# Patient Record
Sex: MALE | Race: WHITE | Employment: OTHER | URBAN - NONMETROPOLITAN AREA
[De-identification: names, ages, dates, MRNs, and addresses within clinical notes are randomized per-mention and may not be internally consistent; named-entity substitution may affect disease eponyms.]

---

## 2017-01-11 ENCOUNTER — HOSPITAL ENCOUNTER (INPATIENT)
Facility: HOSPITAL | Age: 82
LOS: 4 days | Discharge: NURSING, REHAB OR LONG-TERM CARE | DRG: 638 | End: 2017-01-16
Attending: INTERNAL MEDICINE | Admitting: NURSE PRACTITIONER
Payer: MEDICARE

## 2017-01-11 DIAGNOSIS — E78.00 PURE HYPERCHOLESTEROLEMIA: ICD-10-CM

## 2017-01-11 DIAGNOSIS — I10 HTN (HYPERTENSION): ICD-10-CM

## 2017-01-11 DIAGNOSIS — Z79.4 TYPE 2 DIABETES MELLITUS WITHOUT COMPLICATION, WITH LONG-TERM CURRENT USE OF INSULIN (H): ICD-10-CM

## 2017-01-11 DIAGNOSIS — I10 ESSENTIAL HYPERTENSION: ICD-10-CM

## 2017-01-11 DIAGNOSIS — E16.2 HYPOGLYCEMIA: ICD-10-CM

## 2017-01-11 DIAGNOSIS — J44.9 COPD (CHRONIC OBSTRUCTIVE PULMONARY DISEASE) (H): ICD-10-CM

## 2017-01-11 DIAGNOSIS — N40.0 HYPERTROPHY OF PROSTATE WITHOUT URINARY OBSTRUCTION: ICD-10-CM

## 2017-01-11 DIAGNOSIS — M86.171 OSTEOMYELITIS OF FOOT, RIGHT, ACUTE (H): Primary | ICD-10-CM

## 2017-01-11 DIAGNOSIS — E11.9 TYPE 2 DIABETES MELLITUS WITHOUT COMPLICATION, WITH LONG-TERM CURRENT USE OF INSULIN (H): ICD-10-CM

## 2017-01-11 DIAGNOSIS — Z79.4 TYPE 2 DIABETES MELLITUS WITH HYPOGLYCEMIA WITHOUT COMA, WITH LONG-TERM CURRENT USE OF INSULIN (H): ICD-10-CM

## 2017-01-11 DIAGNOSIS — E11.649 TYPE 2 DIABETES MELLITUS WITH HYPOGLYCEMIA WITHOUT COMA, WITH LONG-TERM CURRENT USE OF INSULIN (H): ICD-10-CM

## 2017-01-11 DIAGNOSIS — I48.21 PERMANENT ATRIAL FIBRILLATION (H): ICD-10-CM

## 2017-01-11 DIAGNOSIS — I48.91 A-FIB (H): ICD-10-CM

## 2017-01-11 DIAGNOSIS — M19.90 OSTEOARTHRITIS, UNSPECIFIED OSTEOARTHRITIS TYPE, UNSPECIFIED SITE: ICD-10-CM

## 2017-01-11 DIAGNOSIS — K21.9 GASTROESOPHAGEAL REFLUX DISEASE WITHOUT ESOPHAGITIS: ICD-10-CM

## 2017-01-11 DIAGNOSIS — I25.10 CORONARY ARTERY DISEASE WITHOUT ANGINA PECTORIS, UNSPECIFIED VESSEL OR LESION TYPE, UNSPECIFIED WHETHER NATIVE OR TRANSPLANTED HEART: ICD-10-CM

## 2017-01-11 DIAGNOSIS — J41.0 SIMPLE CHRONIC BRONCHITIS (H): ICD-10-CM

## 2017-01-11 DIAGNOSIS — I10 BENIGN ESSENTIAL HYPERTENSION: ICD-10-CM

## 2017-01-11 DIAGNOSIS — K21.9 ESOPHAGEAL REFLUX: ICD-10-CM

## 2017-01-11 DIAGNOSIS — S20.221A BACK CONTUSION, RIGHT, INITIAL ENCOUNTER: ICD-10-CM

## 2017-01-11 PROBLEM — C61: Chronic | Status: ACTIVE | Noted: 2017-01-11

## 2017-01-11 PROBLEM — T38.3X5A HYPOGLYCEMIA DUE TO INSULIN: Status: ACTIVE | Noted: 2017-01-11

## 2017-01-11 PROBLEM — E16.0 HYPOGLYCEMIA DUE TO INSULIN: Status: ACTIVE | Noted: 2017-01-11

## 2017-01-11 LAB
ALBUMIN SERPL-MCNC: 2.4 G/DL (ref 3.4–5)
ALBUMIN UR-MCNC: 30 MG/DL
ALP SERPL-CCNC: 86 U/L (ref 40–150)
ALT SERPL W P-5'-P-CCNC: 15 U/L (ref 0–70)
ANION GAP SERPL CALCULATED.3IONS-SCNC: 9 MMOL/L (ref 3–14)
APPEARANCE UR: CLEAR
AST SERPL W P-5'-P-CCNC: 10 U/L (ref 0–45)
BASOPHILS # BLD AUTO: 0 10E9/L (ref 0–0.2)
BASOPHILS NFR BLD AUTO: 0.2 %
BILIRUB SERPL-MCNC: 0.5 MG/DL (ref 0.2–1.3)
BILIRUB UR QL STRIP: NEGATIVE
BUN SERPL-MCNC: 28 MG/DL (ref 7–30)
CALCIUM SERPL-MCNC: 8.6 MG/DL (ref 8.5–10.1)
CHLORIDE SERPL-SCNC: 102 MMOL/L (ref 94–109)
CK SERPL-CCNC: 56 U/L (ref 30–300)
CO2 SERPL-SCNC: 28 MMOL/L (ref 20–32)
COLOR UR AUTO: YELLOW
CREAT SERPL-MCNC: 1.56 MG/DL (ref 0.66–1.25)
DIFFERENTIAL METHOD BLD: ABNORMAL
EOSINOPHIL # BLD AUTO: 0 10E9/L (ref 0–0.7)
EOSINOPHIL NFR BLD AUTO: 0.2 %
ERYTHROCYTE [DISTWIDTH] IN BLOOD BY AUTOMATED COUNT: 13.2 % (ref 10–15)
GFR SERPL CREATININE-BSD FRML MDRD: 43 ML/MIN/1.7M2
GLUCOSE BLDC GLUCOMTR-MCNC: 125 MG/DL (ref 70–99)
GLUCOSE BLDC GLUCOMTR-MCNC: 155 MG/DL (ref 70–99)
GLUCOSE BLDC GLUCOMTR-MCNC: 204 MG/DL (ref 70–99)
GLUCOSE BLDC GLUCOMTR-MCNC: 212 MG/DL (ref 70–99)
GLUCOSE BLDC GLUCOMTR-MCNC: 246 MG/DL (ref 70–99)
GLUCOSE SERPL-MCNC: 161 MG/DL (ref 70–99)
GLUCOSE UR STRIP-MCNC: 70 MG/DL
HCT VFR BLD AUTO: 36.5 % (ref 40–53)
HGB BLD-MCNC: 12.4 G/DL (ref 13.3–17.7)
HGB UR QL STRIP: ABNORMAL
HYALINE CASTS #/AREA URNS LPF: 2 /LPF (ref 0–2)
IMM GRANULOCYTES # BLD: 0.1 10E9/L (ref 0–0.4)
IMM GRANULOCYTES NFR BLD: 0.5 %
INR PPP: 1.2 (ref 0.8–1.2)
KETONES UR STRIP-MCNC: NEGATIVE MG/DL
LACTATE SERPL-SCNC: 1.6 MMOL/L (ref 0.4–2)
LEUKOCYTE ESTERASE UR QL STRIP: NEGATIVE
LYMPHOCYTES # BLD AUTO: 1.1 10E9/L (ref 0.8–5.3)
LYMPHOCYTES NFR BLD AUTO: 7.7 %
MCH RBC QN AUTO: 29.9 PG (ref 26.5–33)
MCHC RBC AUTO-ENTMCNC: 34 G/DL (ref 31.5–36.5)
MCV RBC AUTO: 88 FL (ref 78–100)
MONOCYTES # BLD AUTO: 0.9 10E9/L (ref 0–1.3)
MONOCYTES NFR BLD AUTO: 6.3 %
MUCOUS THREADS #/AREA URNS LPF: PRESENT /LPF
NEUTROPHILS # BLD AUTO: 12.1 10E9/L (ref 1.6–8.3)
NEUTROPHILS NFR BLD AUTO: 85.1 %
NITRATE UR QL: NEGATIVE
NRBC # BLD AUTO: 0 10*3/UL
NRBC BLD AUTO-RTO: 0 /100
PH UR STRIP: 5.5 PH (ref 4.7–8)
PLATELET # BLD AUTO: 384 10E9/L (ref 150–450)
POTASSIUM SERPL-SCNC: 4.4 MMOL/L (ref 3.4–5.3)
PROT SERPL-MCNC: 6.9 G/DL (ref 6.8–8.8)
RBC # BLD AUTO: 4.15 10E12/L (ref 4.4–5.9)
RBC #/AREA URNS AUTO: 2 /HPF (ref 0–2)
SODIUM SERPL-SCNC: 139 MMOL/L (ref 133–144)
SP GR UR STRIP: 1.02 (ref 1–1.03)
URN SPEC COLLECT METH UR: ABNORMAL
UROBILINOGEN UR STRIP-MCNC: 2 MG/DL (ref 0–2)
WBC # BLD AUTO: 14.2 10E9/L (ref 4–11)
WBC #/AREA URNS AUTO: 3 /HPF (ref 0–2)

## 2017-01-11 PROCEDURE — 99223 1ST HOSP IP/OBS HIGH 75: CPT | Mod: AI | Performed by: NURSE PRACTITIONER

## 2017-01-11 PROCEDURE — 81001 URINALYSIS AUTO W/SCOPE: CPT | Performed by: INTERNAL MEDICINE

## 2017-01-11 PROCEDURE — 85025 COMPLETE CBC W/AUTO DIFF WBC: CPT | Performed by: INTERNAL MEDICINE

## 2017-01-11 PROCEDURE — 73630 X-RAY EXAM OF FOOT: CPT | Mod: TC,RT

## 2017-01-11 PROCEDURE — 96360 HYDRATION IV INFUSION INIT: CPT

## 2017-01-11 PROCEDURE — 85610 PROTHROMBIN TIME: CPT | Performed by: INTERNAL MEDICINE

## 2017-01-11 PROCEDURE — 40000786 ZZHCL STATISTIC ACTIVE MRSA SURVEILLANCE CULTURE: Performed by: NURSE PRACTITIONER

## 2017-01-11 PROCEDURE — 93005 ELECTROCARDIOGRAM TRACING: CPT

## 2017-01-11 PROCEDURE — 99285 EMERGENCY DEPT VISIT HI MDM: CPT | Mod: 25

## 2017-01-11 PROCEDURE — A9270 NON-COVERED ITEM OR SERVICE: HCPCS | Mod: GY | Performed by: NURSE PRACTITIONER

## 2017-01-11 PROCEDURE — 71020 ZZHC CHEST TWO VIEWS, FRONT/LAT: CPT | Mod: TC

## 2017-01-11 PROCEDURE — 93010 ELECTROCARDIOGRAM REPORT: CPT | Performed by: INTERNAL MEDICINE

## 2017-01-11 PROCEDURE — 99283 EMERGENCY DEPT VISIT LOW MDM: CPT | Performed by: INTERNAL MEDICINE

## 2017-01-11 PROCEDURE — 82550 ASSAY OF CK (CPK): CPT | Performed by: INTERNAL MEDICINE

## 2017-01-11 PROCEDURE — G0378 HOSPITAL OBSERVATION PER HR: HCPCS

## 2017-01-11 PROCEDURE — 40000275 ZZH STATISTIC RCP TIME EA 10 MIN

## 2017-01-11 PROCEDURE — 25000132 ZZH RX MED GY IP 250 OP 250 PS 637: Mod: GY | Performed by: NURSE PRACTITIONER

## 2017-01-11 PROCEDURE — 36415 COLL VENOUS BLD VENIPUNCTURE: CPT | Performed by: INTERNAL MEDICINE

## 2017-01-11 PROCEDURE — 00000146 ZZHCL STATISTIC GLUCOSE BY METER IP

## 2017-01-11 PROCEDURE — 96361 HYDRATE IV INFUSION ADD-ON: CPT

## 2017-01-11 PROCEDURE — 70450 CT HEAD/BRAIN W/O DYE: CPT | Mod: TC

## 2017-01-11 PROCEDURE — 83605 ASSAY OF LACTIC ACID: CPT | Performed by: INTERNAL MEDICINE

## 2017-01-11 PROCEDURE — 25000125 ZZHC RX 250: Performed by: INTERNAL MEDICINE

## 2017-01-11 PROCEDURE — 80053 COMPREHEN METABOLIC PANEL: CPT | Performed by: INTERNAL MEDICINE

## 2017-01-11 RX ORDER — METOPROLOL SUCCINATE 50 MG/1
50 TABLET, EXTENDED RELEASE ORAL DAILY
Status: DISCONTINUED | OUTPATIENT
Start: 2017-01-11 | End: 2017-01-14

## 2017-01-11 RX ORDER — CANDESARTAN 16 MG/1
16 TABLET ORAL DAILY
Status: DISCONTINUED | OUTPATIENT
Start: 2017-01-11 | End: 2017-01-13

## 2017-01-11 RX ORDER — DEXTROSE MONOHYDRATE 50 MG/ML
INJECTION, SOLUTION INTRAVENOUS CONTINUOUS
Status: DISCONTINUED | OUTPATIENT
Start: 2017-01-11 | End: 2017-01-12

## 2017-01-11 RX ORDER — ACETAMINOPHEN 325 MG/1
650 TABLET ORAL EVERY 4 HOURS PRN
Status: DISCONTINUED | OUTPATIENT
Start: 2017-01-11 | End: 2017-01-16 | Stop reason: HOSPADM

## 2017-01-11 RX ORDER — NICOTINE POLACRILEX 4 MG
15-30 LOZENGE BUCCAL
Status: DISCONTINUED | OUTPATIENT
Start: 2017-01-11 | End: 2017-01-16 | Stop reason: HOSPADM

## 2017-01-11 RX ORDER — DIGOXIN 125 MCG
125 TABLET ORAL DAILY
Status: DISCONTINUED | OUTPATIENT
Start: 2017-01-11 | End: 2017-01-16 | Stop reason: HOSPADM

## 2017-01-11 RX ORDER — ASPIRIN 325 MG
325 TABLET ORAL DAILY
Status: DISCONTINUED | OUTPATIENT
Start: 2017-01-11 | End: 2017-01-16 | Stop reason: HOSPADM

## 2017-01-11 RX ORDER — WARFARIN SODIUM 4 MG/1
8 TABLET ORAL
Status: COMPLETED | OUTPATIENT
Start: 2017-01-11 | End: 2017-01-11

## 2017-01-11 RX ORDER — DEXTROSE MONOHYDRATE 25 G/50ML
25-50 INJECTION, SOLUTION INTRAVENOUS
Status: DISCONTINUED | OUTPATIENT
Start: 2017-01-11 | End: 2017-01-16 | Stop reason: HOSPADM

## 2017-01-11 RX ORDER — NALOXONE HYDROCHLORIDE 0.4 MG/ML
.1-.4 INJECTION, SOLUTION INTRAMUSCULAR; INTRAVENOUS; SUBCUTANEOUS
Status: DISCONTINUED | OUTPATIENT
Start: 2017-01-11 | End: 2017-01-16 | Stop reason: HOSPADM

## 2017-01-11 RX ORDER — HYDROCODONE BITARTRATE AND ACETAMINOPHEN 5; 325 MG/1; MG/1
1 TABLET ORAL EVERY 6 HOURS PRN
Status: DISCONTINUED | OUTPATIENT
Start: 2017-01-11 | End: 2017-01-16 | Stop reason: HOSPADM

## 2017-01-11 RX ADMIN — METOPROLOL SUCCINATE 50 MG: 50 TABLET, EXTENDED RELEASE ORAL at 18:30

## 2017-01-11 RX ADMIN — INSULIN GLARGINE 80 UNITS: 300 INJECTION, SOLUTION SUBCUTANEOUS at 22:42

## 2017-01-11 RX ADMIN — CANDESARTAN CILEXETIL 16 MG: 16 TABLET ORAL at 12:29

## 2017-01-11 RX ADMIN — WARFARIN SODIUM 8 MG: 4 TABLET ORAL at 18:30

## 2017-01-11 RX ADMIN — DEXTROSE MONOHYDRATE: 50 INJECTION, SOLUTION INTRAVENOUS at 06:15

## 2017-01-11 RX ADMIN — INSULIN ASPART 2 UNITS: 100 INJECTION, SOLUTION INTRAVENOUS; SUBCUTANEOUS at 12:21

## 2017-01-11 RX ADMIN — INSULIN ASPART 2 UNITS: 100 INJECTION, SOLUTION INTRAVENOUS; SUBCUTANEOUS at 18:32

## 2017-01-11 RX ADMIN — DIGOXIN 125 MCG: 125 TABLET ORAL at 12:29

## 2017-01-11 RX ADMIN — CANDESARTAN CILEXETIL 16 MG: 16 TABLET ORAL at 12:21

## 2017-01-11 RX ADMIN — RANITIDINE HYDROCHLORIDE 150 MG: 150 TABLET, FILM COATED ORAL at 22:39

## 2017-01-11 RX ADMIN — DEXTROSE MONOHYDRATE: 50 INJECTION, SOLUTION INTRAVENOUS at 17:28

## 2017-01-11 ASSESSMENT — ACTIVITIES OF DAILY LIVING (ADL)
TRANSFERRING: 0-->INDEPENDENT
CHANGE_IN_FUNCTIONAL_STATUS_SINCE_ONSET_OF_CURRENT_ILLNESS/INJURY: NO
RETIRED_EATING: 0-->INDEPENDENT
NUMBER_OF_TIMES_PATIENT_HAS_FALLEN_WITHIN_LAST_SIX_MONTHS: 2
TOILETING: 0-->INDEPENDENT
BATHING: 0-->INDEPENDENT
SWALLOWING: 0-->SWALLOWS FOODS/LIQUIDS WITHOUT DIFFICULTY
EATING: 0-->INDEPENDENT
DRESS: 0-->INDEPENDENT
CURRENT_FUNCTIONAL_LEVEL_COMMENT: INDEPENDENT
DRESS: 0-->INDEPENDENT
RETIRED_COMMUNICATION: 0-->UNDERSTANDS/COMMUNICATES WITHOUT DIFFICULTY
FALL_HISTORY_WITHIN_LAST_SIX_MONTHS: YES
COMMUNICATION: 0-->UNDERSTANDS/COMMUNICATES WITHOUT DIFFICULTY
SWALLOWING: 0-->SWALLOWS FOODS/LIQUIDS WITHOUT DIFFICULTY
COGNITION: 0 - NO COGNITION ISSUES REPORTED
AMBULATION: 1-->ASSISTIVE EQUIPMENT
TRANSFERRING: 0-->INDEPENDENT
TOILETING: 0-->INDEPENDENT
WHICH_OF_THE_ABOVE_FUNCTIONAL_RISKS_HAD_A_RECENT_ONSET_OR_CHANGE?: TRANSFERRING
BATHING: 0-->INDEPENDENT
AMBULATION: 1-->ASSISTIVE EQUIPMENT

## 2017-01-11 NOTE — IP AVS SNAPSHOT
` ` Patient Information     Patient Name Sex     Adiel Rosa Jr ([id number]) Male 1935       Room Bed    3106 3106-1      Patient Demographics     Address Phone    [address]  [place] [address] [phone] (Home)  none (Mobile)      Patient Ethnicity & Race     Ethnic Group Patient Race    American White      Emergency Contact(s)     Name Relation Home Work Mobile    Chitra Rosa Spouse [phone]      No Secondary Contact Other none        Documents on File        Status Date Received Description       Documents for the Patient    Consent for Services - Hospital/Clinic-ESign Received 13     [place] Privacy Notice-ESign Received 13     Patient ID Received 13     Consent for EHR Access-Received-ESign Received 13     Consent for EHR Access-Decline-ESign       Insurance Card Received 13     Patient ID Received 13     HIM CHAYO Authorization - File Only  10/28/13     Consent for Services - Hospital/Clinic-ESign Received 14     HIM CHAYO Authorization  06/10/14 [facility]    HIM CHAYO Authorization  10/02/14 [facility]    HIM CHAYO Authorization  14 [facility]    Consent for Services - Hospital/Clinic Received 04/06/15 Er Visit    Consent for Services - Hospital/Clinic-ESign Received 08/27/15     Business/Insurance/Care Coordination/Health Form - Patient   INSULIN-TREATED DIABETES MELLITUS REPORT, [place] DEPT PUBLIC SAF[place], 2016    Business/Insurance/Care Coordination/Health Form - Patient   'S LICENSE CANCELED, [place] [employer]PT [place], 2016    Consent for Services/Privacy Notice - Hospital/Clinic-[facility]sign Received 16     Privacy Notice - [place] Received 16     Consent for Services/Privacy Notice - Hospital/Clinic          Documents for the Encounter    Preliminary Result  17 CT HEAD, PRELIMINARY RADIOLOGY REPORT, [facility], 2017    CMS IM for Patient Signature Received 17     CMS IM for Patient  Signature -  Received 01/13/17     CMS IM for Patient Signature Received 01/16/17     Assessment/Questionnaire  01/16/17 MRI HISTORY QUESTIONNAIRE AND CONTRAST NOTICE      Admission Information     Attending Provider Admitting Provider Admission Type Admission Date/Time    Alejandro Fung MD Stenstrom, Scott, MD Emergency 01/11/17  0552    Discharge Date Hospital Service Auth/Cert Status Service Area     General Medicine Incomplete [facility]    Unit Room/Bed Admission Status    [facility] 3106/3106-1 Admission (Confirmed)            Admission     Complaint    Hypoglycemia due to insulin, Osteomyelitis of foot, right, acute (H)      Hospital Account     Name Acct ID Class Status Primary Coverage    Adiel Rosa Jr. [id number] Inpatient Open MEDICARE - MEDICARE            Guarantor Account (for Hospital Account #[id number])     Name Relation to Pt Service Area Active? Acct Type    Adiel Rosa Jr. Self [facility] Yes Personal/Family    Address Phone          [address] [place] [address] [phone](H)              Coverage Information (for Hospital Account #[id number])     1. MEDICARE/MEDICARE     F/O Payor/Plan Precert #    MEDICARE/MEDICARE     Subscriber Subscriber #    Adiel Rosa Jr. [id number]    Address Phone    [employer]  [address]  [facility] [place] [address] [phone]          2. // FOR LIFE     F/O Payor/Plan Precert #    // FOR LIFE     Subscriber Subscriber #    Adiel Rosa Jr. [id number]    Address Phone     [employer]  [address]  [place] [address]

## 2017-01-11 NOTE — ED NOTES
"Pt called EMS d/t a fall, which pt states that he hit his head. Upon arrival EMS reports BG was 34. Pt given some oral glucose and BG increased to 50. 1/2 AMP of D50 given and BG increased to 126. Pt reports that he ran out of his long acting insulin on Monday, and has been taking \"some insulin that starts with an R in a green pen.\" Pt reports that he has been taking 10 units of this insulin along with his novolog. Pt denies pain, dizziness, diaphoresis, n/v/d, and is alert and oriented. No bruising noted.   "

## 2017-01-11 NOTE — IP AVS SNAPSHOT
"` `     [facility]: [phone]                 INTERAGENCY TRANSFER FORM - NOTES (H&P, Discharge Summary, Consults, Procedures, Therapies)   2017                    Hospital Admission Date: 2017  JOYCE ROSA JR   : 1935  Sex: Male        Patient PCP Information     Provider PCP Type    R Hao Grijalva MD General         History & Physicals      H&P by Shirley Chan NP at 2017 11:38 AM     Author:  Shirley Chan NP Service:  Hospitalist Author Type:  Nurse Practitioner    Filed:  2017  5:13 PM Note Time:  2017 11:38 AM Status:  Signed    :  Shirley Chan NP (Nurse Practitioner)           [facility]    History and Physical  Hospitalist       Date of Admission:  2017  Date of Service (when I saw the patient): 2017    Assessment and Plan  Joyce Rosa Jr is a 81 year old male who presents with fall at home.     Active Problems:      Hypoglycemia due to insulin: Ran out of [facility] so called diabetes center and they told him to use his Levemir pen instead at regular dose. He did but states he has no food in the house so has not really eaten much in the last 24 hours. He got up from chair and sort of lost his balance and fell to his knees then forward hitting his head on the carpeted floor. By the time he his his head he was already kneeling. His wife called EMS. EMS found him seated on the floor awake and alert. Blood sugars was in the 30's. He wa given further glucose in the ED and blood sugars have come up nicely. He denies taking any insulin today.       Diabetic foot ulcer: Large 4x4 calloused ulcer noted to right plantar foot at 2nd/3rd metatarsal. There is putrid drainage present. There is a corresponding wound on dorsal side of foot with same drainage. Patient states wound present \"about a month\". Lopez not recall specific injury but does state he is \"out working in the yard a lot, could have stepped on something\". " "Has been treating at home with epsom salt soaks and gold bond lotion. Has been draining for some time. Xray pending to evaluate for osteomyelitis, I would be surprised if he DIDN'T have osteomyelitis. May need MRI for definitive diagnosis and to learn extent of disease.       Uncontrolled diabetes with vascular complications: History peripheral neuropathy, poor compliance and poor control of diabetes. Checks blood sugars unreliable,takes medications unreliably and refused dietary education. He reports he has been receiving his Trujeo from diabetes center and when he takes it he feels his sugars are in good control.       Poor compliance to medical treatment: Fails to show to multiple appointments including diabetic center for follow and to get insulin pens. Has not followed with Coumadin clinic and INR subtherapeutic today. States his car is frozen and won't start. Consult for  for community services, transportation services, etc. In the past has refused services.       Chronic A-fib: Rate well controlled.       Chronic anticoagulation 2nd to above: INR 1.2, pharmacy to dose while here.       COPD,chronic: Takes Advair \"occasionally\". As this is not in our formulary will not order while here.      Fall at home ,presumably due to severe hypoglycemia: Did hit his head but he has no identifiable injury, no laceration, bruising or swelling. INR subtherapeutic, CT scan head negative for acute hemorrhage.           DVT Prophylaxis: Low Risk/Ambulatory with no VTE prophylaxis indicated  Code Status: Full Code    Disposition: Expected discharge in 1-2 days once stable.    Shirley Chan, CNP    Primary Care Physician  SASKIA Grijalva    Chief Complaint  Fall with low blood sugar    History is obtained from the patient    History of Present Illness  Adiel Rosa Jr is a 81 year old male who presented to the ED with hypoglycemia and fall at home. He reports that he ran out of his Trujeo and so took his old " "Levimir instead at his old dose. However, he has \"no food in the house\" so has basically not eaten over the last 24 hours or so. No food this morning, has not taken any insulin this morning either. When EMS arrived to his house they found him seated on the floor alert and conversant. He states he lost his balance and fell to his knees then fell forward and struck his forehead on the floor. He denies any loss of consciousness. EMS checked glucose an it was in the 30's. On arrival to ED glucose >100 and remained so for several hours in the ED. CT scan negative for acute hemorrhage. No neurological changes or complaints. He is on coumadin therapy so he is admitted fo observation overnight.      Past Medical History   I have reviewed this patient's medical history and updated it with pertinent information if needed.   Past Medical History   Diagnosis Date     Diabetes mellitus without mention of complication 11/29/1999     Cough 9/11/2007     Hypertension, benign 1/1/2011     Hypercholesterolemia 1/1/2011     Acute myocardial infarction of other specified sites, episode of care unspecified 1/1/1999     Non Q wave  treated with Retavase     Esophageal reflux 1/1/2011     BPH 1/1/2011     Erectile Dysfunction 1/1/2011     COPD 1/1/2011     Hypercalcemia 1/1/2011     Benign hypertensive heart disease with congestive 1/1/2011     Chronic renal disease, stage III 9/22/2011       Past Surgical History  I have reviewed this patient's surgical history and updated it with pertinent information if needed.  Past Surgical History   Procedure Laterality Date     Nephrolithiasis       lithotripsy     Bladder stone removal       Appendectomy       Tonsillectomy       Stent x 1         Prior to Admission Medications  Prior to Admission Medications   Prescriptions Last Dose Informant Patient Reported? Taking?   ADVAIR DISKUS 250-50 MCG/DOSE diskus inhaler Unknown at Unknown time  No No   Sig: USE 1 INHALATION TWO TIMES A DAY IN THE " MORNING AND IN THE EVENING APPROXIMATELY 12 HOURS APART   B-D U/F 31G X 8 MM insulin pen needle 1/11/2017 at Unknown time  No Yes   Sig: USE 5 TO 6 PEN NEEDLES DAILY OR AS DIRECTED   Cranberry 500 MG CAPS 1/10/2017 at Unknown time  Yes Yes   Sig: Take 1 capsule by mouth   Doxylamine-DM 6.25-15 MG/15ML LIQD Unknown at Unknown time  Yes No   Sig: Taking as pkg directions at night   HYDROcodone-acetaminophen (NORCO) 5-325 MG per tablet 1/10/2017 at Unknown time  No Yes   Sig: Take 1 tablet by mouth every 6 hours as needed for moderate to severe pain   Multiple Vitamins-Minerals (CENTRUM SILVER PO) 1/10/2017 at Unknown time  Yes Yes   Sig: Take 1 tablet by mouth   NOVOLOG FLEXPEN 100 UNIT/ML soln 1/10/2017 at Unknown time  No Yes   Sig: INJECT 20 TO 30 UNITS FOUR TIMES A DAY WITH MEALS AND AT BEDTIME   ONE TOUCH ULTRA test strip 1/10/2017 at Unknown time  No Yes   Sig: USE UP TO 5 STRIPS DAILY TO TEST BLOOD GLUCOSE   acetaminophen (TYLENOL ARTHRITIS PAIN) 650 MG CR tablet Unknown at Unknown time  Yes No   Sig: Take 650 mg by mouth every 8 hours as needed   aspirin 325 MG tablet 1/10/2017 at Unknown time  Yes Yes   Sig: Take 1 tablet by mouth daily   candesartan (ATACAND) 16 MG tablet 1/10/2017 at Unknown time  No Yes   Sig: TAKE 1 TABLET DAILY   cholecalciferol (VITAMIN D3) 1000 UNIT tablet 1/10/2017 at Unknown time  Yes Yes   Sig: Take 1,000 Units by mouth daily   digoxin (LANOXIN) 125 MCG tablet 1/10/2017 at Unknown time  No Yes   Sig: TAKE 1 TABLET DAILY   insulin glargine U-300 (TOUJEO) 300 UNIT/ML PEN Past Week at Unknown time  No Yes   Sig: Inject 80 Units Subcutaneous At Bedtime   magnesium 250 MG tablet 1/10/2017 at Unknown time  Yes Yes   Sig: Take 1 tablet by mouth daily   metoprolol (TOPROL-XL) 50 MG 24 hr tablet 1/10/2017 at Unknown time  No Yes   Sig: TAKE 1 TABLET DAILY   ranitidine (ZANTAC) 150 MG tablet 1/10/2017 at Unknown time  No Yes   Sig: TAKE 1 TABLET TWICE A DAY   simvastatin (ZOCOR) 80 MG tablet  1/10/2017 at Unknown time  No Yes   Sig: TAKE 1 TABLET DAILY IN THE EVENING   warfarin (COUMADIN) 2 MG tablet 1/10/2017 at Unknown time  No Yes   Sig: TAKE 4 TABLETS ON MONDAY, WEDNESDAY, AND FRIDAY AND 3 TABLETS ALL OTHER DAYS      Facility-Administered Medications: None     Allergies  No Known Allergies    Social History  I have reviewed this patient's social history and updated it with pertinent information if needed. Adiel Rosa   reports that he has quit smoking. His smoking use included Cigarettes. He has never used smokeless tobacco.    Family History  I have reviewed this patient's family history and updated it with pertinent information if needed.   Family History   Problem Relation Age of Onset     C.A.D. Father      C.A.D.       Family hx     Heart Failure Mother      Congestive     DIABETES       Family hx     HEART DISEASE Brother      MI, cause of death     HEART DISEASE Father      MI, cause of death     Other - See Comments Brother      PVD     HEART DISEASE Mother      MI, cause of death       Review of Systems  Review Of Systems  Skin: negative, negative for, rash, itching, bruising, lumps or bumps, wounds  Eyes: negative, visual blurring  Ears/Nose/Throat: negative, tinnitus, vertigo  Respiratory: No shortness of breath, dyspnea on exertion, cough, or hemoptysis  Cardiovascular: negative for, palpitations, tachycardia, irregular heart beat, chest pain, lower extremity edema and syncope or near-syncope  Gastrointestinal: negative for, nausea, vomiting and abdominal pain  Genitourinary: negative for, dysuria, urgency and hematuria  Musculoskeletal: negative for, neck pain and joint pain  Neurologic: negative for, headaches, syncope, speech problems, incoordination and tremor  Hematologic/Lymphatic/Immunologic: positive for negative and weight loss, negative for, bleeding disorder, chills, fever, night sweats and swollen nodes  Endocrine: positive for negative, diabetes and polyuria, negative  for, hot flashes and night sweats    Physical Exam  Temp: 97.8  F (36.6  C) Temp src: Oral BP: 114/55 mmHg Pulse: 97 Heart Rate: 78 Resp: 18 SpO2: 98 % O2 Device: None (Room air)    Vital Signs with Ranges  Temp:  [97  F (36.1  C)-97.8  F (36.6  C)] 97.8  F (36.6  C)  Pulse:  [97] 97  Heart Rate:  [] 78  Resp:  [16-18] 18  BP: (100-121)/(54-88) 114/55 mmHg  SpO2:  [95 %-98 %] 98 %  185 lbs 13.56 oz    EXAM:  Constitutional: no distress, pale and unkempt appearing.   Cardiovascular: negative findings: no lift, heave, or thrill, no murmurs, clicks, or gallops, positive findings: irregular rhythm  Respiratory: Percussion normal. Good diaphragmatic excursion. Lungs clear  Head: Normocephalic. No masses, lesions, tenderness or abnormalities, negative findings: Nares normal. Septum midline. Mucosa normal. No drainage or sinus tenderness., negative findings: no wounds,swelling or bruising.  Neck: Neck supple. No adenopathy. Thyroid symmetric, normal size,, Carotids without bruits., negative findings: cervical spinal crepitus or pain on palpation over bony prominences.  Abdomen: Abdomen soft, non-tender. BS normal. No masses, organomegaly  : Deferred  NEURO: negative findings: speech normal, mental status intact, cranial nerves 2-12 intact, muscle strength normal, reflexes normal and symmetric  SKIN: no suspicious lesions or rashes  JOINT/EXTREMITIES: Large 4cm x4cm thick calloused area noted to plantar foot at 2nd/3rd metatarsal head. Central opening is 1cm x1cm and somewhat elliptical in shape. Very malodorous. Thin yellow to serosanguinous drainage present and increases with palpation of the forefoot. Patient states he does have some pain with ambulation but no pain to palpation. Beneath the calloused area the tissue is mildly boggy. There are occasional air bubbles expressed as well as drainage. This wound appears to extend all the way to the dorsal surface as he has a 1cm x1cm open wound with same drainage  present. The surrounding tissue is not erythremic or warm to touch.       Data  Data reviewed today:  I personally reviewed no images or EKG's today. Foot xray pending. Head CT scan reviewed, no acute hemorrhage.     Recent Labs  Lab 01/11/17  0630   WBC 14.2*   HGB 12.4*   MCV 88      INR 1.20      POTASSIUM 4.4   CHLORIDE 102   CO2 28   BUN 28   CR 1.56*   ANIONGAP 9   AZRA 8.6   *   ALBUMIN 2.4*   PROTTOTAL 6.9   BILITOTAL 0.5   ALKPHOS 86   ALT 15   AST 10       Recent Results (from the past 24 hour(s))   CT Head w/o Contrast    Narrative    HEAD CT    FINDINGS:  There is an old right occipital infarct seen.  The  ventricular system and cortical sulci appear normal.  Cranial vault is  intact.  The paranasal sinuses are clear.    IMPRESSION:  OLD RIGHT OCCIPITAL INFARCT.  Exam Date: Jan 11, 2017 07:30:45 AM  Author: KIET ROMANO  This report is final and signed                       Discharge Summaries      Discharge Summaries by Alejandro Retana MD at 1/16/2017  2:35 PM     Author:  Alejandro Retana MD Service:  Hospitalist Author Type:  Physician    Filed:  1/16/2017  4:05 PM Note Time:  1/16/2017  2:35 PM Status:  Signed    :  Alejandro Retana MD (Physician)           [facility]    Discharge Summary  Hospitalist    Date of Admission:  1/11/2017  Date of Discharge:  1/16/2017  2:55 PM  Discharging Provider: Alejandro Retana  Date of Service (when I saw the patient): 01/16/2017    Discharge Diagnoses  Right foot osteomyelitis.  Uncontrolled diabetes mellitus with hypoglycemia on presentation to hospital.  Peripheral vascular disease.  Permanent atrial fibrillation, rate controlled.  Stable coronary artery disease.  Chronic kidney disease    History of Present Illness    Adiel Rosa Jr is a 81 year old man who was admitted on 1/11/2017 after fall. He indicated at that time he had difficulties with his home insulin supply and recently had been using an old  insulin.  He also had difficulties with enough food at home.  EMS was activated.  The patient was found to have a glucose of approximately 30. During initial evaluation, a right foot ulcer was noted felt to be a diabetic foot ulcer.  Further evaluation including magnetic resonance imaging was suggestive of osteomyelitis.  He was evaluated by Dr. Zamora, of orthopedic surgery.  Amputation was recommended.  Consistently, however, the patient has adamantly declined this.    Hospital Course  Adiel Rosa Jr was admitted on 1/11/2017.  The following problems were addressed during his hospitalization:      DM / hypoglycemia  Although he has not had marked hypoglycemia since early in his hospitalization.  Glucose levels have been relatively low wall insulin has been adjusted to less than his reported chronic dosing.  Suspect in large part his hypoglycemia has been on the basis of poor oral intake with a contribution from his active infectious process.  Over the past 24 hours, however, nighttime glucose was somewhat more elevated.  On the day of discharge, resumed a low dose of night time.  Glargine insulin continuing a lower than usual dose of daytime glargine insulin.  Have also continued a low sliding scale mealtime regular insulin dose.  Depending on his course in the next days to week.  Alteration in his insulin dosing will no doubt be required. It is unfortunately unlikely that efforts at take glucose control will be fruitful for this patient, at least as long as he has an active infection.     Right Diabetic foot ulcer / abscess with osteomyelitis:     Evaluated by Dr. Zamora. ABIs suggested that below the knee amputation would be successful.  Unfortunately, the patient has adamantly declined  Any consideration of surgery of any kind.  I, Dr. Lang, and Dr. Zamora have discussed with the patient that any treatment short of amputation is unlikely to have meaningful benefit. E remains, however, quite  opposed to any consideration of a surgical approach either now or in the future.  He describes a history of his brother which appears somewhat similar and on this basis indicates he is quite skeptical that a single surgery for him would be adequate.  He is able to volunteer that  Declining surgery may lead to his more rapid death.  He is able to acknowledge, although he is somewhat skeptical, that antibiotic and local wound treatment is unlikely to be successful.  However, he is focused on returning to his wife, who lives in an assisted living center in [place].  Although benefits are not marked.  Will continue oral antibiotics for 2 weeks with wound care.  They have requested follow-up with Dr. David Peck his primary physician.  Would be hopeful that at that point, the patient might be amenable to considering other therapy..  However, if this is not the case it is unlikely that prolonged antibiotics would be of meaningful benefit.    Permanent atrial fibrillation  Have resumed warfarin as any operation is quite unlikely. Rate control has been adequate with metoprolol as well as digoxin.  These are continued.  His prior dose of metoprolol was decreased from 50-25 mg  During this hospitalization.    Chronic kidney disease  Renal function appears at his baseline.    CAD / s/p stent 1999  As above, continuing metoprolol at a reduced dose.  Aspirin.  Echocardiogram and been done last in 2014 which showed mild reduction in left ventricular systolic function with normal LV size and left atrial enlargement.     Alejandro Retana    Significant Results and Procedures  Osteomyelitis suggested on magnetic resonance imaging    Pending Results  These results will be followed up by Dr. Cordero   Unresulted Labs Ordered in the Past 30 Days of this Admission     Date and Time Order Name Status Description    1/14/2017 0000 25 Hydroxyvitamin D2 and D3 In process     1/12/2017 0755 Wound Culture Aerobic Bacterial  Preliminary           Code Status  Full Code       Primary Care Physician  GAURAV Grijalva    Vital signs:  Temp: 99.2  F (37.3  C) Temp src: Tympanic BP: (!) 112/44 mmHg   Heart Rate: 78 Resp: 16 SpO2: 95 % O2 Device: None (Room air)   Height: 182.9 cm (6') Weight: 84.3 kg (185 lb 13.6 oz)  Estimated body mass index is 25.2 kg/(m^2) as calculated from the following:    Height as of this encounter: 1.829 m (6').    Weight as of this encounter: 84.3 kg (185 lb 13.6 oz).        Awake, alert, somewhat irritable man lying in bed on medical wards.  Speech is clear.  Oriented ×3.  Minimal distractibility.  HEENT: Pupils equal, conjugate. No icterus or nystagmus. Oral mucosa moist. No facial asymmetry.   Neck: Supple, jugular veins not elevated. Trachea midline   Chest: No chest wall movement asymmetry. Aeration preserved to bases. Accessory muscles not in use. Expiratory time not increased. No tidal wheezes. No rhonchi. No discrete crackles.   Cardiac: PMI not displaced. S1, S2 unremarkable. No S3, S4. P2 not accentuated. No murmurs. Carotid upstroke preserved. Carotid amplitude preserved.   Abdomen: Soft. No palpation or percussion tenderness. No distention. Normoactive bowel sounds. Liver and spleen not increased in size. No bruits, masses, or pulsations.   Extremities: Right foot with ulcers both on dorsum and plantar surface atbase of second toe.  mildly boggy area at base of 2nd toe. No palpation tenderness. unable to palpate pulses bilaterally. Ulcers subsequently dressed without significant drainage      Discharge Disposition  Discharged to assisted-living  Condition at discharge: Stable    Consultations This Hospital Stay  PHARMACY TO DOSE WARFARIN  NUTRITION SERVICES ADULT IP CONSULT  SOCIAL WORK IP CONSULT  WOUND OSTOMY CONTINENCE NURSE  IP CONSULT  PHYSICAL THERAPY ADULT IP CONSULT  PHARMACY TO DOSE WARFARIN    Time Spent on This Encounter  I, Alejandro Retana, personally saw the patient today and spent greater  than 30 minutes discharging this patient.    Discharge Orders    INR     Home care nursing referral     INR CLINIC REFERRAL     MD face to face encounter   Documentation of Face to Face and Certification for Home Health Services    I certify that patient: Adiel Rosa Jr is under my care and that I, or a nurse practitioner or physician's assistant working with me, had a face-to-face encounter that meets the physician face-to-face encounter requirements with this patient on: 1/16/2017.    This encounter with the patient was in whole, or in part, for the following medical condition, which is the primary reason for home health care: acute osteomyletis, uncontrolled diabetes mellitus.    I certify that, based on my findings, the following services are medically necessary home health services: Nursing.    My clinical findings support the need for the above services because: Nurse is needed: To provide assessment and oversight required in the home to assure adherence to the medical plan due to: complex wound care and monitoring of uncontrolled diabetes mellitus.    Further, I certify that my clinical findings support that this patient is homebound (i.e. absences from home require considerable and taxing effort and are for medical reasons or [employer] services or infrequently or of short duration when for other reasons) because: Leaving home is medically contraindicated for the following reason(s): Dyspnea on exertion that makes it so they cannot leave their home for needed services without clinical deterioration and extremity wound/infection limiting mobility    Based on the above findings. I certify that this patient is confined to the home and needs intermittent skilled nursing care, physical therapy and/or speech therapy.  The patient is under my care, and I have initiated the establishment of the plan of care.  This patient will be followed by a physician who will periodically review the plan of  care.  Physician/Provider to provide follow up care: GAURAV Grijalva    Attending hospital physician (the Medicare certified JORDIN provider): Alejandro Retana  Physician Signature: See electronic signature associated with these discharge orders.  Date: 1/16/2017     Activity   Your activity upon discharge: activity as tolerated using walker     Reason for your hospital stay   Adiel Rosa Jr is a 81 year old man who presented to the ED with hypoglycemia and fall at home. He reports difficulty with his insulin supply and enough food at home. In hospital, evaluation of a right foot ulcer demonstrated evidence of osteomyelitis. He adamantly declinced amputation which was recommended; He agreed to continued antibiotic treatment and dressing changes, with discussion indicating a low probability of these being effective. Insulin regimen was decreased from his previous because of relatively low glucose levels; further adjustment likely will be needed.     Diet   Follow this diet upon discharge: Orders Placed This Encounter  Moderate Consistent CHO Diet       Discharge Medications  Current Discharge Medication List      START taking these medications    Details   candesartan (ATACAND) 16 MG tablet Take 1 tablet (16 mg) by mouth daily  Qty: 90 tablet, Refills: 0    Associated Diagnoses: Essential hypertension      furosemide (LASIX) 40 MG tablet Take 1 tablet (40 mg) by mouth daily  Qty: 90 tablet, Refills: 1    Associated Diagnoses: Benign essential hypertension      benzocaine-menthol (CEPACOL) 15-3.6 MG lozenge Place 1 lozenge inside cheek every hour as needed for sore throat  Qty: 30 lozenge, Refills: 3    Associated Diagnoses: Simple chronic bronchitis (H)      amoxicillin-clavulanate (AUGMENTIN) 875-125 MG per tablet Take 1 tablet by mouth every 12 hours  Qty: 28 tablet, Refills: 0    Associated Diagnoses: Osteomyelitis of foot, right, acute (H)         CONTINUE these medications which have CHANGED    Details    HYDROcodone-acetaminophen (NORCO) 5-325 MG per tablet Take 1 tablet by mouth every 6 hours as needed for moderate to severe pain  Qty: 30 tablet, Refills: 0    Associated Diagnoses: Back contusion, right, initial encounter      Cranberry 500 MG CAPS Take 1 capsule (500 mg) by mouth daily  Qty: 90 capsule, Refills: 3    Associated Diagnoses: Hypertrophy of prostate without urinary obstruction      acetaminophen (TYLENOL) 650 MG CR tablet Take 1 tablet (650 mg) by mouth every 8 hours as needed  Qty: 60 tablet    Associated Diagnoses: Osteoarthritis, unspecified osteoarthritis type, unspecified site      aspirin 325 MG tablet Take 1 tablet (325 mg) by mouth daily  Qty: 120 tablet, Refills: 3    Associated Diagnoses: Coronary artery disease without angina pectoris, unspecified vessel or lesion type, unspecified whether native or transplanted heart      fluticasone-salmeterol (ADVAIR DISKUS) 250-50 MCG/DOSE diskus inhaler USE 1 INHALATION TWO TIMES A DAY IN THE MORNING AND IN THE EVENING APPROXIMATELY 12 HOURS APART  Qty: 3 Inhaler, Refills: 1    Associated Diagnoses: Simple chronic bronchitis (H)      warfarin (COUMADIN) 2 MG tablet TAKE 4 TABLETS ON MONDAY, WEDNESDAY, AND FRIDAY AND 3 TABLETS ALL OTHER DAYS  Qty: 312 tablet, Refills: 3    Associated Diagnoses: Permanent atrial fibrillation (H)      !! insulin glargine U-300 (TOUJEO) 300 UNIT/ML injection Inject 10 Units Subcutaneous At Bedtime  Qty: 12 mL, Refills: 3    Associated Diagnoses: Type 2 diabetes mellitus without complication, with long-term current use of insulin (H)      !! insulin glargine U-300 (TOUJEO) 300 UNIT/ML injection Inject 35 Units Subcutaneous every morning  Qty: 20 mL, Refills: 3    Associated Diagnoses: Type 2 diabetes mellitus without complication, with long-term current use of insulin (H)      insulin aspart (NOVOLOG FLEXPEN) 100 UNIT/ML injection Inject 1-7 Units Subcutaneous 3 times daily (with meals) If glucose less than or equal to  150 mg/dL do not give insulin  If glucose 151-200 give 2 Units subcutaneously  If glucose 201-250 give 3 Units subcutaneously  If glucose 251-300 give 4 Units subcutaneously  If glucose 301-350 give 5 Units subcutaneously  If glucose over 350 give 7 Units subcutaneously and call physician  Qty: 100 mL, Refills: 3    Associated Diagnoses: Type 2 diabetes mellitus without complication, with long-term current use of insulin (H)      simvastatin (ZOCOR) 80 MG tablet TAKE 1 TABLET DAILY IN THE EVENING  Qty: 90 tablet, Refills: 3    Associated Diagnoses: Pure hypercholesterolemia      metoprolol (TOPROL XL) 50 MG 24 hr tablet Take 0.5 tablets (25 mg) by mouth daily  Qty: 90 tablet, Refills: 0    Associated Diagnoses: Essential hypertension      digoxin (LANOXIN) 125 MCG tablet Take 1 tablet (125 mcg) by mouth daily  Qty: 90 tablet, Refills: 0    Associated Diagnoses: Permanent atrial fibrillation (H)      Doxylamine-DM 6.25-15 MG/15ML LIQD Taking as pkg directions at night  Qty: 236 mL, Refills: 3    Associated Diagnoses: Osteomyelitis of foot, right, acute (H); Simple chronic bronchitis (H)      magnesium 250 MG tablet Take 1 tablet by mouth daily  Qty: 30 tablet, Refills: 3    Associated Diagnoses: Permanent atrial fibrillation (H)      Multiple Vitamins-Minerals (CENTRUM SILVER) per tablet Take 1 tablet by mouth daily  Qty: 90 tablet, Refills: 3    Associated Diagnoses: Simple chronic bronchitis (H)      ranitidine (ZANTAC) 150 MG tablet Take 1 tablet (150 mg) by mouth 2 times daily  Qty: 180 tablet, Refills: 0    Associated Diagnoses: Gastroesophageal reflux disease without esophagitis      cholecalciferol (VITAMIN D3) 1000 UNIT tablet Take 1 tablet (1,000 Units) by mouth daily  Qty: 90 tablet, Refills: 3    Associated Diagnoses: Simple chronic bronchitis (H)       !! - Potential duplicate medications found. Please discuss with provider.      CONTINUE these medications which have NOT CHANGED    Details   B-D U/F 31G X  8 MM insulin pen needle USE 5 TO 6 PEN NEEDLES DAILY OR AS DIRECTED  Qty: 6 each, Refills: 3    Associated Diagnoses: Diabetes mellitus, type 2 (H)      ONE TOUCH ULTRA test strip USE UP TO 5 STRIPS DAILY TO TEST BLOOD GLUCOSE  Qty: 300 each, Refills: 1    Associated Diagnoses: Diabetes mellitus (H)           Allergies  No Known Allergies  Data  Most Recent 3 CBC's:  Recent Labs   Lab Test  01/15/17   0613  01/14/17   0611  01/13/17   0548   WBC  8.2  10.7  14.1*   HGB  11.4*  11.8*  12.9*   MCV  88  87  86   PLT  293  326  385      Most Recent 3 BMP's:  Recent Labs   Lab Test  01/15/17   0613  01/14/17   0611  01/13/17   0548   NA  140  139  140   POTASSIUM  4.4  4.3  3.8   CHLORIDE  104  104  104   CO2  29  28  28   BUN  21  18  18   CR  1.31*  1.24  1.25   ANIONGAP  7  7  8   AZRA  8.2*  8.2*  8.7   GLC  129*  75  35*     Most Recent 2 LFT's:  Recent Labs   Lab Test  01/15/17   0613  01/14/17   0611   AST  9  11   ALT  11  11   ALKPHOS  71  74   BILITOTAL  0.4  0.4     Most Recent INR's and Anticoagulation Dosing History:        Anticoagulation Dose History     Recent Dosing and Labs Latest Ref Rn 11/22/2016 1/11/2017 1/12/2017 1/13/2017 1/14/2017 1/15/2017 1/16/2017    Warfarin 3 mg - - - 6 mg - - - -    Warfarin 4 mg - - 8 mg - - - - -    INR 0.80 - 1.20 - 1.20 1.17 1.34(H) 1.46(H) 1.40(H) 1.20    INR 0.86 - 1.14 2.3(A) - - - - - -        Most Recent 3 Troponin's:  Recent Labs   Lab Test  04/06/15   0435   TROPI  <0.015  The 99th percentile for upper reference range is 0.045 ug/L.  Troponin values in   the range of 0.045 - 0.120 ug/L may be associated with risks of adverse   clinical events.   Effective 7/30/2014, the reference range for this assay has changed to reflect   new instrumentation/methodology.       Most Recent Cholesterol Panel:  Recent Labs   Lab Test  04/23/15   0922   CHOL  142   LDL  56   HDL  36*   TRIG  248*     Most Recent 6 Bacteria Isolates From Any Culture (See EPIC Reports for Culture  Details):  Recent Labs   Lab Test  01/12/17   0755  01/11/17   1117   CULT  Moderate growth Proteus mirabilis  Heavy growth Staphylococcus pseudintermedius  Heavy growth Pasteurella canis Susceptibility testing in progress  Light growth Alcaligenes species  Isolated in the broth only:   Enterococcus faecalis  *  No MRSA isolated     Most Recent TSH, T4 and A1c Labs:  Recent Labs   Lab Test  01/15/17   0613   A1C  8.4*     Results for orders placed or performed during the hospital encounter of 01/11/17   XR Chest 2 Views    Narrative    PA AND LATERAL CHEST    FINDINGS:  The heart size is borderline.  The pulmonary vascularity is  normal and there is no evidence for pleural effusion.  No lung  consolidation is seen.  A coronary artery stent is in place.    IMPRESSION:  COMPARISON IS MADE TO THE PRIOR EXAMINATION COMPLETED  APRIL 6, 2015.  THE FINDINGS THAT WERE COMPATIBLE WITH CONGESTIVE  HEART FAILURE PREVIOUSLY HAVE REGRESSED, WITH NO NEW ABNORMALITIES  IDENTIFIED NOW.  Exam Date: Jan 11, 2017 09:26:05 AM  Author: ASH MANE  This report is final and signed     CT Head w/o Contrast    Narrative    HEAD CT    FINDINGS:  There is an old right occipital infarct seen.  The  ventricular system and cortical sulci appear normal.  Cranial vault is  intact.  The paranasal sinuses are clear.    IMPRESSION:  OLD RIGHT OCCIPITAL INFARCT.  Exam Date: Jan 11, 2017 07:30:45 AM  Author: KIET ROMANO  This report is final and signed     XR Foot Right G/E 3 Views    Narrative    RIGHT FOOT  HISTORY:  This is a 81-year-old male with history of diabetic foot,  evaluate for osteomyelitis.    FINDINGS:  There is a non-acute, healing oblique fracture seen in the  proximal aspect of the first metatarsal.  Soft tissue lucencies are  seen about the second metatarsophalangeal head with bony destruction,  concerning for soft tissue abscess and osteomyelitis with bony  destruction.  A non-acute appearing impacted fracture is also seen  of  the third metatarsal head.  Lucency is seen along the medial aspect of  the proximal phalanx of the third digit, adjacent to the markedly  abnormal appearing second metatarsophalangeal joint, suggesting bony  destruction.    Joint space narrowing and subchondral cystic degenerative changes are  seen at the first metatarsophalangeal joint.    IMPRESSION:  1.  SOFT TISSUE LUCENCIES SEEN ABOUT THE SECOND METATARSOPHALANGEAL  JOINT WITH BONY DESTRUCTION, CONCERNING FOR SOFT TISSUE ABSCESS AND  DESTRUCTION.  ANOTHER LUCENCY IS SEEN ALONG THE MEDIAL ASPECT OF THE  SECOND METATARSAL AT APPROXIMATELY THE MID METATARSAL LEVEL,  CONCERNING FOR A TINY GAS BUBBLE IN THIS LOCATION.    2.  THE MEDIAL ASPECT OF THE THIRD DIGIT PROXIMAL PHALANX ALSO  DEMONSTRATES A FOCAL LUCENCY, CONCERNING FOR BONY DESTRUCTION IN THIS  LOCATION.    3.  NON-ACUTE APPEARING, IMPACTED FRACTURE INVOLVING THE THIRD  METATARSAL HEAD ALSO APPEARS TO BE ASSOCIATED WITH BONY DESTRUCTION  AND MAY ALSO BE INVOLVED WITH THE ABSCESS AND OSTEOMYELITIS ASSOCIATED  WITH THE SECOND DIGIT.  Continued on page 2...        IMPRESSION:  (continued)  4.  NON-ACUTE APPEARING, HEALING FRACTURE INVOLVING THE PROXIMAL  ASPECT OF THE FIRST METATARSAL.    5.  PROMINENT DEGENERATIVE CHANGES OF THE FIRST METATARSOPHALANGEAL  JOINT.  Exam Date: Jan 11, 2017 05:52:48 PM  Author: PAUL AVITIA  This report is final and signed      CATIA Doppler No Exercise    Narrative    ANKLE-BRACHIAL INDICES    REPORT:  The ankle-brachial index on the right measures 1.00, on the  left 1.04.  Absolute ankle pressure measures 119 on the right and 130  on the left.    IMPRESSION:  ADEQUATE BLOOD FLOW FOR WOUND HEALING.  Exam Date: Jan 12, 2017 12:28:00 AM  Author: KIET ROMANO  This report is final and signed     MR Foot Right w/o & w Contrast    Narrative    RIGHT FOOT MRI    HISTORY:  Foot ulceration, osteomyelitis.  COMPARISON:  Today's study is compared to conventional  radiographs  which are dated January 11, 2017.    TECHNIQUE:  Multiplanar MR images acquired before and after the  administration of 7.5 mL intravenous Gadavist.    FINDINGS:  Abnormal marrow signal throughout the entire second  metatarsal with relative sparing of the base of the metatarsal.  The  second metatarsal is of low T1 and low T2 signal and enhances after  the administration of gadolinium.  It has a patchy lytic appearance on  the conventional radiograph and findings are classic for  osteomyelitis.  There are several tiny areas of signal void within the  shaft of the metatarsal, which could be due to gas.  Destructive  changes of the head of the second metatarsal.  Superior subluxation of  the right second toe at the MTP joint.  The base and proximal shaft of  the second metatarsal demonstrate abnormal T1 and T2 signal with  enhancement in addition to tiny areas of signal void, suspicious for  osteomyelitis ate the base of the proximal phalanx of the second toe.  A large amount of fluid which is peripherally enhancing surrounds the  second MTP joint and extends to the ulceration along the plantar  aspect of the foot.  This is consistent with an abscess.  There is  also a small amount of fluid surrounding the flexor tendons of the  second toe, beginning at the distal shaft of the metatarsal consistent  with mild flexor tenosynovitis which is likely infected as well.    There is an impacted fracture of the head and neck of the third  metatarsal and associated signal abnormality in the shaft, neck and  head of the third metatarsal, and in the base and proximal shaft of  the proximal phalanx.  The abscess that surrounds the second MTP joint  extends to partially surround the third MTP joint as well.  Constellation of findings is very suspicious for extension of  osteomyelitis to involve the neck and head of the third metatarsal as  well, in addition to the base of the proximal phalanx of the  third  toe.  Continued on page 2...      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  Healed or healing  fracture base and proximal shaft of the first metatarsal, but no  findings to suggest osteomyelitis in the first metatarsal.    Subcutaneous edema about the forefoot.  Multiple hammertoe  deformities.    Images are degraded by motion.    IMPRESSION:  1.  FINDINGS SUSPICIOUS FOR OSTEOMYELITIS INVOLVING MOST OF THE SECOND  METATARSAL, HEAD AND NECK OF THE THIRD METATARSAL, PROXIMAL BASE AND  SHAFT SECOND PROXIMAL PHALANX, AND BASE OF THE THIRD PROXIMAL PHALANX.      2.  ULCERATION ON THE PLANTAR BASE OF THE MTP JOINT EXTENDING TO AN  ABSCESS SURROUNDING THE SECOND MTP JOINT AND PARTIALLY SURROUNDING THE  THIRD MTP JOINT.    3.  FRACTURES OF THE NECK OF THE THIRD METATARSAL, AND BASE AND  PROXIMAL SHAFT OF THE FIRST METATARSAL.    4.  SEVERE DEGENERATIVE ARTHRITIS FIRST MTP JOINT.    5.  FINDINGS OF OSTEOMYELITIS WERE DISCUSSED WITH DR. ILYA REYES  AT 1015 HOURS ON JANUARY 13, 2017.                SIGNATURE PAGE ONLY  Exam Date: Jan 13, 2017 09:18:13 AM  Author: SHILOH COMER  This report is final and signed                       Consult Notes      Consults by Chauncey Zamora MD at 1/14/2017 11:35 AM     Author:  Chauncey Zamora MD Service:  Orthopedics Author Type:  Physician    Filed:  1/14/2017 11:36 AM Note Time:  1/14/2017 11:35 AM Status:  Signed    :  Chauncey Zamora MD (Physician)           Orthopedic Inpatient Consultation  Physician requesting consult: Dr. Reyes  Reason for consultation: Diabetic osteomyelitis right foot    Subjective: This 81-year-old diabetic gentleman was admitted to the hospital with hypoglycemia and a diabetic ulcer on the plantar aspect of the right foot.  X-rays and MRI reveal osteomyelitis involving nearly the entirety of the 2nd metatarsal, the head and neck of the 3rd metatarsal, and the proximal phalanges of the 2nd and 3rd rays.  In addition there  are fractures of the bases of the 2nd and 3rd proximal phalanges, and an abscess around the entire 2nd ray.  Orthopedics was consulted for surgical intervention.    Objective: Elderly frail-appearing male in no obvious distress.  The right foot shows a an ulcer on the plantar aspect of the 2nd metatarsal head.  No obvious purulence is emerging from the ulcer.  There is a small ulcer on the dorsal aspect of the foot between the 2nd and 3rd metatarsal heads from which robin purulence is noted.  The 2nd toe is swollen and erythematous.    X-rays: His plain films and MRI were reviewed.  In addition ABIs were performed which were normal and the right ankle.    Impression: Diabetic osteomyelitis with abscess involving most of the 2nd and 3rd rays of the right foot    Recommendation: I recommend a below the knee amputation.  The results of the CATIA suggest that he has healing potential at that level.  A lesser procedure would not resolve the infection.  A simple draining of the abscess will not resolve the osteomyelitis.  It is extremely unlikely that IV antibiotics will eradicate the osteomyelitis.  Ray resection would require resection of the 2nd and 3rd rays which is mutilating and would leave the foot nonfunctional.  A transmetatarsal amputation would not remove the entirety of the 2nd ray which has osteomyelitis to its base, according to the MRI.    I discussed the procedure (BKA) with the patient this morning and offered to do the procedure Monday morning.  The patient refused the amputation.  I warned the patient that without surgery the infection will spread and he could eventually die of sepsis.  He still refused.  Unless he consents, I have nothing surgical to offer him.    When Dr. Rebollar contacted me about the MRI results, Dr. Rebollar felt that the patient was amenable to a BKA.  For that reason I believe Dr. Rebollar made the patient NPO after midnight Ubaldo night. The NPO order may to be removed if the  patient continues to refuse amputation.    Please reconsult me if the patient changes his mind.       Consults by Maria Dolores Crawford, RN at 1/11/2017  4:15 PM     Author:  Maria Dolores Crawford, RN Service:  [facility] Nurse Author Type:  Registered Nurse    Filed:  1/12/2017 10:14 AM Note Time:  1/11/2017  4:15 PM Status:  Signed    :  Maria Dolores Crawford, RN (Registered Nurse)       Consult Orders:    1. Wound Ostomy Continence Nurse IP Consult [[id number]] ordered by Shirley Chan NP at 01/11/17 2805                Received phone call from JOSIAH Stoddard re: wound consult by TREVA Chan.  Upon arrival to patient room TREVA Chan present - she stated that the patient has a significant wound that will need a surgical consult and stated that we do not need to consult on this patient at this time.  Shirley will be ordering the surgical consult as well as some diagnostic tests.  Wound Care will be available in the future should our services be needed.                Progress Notes - Physician (Notes from 01/13/17 through 01/16/17)      Progress Notes by Mildred Amato RD at 1/16/2017  2:03 PM     Author:  Mildred Amato RD Service:  (none) Author Type:  Registered Dietitian    Filed:  1/16/2017  2:09 PM Note Time:  1/16/2017  2:03 PM Status:  Signed    :  Mildred Amato RD (Registered Dietitian)           Followup from 1/13/17.  Patient is on moderate consistent CHO diet.  Patient continues to refuse surgery.  Glucose continues to be checked, and given insulin based on levels.  Lantus adjustments have been work in progress.  Continue to encourage PO intake and give nutritional supplement.  Please ensure that patient is scheduled with Piedad Cruz for Diabetic Education upon discharge.   RD will continue to remain available.    Mildred Amato RD       Progress Notes by Sunitha Gifford PT at 1/16/2017 10:19 AM     Author:  Sunitha Gifford, PT Service:  (none) Author Type:  Physical Therapist     Filed:  1/16/2017 10:19 AM Note Time:  1/16/2017 10:19 AM Status:  Signed    :  Sunitha Gifford, PT (Physical Therapist)            01/16/17 1002   Quick Adds   Type of Visit Initial PT Evaluation   Living Environment   Lives With spouse   Living Arrangements house   Home Accessibility stairs to enter home;stairs within home   Number of Stairs to Enter Home 5   Number of Stairs Within Home 12   Stair Railings at Home outside, present at both sides;inside, present at both sides   Transportation Available car   Living Environment Comment Pt's wife was recently moved to assisted living in [place]   SelfCare   Usual Activity Tolerance moderate   Current Activity Tolerance moderate   Regular Exercise no   Equipment Currently Used at Home walker, rolling  (at times uses walker)   Functional Level Prior   Ambulation 1-->assistive equipment   Transferring 0-->independent   Toileting 0-->independent   Bathing 0-->independent   Dressing 0-->independent   Eating 0-->independent   Communication 0-->understands/communicates without difficulty   Swallowing 0-->swallows foods/liquids without difficulty   Cognition 0 - no cognition issues reported   Fall history within last six months yes   Number of times patient has fallen within last six months 1   Which of the above functional risks had a recent onset or change? none   Prior Functional Level Comment Pt reports he has been independent at home   General Information   Onset of Illness/Injury or Date of Surgery - Date 01/11/17   Referring Physician Dr. Lang   Patient/Family Goals Statement pt planning to go to assisted living with wife   Pertinent History of Current Problem (include personal factors and/or comorbidities that impact the POC) Pt admitted with hypoglycemia and also found to have diabetic foot ulcer with osteomyelitis.  Pt with h/o DM with poor control with A1C of 8.4, pt has history of noncompliance with medical treatments and recommendations and both pt  and wife have had vulnerable adult filed in past.  Surgery has been recommended for the osteo however pt refusing at this time and wanting to treat strictly with antibiotics.   Weight-Bearing Status - LLE full weight-bearing   Weight-Bearing Status - RLE full weight-bearing   General Observations Pt resting in bed, agreeable to PT eval   Cognitive Status Examination   Orientation orientation to person, place and time   Level of Consciousness alert   Follows Commands and Answers Questions 100% of the time   Personal Safety and Judgment intact   Memory intact   Pain Assessment   Patient Currently in Pain No   Integumentary/Edema   Integumentary/Edema Comments R foot wrapped   Posture    Posture Forward head position   Range of Motion (ROM)   ROM Comment Bilateral LE AROM WFL throughout   Strength   Strength Comments Bilateral hips 4+/5, bilateral knees 4+/5, bilateral ankles 4/5   Bed Mobility   Bed Mobility Comments sup>sit mod I   Transfer Skills   Transfer Comments sit<>stand CGA   Gait   Gait Comments Ambulated 10' into bathroom with FWW CGA with 1 minor LOB requiring assist to correct   Balance   Balance Comments fair+ with support from walker   Sensory Examination   Sensory Perception Comments per MD limited to no blood flow to R LE causing no pain sensation at this time   General Therapy Interventions   Planned Therapy Interventions gait training;risk factor education   Clinical Impression   Criteria for Skilled Therapeutic Intervention yes, treatment indicated   PT Diagnosis gait disturbance   Influenced by the following impairments decreased strength, decreased balance, decreased sensation   Functional limitations due to impairments decreased safety with gait and transfers   Clinical Presentation Evolving/Changing   Clinical Presentation Rationale presence of osteo with poor DM control and limited circulation to R foot which could lead to progression and worsening infection   Clinical Decision Making  "(Complexity) Moderate complexity   Therapy Frequency` 1 time/week   Predicted Duration of Therapy Intervention (days/wks) eval + 1 treatment   Anticipated Equipment Needs at Discharge other (see comments)  (4WW if wife using the one that they had at home)   Anticipated Discharge Disposition Other (see comments)  (Assisted living with currenlty no further skilled PT needs)   Risk & Benefits of therapy have been explained Yes   Patient, Family & other staff in agreement with plan of care Yes   Clinical Impression Comments Pt presents with osteo of R foot due to diabetic ulcer that pt is currently refusing to have surgery to treat.      [facility] AM-PAC TM \"6 Clicks\"   2016, Trustees of [facility], under license to [employer].  All rights reserved.   6 Clicks Short Forms Basic Mobility Inpatient Short Form   [facility] AM-PAC  \"6 Clicks\" V.2 Basic Mobility Inpatient Short Form   1. Turning from your back to your side while in a flat bed without using bedrails? 4 - None   2. Moving from lying on your back to sitting on the side of a flat bed without using bedrails? 4 - None   3. Moving to and from a bed to a chair (including a wheelchair)? 4 - None   4. Standing up from a chair using your arms (e.g., wheelchair, or bedside chair)? 3 - A Little   5. To walk in hospital room? 3 - A Little   6. Climbing 3-5 steps with a railing? 3 - A Little   Basic Mobility Raw Score (Score out of 24.Lower scores equate to lower levels of function) 21   Total Evaluation Time   Total Evaluation Time (Minutes) 16          Progress Notes by Britt Lang MD at 1/15/2017  3:36 PM     Author:  Britt Lang MD Service:  Hospitalist Author Type:  Physician    Filed:  1/15/2017  3:54 PM Note Time:  1/15/2017  3:36 PM Status:  Signed    :  Britt Lang MD (Physician)           [facility]    Hospitalist Progress Note    Date of Service (when I saw the patient): 01/15/2017    Assessment and Plan  Adiel VILLATORO" Moe Reis is a 81 year old male who was admitted on 1/11/2017 with fall and was found to have FS in 30s. He continues to have low FS adrien middle of night and higher reading pre-dinner. I will stop nighttime lantus to avoid hypoglycemia and give it in the morning.  Has right foot abscess and osteo. Seen by Dr Zamora and he refused surgery to him.   He adamantly refuses surgery and wants to go the [facility] where his wife was just admitted to be with her.  Discussed with SW.       DM / hypoglycemia  Suspect due to poor PO intake.  Again was hypo last night - FS was 58 mg. I will stop night time lantus and give it in the morning. He states at home he eats all night so I think dietary changes here have contributed to hypoglycemia.  His lantus adjustments will be work in progress.  A1c is 8.4.      Right Diabetic foot ulcer / abscess with osteomyelitis:    Foot Cx growth: staph pseudintermedius and proteus mirabilis.  Cont IV unasyn. Surgery on Monday.  CATIA were performed and ok at 1 on both legs.    Afib:   Rate controled.  Holding warfarin for now in anticipation of surgery.    CKD  Cr at baseline    CAD / s/p stent 1999   stable at this time.  Cont metoprolol.   Last Echo done in 2014 showed Normal LV size with mild reduction of EF.  Severe LAE,  No major valve issues.        Protein energy malnutrition  Encourage PO intake and give nutritional supplement.    Social Issues:    Has a long history of noncompliance and  vulnerable adult issues with his wife also.  SS has been involved and they are resistant to any help. Currently his wife is in assisted living  She fel at home and unable to care for herself.  Their 3 dogs are now in a shelter  So he has agreed to stay here and proceed with surgery and care.    DVT Prophylaxis: start Heparin SQ since INR is subtherapeutic.    Code Status: Full Code    Disposition: Expected discharge in 1-2 days           Britt Rickey    Interval History  Has no complaints today.    admant not  to have surgery done and wants to go to AL facility. He states that his brother lost his life after getting this type of surgery.  I explained that with his active infection he will become septic and die.  Also explained that AL facility may not accept due to active infection.    He stated 'get the fucking hell out of here'.       -Data reviewed today: I reviewed all new labs and imaging results over the last 24 hours. I personally reviewed no images or EKG's today.    Physical Exam  Temp: 98  F (36.7  C) Temp src: Tympanic BP: 96/56 mmHg   Heart Rate: 83 Resp: 16 SpO2: 97 % O2 Device: None (Room air)    Filed Vitals:    01/11/17 1004   Weight: 84.3 kg (185 lb 13.6 oz)     Vital Signs with Ranges  Temp:  [97  F (36.1  C)-100.2  F (37.9  C)] 98  F (36.7  C)  Heart Rate:  [83-93] 83  Resp:  [16-18] 16  BP: ()/(56-82) 96/56 mmHg  SpO2:  [95 %-97 %] 97 %  I/O last 3 completed shifts:  In: 1168 [P.O.:720; I.V.:448]  Out: 1675 [Urine:1675]    Peripheral IV 01/14/17 Left Hand (Active)   Site Assessment WDL 1/15/2017  8:40 AM   Line Status Infusing 1/15/2017  8:40 AM   Phlebitis Scale 0-->no symptoms 1/15/2017  8:40 AM   Infiltration Scale 0 1/15/2017  8:40 AM   Extravasation? No 1/14/2017  4:00 PM   Number of days:1       Wound 01/12/17 Right Foot Other (comment) deep wound to middle top of right foot and bottom of middle right foot. (Active)   Site Assessment Pink;Red;Moist;White 1/15/2017  2:00 PM   Miguelina-wound Assessment Erythema 1/15/2017  2:00 PM   Drainage Amount Moderate 1/15/2017  2:00 PM   Drainage Color/Charcteristics Yellow;Tan;Creamy 1/15/2017  2:00 PM   Wound Care/Cleansing Soap and water 1/15/2017  2:00 PM   Dressing Xeroform;Dry gauze 1/15/2017  2:00 PM   Dressing Status New dressing 1/15/2017  2:00 PM   Number of days:3     Line/device assessment(s) completed for medical necessity    Constitutional: NAD  Alert  Respiratory: CTA b/l  Cardiovascular: S1S2 RRR  GI: soft and nontender  Skin/Integumen:  "dressing over right foot is c/d/i  Other:      Medications    IV infusion builder WITH additives 75 mL/hr at 01/15/17 0939     Warfarin Therapy Reminder         [START ON 2017] insulin glargine U-300  35 Units Subcutaneous QAM     metoprolol  25 mg Oral Daily     heparin  5,000 Units Subcutaneous Q8H     sodium chloride (PF)  3 mL Intracatheter Q8H     ampicillin-sulbactam (UNASYN) IV  3 g Intravenous Q6H     aspirin  325 mg Oral Daily     digoxin  125 mcg Oral Daily     insulin aspart  1-7 Units Subcutaneous TID AC     insulin aspart  1-5 Units Subcutaneous At Bedtime     ranitidine  150 mg Oral At Bedtime       Data    Recent Labs  Lab 01/15/17  0613 17  0611 17  0548   WBC 8.2 10.7 14.1*   HGB 11.4* 11.8* 12.9*   MCV 88 87 86    326 385   INR 1.40* 1.46* 1.34*    139 140   POTASSIUM 4.4 4.3 3.8   CHLORIDE 104 104 104   CO2 29 28 28   BUN 21 18 18   CR 1.31* 1.24 1.25   ANIONGAP 7 7 8   AZRA 8.2* 8.2* 8.7   * 75 35*   ALBUMIN 2.1* 2.1*  --    PROTTOTAL 6.2* 6.5*  --    BILITOTAL 0.4 0.4  --    ALKPHOS 71 74  --    ALT 11 11  --    AST 9 11  --        No results found for this or any previous visit (from the past 24 hour(s)).         Progress Notes by Eliana Morales CM at 1/15/2017 12:31 PM     Author:  Eliana Morales CM Service:  (none) Author Type:      Filed:  1/15/2017 12:59 PM Note Time:  1/15/2017 12:31 PM Status:  Signed    :  Eliana Morales CM ()           In talking with Adiel about his plan of care he says he does not want the surgery tomorrow. He said he just wants to spend his remaining time together with his wife. We discussed the opportunity to potentially extend that time with surgery, he refused saying \"I came into this world with two legs and I'm going out with two legs\". He said his brother refused this surgery as well, when asked about the outcome of that he replied \"he \". He is aware he may die from the " infections and he feels this is better than having his leg amputated as planned. He initially requested help getting to [facility] this afternoon, he is agreeable to waiting until tomorrow to allow me to work with [facility] on placement there. Call placed to Wendy ([phone]).        Progress Notes by Eliana Morales CM at 1/14/2017  3:07 PM     Author:  Eliana Morales CM Service:  (none) Author Type:      Filed:  1/14/2017  3:12 PM Note Time:  1/14/2017  3:07 PM Status:  Signed    :  Eliana Morales CM ()           Adiel was up in his chair this afternoon. He signed the paperwork requested by [facility] and was provided with a copy of it. He was also assisted in calling over there to talk to Chitra.        Progress Notes by Britt Lang MD at 1/14/2017 10:49 AM     Author:  Britt Lang MD Service:  Hospitalist Author Type:  Physician    Filed:  1/14/2017 11:19 AM Note Time:  1/14/2017 10:49 AM Status:  Signed    :  Britt Lang MD (Physician)           [facility]    Hospitalist Progress Note    Date of Service (when I saw the patient): 01/14/2017    Assessment and Plan  Adiel Rosa Jr is a 81 year old male who was admitted on 1/11/2017 with fall and was found to have FS in 30s. He continues to have low FS adrien middle of night and higher reading pre-dinner. lantus dose being adjusted.  Also has right foot abscess / osteomyelitis. Awaiting foot surgery on Monday.      DM / hypoglycemia  Suspect due to poor PO intake.  Again was hypo last night - FS was 40 mg. I will cut back on lantus more today. i think he will benefit from twice daily lantu to avoid night time hypoglycemia and predinner hyperglycemia.  A1c in 9/2014 was 7.3. Will recheck A1c in am.      Right Diabetic foot ulcer / abscess with osteomyelitis:    Foot Cx growth: staph pseudintermedius and proteus mirabilis.  Cont IV unasyn. Surgery on Monday.  CATIA were  performed and ok at 1 on both legs.    Afib:   Rate controled.  Holding warfarin for now in anticipation of surgery.    CKD  Cr at baseline    CAD   stable at this time.  He has not received in 3 days prior to sunday and his BP continues to be in low normal range. I have hence cut back on metoprolol dose to 25 mg qd as he needs BB due to CAD.  Per records had and anterior MI back in  1999 had reperfusion with Retavase then PCI to his proximal LAD. And had PTCA to D1 Also.    Last Echo done in 2014 showed Normal LV size with mild reduction of EF.  Severe LAE,  No major valve issues.        Protein energy malnutrition  Encourage PO intake and give nutritional supplement.    Social Issues:    Has a long history of noncompliance and  vulnerable adult issues with his wife also.  SS has been involved and they are resistant to any help. Currently his wife is in assisted living  She fel at home and unable to care for herself.  Their 3 dogs are now in a shelter  So he has agreed to stay here and proceed with surgery and care.    DVT Prophylaxis: start Heparin SQ since INR is subtherapeutic.    Code Status: Full Code    Disposition: Expected discharge in 3-5 days once surgery of foot is taken care of.          Britt Lang    Interval History  Has no complaints today.    -Data reviewed today: I reviewed all new labs and imaging results over the last 24 hours. I personally reviewed no images or EKG's today.    Physical Exam  Temp: 98.8  F (37.1  C) Temp src: Tympanic BP: 110/53 mmHg   Heart Rate: 70 Resp: 20 SpO2: 95 % O2 Device: None (Room air)    Filed Vitals:    01/11/17 1004   Weight: 84.3 kg (185 lb 13.6 oz)     Vital Signs with Ranges  Temp:  [98  F (36.7  C)-98.8  F (37.1  C)] 98.8  F (37.1  C)  Heart Rate:  [70-80] 70  Resp:  [20] 20  BP: (104-110)/(42-70) 110/53 mmHg  SpO2:  [93 %-95 %] 95 %  I/O last 3 completed shifts:  In: 1407 [P.O.:1000; I.V.:407]  Out: 875 [Urine:875]    Peripheral IV 01/12/17 Left Upper forearm  (Active)   Site Assessment WDL 1/14/2017  7:57 AM   Line Status Saline locked 1/14/2017  7:57 AM   Phlebitis Scale 0-->no symptoms 1/14/2017  7:57 AM   Infiltration Scale 0 1/14/2017  7:57 AM   Extravasation? No 1/13/2017  5:00 PM   Number of days:2       Wound 01/12/17 Right Foot Other (comment) deep wound to middle top of right foot and bottom of middle right foot. (Active)   Site Assessment UTV 1/14/2017  7:53 AM   Miguelina-wound Assessment UT 1/14/2017  7:53 AM   Drainage Amount UTV 1/14/2017  7:53 AM   Drainage Color/Charcteristics Serous;Other (Comment) 1/13/2017  2:00 PM   Wound Care/Cleansing Soap and water 1/13/2017  2:00 PM   Dressing Xeroform;Dry gauze 1/13/2017  2:00 PM   Dressing Status New dressing 1/13/2017  2:00 PM   Number of days:2     Line/device assessment(s) completed for medical necessity    Constitutional: NAD  Alert  Respiratory: CTA b/l  Cardiovascular: S1S2 RRR  GI: soft and nontender  Skin/Integumen: dressing over right foot is c/d/i  Other:      Medications    IV infusion builder WITH additives 75 mL/hr at 01/13/17 1714     Warfarin Therapy Reminder         [START ON 1/15/2017] metoprolol  25 mg Oral Daily     sodium chloride (PF)  3 mL Intracatheter Q8H     ampicillin-sulbactam (UNASYN) IV  3 g Intravenous Q6H     insulin glargine U-300  50 Units Subcutaneous At Bedtime     aspirin  325 mg Oral Daily     digoxin  125 mcg Oral Daily     insulin aspart  1-7 Units Subcutaneous TID AC     insulin aspart  1-5 Units Subcutaneous At Bedtime     ranitidine  150 mg Oral At Bedtime       Data    Recent Labs  Lab 01/14/17  0611 01/13/17  0548 01/12/17  0510 01/11/17  0630   WBC 10.7 14.1* 10.4 14.2*   HGB 11.8* 12.9* 11.7* 12.4*   MCV 87 86 87 88    385 357 384   INR 1.46* 1.34* 1.17 1.20    140 136 139   POTASSIUM 4.3 3.8 4.2 4.4   CHLORIDE 104 104 101 102   CO2 28 28 28 28   BUN 18 18 19 28   CR 1.24 1.25 1.04 1.56*   ANIONGAP 7 8 7 9   AZRA 8.2* 8.7 8.4* 8.6   GLC 75 35* 143* 161*    ALBUMIN 2.1*  --   --  2.4*   PROTTOTAL 6.5*  --   --  6.9   BILITOTAL 0.4  --   --  0.5   ALKPHOS 74  --   --  86   ALT 11  --   --  15   AST 11  --   --  10       No results found for this or any previous visit (from the past 24 hour(s)).         Progress Notes by Juan Rebollar MD at 1/13/2017  4:42 PM     Author:  Juan Rebollar MD Service:  Internal Medicine Author Type:  Physician    Filed:  1/13/2017  5:03 PM Note Time:  1/13/2017  4:42 PM Status:  Signed    :  Juan Rebollar MD (Physician)           [facility]    Hospitalist Progress Note    Date of Service (when I saw the patient): 01/13/2017    Assessment and Plan  Adiel Rosa Jr is a 81 year old male who was admitted on 1/11/2017.       1.  Diabetes mellitus:  Patient presented with significant hypoglycemia on admit.  Has been doing better since admit did have some lower sugar this am though  Is on too much Trujeo here.  Have now cut back on the dose to 50 units.  Will have continued with sliding scale coverage also.    2.  Right  Diabetic foot ulcer with osteomyelitis:  Patient has neuropathy on exam and has this ulceration on plantar surface about 1 cm diameter with what appears to be bone on bottom on probing and has on dorsum of foot  Has open ulcer again about 1 cm with some draining   No real surrounding erythema  minimally tender   Some bogginess to the area.  MRI does confirm that has osteomyelitis of 2nd and 3rd metatarsals with abscess present.  Not febrile not systemically il at this time.  Will  Have him on Unasyn.  Will absolutely need surgical intervention. Have been fighting with patient regarding having surgery and staying in hospital he has now agreed.  His wife is safely in assisted living and his dogs are being take care of in shelter.  I will contact Dr Zamora regarding issue of surgery and hope can have this done on Monday  patient is aware that will likely end up with amputation .  CATIA were  performed and ok at 1 on both legs.      3.  Afib:  Rate controled is on warfarin  His INR is sub therapeutic. Have now stopped the warfarin in anticipation of surgery will put on some heparin for DVT prophylaxis.    5.  Chronic kidney disease:  Luckily his creatinine  is quite good today at 1.24.    6.  CAD: Per records had and anterior MI back in  1999 had reperfusion with Retavase then PCI to his proximal LAD. And had PTCA to D1 Also.  Last Echo done in 2014 showed Normal LV size with mild reduction of EF.  Severe LAE,  No major valve issues.  Currently stable at this time.    7.  Social Issues:  Has a long history of noncompliance and  vulnerable adult issues with his wife also.  SS has been involved and they are resistant to any help. Currently his wife is in assisted living  She fel at home and unable to care for herself.  Their 3 dogs are now in a shelter  So he has agreed to stay here and proceed with surgery and care.    DVT Prophylaxis: Heparin SQ  Code Status: Full Code    Disposition: Expected discharge in 3-5 days once surgery o foot is taken care of.    Juan Rebollar    Interval History    Long drawn out day getting his social issues settled as above   Has no foot pain a lot of drainage from his wound  No sob chest pain or nausea had some hypoglycemia this am have cut back on his trujeo now.    -Data reviewed today: I reviewed all new labs and imaging results over the last 24 hours. I personally reviewed the MRI image(s) showing osteomyelitis and ascessof his right foot.    Physical Exam  Temp: 97.8  F (36.6  C) Temp src: Tympanic BP: (!) 104/42 mmHg   Heart Rate: 93 Resp: 20 SpO2: 94 % O2 Device: None (Room air)    Filed Vitals:    01/11/17 1004   Weight: 84.3 kg (185 lb 13.6 oz)     Vital Signs with Ranges  Temp:  [97  F (36.1  C)-100.7  F (38.2  C)] 97.8  F (36.6  C)  Heart Rate:  [74-93] 93  Resp:  [18-28] 20  BP: ()/(42-70) 104/42 mmHg  SpO2:  [93 %-96 %] 94 %  I/O last 3 completed  shifts:  In: 783 [P.O.:780; I.V.:3]  Out: 125 [Urine:125]    Peripheral IV 01/12/17 Left Upper forearm (Active)   Site Assessment WDL 1/13/2017 10:15 AM   Line Status Saline locked 1/13/2017 10:15 AM   Phlebitis Scale 0-->no symptoms 1/13/2017 10:15 AM   Infiltration Scale 0 1/13/2017 10:15 AM   Number of days:1       Wound 01/12/17 Right Foot Other (comment) deep wound to middle top of right foot and bottom of middle right foot. (Active)   Site Assessment Moist;Pink;Pale;Drainage 1/13/2017  2:00 PM   Miguelina-wound Assessment Erythema;Moist 1/13/2017  2:00 PM   Drainage Amount Moderate 1/13/2017  2:00 PM   Drainage Color/Charcteristics Serous;Other (Comment) 1/13/2017  2:00 PM   Wound Care/Cleansing Soap and water 1/13/2017  2:00 PM   Dressing Xeroform;Dry gauze 1/13/2017  2:00 PM   Dressing Status New dressing 1/13/2017  2:00 PM   Number of days:1     No line/device    Constitutional: Alert and oriented x3. No distress    HEENT: NC/AT, PERRL, EOMI, mouth moist, neck supple, no LN.     Cardiovascular: RRR. no Murmur, no  rubs, or gallops.  JVD flat.  Bruits no.  Pulses decreased    Respiratory: Clear to auscultation bilaterally    Abdomen: Soft, NTND, no organomegaly. Bowel sounds present    Extremities: Warm/dry.   Right foot abut same purulent drainage from top and bottom of foot  no erythema   bogginess noted     Neuro:   Non focal, cranial nerves intact, Moves all extremities.    Medications    IV infusion builder WITH additives       Warfarin Therapy Reminder         sodium chloride (PF)  3 mL Intracatheter Q8H     ampicillin-sulbactam (UNASYN) IV  3 g Intravenous Q6H     insulin glargine U-300  50 Units Subcutaneous At Bedtime     aspirin  325 mg Oral Daily     digoxin  125 mcg Oral Daily     metoprolol  50 mg Oral Daily     insulin aspart  1-7 Units Subcutaneous TID AC     insulin aspart  1-5 Units Subcutaneous At Bedtime     ranitidine  150 mg Oral At Bedtime       Data    Recent Labs  Lab 01/13/17  4546  01/12/17  0510 01/11/17  0630   WBC 14.1* 10.4 14.2*   HGB 12.9* 11.7* 12.4*   MCV 86 87 88    357 384   INR 1.34* 1.17 1.20    136 139   POTASSIUM 3.8 4.2 4.4   CHLORIDE 104 101 102   CO2 28 28 28   BUN 18 19 28   CR 1.25 1.04 1.56*   ANIONGAP 8 7 9   AZRA 8.7 8.4* 8.6   GLC 35* 143* 161*   ALBUMIN  --   --  2.4*   PROTTOTAL  --   --  6.9   BILITOTAL  --   --  0.5   ALKPHOS  --   --  86   ALT  --   --  15   AST  --   --  10     LACTIC ACID   Date Value Ref Range Status   01/13/2017 1.0 0.4 - 2.0 mmol/L Final   01/11/2017 1.6 0.4 - 2.0 mmol/L Final       Recent Results (from the past 24 hour(s))   MR Foot Right w/o & w Contrast    Narrative    RIGHT FOOT MRI    HISTORY:  Foot ulceration, osteomyelitis.  COMPARISON:  Today's study is compared to conventional radiographs  which are dated January 11, 2017.    TECHNIQUE:  Multiplanar MR images acquired before and after the  administration of 7.5 mL intravenous Gadavist.    FINDINGS:  Abnormal marrow signal throughout the entire second  metatarsal with relative sparing of the base of the metatarsal.  The  second metatarsal is of low T1 and low T2 signal and enhances after  the administration of gadolinium.  It has a patchy lytic appearance on  the conventional radiograph and findings are classic for  osteomyelitis.  There are several tiny areas of signal void within the  shaft of the metatarsal, which could be due to gas.  Destructive  changes of the head of the second metatarsal.  Superior subluxation of  the right second toe at the MTP joint.  The base and proximal shaft of  the second metatarsal demonstrate abnormal T1 and T2 signal with  enhancement in addition to tiny areas of signal void, suspicious for  osteomyelitis ate the base of the proximal phalanx of the second toe.  A large amount of fluid which is peripherally enhancing surrounds the  second MTP joint and extends to the ulceration along the plantar  aspect of the foot.  This is consistent with  an abscess.  There is  also a small amount of fluid surrounding the flexor tendons of the  second toe, beginning at the distal shaft of the metatarsal consistent  with mild flexor tenosynovitis which is likely infected as well.    There is an impacted fracture of the head and neck of the third  metatarsal and associated signal abnormality in the shaft, neck and  head of the third metatarsal, and in the base and proximal shaft of  the proximal phalanx.  The abscess that surrounds the second MTP joint  extends to partially surround the third MTP joint as well.  Constellation of findings is very suspicious for extension of  osteomyelitis to involve the neck and head of the third metatarsal as  well, in addition to the base of the proximal phalanx of the third  toe.  Continued on page 2...      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  Healed or healing  fracture base and proximal shaft of the first metatarsal, but no  findings to suggest osteomyelitis in the first metatarsal.    Subcutaneous edema about the forefoot.  Multiple hammertoe  deformities.    Images are degraded by motion.    IMPRESSION:  1.  FINDINGS SUSPICIOUS FOR OSTEOMYELITIS INVOLVING MOST OF THE SECOND  METATARSAL, HEAD AND NECK OF THE THIRD METATARSAL, PROXIMAL BASE AND  SHAFT SECOND PROXIMAL PHALANX, AND BASE OF THE THIRD PROXIMAL PHALANX.      2.  ULCERATION ON THE PLANTAR BASE OF THE MTP JOINT EXTENDING TO AN  ABSCESS SURROUNDING THE SECOND MTP JOINT AND PARTIALLY SURROUNDING THE  THIRD MTP JOINT.    3.  FRACTURES OF THE NECK OF THE THIRD METATARSAL, AND BASE AND  PROXIMAL SHAFT OF THE FIRST METATARSAL.    4.  SEVERE DEGENERATIVE ARTHRITIS FIRST MTP JOINT.    5.  FINDINGS OF OSTEOMYELITIS WERE DISCUSSED WITH DR. ILYA REYES  AT 1015 HOURS ON JANUARY 13, 2017.                SIGNATURE PAGE ONLY  Exam Date: Jan 13, 2017 09:18:13 AM  Author: SHILOH COMER  This report is final and signed                      Progress Notes by Carmen  "ROOSEVELT Monroy at 1/13/2017  4:29 PM     Author:  Eliana Morales CM Service:  (none) Author Type:      Filed:  1/13/2017  4:45 PM Note Time:  1/13/2017  4:29 PM Status:  Signed    :  Eliana Morales CM ()           Spoke with Adiel off/on today about a variety of topics including to update him on his wifes status, to discuss his plans for treatment of his foot wound and post op needs, and help with appropriate care of his dogs.     When asked what his expectations were for treatment, he relayed that he would be staying here and having surgery next week. And was aware that he will need treatment such as dressings and therapy afterward, in a facility.     At this time we discussed that Chitra is at [facility] ([phone]) in a room that will be able to accommodate him when appropriate as well. He had no memory of having been shown pictures of the facility earlier this day by another staff member.     He was updated that his dogs will be going to [facility] ([phone]). He provided me with the names, breeds, ages, and overall health of the dogs. His goal is to get his dogs back, though in discussing his care needs he also understood that he may not get them back, he was relieved that this was a no kill shelter. At various points he became agitated and threatened that he'd get a gun and go after anyone who's hurt his dogs.     He also became very agitated discussing his son and daughter in law. He feels they will be trying to steal all his money and that they have already approached him for bank account information. He threatens to have him \"thrown back in group home, and his little wife too!\" if they steal his money.        Progress Notes by Mildred Amato RD at 1/13/2017  2:44 PM     Author:  Mildred Amato RD Service:  (none) Author Type:  Registered Dietitian    Filed:  1/13/2017  2:48 PM Note Time:  1/13/2017  2:44 PM Status:  Signed    :  " Mildred Amato RD (Registered Dietitian)           Follow-up from 11/11:  Patient would benefit from getting back to his see outpatient Diabetic Education provider, Piedad Cruz, who will be able to work with Adiel with his diabetic medication management.  As I stated previously, patient failed to show to his follow-up appointment in October of 2016, which has unfortunately resulted in uncontrolled diabetes.    Writer has spoke to Piedad Cruz, CRUZITO, CDE regarding this patient, and she would like to schedule this asap.    RD will continue to follow and remain available.    Mildred Amato RD               Procedure Notes     No notes of this type exist for this encounter.         Progress Notes - Therapies (Notes from 01/13/17 through 01/16/17)      Progress Notes by Sunitha Gifford PT at 1/16/2017 10:19 AM     Author:  Sunitha Gifford PT Service:  (none) Author Type:  Physical Therapist    Filed:  1/16/2017 10:19 AM Note Time:  1/16/2017 10:19 AM Status:  Signed    :  Sunitha Gifford PT (Physical Therapist)            01/16/17 1002   Quick Adds   Type of Visit Initial PT Evaluation   Living Environment   Lives With spouse   Living Arrangements house   Home Accessibility stairs to enter home;stairs within home   Number of Stairs to Enter Home 5   Number of Stairs Within Home 12   Stair Railings at Home outside, present at both sides;inside, present at both sides   Transportation Available car   Living Environment Comment Pt's wife was recently moved to assisted living in [facility]   Usual Activity Tolerance moderate   Current Activity Tolerance moderate   Regular Exercise no   Equipment Currently Used at Home walker, rolling  (at times uses walker)   Functional Level Prior   Ambulation 1-->assistive equipment   Transferring 0-->independent   Toileting 0-->independent   Bathing 0-->independent   Dressing 0-->independent   Eating 0-->independent   Communication 0-->understands/communicates  without difficulty   Swallowing 0-->swallows foods/liquids without difficulty   Cognition 0 - no cognition issues reported   Fall history within last six months yes   Number of times patient has fallen within last six months 1   Which of the above functional risks had a recent onset or change? none   Prior Functional Level Comment Pt reports he has been independent at home   General Information   Onset of Illness/Injury or Date of Surgery - Date 01/11/17   Referring Physician Dr. Lang   Patient/Family Goals Statement pt planning to go to assisted living with wife   Pertinent History of Current Problem (include personal factors and/or comorbidities that impact the POC) Pt admitted with hypoglycemia and also found to have diabetic foot ulcer with osteomyelitis.  Pt with h/o DM with poor control with A1C of 8.4, pt has history of noncompliance with medical treatments and recommendations and both pt and wife have had vulnerable adult filed in past.  Surgery has been recommended for the osteo however pt refusing at this time and wanting to treat strictly with antibiotics.   Weight-Bearing Status - LLE full weight-bearing   Weight-Bearing Status - RLE full weight-bearing   General Observations Pt resting in bed, agreeable to PT eval   Cognitive Status Examination   Orientation orientation to person, place and time   Level of Consciousness alert   Follows Commands and Answers Questions 100% of the time   Personal Safety and Judgment intact   Memory intact   Pain Assessment   Patient Currently in Pain No   Integumentary/Edema   Integumentary/Edema Comments R foot wrapped   Posture    Posture Forward head position   Range of Motion (ROM)   ROM Comment Bilateral LE AROM WFL throughout   Strength   Strength Comments Bilateral hips 4+/5, bilateral knees 4+/5, bilateral ankles 4/5   Bed Mobility   Bed Mobility Comments sup>sit mod I   Transfer Skills   Transfer Comments sit<>stand CGA   Gait   Gait Comments Ambulated 10' into  "bathroom with FWW CGA with 1 minor LOB requiring assist to correct   Balance   Balance Comments fair+ with support from walker   Sensory Examination   Sensory Perception Comments per MD limited to no blood flow to R LE causing no pain sensation at this time   General Therapy Interventions   Planned Therapy Interventions gait training;risk factor education   Clinical Impression   Criteria for Skilled Therapeutic Intervention yes, treatment indicated   PT Diagnosis gait disturbance   Influenced by the following impairments decreased strength, decreased balance, decreased sensation   Functional limitations due to impairments decreased safety with gait and transfers   Clinical Presentation Evolving/Changing   Clinical Presentation Rationale presence of osteo with poor DM control and limited circulation to R foot which could lead to progression and worsening infection   Clinical Decision Making (Complexity) Moderate complexity   Therapy Frequency` 1 time/week   Predicted Duration of Therapy Intervention (days/wks) eval + 1 treatment   Anticipated Equipment Needs at Discharge other (see comments)  (4WW if wife using the one that they had at home)   Anticipated Discharge Disposition Other (see comments)  (Assisted living with currenlty no further skilled PT needs)   Risk & Benefits of therapy have been explained Yes   Patient, Family & other staff in agreement with plan of care Yes   Clinical Impression Comments Pt presents with osteo of R foot due to diabetic ulcer that pt is currently refusing to have surgery to treat.      [facility] [employer] TM \"6 Clicks\"   2016, Trustees of [facility], under license to [employer].  All rights reserved.   6 Clicks Short Forms Basic Mobility Inpatient Short Form   [facility] [employer]-PAC  \"6 Clicks\" V.2 Basic Mobility Inpatient Short Form   1. Turning from your back to your side while in a flat bed without using bedrails? 4 - None   2. Moving from lying on your back to " sitting on the side of a flat bed without using bedrails? 4 - None   3. Moving to and from a bed to a chair (including a wheelchair)? 4 - None   4. Standing up from a chair using your arms (e.g., wheelchair, or bedside chair)? 3 - A Little   5. To walk in hospital room? 3 - A Little   6. Climbing 3-5 steps with a railing? 3 - A Little   Basic Mobility Raw Score (Score out of 24.Lower scores equate to lower levels of function) 21   Total Evaluation Time   Total Evaluation Time (Minutes) 16

## 2017-01-11 NOTE — ED NOTES
hospitalist staff down to see pt. Requested a diet order be put in for the pt. Order placed and menu provided.

## 2017-01-11 NOTE — IP AVS SNAPSHOT
[facility]    [address] [address]    Phone:  [phone]    Fax:  [phone]                                       After Visit Summary   1/11/2017    Adiel Rsoa Jr    MRN: [id number]           After Visit Summary Signature Page     I have received my discharge instructions, and my questions have been answered. I have discussed any challenges I see with this plan with the nurse or doctor.    ..........................................................................................................................................  Patient/Patient Representative Signature      ..........................................................................................................................................  Patient Representative Print Name and Relationship to Patient    ..................................................               ................................................  Date                                            Time    ..........................................................................................................................................  Reviewed by Signature/Title    ...................................................              ..............................................  Date                                                            Time

## 2017-01-11 NOTE — PROGRESS NOTES
"Met with patient today regarding diabetes care.  Patient came into ER today with blood sugar of 34.  According to chart review, patient ran out of insulin, and gave himself and unknown amount of medication.    Once he was admitted, ate 100% meal.      Patient sees Piedad Cruz for outpatient diabetes education.  Last appt was in September, and patient was a \"No Show\" for October follow-up.  Instructed patient to followup with PCP Dr. Grijalva, and patient failed to do this as well.    Last A1c was 7.4 in April.    Diabetes medication:  1.  Novolog 10 units with meals + correction (>199: additional 4 units)  2.  Toujeo; 67 units every day    In speaking with RN assigned to this patient, he has not been eating well at home due to limited resources.  Lives with wife, and limited cooking at home. May not have proper transportation.  Patient would benefit from additional resources and assistance with medication and health management.    7' 0\"  185 lbs 13.56 oz  Wt Readings from Last 10 Encounters:   01/11/17 84.3 kg (185 lb 13.6 oz)   07/05/16 94.348 kg (208 lb)   06/02/16 92.987 kg (205 lb)   05/16/16 93.35 kg (205 lb 12.8 oz)   04/11/16 93.94 kg (207 lb 1.6 oz)   01/12/16 97.523 kg (215 lb)   12/01/15 99.066 kg (218 lb 6.4 oz)   10/20/15 98.793 kg (217 lb 12.8 oz)   09/23/15 103.012 kg (227 lb 1.6 oz)   09/08/15 102.604 kg (226 lb 3.2 oz)     Patient has lost a significant amount of weight in the past 6 months.  23 lbs in the past 6 months.      Labs reviewed.  Albumin low at 2.4  RD would like to have prealbumin ordered in order to assess nutrition status.    RD will speak to  contact for this patient, and will followup tomorrow (1/12)    Mildred Amato RD    Has a hx of falls, most likely due to low blood sugars.        "

## 2017-01-11 NOTE — ED PROVIDER NOTES
History     Chief Complaint   Patient presents with     Fall     states that he hit his head a couple of times      Hypoglycemia     34 when EMS arrived d/t fall. Pt given 1/5 AMP D50.       The history is provided by the patient.     Adiel Rosa Jr is a 81 year old male who brought by EMS for fall, head trauma, hypoglycemia.     I have reviewed the Medications, Allergies, Past Medical and Surgical History, and Social History in the Epic system.    Review of Systems   Constitutional: Negative for fever, chills and diaphoresis.   HENT: Negative for voice change.    Eyes: Negative for visual disturbance.   Respiratory: Negative for cough, chest tightness, shortness of breath and wheezing.    Cardiovascular: Negative for chest pain, palpitations and leg swelling.   Gastrointestinal: Negative for nausea, vomiting, abdominal pain, blood in stool, abdominal distention and anal bleeding.   Genitourinary: Negative for dysuria, frequency, flank pain and decreased urine volume.   Musculoskeletal: Negative for myalgias, back pain, gait problem and neck pain.   Skin: Negative for color change, pallor and rash.   Neurological: Positive for dizziness and syncope. Negative for weakness, light-headedness, numbness and headaches.   Psychiatric/Behavioral: Negative for suicidal ideas, confusion and sleep disturbance.       Physical Exam   BP: 102/76 mmHg  Pulse: 97  Heart Rate: 98  Temp: 97  F (36.1  C)  Resp: 16  SpO2: 95 %  Physical Exam   Constitutional: He is oriented to person, place, and time. He appears well-developed and well-nourished.   HENT:   Head: Normocephalic and atraumatic.   Mouth/Throat: No oropharyngeal exudate.   Eyes: Conjunctivae are normal. Pupils are equal, round, and reactive to light.   Neck: Normal range of motion. Neck supple. No JVD present. No tracheal deviation present. No thyromegaly present.   Cardiovascular: Normal rate, regular rhythm, normal heart sounds and intact distal pulses.   Exam reveals no gallop and no friction rub.    No murmur heard.  Pulmonary/Chest: Effort normal and breath sounds normal. No stridor. No respiratory distress. He has no wheezes. He has no rales. He exhibits no tenderness.   Abdominal: Soft. Bowel sounds are normal. He exhibits no distension and no mass. There is no tenderness. There is no rebound and no guarding.   Musculoskeletal: Normal range of motion. He exhibits no edema or tenderness.   R 2th, 3th metatarsal calus   Lymphadenopathy:     He has no cervical adenopathy.   Neurological: He is alert and oriented to person, place, and time.   Skin: Skin is warm and dry. No rash noted. No erythema. No pallor.   Psychiatric: His behavior is normal.   Nursing note and vitals reviewed.      ED Course   Procedures               Labs Ordered and Resulted from Time of ED Arrival Up to the Time of Departure from the ED   ROUTINE UA WITH MICROSCOPIC REFLEX TO CULTURE - Abnormal; Notable for the following:     Glucose Urine 70 (*)     Blood Urine Trace (*)     Protein Albumin Urine 30 (*)     WBC Urine 3 (*)     Mucous Urine Present (*)     Hyaline Casts 2 (*)     All other components within normal limits   CBC WITH PLATELETS DIFFERENTIAL - Abnormal; Notable for the following:     WBC 14.2 (*)     RBC Count 4.15 (*)     Hemoglobin 12.4 (*)     Hematocrit 36.5 (*)     Absolute Neutrophil 12.1 (*)     All other components within normal limits   COMPREHENSIVE METABOLIC PANEL - Abnormal; Notable for the following:     Glucose 161 (*)     Creatinine 1.56 (*)     GFR Estimate 43 (*)     GFR Estimate If Black 52 (*)     Albumin 2.4 (*)     All other components within normal limits   GLUCOSE BY METER - Abnormal; Notable for the following:     Glucose 125 (*)     All other components within normal limits   GLUCOSE BY METER - Abnormal; Notable for the following:     Glucose 155 (*)     All other components within normal limits   CK TOTAL   INR   LACTIC ACID   CARDIAC CONTINUOUS  MONITORING   GLUCOSE MONITOR NURSING POCT       Assessments & Plan (with Medical Decision Making)   Hypoglycemia due to CKD + increased in insulin dosage  Diabetic foot  Need admission for observation   Spoke to Dr Miramontes , accepted for admission  I have reviewed the nursing notes.    I have reviewed the findings, diagnosis, plan and need for follow up with the patient.    Discharge Medication List as of 1/16/2017  4:09 PM      START taking these medications    Details   benzocaine-menthol (CEPACOL) 15-3.6 MG lozenge Place 1 lozenge inside cheek every hour as needed for sore throat, Disp-30 lozenge, R-3, Fax      order for DME Equipment being ordered: [facility] BedDisp-1 Device, R-0, Local Print      amoxicillin-clavulanate (AUGMENTIN) 875-125 MG per tablet Take 1 tablet by mouth every 12 hours, Disp-28 tablet, R-0, Local Print             Final diagnoses:   Hypoglycemia       1/11/2017   [facility]      Justin Lovell MD  02/02/17 0620    Justin Lovell MD  02/02/17 2126    Justin Lovell MD  02/02/17 4294

## 2017-01-11 NOTE — PLAN OF CARE
[facility] Inpatient Admission Note:    Patient admitted to 3106/3106-1 at approximately 1004 via cart accompanied by transport tech from emergency room . Report received from Kim RAHMAN RN in SBAR format over  via telephone. Patient transferred to bed via self.. Patient is alert and oriented X 3, denies pain; rates at 0 on 0-10 scale.  Patient oriented to room, unit, hourly rounding, and plan of care. Explained admission packet and patient handbook with patient bill of rights brochure. Will continue to monitor and document as needed.     Inpatient Nursing criteria listed below was met:      Health care directives status obtained and documented: NA      Care Everywhere authorization obtained No      MRSA swab completed for patient 65 years and older: Yes      Patient identifies a surrogate decision maker: Yes If yes, who:Wife Contact Information:see face sheet          Core Measure diagnosis present:No. If yes, state diagnosis: na       If initial lactic acid >2.0, repeat lactic acid drawn within one hour of arrival to unit: NA. If no, state reason: NA      Vaccination assessment and education completed: Yes   Vaccinations received prior to admission: Pneumovax NO Influenza(seasonal)  N/A   Vaccination(s) ordered: influenza vaccine, pneumococcal vaccine      Clergy visit ordered if patient requests: No      Skin issues/needs documented: No      Isolation Patient: no Education given, correct sign in place and documentation row added to PCS:  No      Fall Prevention Yes: Care plan updated, education given and documented, sticker and magnet in place: Yes      Care Plan initiated: Yes      Education Documented (including assessment): Yes      Patient has discharge needs : Yes If yes, please explain:nutrition and diabetic ed

## 2017-01-11 NOTE — PLAN OF CARE
Wound to rt foot top messures 1.2 by 1 ecchymotic, Biegh, drainage. Bottom  1.2 by o.4 with 2.1 probe to the bone .

## 2017-01-11 NOTE — PROGRESS NOTES
Spoke with patient about his Advair and he states that he is unable to have any friends or family bring in his inhaler from home. Pt states that he does not take Advair on a regular basis.

## 2017-01-11 NOTE — PLAN OF CARE
"Problem: Patient Goal: Social Work Focus  Goal: 1. Patient Goal: Social Work Focus  Outcome: No Change  Assessment completed via flowsheet.  Adiel is awake, resting in bed. He is willing to visit pleasantly. His PCP is Dr LAUREN Grijalva. His pharmacy is [facility] [place]. He has dentures and says he has glasses already and so does not need to see either a dentist nor an eye provider. We discussed the benefits of continuing to see these types of care providers, he adamantly refuses. He does not have healthcare directives completed, after discussing what it meant he was receptive of the information to complete one. He is a 26 year [employer] . [facility] and say he will need to use his Medicare for this admission. [facility] was said to have fired the [facility] in [facility], if he would like to re-establish with them he may call the local clinic and schedule an appointment to do so.     He has been performing his own ADL's and household upkeep. He has a hydrolic life chair in the bathtub. He often uses his wifes walker to get around at home. He denies any recent falls other than related to this visit. He is licensed to drive, but says his car is \"froze up\" right now. He relies on friends and daughter in law for getting things like groceries and medications.     He has off/on sleep disruption. He denies any mood concerns. He denies smoking, alcohol consumption, and street drug use. He is Hindu, but does not feel his Yazdanism life is important to him citing various infringements by the [employer] on a community he once lived in.     He has worked with Piedad from Diabetes education in the past and expects a visit from her while here. Though he feels he has enough food in his home, he says sometimes he and his wife are \"too lazy\" to prepare a meal, as is what happened last night. He requests information about Meals on Wheels. He sets up meds for his wife. His wife has been getting PT at " home by [facility] for the past two weeks.

## 2017-01-11 NOTE — IP AVS SNAPSHOT
Adiel Rosa Jr #[id number] (CSN: [id number])  (81 year old M)  (Adm: 17)     [id number]               [facility]: [phone]            Patient Demographics     Patient Name Sex          Age SSN Address Phone    Adiel Rosa Jr Male 1935 (81 year old) [id number]  [place] [address] [phone] (Home)  none (Mobile)      Emergency Contact(s)     Name Relation Home Work Mobile    Chitra Rosa Spouse [phone]      No Secondary Contact Other none        Admission Information     Attending Provider Admitting Provider Admission Type Admission Date/Time    Alejandro Fung MD Stenstrom, Scott, MD Emergency 17  0552    Discharge Date Hospital Service Auth/Cert Status Service Area     General Medicine Incomplete [facility] SERVICES    Unit Room/Bed Admission Status    [facility] 3106/3106-1 Admission (Confirmed)            Admission     Complaint    Hypoglycemia due to insulin, Osteomyelitis of foot, right, acute (H)      Hospital Account     Name Acct ID Class Status Primary Coverage    Adiel Rosa Jr. [id number] Inpatient Open MEDICARE - MEDICARE            Guarantor Account (for Hospital Account #[id number])     Name Relation to Pt Service Area Active? Acct Type    Adiel Rosa Jr. Self RANGE Yes Personal/Family    Address Phone          [address] [place] [address] [phone](H)              Coverage Information (for Hospital Account #[id number])     1. MEDICARE/MEDICARE     F/O Payor/Plan Precert #    MEDICARE/MEDICARE     Subscriber Subscriber #    Adiel Rosa Jr. [id number]    Address Phone    [employer] CLAIMS  [address]  [place] [address] [phone]          2. // FOR LIFE     F/O Payor/Plan Precert #    // FOR LIFE     Subscriber Subscriber #    Adiel Rosa JrJayy [id number]    Address Phone     [employer]  [address]  [place] [address]                                                       INTERAGENCY TRANSFER FORM - PHYSICIAN ORDERS   1/11/2017                       [facility]: [phone]            Attending Provider: Alejandro Fung MD     Allergies:  No Known Allergies    Infection:  None   Service:  GENERAL MEDI    Ht:  1.829 m (6')   Wt:  84.3 kg (185 lb 13.6 oz)   Admission Wt:  84.3 kg (185 lb 13.6 oz)    BMI:  25.2 kg/m 2   BSA:  2.07 m 2            ED Clinical Impression     Diagnosis Description Comment Added By Time Added    Hypoglycemia [E16.2] Hypoglycemia [E16.2]  Justin Lovell MD 1/11/2017  7:43 AM      Hospital Problems as of 1/16/2017              Priority Class Noted POA    Chronic airway obstruction (H)   1/1/2011 Yes    Hypertrophy of prostate without urinary obstruction   1/1/2011 Yes    Chronic renal disease, stage III   9/22/2011 Yes    Chronic atrial fibrillation (H)   4/1/2013 Yes    Long-term (current) use of anticoagulants [Z79.01] Medium  8/2/2016 Yes    * (Principal)Hypoglycemia due to insulin Medium  1/11/2017 Yes    Primary prostate cancer identified by needle biopsy (T1c) (H) Medium  1/11/2017 Yes    Osteomyelitis of foot, right, acute (H) Medium  1/12/2017 Yes      Non-Hospital Problems as of 1/16/2017              Priority Class Noted    Acute myocardial infarction of other specified sites, episode of care unspecified   1/1/1999    Diabetes mellitus, type 2 (H)   11/29/1999    Cough   9/11/2007    Hypercholesterolemia   1/1/2011    Benign hypertensive heart disease with heart failure (H)   1/1/2011    Hypercalcemia   1/1/2011    Erectile Dysfunction   1/1/2011    Esophageal reflux   1/1/2011    Hypertension, benign   1/1/2011    Advanced care planning/counseling discussion Medium  3/16/2012      Code Status History     Date Active Date Inactive Code Status Order ID Comments User Context    This patient has a current code status but no historical code status.      Current  Code Status     Date Active Code Status Order ID Comments User Context       1/11/2017  9:48 AM Full Code [id number]  Shirley Chan NP Inpatient       Summary of Visit     Reason for your hospital stay       Adiel Rosa Jr is a 81 year old man who presented to the ED with hypoglycemia and fall at home. He reports difficulty with his insulin supply and enough food at home. In hospital, evaluation of a right foot ulcer demonstrated evidence of osteomyelitis. He adamantly declinced amputation which was recommended; He agreed to continued antibiotic treatment and dressing changes, with discussion indicating a low probability of these being effective. Insulin regimen was decreased from his previous because of relatively low glucose levels; further adjustment likely will be needed.                Medication Review      START taking        Dose / Directions Comments    amoxicillin-clavulanate 875-125 MG per tablet   Commonly known as:  AUGMENTIN   Indication:  Infection of Bone and Bone Marrow, Skin and Soft Tissue Infection   Used for:  Osteomyelitis of foot, right, acute (H)        Dose:  1 tablet   Take 1 tablet by mouth every 12 hours   Quantity:  28 tablet   Refills:  0        benzocaine-menthol 15-3.6 MG lozenge   Commonly known as:  CEPACOL   Used for:  Simple chronic bronchitis (H)        Dose:  1 lozenge   Place 1 lozenge inside cheek every hour as needed for sore throat   Quantity:  30 lozenge   Refills:  3        candesartan 16 MG tablet   Commonly known as:  ATACAND   Used for:  Essential hypertension        Dose:  16 mg   Take 1 tablet (16 mg) by mouth daily   Quantity:  90 tablet   Refills:  0        metoprolol 50 MG 24 hr tablet   Commonly known as:  TOPROL XL   Used for:  Essential hypertension        Dose:  25 mg   Take 0.5 tablets (25 mg) by mouth daily   Quantity:  90 tablet   Refills:  0        order for DME   Used for:  Osteomyelitis of foot, right, acute (H), Osteoarthritis, unspecified  osteoarthritis type, unspecified site        Equipment being ordered: Hospital Bed   Quantity:  1 Device   Refills:  0          CONTINUE these medications which may have CHANGED, or have new prescriptions. If we are uncertain of the size of tablets/capsules you have at home, strength may be listed as something that might have changed.        Dose / Directions Comments    CENTRUM SILVER per tablet   This may have changed:  when to take this   Used for:  Simple chronic bronchitis (H)        Dose:  1 tablet   Take 1 tablet by mouth daily   Quantity:  90 tablet   Refills:  3        Cranberry 500 MG Caps   This may have changed:  when to take this   Used for:  Hypertrophy of prostate without urinary obstruction        Dose:  1 capsule   Take 1 capsule (500 mg) by mouth daily   Quantity:  90 capsule   Refills:  3        digoxin 125 MCG tablet   Commonly known as:  LANOXIN   This may have changed:  See the new instructions.   Used for:  Permanent atrial fibrillation (H)        Dose:  125 mcg   Take 1 tablet (125 mcg) by mouth daily   Quantity:  90 tablet   Refills:  0        fluticasone-salmeterol 250-50 MCG/DOSE diskus inhaler   Commonly known as:  ADVAIR DISKUS   This may have changed:  See the new instructions.   Used for:  Simple chronic bronchitis (H)        USE 1 INHALATION TWO TIMES A DAY IN THE MORNING AND IN THE EVENING APPROXIMATELY 12 HOURS APART   Quantity:  3 Inhaler   Refills:  1        furosemide 40 MG tablet   Commonly known as:  LASIX   This may have changed:  See the new instructions.   Used for:  Benign essential hypertension        Dose:  40 mg   Take 1 tablet (40 mg) by mouth daily   Quantity:  90 tablet   Refills:  1        insulin aspart 100 UNIT/ML injection   Commonly known as:  NovoLOG FLEXPEN   This may have changed:  See the new instructions.   Used for:  Type 2 diabetes mellitus without complication, with long-term current use of insulin (H)        Dose:  1-7 Units   Inject 1-7 Units  Subcutaneous 3 times daily (with meals) If glucose less than or equal to 150 mg/dL do not give insulin If glucose 151-200 give 2 Units subcutaneously If glucose 201-250 give 3 Units subcutaneously If glucose 251-300 give 4 Units subcutaneously If glucose 301-350 give 5 Units subcutaneously If glucose over 350 give 7 Units subcutaneously and call physician   Quantity:  100 mL   Refills:  3        * insulin glargine U-300 300 UNIT/ML injection   Commonly known as:  TOUJEO   This may have changed:  how much to take   Used for:  Type 2 diabetes mellitus without complication, with long-term current use of insulin (H)        Dose:  10 Units   Inject 10 Units Subcutaneous At Bedtime   Quantity:  12 mL   Refills:  3        * insulin glargine U-300 300 UNIT/ML injection   Commonly known as:  TOUJEO   This may have changed:  You were already taking a medication with the same name, and this prescription was added. Make sure you understand how and when to take each.   Used for:  Type 2 diabetes mellitus without complication, with long-term current use of insulin (H)        Dose:  35 Units   Inject 35 Units Subcutaneous every morning   Quantity:  20 mL   Refills:  3        ranitidine 150 MG tablet   Commonly known as:  ZANTAC   This may have changed:  See the new instructions.   Used for:  Gastroesophageal reflux disease without esophagitis        Dose:  150 mg   Take 1 tablet (150 mg) by mouth 2 times daily   Quantity:  180 tablet   Refills:  0        simvastatin 80 MG tablet   Commonly known as:  ZOCOR   This may have changed:  See the new instructions.   Used for:  Pure hypercholesterolemia        TAKE 1 TABLET DAILY IN THE EVENING   Quantity:  90 tablet   Refills:  3        warfarin 2 MG tablet   Commonly known as:  COUMADIN   This may have changed:  See the new instructions.   Used for:  Permanent atrial fibrillation (H)        TAKE 4 TABLETS ON MONDAY, WEDNESDAY, AND FRIDAY AND 3 TABLETS ALL OTHER DAYS   Quantity:  312  tablet   Refills:  3        * Notice:  This list has 2 medication(s) that are the same as other medications prescribed for you. Read the directions carefully, and ask your doctor or other care provider to review them with you.      CONTINUE these medications which have NOT CHANGED        Dose / Directions Comments    acetaminophen 650 MG CR tablet   Commonly known as:  TYLENOL   Used for:  Osteoarthritis, unspecified osteoarthritis type, unspecified site        Dose:  650 mg   Take 1 tablet (650 mg) by mouth every 8 hours as needed   Quantity:  60 tablet   Refills:  0        aspirin 325 MG tablet   Used for:  Coronary artery disease without angina pectoris, unspecified vessel or lesion type, unspecified whether native or transplanted heart        Dose:  325 mg   Take 1 tablet (325 mg) by mouth daily   Quantity:  120 tablet   Refills:  3        B-D U/F 31G X 8 MM   Used for:  Diabetes mellitus, type 2 (H)   Generic drug:  insulin pen needle        USE 5 TO 6 PEN NEEDLES DAILY OR AS DIRECTED   Quantity:  6 each   Refills:  3        cholecalciferol 1000 UNIT tablet   Commonly known as:  vitamin D   Used for:  Simple chronic bronchitis (H)        Dose:  1000 Units   Take 1 tablet (1,000 Units) by mouth daily   Quantity:  90 tablet   Refills:  3        Doxylamine-DM 6.25-15 MG/15ML Liqd   Used for:  Osteomyelitis of foot, right, acute (H), Simple chronic bronchitis (H)        Taking as pkg directions at night   Quantity:  236 mL   Refills:  3        HYDROcodone-acetaminophen 5-325 MG per tablet   Commonly known as:  NORCO   Used for:  Back contusion, right, initial encounter        Dose:  1 tablet   Take 1 tablet by mouth every 6 hours as needed for moderate to severe pain   Quantity:  30 tablet   Refills:  0        magnesium 250 MG tablet   Used for:  Permanent atrial fibrillation (H)        Dose:  1 tablet   Take 1 tablet by mouth daily   Quantity:  30 tablet   Refills:  3        ONE TOUCH ULTRA test strip   Used for:   Diabetes mellitus (H)   Generic drug:  blood glucose monitoring        USE UP TO 5 STRIPS DAILY TO TEST BLOOD GLUCOSE   Quantity:  300 each   Refills:  1                After Care     Activity       Your activity upon discharge: activity as tolerated using walker       Diet       Follow this diet upon discharge: Orders Placed This Encounter  Moderate Consistent CHO Diet               Further instructions from your care team                    Lab Orders     INR                 Referrals     Home care nursing referral       RN skilled nursing visit. RN to assess vital signs and weight, diabetic managment.  RN to provide complex wound managment.    Your provider has ordered home care nursing services. If you have not been contacted within 2 days of your discharge please call the inpatient department phone number at [phone] .       INR CLINIC REFERRAL       Your provider has referred you to INR Services.    Please be aware that coverage of these services is subject to the terms and limitations of your health insurance plan.  Call member services at your health plan with any benefit or coverage questions.    Indication for Anticoagulation: Atrial Fibrillation  If nonstandard INR is desired, indicate goal range and explanation: 2-3  Expected Duration of Therapy: Lifetime   ANTICOAGULATION CLINIC COLLABORATIVE PRACTICE AGREEMENT    The following represents a collaborative practice agreement among the physicians of the Clinic and staff of the Anticoagulation Clinic Service (ACC)    Physicians shall:  1.  Refer patients requiring anticoagulation to a specialty service staffed by personnel of Pharmacy Services and supervised by Clinic physicians.  2.  Respond to questions and referrals from pharmacy staff regarding delinquent or difficult patients.  3.  Inform the ACC staff when a new patient is to be started on the service in a timely manner including the indication for warfarin therapy and any variation from the  protocol.    Anticoagulation service staff shall:  1.  Conduct an education program for each patient.  2.  Assess each patient for their ability to comply with therapy and their understanding of anticoagulation therapy.  3.  Order all doses and dose changes based on a mutually agreed protocol.  4.  Schedule patients for follow-up laboratory and consultation visits.  5.  Assess each patient's INR and provide appropriate direction.  6.  At each visit, assess each patient for his or her risk of adverse or sub-therapeutic effects including at least the following:  Has  the patient experienced any bleeding since the last INR  Has the patient had any bacterial or viral infections since the last INR  Have any medications been added or changed or stopped since the last INR  Has the patient had any symptoms of thromboemboli since the last INR  Has the patient had any dietary changes or changes in eating habits since the last INR  Has the patient missed any warfarin/coumadin doses since the last INR  Has the patient had a fever since the last INR  Has the patient had unusual diarrhea since the last INR    7.  Prepare new prescriptions and authorize refills for anticoagulants.  8.  Serve as a resource to and answer questions from patients and staff.  9.  Identify delinquent patients and initiate appropriate steps to ensure adequate follow-up and monitoring.  10.  Refer delinquent patients and other problem situations to a physician for direction.  11.  Initiate vitamin K therapy when indicated.              MD face to face encounter       Documentation of Face to Face and Certification for Home Health Services    I certify that patient: Adiel Rosa Jr is under my care and that I, or a nurse practitioner or physician's assistant working with me, had a face-to-face encounter that meets the physician face-to-face encounter requirements with this patient on: 1/16/2017.    This encounter with the patient was in whole, or in  part, for the following medical condition, which is the primary reason for home health care: acute osteomyletis, uncontrolled diabetes mellitus.    I certify that, based on my findings, the following services are medically necessary home health services: Nursing.    My clinical findings support the need for the above services because: Nurse is needed: To provide assessment and oversight required in the home to assure adherence to the medical plan due to: complex wound care and monitoring of uncontrolled diabetes mellitus.    Further, I certify that my clinical findings support that this patient is homebound (i.e. absences from home require considerable and taxing effort and are for medical reasons or [facility] services or infrequently or of short duration when for other reasons) because: Leaving home is medically contraindicated for the following reason(s): Dyspnea on exertion that makes it so they cannot leave their home for needed services without clinical deterioration and extremity wound/infection limiting mobility    Based on the above findings. I certify that this patient is confined to the home and needs intermittent skilled nursing care, physical therapy and/or speech therapy.  The patient is under my care, and I have initiated the establishment of the plan of care.  This patient will be followed by a physician who will periodically review the plan of care.  Physician/Provider to provide follow up care: GAURAV Grijalva    Attending hospital physician (the Medicare certified [place] provider): Alejandro Retana  Physician Signature: See electronic signature associated with these discharge orders.  Date: 1/16/2017                  Statement of Approval     Ordered          01/16/17 1430  I have reviewed and agree with all the recommendations and orders detailed in this document.   EFFECTIVE NOW     Approved and electronically signed by:  Alejandro Retana MD                                                  INTERAGENCY TRANSFER FORM - NURSING   1/11/2017                       [facility]: [phone]            Attending Provider: Alejandro Fung MD     Allergies:  No Known Allergies    Infection:  None   Service:  GENERAL MEDI    Ht:  1.829 m (6')   Wt:  84.3 kg (185 lb 13.6 oz)   Admission Wt:  84.3 kg (185 lb 13.6 oz)    BMI:  25.2 kg/m 2   BSA:  2.07 m 2            Advance Directives        Does patient have a scanned Advance Directive/ACP document in EPIC?           No        Immunizations     Name Date      Influenza (High Dose) 3 valent vaccine 01/12/17     Pneumococcal 23 valent 01/12/17       ASSESSMENT     Discharge Profile Flowsheet     EXPECTED DISCHARGE     GASTROINTESTINAL (ADULT,PEDIATRIC,OB)      Expected Discharge Date  01/12/17 01/11/17 1218   GI WDL  WDL 01/16/17 0036    DISCHARGE NEEDS ASSESSMENT     All Quadrants Bowel Sounds  audible and active in all quadrants 01/16/17 0036    Readmission Within The Last 30 Days  no previous admission in last 30 days 01/11/17 1227   All Quadrants Palpation  soft/nontender 01/11/17 0605    Equipment Currently Used at Home  walker, rolling (at times uses walker) 01/16/17 1007   Last Bowel Movement  01/14/17 01/16/17 0036    Transportation Available  car 01/16/17 1007   Passing flatus  yes 01/16/17 0036    FUNCTIONAL LEVEL CURRENT     COMMUNICATION ASSESSMENT      Ambulation  2-->assistive person 01/15/17 1642   Patient's communication style  spoken language (English or Bilingual) 01/11/17 0554    Transferring  2-->assistive person 01/15/17 1642   SKIN      Toileting  2-->assistive person 01/15/17 1642   Inspection  Partial (identify areas NOT inspected) 01/16/17 0036    Bathing  2-->assistive person 01/15/17 1642   Skin areas NOT inspected  Buttock, left;Buttock, right;Sacrum;Coccyx 01/16/17 0036    Dressing  2-->assistive person 01/15/17 1642   Skin WDL  ex 01/16/17 0036    Eating  0-->independent 01/15/17 1642   Skin Integrity  bruise(s) 01/16/17  "0036    Communication  0-->understands/communicates without difficulty 01/15/17 1642   Additional Documentation  Wound (LDA) 01/16/17 0036    Swallowing  0-->swallows foods/liquids without difficulty 01/15/17 1642   SAFETY      Current Functional Level Comment  independent 01/11/17 1014   Safety WDL  WDL 01/16/17 0036    Change in Functional Status Since Onset of Current Illness/Injury  no 01/11/17 1014                      Assessment WDL (Within Defined Limits) Definitions           Safety WDL     Effective: 09/28/15    Row Information: <b>WDL Definition:</b> Bed in low position, wheels locked; call light in reach; upper side rails up x 2; ID band on<br> <font color=\"gray\"><i>Item=AS safety wdl>>List=AS safety wdl>>Version=F14</i></font>      Skin WDL     Effective: 09/28/15    Row Information: <b>WDL Definition:</b> Warm; dry; intact; elastic; without discoloration; pressure points without redness<br> <font color=\"gray\"><i>Item=AS skin wdl>>List=AS skin wdl>>Version=F14</i></font>      Vitals     Vital Signs Flowsheet     QUICK ADDS     Bedside Glucose (mg/dl )   137 mg/dl 01/16/17 0744    Quick Adds  Fingerstick Glucose 01/15/17 1646   HEIGHT AND WEIGHT      VITAL SIGNS     Height  1.829 m (6') 01/16/17 0857    Temp  99.2  F (37.3  C) 01/16/17 1411   Weight  84.3 kg (185 lb 13.6 oz) 01/11/17 1035    Temp src  Tympanic 01/16/17 1411   BSA (Calculated - sq m)  2.24 01/11/17 1035    Resp  16 01/16/17 1411   BMI (Calculated)  18.56 01/11/17 1035    Pulse  74 01/12/17 0128   POSITIONING      Heart Rate  78 01/16/17 1411   Body Position  independently positioning 01/16/17 1016    Pulse/Heart Rate Source  Monitor 01/16/17 1411   Head of Bed (HOB)  HOB at 20-30 degrees 01/16/17 0036    BP  (!) 112/44 mmHg 01/16/17 1411   DAILY CARE      BP Location  Left arm 01/16/17 1411   Activity Type  up in stretcher chair 01/16/17 1016    OXYGEN THERAPY     Activity Level of Assistance  assistance, 2 people 01/16/17 0937    " SpO2  95 % 01/16/17 1411   Activity Assistive Device  walker;gait belt 01/16/17 0937    O2 Device  None (Room air) 01/16/17 1411   ECG      PAIN/COMFORT     ECG Rhythm  Atrial fibrillation 01/11/17 1038    Patient Currently in Pain  denies 01/16/17 1411   Lead Monitored  -- (per EKG) 01/11/17 1038    Preferred Pain Scale  number (Numeric Rating Pain Scale) 01/15/17 1646   NEERAJ COMA SCALE      Patient's Stated Pain Goal  No pain 01/15/17 1646   Best Eye Response  4-->(E4) spontaneous 01/16/17 0036    0-10 Pain Scale  0 01/15/17 1646   Best Motor Response  6-->(M6) obeys commands 01/16/17 0036    POINT OF CARE TESTING     Best Verbal Response  5-->(V5) oriented 01/16/17 0036    Puncture Site  fingertip 01/16/17 0744   Neeraj Coma Scale Score  15 01/16/17 0036            Patient Lines/Drains/Airways Status    Active LINES/DRAINS/AIRWAYS     Name: Placement date: Placement time: Site: Days: Last dressing change:    Peripheral IV 01/16/17 Right Upper forearm 01/16/17  0400  Upper forearm  less than 1     Wound 01/12/17 Right Foot Other (comment) deep wound to middle top of right foot and bottom of middle right foot. 01/12/17  0815  Foot  4             Patient Lines/Drains/Airways Status    Active PICC/CVC     **None**            Intake/Output Detail Report     Date Intake     Output Net    Shift P.O. I.V. IV Piggyback Total Urine Total       Noc 01/14/17 2300 - 01/15/17 0659 -- -- -- -- 600 600 -600    Day 01/15/17 0700 - 01/15/17 1459 480 -- -- 480 525 525 -45    Erica 01/15/17 1500 - 01/15/17 2259 480 520 -- 1000 1250 1250 -250    Noc 01/15/17 2300 - 01/16/17 0659 -- -- -- -- -- -- 0    Day 01/16/17 0700 - 01/16/17 1459 720 -- -- 720 1000 1000 -280      Last Void/BM       Most Recent Value    Urine Occurrence 1 at 01/15/2017 2019    Stool Occurrence 1 at 01/12/2017 1500      Case Management/Discharge Planning     Case Management/Discharge Planning Flowsheet     REFERRAL INFORMATION     EXPECTED DISCHARGE       Admission Type  observation 01/11/17 1218   Expected Discharge Date  01/12/17 01/11/17 1218    Arrived From  home or self-care 01/11/17 1218   DISCHARGE PLANNING      Referral Source  admission list 01/11/17 1218   Readmission Within The Last 30 Days  no previous admission in last 30 days 01/11/17 1227    Reason For Consult  discharge planning 01/11/17 1218   Transportation Available  car 01/16/17 1007    Record Reviewed  history and physical;medical record;patient profile 01/11/17 1218   FINAL NOTE       Assigned to Case  Lashell Loza RN 01/11/17 1218   Final Note  see care plan note 01/11/17 1227    [facility] Name  [facility] 01/11/17 1218   FINAL RESOURCES      Primary Care MD Name  Dr LAUREN Grijalva 01/11/17 1218   Equipment Currently Used at Home  walker, rolling (at times uses walker) 01/16/17 1007    LIVING ENVIRONMENT     ABUSE RISK SCREEN      Lives With  spouse 01/16/17 1007   QUESTION TO PATIENT:  Has a member of your family or a partner(now or in the past) intimidated, hurt, manipulated, or controlled you in any way?  no 01/11/17 0554    Living Arrangements  house 01/16/17 1007   QUESTION TO PATIENT: Do you feel safe going back to the place where you are living?  yes 01/11/17 0554    Able to Return to Prior Living Arrangements  yes 01/11/17 1218   OBSERVATION: Is there reason to believe there has been maltreatment of a vulnerable adult (ie. Physical/Sexual/Emotional abuse, self neglect, lack of adequate food, shelter, medical care, or financial exploitation)?  no 01/11/17 0554    ASSESSMENT OF FAMILY/SOCIAL SUPPORT     (R) MENTAL HEALTH SUICIDE RISK      Marital Status   01/11/17 1218   Are you depressed or being treated for depression?  No 01/11/17 1027    COPING/STRESS     HOMICIDE RISK      Major Change/Loss/Stressor  none 01/11/17 1008   Homicidal Ideation  no 01/11/17 0554                  [facility]: [phone]            Medication Administration Report for Moe  Adiel Reis as of 01/16/17 1439   Legend:    Given Hold Not Given Due Canceled Entry Other Actions    Time Time (Time) Time  Time-Action       Inactive    Active    Linked        Medications 01/10/17 01/11/17 01/12/17 01/13/17 01/14/17 01/15/17 01/16/17    acetaminophen (TYLENOL) tablet 650 mg  Dose: 650 mg Freq: EVERY 4 HOURS PRN Route: PO  PRN Reason: mild pain  Start: 01/11/17 0948   Admin Instructions: Alternate ibuprofen (if ordered) with acetaminophen.  Maximum acetaminophen dose from all sources = 75 mg/kg/day not to exceed 4 grams/day.        0051 (650 mg)-Given        0513 (650 mg)-Given             amoxicillin-clavulanate (AUGMENTIN) 875-125 MG per tablet 1 tablet  Dose: 1 tablet Freq: EVERY 12 HOURS SCHEDULED Route: PO  Indications of Use: OSTEOMYELITIS,SKIN AND SOFT TISSUE INFECTION  Start: 01/16/17 0830   End: 01/23/17 0759          0946 (1 tablet)-Given       [ ] 2000           aspirin tablet 325 mg  Dose: 325 mg Freq: DAILY Route: PO  Start: 01/11/17 1000     (1044)-Not Given        0921 (325 mg)-Given        0938 (325 mg)-Given        0932 (325 mg)-Given        0943 (325 mg)-Given        0832 (325 mg)-Given           benzocaine-menthol (CEPACOL) 15-3.6 MG lozenge 1 lozenge  Dose: 1 lozenge Freq: EVERY 1 HOUR PRN Route: BU  PRN Reason: sore throat  Start: 01/16/17 0948              digoxin (LANOXIN) tablet 125 mcg  Dose: 125 mcg Freq: DAILY Route: PO  Start: 01/11/17 1000     1229 (125 mcg)-Given [C]        0921 (125 mcg)-Given        0938 (125 mcg)-Given        0932 (125 mcg)-Given        0943 (125 mcg)-Given        0832 (125 mcg)-Given           glucose 40 % gel 15-30 g  Dose: 15-30 g Freq: EVERY 15 MIN PRN Route: PO  PRN Reason: low blood sugar  Start: 01/11/17 0948   Admin Instructions: Give 15 g for BG 51 to 69 mg/dL IF patient is conscious and able to swallow. Give 30 g for BG less than or equal to 50 mg/dL IF patient is conscious and able to swallow. Do NOT give glucose gel via enteral tube.   IF patient has enteral tube: give apple juice 120 mL (4 oz or 15 g of CHO) via enteral tube for BG 51 to 69 mg/dL.  Give apple juice 240 mL (8 oz or 30 g of CHO) via enteral tube for BG less than or equal to 50 mg/dL.        0105 (15 g)-Given       0632 (30 g)-Given [C]               0804 (15 g)-Given                   Or  dextrose 50 % injection 25-50 mL  Dose: 25-50 mL Freq: EVERY 15 MIN PRN Route: IV  PRN Reason: low blood sugar  Start: 01/11/17 0948   Admin Instructions: Use if have IV access, BG less than 70 mg/dL and meet dose criteria below:  Dose if conscious and alert (or disorientated) and NPO = 25 mL  Dose if unconscious / not alert = 50 mL                       0223 (25 mL)-Given               0429 (25 mL)-Given           Or  glucagon injection 1 mg  Dose: 1 mg Freq: EVERY 15 MIN PRN Route: SC  PRN Reason: low blood sugar  PRN Comment: May repeat x 1 only  Start: 01/11/17 0948   Admin Instructions: May give SQ or IM. IF BG less than or equal to 50 mg/dL and no IV access.  ONLY use glucagon IF patient has NO IV access AND is UNABLE to swallow.                                                  heparin sodium PF injection 5,000 Units  Dose: 5,000 Units Freq: EVERY 8 HOURS Route: SC  Start: 01/14/17 1115   Admin Instructions: High concentration HEParin. Not for line flush or cath care.         1146 (5,000 Units)-Given       1957 (5,000 Units)-Given        0422 (5,000 Units)-Given       1140 (5,000 Units)-Given       2027 (5,000 Units)-Given        0455 (5,000 Units)-Given       1135 (5,000 Units)-Given       [ ] 2000           HYDROcodone-acetaminophen (NORCO) 5-325 MG per tablet 1 tablet  Dose: 1 tablet Freq: EVERY 6 HOURS PRN Route: PO  PRN Reason: moderate to severe pain  Start: 01/11/17 0948   Admin Instructions: Maximum acetaminophen dose from all sources= 75 mg/kg/day not to exceed 4 grams               insulin aspart (NovoLOG) inj (RAPID ACTING)  Dose: 1-5 Units Freq: AT BEDTIME Route: SC  Start:  01/11/17 2200   Admin Instructions: MEDIUM INSULIN RESISTANCE DOSING    Do Not give Bedtime Correction Insulin if BG less than  200.   For  - 249 give 1 units.   For  - 299 give 2 units.   For  - 349 give 3 units.   For  -399 give 4 units.   For BG greater than or equal to 400 give 5 units.  Notify provider if glucose greater than or equal to 350 mg/dL after administration of correction dose.  If given at mealtime, must be administered 5 min before meal or immediately after.      2244 (1 Units)-Given        (2117)-Not Given        2118 (1 Units)-Given [C]        2100 (1 Units)-Given [C]        2112 (3 Units)-Given [C]        [ ] 2200           insulin aspart (NovoLOG) inj (RAPID ACTING)  Dose: 1-7 Units Freq: 3 TIMES DAILY BEFORE MEALS Route: SC  Start: 01/11/17 1130   Admin Instructions: Correction Scale - MEDIUM INSULIN RESISTANCE DOSING     Do Not give Correction Insulin if Pre-Meal BG less than 140.   For Pre-Meal  - 189 give 1 unit.   For Pre-Meal  - 239 give 2 units.   For Pre-Meal  - 289 give 3 units.   For Pre-Meal  - 339 give 4 units.   For Pre-Meal - 399 give 5 units.   For Pre-Meal -449 give 6 units  For Pre-Meal BG greater than or equal to 450 give 7 units.   To be given with prandial insulin, and based on pre-meal blood glucose.    Notify provider if glucose greater than or equal to 350 mg/dL after administration of correction dose.  If given at mealtime, must be administered 5 min before meal or immediately after.      1221 (2 Units)-Given       1832 (2 Units)-Given        0709 (1 Units)-Given       (1140)-Not Given [C]       1654 (2 Units)-Given        (0745)-Not Given [C]       1122 (2 Units)-Given [C]       1720 (2 Units)-Given [C]        (0802)-Not Given [C]       (1105)-Not Given [C]       1629 (1 Units)-Given [C]        (0649)-Not Given       1130 (2 Units)-Given [C]       1627 (4 Units)-Given [C]        (0742)-Not Given       1135 (1  Units)-Given       [ ] 1630           insulin glargine U-300 (TOUJEO) injection 35 Units  Dose: 35 Units Freq: EVERY MORNING Route: SC  Start: 01/16/17 0900          0832 (35 Units)-Given           metoprolol (TOPROL-XL) 24 hr tablet 25 mg  Dose: 25 mg Freq: DAILY Route: PO  Start: 01/15/17 0900   Admin Instructions: DO NOT CRUSH. Tablet may be split in half along score line.  Hold if HR less than 60 or MAP less than 65          0943 (25 mg)-Given        0832 (25 mg)-Given           naloxone (NARCAN) injection 0.1-0.4 mg  Dose: 0.1-0.4 mg Freq: EVERY 2 MIN PRN Route: IV  PRN Reason: opioid reversal  Start: 01/11/17 0948   Admin Instructions: For respiratory rate LESS than or EQUAL to 8.  Partial reversal dose:  0.1 mg titrated q 2 minutes for Analgesia Side Effects Monitoring Sedation Level of 3 (frequently drowsy, arousable, drifts to sleep during conversation).Full reversal dose:  0.4 mg bolus for Analgesia Side Effects Monitoring Sedation Level of 4 (somnolent, minimal or no response to stimulation).               ranitidine (ZANTAC) tablet 150 mg  Dose: 150 mg Freq: AT BEDTIME Route: PO  Start: 01/11/17 2200     2239 (150 mg)-Given        2138 (150 mg)-Given        2026 (150 mg)-Given        2003 (150 mg)-Given        2027 (150 mg)-Given        [ ] 2100           sodium chloride (PF) 0.9% PF flush 3 mL  Dose: 3 mL Freq: EVERY 8 HOURS Route: IK  Start: 01/13/17 0100       0051 (3 mL)-Given       0941 (3 mL)-Given       (1722)-Not Given [C]        (0100)-Not Given       0933 (3 mL)-Given       1629 (3 mL)-Given        (0114)-Not Given       (0934)-Not Given       (1649)-Not Given        (0021)-Not Given       (0840)-Not Given       [ ] 1700           Warfarin Therapy Reminder (Check START DATE - warfarin may be starting in the FUTURE)  Freq: CONTINUOUS PRN Route: XX  Start: 01/16/17 0859   Admin Instructions: *Note to reorder warfarin daily*  Pharmacy Warfarin Dosing Service  Patient is on Warfarin Therapy - check  for daily order              Future Medications  Medications 01/10/17 01/11/17 01/12/17 01/13/17 01/14/17 01/15/17 01/16/17       insulin glargine U-300 (TOUJEO) injection 10 Units  Dose: 10 Units Freq: AT BEDTIME Route: SC  Start: 01/16/17 2200          [ ] 2200           warfarin (COUMADIN) tablet 8 mg  Dose: 8 mg Freq: ONCE AT 6PM Route: PO  Start: 01/16/17 1800          [ ] 1800          Completed Medications  Medications 01/10/17 01/11/17 01/12/17 01/13/17 01/14/17 01/15/17 01/16/17         Dose: 25 Units Freq: ONCE Route: SC  Start: 01/15/17 1000   End: 01/15/17 0954         0954 (25 Units)-Given           Discontinued Medications  Medications 01/10/17 01/11/17 01/12/17 01/13/17 01/14/17 01/15/17 01/16/17         Dose: 1 tablet Freq: EVERY 12 HOURS SCHEDULED Route: PO  Indications of Use: SKIN AND SOFT TISSUE INFECTION  Start: 01/16/17 0830   End: 01/16/17 0829          0829-Med Discontinued         Dose: 3 g Freq: EVERY 6 HOURS Route: IV  Indications of Use: OSTEOMYELITIS  Last Dose: 3 g (01/15/17 1132)  Start: 01/13/17 1645   End: 01/16/17 0827       1716 (3 g)-New Bag [C]       2201 (3 g)-New Bag        0509 (3 g)-New Bag       1114 (3 g)-New Bag       1629 (3 g)-New Bag       2156 (3 g)-New Bag        0422 (3 g)-New Bag       1132 (3 g)-New Bag       1630 (3 g)-New Bag       2208 (3 g)-New Bag        0455 (3 g)-New Bag       0827-Med Discontinued         Dose: 16 mg Freq: DAILY Route: PO  Start: 01/11/17 1000   End: 01/13/17 1638     1221 (16 mg)-Given       1229 (16 mg)-Given        0921 (16 mg)-Given        0804-Hold       1638-Med Discontinued            Dose: 400 mg Freq: EVERY 12 HOURS Route: IV  Indications of Use: OSTEOMYELITIS  Last Dose: 400 mg (01/13/17 1212)  Start: 01/12/17 1015   End: 01/13/17 1638      1233 (400 mg)-New Bag        0050 (400 mg)-New Bag       1212 (400 mg)-New Bag       1638-Med Discontinued            Dose: 25 Units Freq: ONCE Route: SC  Start: 01/15/17 1000   End: 01/15/17  0954         0954-Med Discontinued          Dose: 25 Units Freq: 2 TIMES DAILY Route: SC  Start: 01/14/17 2100   End: 01/15/17 0945        2101 (25 Units)-Given        0945-Med Discontinued                Dose: 50 Units Freq: AT BEDTIME Route: SC  Start: 01/13/17 2200   End: 01/14/17 1106       2200 (50 Units)-Given [C]        1106-Med Discontinued           Dose: 80 Units Freq: AT BEDTIME Route: SC  Start: 01/11/17 2200   End: 01/13/17 1640     2242 (80 Units)-Given        2138 (80 Units)-Given        1640-Med Discontinued            Dose: 50 mg Freq: DAILY Route: PO  Start: 01/11/17 1000   End: 01/14/17 1049   Admin Instructions: DO NOT CRUSH. Tablet may be split in half along score line.      1830 (50 mg)-Given        0924-Hold [C]        0804-Hold        0839-Hold       1049-Med Discontinued           Dose: 500 mg Freq: EVERY 8 HOURS Route: IV  Indications of Use: OSTEOMYELITIS  Last Dose: 500 mg (01/13/17 1025)  Start: 01/12/17 1015   End: 01/13/17 1638      1125 (500 mg)-New Bag       1740 (500 mg)-New Bag        0225 (500 mg)-New Bag       0335-Stopped       1025 (500 mg)-New Bag       1638-Med Discontinued            Rate: 75 mL/hr Freq: CONTINUOUS Route: IV  Start: 01/13/17 1645   End: 01/16/17 0900       1714 ( )-New Bag [C]        1955 ( )-New Bag        0939 ( )-New Bag       1500 ( )-Rate/Dose Verify        0221 ( )-New Bag       0900-Med Discontinued         Dose: 6 mg Freq: ONCE AT 6PM Route: PO  Start: 01/13/17 1800   End: 01/13/17 1638       1638-Med Discontinued            Freq: CONTINUOUS PRN Route: XX  Start: 01/11/17 0948   End: 01/16/17 0902   Admin Instructions: *Note to reorder warfarin daily*  Pharmacy Warfarin Dosing Service  Patient is on Warfarin Therapy - check for daily order           0902-Med Discontinued    Medications 01/10/17 01/11/17 01/12/17 01/13/17 01/14/17 01/15/17 01/16/17               INTERAGENCY TRANSFER FORM - NOTES (H&P, Discharge Summary, Consults, Procedures,  "Therapies)   1/11/2017                       [facility]: [phone]               History & Physicals      H&P by Shirley Chan NP at 1/11/2017 11:38 AM     Author:  Shirley Chan NP Service:  Hospitalist Author Type:  Nurse Practitioner    Filed:  1/11/2017  5:13 PM Note Time:  1/11/2017 11:38 AM Status:  Signed    :  Shirley Chan NP (Nurse Practitioner)           [facility]    History and Physical  Hospitalist       Date of Admission:  1/11/2017  Date of Service (when I saw the patient): 01/11/2017    Assessment and Plan  Adiel Rosa Jr is a 81 year old male who presents with fall at home.     Active Problems:      Hypoglycemia due to insulin: Ran out of [facility] so called diabetes center and they told him to use his Levemir pen instead at regular dose. He did but states he has no food in the house so has not really eaten much in the last 24 hours. He got up from chair and sort of lost his balance and fell to his knees then forward hitting his head on the carpeted floor. By the time he his his head he was already kneeling. His wife called EMS. EMS found him seated on the floor awake and alert. Blood sugars was in the 30's. He wa given further glucose in the ED and blood sugars have come up nicely. He denies taking any insulin today.       Diabetic foot ulcer: Large 4x4 calloused ulcer noted to right plantar foot at 2nd/3rd metatarsal. There is putrid drainage present. There is a corresponding wound on dorsal side of foot with same drainage. Patient states wound present \"about a month\". Lopez not recall specific injury but does state he is \"out working in the yard a lot, could have stepped on something\". Has been treating at home with epsom salt soaks and gold bond lotion. Has been draining for some time. Xray pending to evaluate for osteomyelitis, I would be surprised if he DIDN'T have osteomyelitis. May need MRI for definitive diagnosis and to learn extent of " "disease.       Uncontrolled diabetes with vascular complications: History peripheral neuropathy, poor compliance and poor control of diabetes. Checks blood sugars unreliable,takes medications unreliably and refused dietary education. He reports he has been receiving his Trujeo from diabetes center and when he takes it he feels his sugars are in good control.       Poor compliance to medical treatment: Fails to show to multiple appointments including diabetic center for follow and to get insulin pens. Has not followed with Coumadin clinic and INR subtherapeutic today. States his car is frozen and won't start. Consult for  for community services, transportation services, etc. In the past has refused services.       Chronic A-fib: Rate well controlled.       Chronic anticoagulation 2nd to above: INR 1.2, pharmacy to dose while here.       COPD,chronic: Takes Advair \"occasionally\". As this is not in our formulary will not order while here.      Fall at home ,presumably due to severe hypoglycemia: Did hit his head but he has no identifiable injury, no laceration, bruising or swelling. INR subtherapeutic, CT scan head negative for acute hemorrhage.           DVT Prophylaxis: Low Risk/Ambulatory with no VTE prophylaxis indicated  Code Status: Full Code    Disposition: Expected discharge in 1-2 days once stable.    Shirley Chan, CNP    Primary Care Physician  SASKIA Grijalva    Chief Complaint  Fall with low blood sugar    History is obtained from the patient    History of Present Illness  Adiel Rosa Jr is a 81 year old male who presented to the ED with hypoglycemia and fall at home. He reports that he ran out of his Trujeo and so took his old Levimir instead at his old dose. However, he has \"no food in the house\" so has basically not eaten over the last 24 hours or so. No food this morning, has not taken any insulin this morning either. When EMS arrived to his house they found him seated on the floor " alert and conversant. He states he lost his balance and fell to his knees then fell forward and struck his forehead on the floor. He denies any loss of consciousness. EMS checked glucose an it was in the 30's. On arrival to ED glucose >100 and remained so for several hours in the ED. CT scan negative for acute hemorrhage. No neurological changes or complaints. He is on coumadin therapy so he is admitted fo observation overnight.      Past Medical History   I have reviewed this patient's medical history and updated it with pertinent information if needed.   Past Medical History   Diagnosis Date     Diabetes mellitus without mention of complication 11/29/1999     Cough 9/11/2007     Hypertension, benign 1/1/2011     Hypercholesterolemia 1/1/2011     Acute myocardial infarction of other specified sites, episode of care unspecified 1/1/1999     Non Q wave  treated with Retavase     Esophageal reflux 1/1/2011     BPH 1/1/2011     Erectile Dysfunction 1/1/2011     COPD 1/1/2011     Hypercalcemia 1/1/2011     Benign hypertensive heart disease with congestive 1/1/2011     Chronic renal disease, stage III 9/22/2011       Past Surgical History  I have reviewed this patient's surgical history and updated it with pertinent information if needed.  Past Surgical History   Procedure Laterality Date     Nephrolithiasis       lithotripsy     Bladder stone removal       Appendectomy       Tonsillectomy       Stent x 1         Prior to Admission Medications  Prior to Admission Medications   Prescriptions Last Dose Informant Patient Reported? Taking?   ADVAIR DISKUS 250-50 MCG/DOSE diskus inhaler Unknown at Unknown time  No No   Sig: USE 1 INHALATION TWO TIMES A DAY IN THE MORNING AND IN THE EVENING APPROXIMATELY 12 HOURS APART   B-D U/F 31G X 8 MM insulin pen needle 1/11/2017 at Unknown time  No Yes   Sig: USE 5 TO 6 PEN NEEDLES DAILY OR AS DIRECTED   Cranberry 500 MG CAPS 1/10/2017 at Unknown time  Yes Yes   Sig: Take 1 capsule by  mouth   Doxylamine-DM 6.25-15 MG/15ML LIQD Unknown at Unknown time  Yes No   Sig: Taking as pkg directions at night   HYDROcodone-acetaminophen (NORCO) 5-325 MG per tablet 1/10/2017 at Unknown time  No Yes   Sig: Take 1 tablet by mouth every 6 hours as needed for moderate to severe pain   Multiple Vitamins-Minerals (CENTRUM SILVER PO) 1/10/2017 at Unknown time  Yes Yes   Sig: Take 1 tablet by mouth   NOVOLOG FLEXPEN 100 UNIT/ML soln 1/10/2017 at Unknown time  No Yes   Sig: INJECT 20 TO 30 UNITS FOUR TIMES A DAY WITH MEALS AND AT BEDTIME   ONE TOUCH ULTRA test strip 1/10/2017 at Unknown time  No Yes   Sig: USE UP TO 5 STRIPS DAILY TO TEST BLOOD GLUCOSE   acetaminophen (TYLENOL ARTHRITIS PAIN) 650 MG CR tablet Unknown at Unknown time  Yes No   Sig: Take 650 mg by mouth every 8 hours as needed   aspirin 325 MG tablet 1/10/2017 at Unknown time  Yes Yes   Sig: Take 1 tablet by mouth daily   candesartan (ATACAND) 16 MG tablet 1/10/2017 at Unknown time  No Yes   Sig: TAKE 1 TABLET DAILY   cholecalciferol (VITAMIN D3) 1000 UNIT tablet 1/10/2017 at Unknown time  Yes Yes   Sig: Take 1,000 Units by mouth daily   digoxin (LANOXIN) 125 MCG tablet 1/10/2017 at Unknown time  No Yes   Sig: TAKE 1 TABLET DAILY   insulin glargine U-300 (TOUJEO) 300 UNIT/ML PEN Past Week at Unknown time  No Yes   Sig: Inject 80 Units Subcutaneous At Bedtime   magnesium 250 MG tablet 1/10/2017 at Unknown time  Yes Yes   Sig: Take 1 tablet by mouth daily   metoprolol (TOPROL-XL) 50 MG 24 hr tablet 1/10/2017 at Unknown time  No Yes   Sig: TAKE 1 TABLET DAILY   ranitidine (ZANTAC) 150 MG tablet 1/10/2017 at Unknown time  No Yes   Sig: TAKE 1 TABLET TWICE A DAY   simvastatin (ZOCOR) 80 MG tablet 1/10/2017 at Unknown time  No Yes   Sig: TAKE 1 TABLET DAILY IN THE EVENING   warfarin (COUMADIN) 2 MG tablet 1/10/2017 at Unknown time  No Yes   Sig: TAKE 4 TABLETS ON MONDAY, WEDNESDAY, AND FRIDAY AND 3 TABLETS ALL OTHER DAYS      Facility-Administered  Medications: None     Allergies  No Known Allergies    Social History  I have reviewed this patient's social history and updated it with pertinent information if needed. Adiel Rosa Jr  reports that he has quit smoking. His smoking use included Cigarettes. He has never used smokeless tobacco.    Family History  I have reviewed this patient's family history and updated it with pertinent information if needed.   Family History   Problem Relation Age of Onset     C.A.D. Father      C.A.D.       Family hx     Heart Failure Mother      Congestive     DIABETES       Family hx     HEART DISEASE Brother      MI, cause of death     HEART DISEASE Father      MI, cause of death     Other - See Comments Brother      PVD     HEART DISEASE Mother      MI, cause of death       Review of Systems  Review Of Systems  Skin: negative, negative for, rash, itching, bruising, lumps or bumps, wounds  Eyes: negative, visual blurring  Ears/Nose/Throat: negative, tinnitus, vertigo  Respiratory: No shortness of breath, dyspnea on exertion, cough, or hemoptysis  Cardiovascular: negative for, palpitations, tachycardia, irregular heart beat, chest pain, lower extremity edema and syncope or near-syncope  Gastrointestinal: negative for, nausea, vomiting and abdominal pain  Genitourinary: negative for, dysuria, urgency and hematuria  Musculoskeletal: negative for, neck pain and joint pain  Neurologic: negative for, headaches, syncope, speech problems, incoordination and tremor  Hematologic/Lymphatic/Immunologic: positive for negative and weight loss, negative for, bleeding disorder, chills, fever, night sweats and swollen nodes  Endocrine: positive for negative, diabetes and polyuria, negative for, hot flashes and night sweats    Physical Exam  Temp: 97.8  F (36.6  C) Temp src: Oral BP: 114/55 mmHg Pulse: 97 Heart Rate: 78 Resp: 18 SpO2: 98 % O2 Device: None (Room air)    Vital Signs with Ranges  Temp:  [97  F (36.1  C)-97.8  F (36.6  C)] 97.8   F (36.6  C)  Pulse:  [97] 97  Heart Rate:  [] 78  Resp:  [16-18] 18  BP: (100-121)/(54-88) 114/55 mmHg  SpO2:  [95 %-98 %] 98 %  185 lbs 13.56 oz    EXAM:  Constitutional: no distress, pale and unkempt appearing.   Cardiovascular: negative findings: no lift, heave, or thrill, no murmurs, clicks, or gallops, positive findings: irregular rhythm  Respiratory: Percussion normal. Good diaphragmatic excursion. Lungs clear  Head: Normocephalic. No masses, lesions, tenderness or abnormalities, negative findings: Nares normal. Septum midline. Mucosa normal. No drainage or sinus tenderness., negative findings: no wounds,swelling or bruising.  Neck: Neck supple. No adenopathy. Thyroid symmetric, normal size,, Carotids without bruits., negative findings: cervical spinal crepitus or pain on palpation over bony prominences.  Abdomen: Abdomen soft, non-tender. BS normal. No masses, organomegaly  : Deferred  NEURO: negative findings: speech normal, mental status intact, cranial nerves 2-12 intact, muscle strength normal, reflexes normal and symmetric  SKIN: no suspicious lesions or rashes  JOINT/EXTREMITIES: Large 4cm x4cm thick calloused area noted to plantar foot at 2nd/3rd metatarsal head. Central opening is 1cm x1cm and somewhat elliptical in shape. Very malodorous. Thin yellow to serosanguinous drainage present and increases with palpation of the forefoot. Patient states he does have some pain with ambulation but no pain to palpation. Beneath the calloused area the tissue is mildly boggy. There are occasional air bubbles expressed as well as drainage. This wound appears to extend all the way to the dorsal surface as he has a 1cm x1cm open wound with same drainage present. The surrounding tissue is not erythremic or warm to touch.       Data  Data reviewed today:  I personally reviewed no images or EKG's today. Foot xray pending. Head CT scan reviewed, no acute hemorrhage.     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  The  ventricular system and cortical sulci appear normal.  Cranial vault is  intact.  The paranasal sinuses are clear.    IMPRESSION:  OLD RIGHT OCCIPITAL INFARCT.  Exam Date: Jan 11, 2017 07:30:45 AM  Author: KIET ROMANO  This report is final and signed                    Discharge Summaries     No notes of this type exist for this encounter.         Consult Notes      Consults by Chauncey Zamora MD at 1/14/2017 11:35 AM     Author:  Chauncey Zamora MD Service:  Orthopedics Author Type:  Physician    Filed:  1/14/2017 11:36 AM Note Time:  1/14/2017 11:35 AM Status:  Signed    :  Chauncey Zamora MD (Physician)           Orthopedic Inpatient Consultation  Physician requesting consult: Dr. Rebollar  Reason for consultation: Diabetic osteomyelitis right foot    Subjective: This 81-year-old diabetic gentleman was admitted to the hospital with hypoglycemia and a diabetic ulcer on the plantar aspect of the right foot.  X-rays and MRI reveal osteomyelitis involving nearly the entirety of the 2nd metatarsal, the head and neck of the 3rd metatarsal, and the proximal phalanges of the 2nd and 3rd rays.  In addition there are fractures of the bases of the 2nd and 3rd proximal phalanges, and an abscess around the entire 2nd ray.  Orthopedics was consulted for surgical intervention.    Objective: Elderly frail-appearing male in no obvious distress.  The right foot shows a an ulcer on the plantar aspect of the 2nd metatarsal head.  No obvious purulence is emerging from the ulcer.  There is a small ulcer on the dorsal aspect of the  foot between the 2nd and 3rd metatarsal heads from which robin purulence is noted.  The 2nd toe is swollen and erythematous.    X-rays: His plain films and MRI were reviewed.  In addition ABIs were performed which were normal and the right ankle.    Impression: Diabetic osteomyelitis with abscess involving most of the 2nd and 3rd rays of the right foot    Recommendation: I recommend a below the knee amputation.  The results of the CATIA suggest that he has healing potential at that level.  A lesser procedure would not resolve the infection.  A simple draining of the abscess will not resolve the osteomyelitis.  It is extremely unlikely that IV antibiotics will eradicate the osteomyelitis.  Ray resection would require resection of the 2nd and 3rd rays which is mutilating and would leave the foot nonfunctional.  A transmetatarsal amputation would not remove the entirety of the 2nd ray which has osteomyelitis to its base, according to the MRI.    I discussed the procedure (BKA) with the patient this morning and offered to do the procedure Monday morning.  The patient refused the amputation.  I warned the patient that without surgery the infection will spread and he could eventually die of sepsis.  He still refused.  Unless he consents, I have nothing surgical to offer him.    When Dr. Rebollar contacted me about the MRI results, Dr. Rebollar felt that the patient was amenable to a BKA.  For that reason I believe Dr. Rebollar made the patient NPO after midnight Ubaldo night. The NPO order may to be removed if the patient continues to refuse amputation.    Please reconsult me if the patient changes his mind.       Consults by Maria Dolores Crawford RN at 1/11/2017  4:15 PM     Author:  Maria Dolores Crawford RN Service:  [facility] Nurse Author Type:  Registered Nurse    Filed:  1/12/2017 10:14 AM Note Time:  1/11/2017  4:15 PM Status:  Signed    :  Maria Dolores Crawford RN (Registered Nurse)       Consult Orders:     1. Wound Ostomy Continence Nurse IP Consult [[id number]] ordered by Shirley Chan NP at 01/11/17 8363                Received phone call from JOSIAH Stoddard re: wound consult by TREVA Chan.  Upon arrival to patient room TREVA Chan present - she stated that the patient has a significant wound that will need a surgical consult and stated that we do not need to consult on this patient at this time.  Shirley will be ordering the surgical consult as well as some diagnostic tests.  Wound Care will be available in the future should our services be needed.                Progress Notes - Physician (Notes for yesterday and today)      Progress Notes by Britt Lang MD at 1/15/2017  3:36 PM     Author:  Britt Lang MD Service:  Hospitalist Author Type:  Physician    Filed:  1/15/2017  3:54 PM Note Time:  1/15/2017  3:36 PM Status:  Signed    :  Britt Lang MD (Physician)           [facility]    Hospitalist Progress Note    Date of Service (when I saw the patient): 01/15/2017    Assessment and Plan  Adiel Rosa Jr is a 81 year old male who was admitted on 1/11/2017 with fall and was found to have FS in 30s. He continues to have low FS adrien middle of night and higher reading pre-dinner. I will stop nighttime lantus to avoid hypoglycemia and give it in the morning.  Has right foot abscess and osteo. Seen by Dr Zamora and he refused surgery to him.   He adamantly refuses surgery and wants to go the AL where his wife was just admitted to be with her.  Discussed with SW.       DM / hypoglycemia  Suspect due to poor PO intake.  Again was hypo last night - FS was 58 mg. I will stop night time lantus and give it in the morning. He states at home he eats all night so I think dietary changes here have contributed to hypoglycemia.  His lantus adjustments will be work in progress.  A1c is 8.4.      Right Diabetic foot ulcer / abscess with osteomyelitis:    Foot Cx growth: staph pseudintermedius and  proteus mirabilis.  Cont IV unasyn. Surgery on Monday.  CATIA were performed and ok at 1 on both legs.    Afib:   Rate controled.  Holding warfarin for now in anticipation of surgery.    CKD  Cr at baseline    CAD / s/p stent 1999   stable at this time.  Cont metoprolol.   Last Echo done in 2014 showed Normal LV size with mild reduction of EF.  Severe LAE,  No major valve issues.        Protein energy malnutrition  Encourage PO intake and give nutritional supplement.    Social Issues:    Has a long history of noncompliance and  vulnerable adult issues with his wife also.  SS has been involved and they are resistant to any help. Currently his wife is in assisted living  She fel at home and unable to care for herself.  Their 3 dogs are now in a shelter  So he has agreed to stay here and proceed with surgery and care.    DVT Prophylaxis: start Heparin SQ since INR is subtherapeutic.    Code Status: Full Code    Disposition: Expected discharge in 1-2 days           Britt Lang    Interval History  Has no complaints today.    admant not to have surgery done and wants to go to AL facility. He states that his brother lost his life after getting this type of surgery.  I explained that with his active infection he will become septic and die.  Also explained that AL facility may not accept due to active infection.    He stated 'get the fucking hell out of here'.       -Data reviewed today: I reviewed all new labs and imaging results over the last 24 hours. I personally reviewed no images or EKG's today.    Physical Exam  Temp: 98  F (36.7  C) Temp src: Tympanic BP: 96/56 mmHg   Heart Rate: 83 Resp: 16 SpO2: 97 % O2 Device: None (Room air)    Filed Vitals:    01/11/17 1004   Weight: 84.3 kg (185 lb 13.6 oz)     Vital Signs with Ranges  Temp:  [97  F (36.1  C)-100.2  F (37.9  C)] 98  F (36.7  C)  Heart Rate:  [83-93] 83  Resp:  [16-18] 16  BP: ()/(56-82) 96/56 mmHg  SpO2:  [95 %-97 %] 97 %  I/O last 3 completed shifts:  In:  1168 [P.O.:720; I.V.:448]  Out: 1675 [Urine:1675]    Peripheral IV 01/14/17 Left Hand (Active)   Site Assessment WDL 1/15/2017  8:40 AM   Line Status Infusing 1/15/2017  8:40 AM   Phlebitis Scale 0-->no symptoms 1/15/2017  8:40 AM   Infiltration Scale 0 1/15/2017  8:40 AM   Extravasation? No 1/14/2017  4:00 PM   Number of days:1       Wound 01/12/17 Right Foot Other (comment) deep wound to middle top of right foot and bottom of middle right foot. (Active)   Site Assessment Pink;Red;Moist;White 1/15/2017  2:00 PM   Miguelina-wound Assessment Erythema 1/15/2017  2:00 PM   Drainage Amount Moderate 1/15/2017  2:00 PM   Drainage Color/Charcteristics Yellow;Tan;Creamy 1/15/2017  2:00 PM   Wound Care/Cleansing Soap and water 1/15/2017  2:00 PM   Dressing Xeroform;Dry gauze 1/15/2017  2:00 PM   Dressing Status New dressing 1/15/2017  2:00 PM   Number of days:3     Line/device assessment(s) completed for medical necessity    Constitutional: NAD  Alert  Respiratory: CTA b/l  Cardiovascular: S1S2 RRR  GI: soft and nontender  Skin/Integumen: dressing over right foot is c/d/i  Other:      Medications    IV infusion builder WITH additives 75 mL/hr at 01/15/17 0939     Warfarin Therapy Reminder         [START ON 1/16/2017] insulin glargine U-300  35 Units Subcutaneous QAM     metoprolol  25 mg Oral Daily     heparin  5,000 Units Subcutaneous Q8H     sodium chloride (PF)  3 mL Intracatheter Q8H     ampicillin-sulbactam (UNASYN) IV  3 g Intravenous Q6H     aspirin  325 mg Oral Daily     digoxin  125 mcg Oral Daily     insulin aspart  1-7 Units Subcutaneous TID AC     insulin aspart  1-5 Units Subcutaneous At Bedtime     ranitidine  150 mg Oral At Bedtime       Data    Recent Labs  Lab 01/15/17  0613 01/14/17  0611 01/13/17  0548   WBC 8.2 10.7 14.1*   HGB 11.4* 11.8* 12.9*   MCV 88 87 86    326 385   INR 1.40* 1.46* 1.34*    139 140   POTASSIUM 4.4 4.3 3.8   CHLORIDE 104 104 104   CO2 29 28 28   BUN 21 18 18   CR 1.31* 1.24  1.25   ANIONGAP 7 7 8   AZRA 8.2* 8.2* 8.7   * 75 35*   ALBUMIN 2.1* 2.1*  --    PROTTOTAL 6.2* 6.5*  --    BILITOTAL 0.4 0.4  --    ALKPHOS 71 74  --    ALT 11 11  --    AST 9 11  --        No results found for this or any previous visit (from the past 24 hour(s)).               Procedure Notes     No notes of this type exist for this encounter.         Progress Notes - Therapies (Notes from 01/13/17 through 01/16/17)      Progress Notes by Sunitha Gifford PT at 1/16/2017 10:19 AM     Author:  Sunitha Gifford PT Service:  (none) Author Type:  Physical Therapist    Filed:  1/16/2017 10:19 AM Note Time:  1/16/2017 10:19 AM Status:  Signed    :  Sunitha Gifford PT (Physical Therapist)            01/16/17 1002   Quick Adds   Type of Visit Initial PT Evaluation   Living Environment   Lives With spouse   Living Arrangements house   Home Accessibility stairs to enter home;stairs within home   Number of Stairs to Enter Home 5   Number of Stairs Within Home 12   Stair Railings at Home outside, present at both sides;inside, present at both sides   Transportation Available car   Living Environment Comment Pt's [facility]   Usual Activity Tolerance moderate   Current Activity Tolerance moderate   Regular Exercise no   Equipment Currently Used at Home walker, rolling  (at times uses walker)   Functional Level Prior   Ambulation 1-->assistive equipment   Transferring 0-->independent   Toileting 0-->independent   Bathing 0-->independent   Dressing 0-->independent   Eating 0-->independent   Communication 0-->understands/communicates without difficulty   Swallowing 0-->swallows foods/liquids without difficulty   Cognition 0 - no cognition issues reported   Fall history within last six months yes   Number of times patient has fallen within last six months 1   Which of the above functional risks had a recent onset or change? none   Prior Functional Level  Comment Pt reports he has been independent at home   General Information   Onset of Illness/Injury or Date of Surgery - Date 01/11/17   Referring Physician Dr. Lang   Patient/Family Goals Statement pt planning to go to assisted living with wife   Pertinent History of Current Problem (include personal factors and/or comorbidities that impact the POC) Pt admitted with hypoglycemia and also found to have diabetic foot ulcer with osteomyelitis.  Pt with h/o DM with poor control with A1C of 8.4, pt has history of noncompliance with medical treatments and recommendations and both pt and wife have had vulnerable adult filed in past.  Surgery has been recommended for the osteo however pt refusing at this time and wanting to treat strictly with antibiotics.   Weight-Bearing Status - LLE full weight-bearing   Weight-Bearing Status - RLE full weight-bearing   General Observations Pt resting in bed, agreeable to PT eval   Cognitive Status Examination   Orientation orientation to person, place and time   Level of Consciousness alert   Follows Commands and Answers Questions 100% of the time   Personal Safety and Judgment intact   Memory intact   Pain Assessment   Patient Currently in Pain No   Integumentary/Edema   Integumentary/Edema Comments R foot wrapped   Posture    Posture Forward head position   Range of Motion (ROM)   ROM Comment Bilateral LE AROM WFL throughout   Strength   Strength Comments Bilateral hips 4+/5, bilateral knees 4+/5, bilateral ankles 4/5   Bed Mobility   Bed Mobility Comments sup>sit mod I   Transfer Skills   Transfer Comments sit<>stand CGA   Gait   Gait Comments Ambulated 10' into bathroom with FWW CGA with 1 minor LOB requiring assist to correct   Balance   Balance Comments fair+ with support from walker   Sensory Examination   Sensory Perception Comments per MD limited to no blood flow to R LE causing no pain sensation at this time   General Therapy Interventions   Planned Therapy Interventions  "gait training;risk factor education   Clinical Impression   Criteria for Skilled Therapeutic Intervention yes, treatment indicated   PT Diagnosis gait disturbance   Influenced by the following impairments decreased strength, decreased balance, decreased sensation   Functional limitations due to impairments decreased safety with gait and transfers   Clinical Presentation Evolving/Changing   Clinical Presentation Rationale presence of osteo with poor DM control and limited circulation to R foot which could lead to progression and worsening infection   Clinical Decision Making (Complexity) Moderate complexity   Therapy Frequency` 1 time/week   Predicted Duration of Therapy Intervention (days/wks) eval + 1 treatment   Anticipated Equipment Needs at Discharge other (see comments)  (4WW if wife using the one that they had at home)   Anticipated Discharge Disposition Other (see comments)  (Assisted living with currenlty no further skilled PT needs)   Risk & Benefits of therapy have been explained Yes   Patient, Family & other staff in agreement with plan of care Yes   Clinical Impression Comments Pt presents with osteo of R foot due to diabetic ulcer that pt is currently refusing to have surgery to treat.      [facility] [employer] \"6 Clicks\"   2016, Trustees of [facility], under license to [employer].  All rights reserved.   6 Clicks Short Forms Basic Mobility Inpatient Short Form   [facility] [employer]  \"6 Clicks\" V.2 Basic Mobility Inpatient Short Form   1. Turning from your back to your side while in a flat bed without using bedrails? 4 - None   2. Moving from lying on your back to sitting on the side of a flat bed without using bedrails? 4 - None   3. Moving to and from a bed to a chair (including a wheelchair)? 4 - None   4. Standing up from a chair using your arms (e.g., wheelchair, or bedside chair)? 3 - A Little   5. To walk in hospital room? 3 - A Little   6. Climbing 3-5 steps with a railing? " 3 - A Little   Basic Mobility Raw Score (Score out of 24.Lower scores equate to lower levels of function) 21   Total Evaluation Time   Total Evaluation Time (Minutes) 16                                                    INTERAGENCY TRANSFER FORM - LAB / IMAGING / EKG / EMG RESULTS   1/11/2017                       [facility]: [phone]            Unresulted Labs (24h ago through future)    Start       Ordered    01/17/17 0600  INR -  (warfarin (COUMADIN) Pharmacy Consult-INITIAL ORDER)   DAILY,   Routine      01/16/17 0900    01/16/17 0000  INR  Routine      01/16/17 1430    01/12/17 0600  INR -  (warfarin (COUMADIN) Pharmacy Consult-INITIAL ORDER)   DAILY,   Routine      01/11/17 0948         Lab Results - 3 Days (01/16/17 - 01/13/17)      Glucose by meter [[id number]] (Abnormal)  Resulted: 01/16/17 1111, Result status: Final result    Ordering provider: Juan Rebollar MD  01/16/17 1105 Resulting lab: POINT OF CARE TEST, GLUCOSE    Specimen Information    Type Source Collected On     01/16/17 1105          Components       Value Reference Range Flag Lab   Glucose 181 70 - 99 mg/dL H 170            Wound Culture Aerobic Bacterial [[id number]] (Abnormal)  Resulted: 01/16/17 1110, Result status: Preliminary result    Ordering provider: Juan Rebollar MD  01/12/17 0755 Resulting lab: [facility]    Specimen Information    Type Source Collected On   Wound  01/12/17 0755          Components       Value Reference Range Flag Lab   Specimen Description Wound   HI   Special Requests Right Foot   HI   Culture Micro --  A HI   Result:         Moderate growth Proteus mirabilis  Heavy growth Staphylococcus pseudintermedius  Heavy growth Pasteurella canis Susceptibility testing in progress  Light growth Alcaligenes species  Isolated in the broth only:   Enterococcus faecalis     Micro Report Status Pending   HI   Result:     Organism: Moderate growth Proteus mirabilis   HI   Organism: Heavy  growth Staphylococcus pseudintermedius   HI   Organism: Isolated in the broth only:   Enterococcus faecalis   HI   Organism: Light growth Alcaligenes species   HI            Glucose by meter [[id number]] (Abnormal)  Resulted: 01/16/17 0736, Result status: Final result    Ordering provider: Juan Rebollar MD  01/16/17 0732 Resulting lab: POINT OF CARE TEST, GLUCOSE    Specimen Information    Type Source Collected On     01/16/17 0732          Components       Value Reference Range Flag Lab   Glucose 137 70 - 99 mg/dL H 170            INR [[id number]]  Resulted: 01/16/17 0620, Result status: Final result    Ordering provider: Shirley Chan NP  01/16/17 0000 Resulting lab: [facility]    Specimen Information    Type Source Collected On   Blood  01/16/17 0551          Components       Value Reference Range Flag Lab   INR 1.20 0.80 - 1.20   HI            Glucose by meter [[id number]] (Abnormal)  Resulted: 01/16/17 0201, Result status: Final result    Ordering provider: Juan Rebollar MD  01/16/17 0154 Resulting lab: POINT OF CARE TEST, GLUCOSE    Specimen Information    Type Source Collected On     01/16/17 0154          Components       Value Reference Range Flag Lab   Glucose 267 70 - 99 mg/dL H 170            Glucose by meter [[id number]] (Abnormal)  Resulted: 01/15/17 2036, Result status: Final result    Ordering provider: Juan Rebollar MD  01/15/17 2026 Resulting lab: POINT OF CARE TEST, GLUCOSE    Specimen Information    Type Source Collected On     01/15/17 2026          Components       Value Reference Range Flag Lab   Glucose 311 70 - 99 mg/dL H 170            Glucose by meter [[id number]] (Abnormal)  Resulted: 01/15/17 1701, Result status: Final result    Ordering provider: Juan Rebollar MD  01/15/17 1626 Resulting lab: POINT OF CARE TEST, GLUCOSE    Specimen Information    Type Source Collected On     01/15/17 1626          Components       Value Reference Range Flag Lab    Glucose 291 70 - 99 mg/dL H 170            Glucose by meter [[id number]] (Abnormal)  Resulted: 01/15/17 1206, Result status: Final result    Ordering provider: Juan Rebollar MD  01/15/17 1127 Resulting lab: POINT OF CARE TEST, GLUCOSE    Specimen Information    Type Source Collected On     01/15/17 1127          Components       Value Reference Range Flag Lab   Glucose 235 70 - 99 mg/dL H 170            Glucose by meter [[id number]] (Abnormal)  Resulted: 01/15/17 0845, Result status: Final result    Ordering provider: Juan Rebollar MD  01/15/17 0833 Resulting lab: POINT OF CARE TEST, GLUCOSE    Specimen Information    Type Source Collected On     01/15/17 0833          Components       Value Reference Range Flag Lab   Glucose 137 70 - 99 mg/dL H 170            Glucose by meter [[id number]]  Resulted: 01/15/17 0821, Result status: Final result    Ordering provider: Juan Rebollar MD  01/15/17 0155 Resulting lab: POINT OF CARE TEST, GLUCOSE    Specimen Information    Type Source Collected On     01/15/17 0155          Components       Value Reference Range Flag Lab   Glucose 86 70 - 99 mg/dL  170            Estimated Average Glucose [[id number]]  Resulted: 01/15/17 0715, Result status: Final result    Ordering provider: Shirley Chan NP  01/15/17 0613 Resulting lab: [facility]    Specimen Information    Type Source Collected On     01/15/17 0613          Components       Value Reference Range Flag Lab   Estimated Average Glucose 194 mg/dL  HI            Hemoglobin A1c [[id number]] (Abnormal)  Resulted: 01/15/17 0706, Result status: Final result    Ordering provider: Britt Lang MD  01/15/17 0000 Resulting lab: [facility]    Specimen Information    Type Source Collected On   Blood  01/15/17 0613          Components       Value Reference Range Flag Lab   Hemoglobin A1C 8.4 4.3 - 6.0 % H HI            Comprehensive metabolic panel [[id number]] (Abnormal)  Resulted:  01/15/17 0646, Result status: Final result    Ordering provider: Juan Rebollar MD  01/15/17 0000 Resulting lab: [facility]    Specimen Information    Type Source Collected On   Blood  01/15/17 0613          Components       Value Reference Range Flag Lab   Sodium 140 133 - 144 mmol/L  HI   Potassium 4.4 3.4 - 5.3 mmol/L  HI   Chloride 104 94 - 109 mmol/L  HI   Carbon Dioxide 29 20 - 32 mmol/L  HI   Anion Gap 7 3 - 14 mmol/L  HI   Glucose 129 70 - 99 mg/dL H HI   Urea Nitrogen 21 7 - 30 mg/dL  HI   Creatinine 1.31 0.66 - 1.25 mg/dL H HI   GFR Estimate 53 >60 mL/min/1.7m2 L HI   Comment:  Non  GFR Calc   GFR Estimate If Black 64 >60 mL/min/1.7m2  HI   Comment:  African American GFR Calc   Calcium 8.2 8.5 - 10.1 mg/dL L HI   Bilirubin Total 0.4 0.2 - 1.3 mg/dL  HI   Albumin 2.1 3.4 - 5.0 g/dL L HI   Protein Total 6.2 6.8 - 8.8 g/dL L HI   Alkaline Phosphatase 71 40 - 150 U/L  HI   ALT 11 0 - 70 U/L  HI   AST 9 0 - 45 U/L  HI            INR [[id number]] (Abnormal)  Resulted: 01/15/17 0636, Result status: Final result    Ordering provider: Shirley Chan NP  01/15/17 0000 Resulting lab: [facility]    Specimen Information    Type Source Collected On   Blood  01/15/17 0613          Components       Value Reference Range Flag Lab   INR 1.40 0.80 - 1.20  H HI            CBC with platelets differential [[id number]] (Abnormal)  Resulted: 01/15/17 0631, Result status: Final result    Ordering provider: Juan Rebollar MD  01/15/17 0000 Resulting lab: [facility]    Specimen Information    Type Source Collected On   Blood  01/15/17 0613          Components       Value Reference Range Flag Lab   WBC 8.2 4.0 - 11.0 10e9/L  HI   RBC Count 3.89 4.4 - 5.9 10e12/L L HI   Hemoglobin 11.4 13.3 - 17.7 g/dL L HI   Hematocrit 34.2 40.0 - 53.0 % L HI   MCV 88 78 - 100 fl  HI   MCH 29.3 26.5 - 33.0 pg  HI   MCHC 33.3 31.5 - 36.5 g/dL  HI   RDW 13.2 10.0 - 15.0  %  HI   Platelet Count 293 150 - 450 10e9/L  HI   Diff Method Automated Method   HI   % Neutrophils 70.4 %  HI   % Lymphocytes 16.3 %  HI   % Monocytes 8.9 %  HI   % Eosinophils 2.9 %  HI   % Basophils 0.6 %  HI   % Immature Granulocytes 0.9 %  HI   Nucleated RBCs 0 0 /100  HI   Absolute Neutrophil 5.8 1.6 - 8.3 10e9/L  HI   Absolute Lymphocytes 1.3 0.8 - 5.3 10e9/L  HI   Absolute Monocytes 0.7 0.0 - 1.3 10e9/L  HI   Absolute Eosinophils 0.2 0.0 - 0.7 10e9/L  HI   Absolute Basophils 0.1 0.0 - 0.2 10e9/L  HI   Abs Immature Granulocytes 0.1 0 - 0.4 10e9/L  HI   Absolute Nucleated RBC 0.0   HI            Glucose by meter [[id number]] (Abnormal)  Resulted: 01/15/17 0611, Result status: Final result    Ordering provider: Juan Rebollar MD  01/15/17 0421 Resulting lab: POINT OF CARE TEST, GLUCOSE    Specimen Information    Type Source Collected On     01/15/17 0421          Components       Value Reference Range Flag Lab   Glucose 58 70 - 99 mg/dL L 170            Glucose by meter [[id number]]  Resulted: 01/15/17 0611, Result status: Final result    Ordering provider: Juan Rebollar MD  01/15/17 0451 Resulting lab: POINT OF CARE TEST, GLUCOSE    Specimen Information    Type Source Collected On     01/15/17 0451          Components       Value Reference Range Flag Lab   Glucose 84 70 - 99 mg/dL  170            Glucose by meter [[id number]] (Abnormal)  Resulted: 01/14/17 2051, Result status: Final result    Ordering provider: Juan Rebollar MD  01/14/17 2002 Resulting lab: POINT OF CARE TEST, GLUCOSE    Specimen Information    Type Source Collected On     01/14/17 2002          Components       Value Reference Range Flag Lab   Glucose 243 70 - 99 mg/dL H 170            Glucose by meter [[id number]] (Abnormal)  Resulted: 01/14/17 1701, Result status: Final result    Ordering provider: Juan Rebollar MD  01/14/17 1548 Resulting lab: POINT OF CARE TEST, GLUCOSE    Specimen Information    Type Source Collected On      01/14/17 1548          Components       Value Reference Range Flag Lab   Glucose 182 70 - 99 mg/dL H 170            Glucose by meter [[id number]]  Resulted: 01/14/17 1126, Result status: Final result    Ordering provider: Juan Rebollar MD  01/14/17 1105 Resulting lab: POINT OF CARE TEST, GLUCOSE    Specimen Information    Type Source Collected On     01/14/17 1105          Components       Value Reference Range Flag Lab   Glucose 99 70 - 99 mg/dL  170            Glucose by meter [[id number]] (Abnormal)  Resulted: 01/14/17 0836, Result status: Final result    Ordering provider: Juan Rebollar MD  01/14/17 0832 Resulting lab: POINT OF CARE TEST, GLUCOSE    Specimen Information    Type Source Collected On     01/14/17 0832          Components       Value Reference Range Flag Lab   Glucose 113 70 - 99 mg/dL H 170            Glucose by meter [[id number]] (Abnormal)  Resulted: 01/14/17 0811, Result status: Final result    Ordering provider: Juan Rebollar MD  01/14/17 0759 Resulting lab: POINT OF CARE TEST, GLUCOSE    Specimen Information    Type Source Collected On     01/14/17 0759          Components       Value Reference Range Flag Lab   Glucose 63 70 - 99 mg/dL L 170            Comprehensive metabolic panel [[id number]] (Abnormal)  Resulted: 01/14/17 0634, Result status: Final result    Ordering provider: Juan Rebollar MD  01/14/17 0000 Resulting lab: [facility]    Specimen Information    Type Source Collected On   Blood  01/14/17 0611          Components       Value Reference Range Flag Lab   Sodium 139 133 - 144 mmol/L  HI   Potassium 4.3 3.4 - 5.3 mmol/L  HI   Chloride 104 94 - 109 mmol/L  HI   Carbon Dioxide 28 20 - 32 mmol/L  HI   Anion Gap 7 3 - 14 mmol/L  HI   Glucose 75 70 - 99 mg/dL  HI   Urea Nitrogen 18 7 - 30 mg/dL  HI   Creatinine 1.24 0.66 - 1.25 mg/dL  HI   GFR Estimate 56 >60 mL/min/1.7m2 L HI   Comment:  Non  GFR Calc   GFR Estimate If Black 68 >60  mL/min/1.7m2  HI   Comment:  African American GFR Calc   Calcium 8.2 8.5 - 10.1 mg/dL L HI   Bilirubin Total 0.4 0.2 - 1.3 mg/dL  HI   Albumin 2.1 3.4 - 5.0 g/dL L HI   Protein Total 6.5 6.8 - 8.8 g/dL L HI   Alkaline Phosphatase 74 40 - 150 U/L  HI   ALT 11 0 - 70 U/L  HI   AST 11 0 - 45 U/L  HI            INR [[id number]] (Abnormal)  Resulted: 01/14/17 0631, Result status: Final result    Ordering provider: Shirley Chan NP  01/14/17 0000 Resulting lab: [facility]    Specimen Information    Type Source Collected On   Blood  01/14/17 0611          Components       Value Reference Range Flag Lab   INR 1.46 0.80 - 1.20  H HI            CBC with platelets differential [[id number]] (Abnormal)  Resulted: 01/14/17 0619, Result status: Final result    Ordering provider: Juan Rebollar MD  01/14/17 0000 Resulting lab: [facility]    Specimen Information    Type Source Collected On   Blood  01/14/17 0611          Components       Value Reference Range Flag Lab   WBC 10.7 4.0 - 11.0 10e9/L  HI   RBC Count 4.05 4.4 - 5.9 10e12/L L HI   Hemoglobin 11.8 13.3 - 17.7 g/dL L HI   Hematocrit 35.2 40.0 - 53.0 % L HI   MCV 87 78 - 100 fl  HI   MCH 29.1 26.5 - 33.0 pg  HI   MCHC 33.5 31.5 - 36.5 g/dL  HI   RDW 13.2 10.0 - 15.0 %  HI   Platelet Count 326 150 - 450 10e9/L  HI   Diff Method Automated Method   HI   % Neutrophils 75.8 %  HI   % Lymphocytes 14.6 %  HI   % Monocytes 7.3 %  HI   % Eosinophils 1.6 %  HI   % Basophils 0.3 %  HI   % Immature Granulocytes 0.4 %  HI   Nucleated RBCs 0 0 /100  HI   Absolute Neutrophil 8.1 1.6 - 8.3 10e9/L  HI   Absolute Lymphocytes 1.6 0.8 - 5.3 10e9/L  HI   Absolute Monocytes 0.8 0.0 - 1.3 10e9/L  HI   Absolute Eosinophils 0.2 0.0 - 0.7 10e9/L  HI   Absolute Basophils 0.0 0.0 - 0.2 10e9/L  HI   Abs Immature Granulocytes 0.0 0 - 0.4 10e9/L  HI   Absolute Nucleated RBC 0.0   HI            25 Hydroxyvitamin D2 and D3 [[id number]]  Resulted: 01/14/17 0617,  Result status: In process    Ordering provider: Juan Rebollar MD  01/14/17 0000 Resulting lab: [facility]    Specimen Information    Type Source Collected On   Blood  01/14/17 0611            Glucose by meter [[id number]] (Abnormal)  Resulted: 01/14/17 0545, Result status: Final result    Ordering provider: Juan Rebollar MD  01/14/17 0541 Resulting lab: POINT OF CARE TEST, GLUCOSE    Specimen Information    Type Source Collected On     01/14/17 0541          Components       Value Reference Range Flag Lab   Glucose 117 70 - 99 mg/dL H 170            Glucose by meter [[id number]] (Abnormal)  Resulted: 01/14/17 0435, Result status: Final result    Ordering provider: Juan Rebollar MD  01/14/17 0432 Resulting lab: POINT OF CARE TEST, GLUCOSE    Specimen Information    Type Source Collected On     01/14/17 0432          Components       Value Reference Range Flag Lab   Glucose 102 70 - 99 mg/dL H 170            Glucose by meter [[id number]] (Abnormal)  Resulted: 01/14/17 0330, Result status: Final result    Ordering provider: Juan Rebollar MD  01/14/17 0327 Resulting lab: POINT OF CARE TEST, GLUCOSE    Specimen Information    Type Source Collected On     01/14/17 0327          Components       Value Reference Range Flag Lab   Glucose 115 70 - 99 mg/dL H 170            Glucose by meter [[id number]]  Resulted: 01/14/17 0251, Result status: Final result    Ordering provider: Juan Rebollar MD  01/14/17 0246 Resulting lab: POINT OF CARE TEST, GLUCOSE    Specimen Information    Type Source Collected On     01/14/17 0246          Components       Value Reference Range Flag Lab   Glucose 96 70 - 99 mg/dL  170            Glucose by meter [[id number]] (Abnormal)  Resulted: 01/14/17 0221, Result status: Final result    Ordering provider: Juan Rebollar MD  01/14/17 0216 Resulting lab: POINT OF CARE TEST, GLUCOSE    Specimen Information    Type Source Collected On     01/14/17 0216          Components       Value  Reference Range Flag Lab   Glucose 42 70 - 99 mg/dL             Glucose by meter [[id number]] (Abnormal)  Resulted: 01/13/17 2035, Result status: Final result    Ordering provider: Juan Rebollar MD  01/13/17 2028 Resulting lab: POINT OF CARE TEST, GLUCOSE    Specimen Information    Type Source Collected On     01/13/17 2028          Components       Value Reference Range Flag Lab   Glucose 235 70 - 99 mg/dL H 170            Glucose by meter [[id number]] (Abnormal)  Resulted: 01/13/17 1736, Result status: Final result    Ordering provider: Juan Rebollar MD  01/13/17 1719 Resulting lab: POINT OF CARE TEST, GLUCOSE    Specimen Information    Type Source Collected On     01/13/17 1719          Components       Value Reference Range Flag Lab   Glucose 213 70 - 99 mg/dL H 170            Glucose by meter [[id number]] (Abnormal)  Resulted: 01/13/17 1506, Result status: Final result    Ordering provider: Juan Rebollar MD  01/13/17 1456 Resulting lab: POINT OF CARE TEST, GLUCOSE    Specimen Information    Type Source Collected On     01/13/17 1456          Components       Value Reference Range Flag Lab   Glucose 187 70 - 99 mg/dL H 170            Glucose by meter [[id number]] (Abnormal)  Resulted: 01/13/17 1126, Result status: Final result    Ordering provider: Juan Rebollar MD  01/13/17 1117 Resulting lab: POINT OF CARE TEST, GLUCOSE    Specimen Information    Type Source Collected On     01/13/17 1117          Components       Value Reference Range Flag Lab   Glucose 239 70 - 99 mg/dL H 170            Glucose by meter [[id number]] (Abnormal)  Resulted: 01/13/17 0951, Result status: Final result    Ordering provider: Juan Rebollar MD  01/13/17 0937 Resulting lab: POINT OF CARE TEST, GLUCOSE    Specimen Information    Type Source Collected On     01/13/17 0937          Components       Value Reference Range Flag Lab   Glucose 260 70 - 99 mg/dL H 170            Glucose by meter [[id number]] (Abnormal)   Resulted: 01/13/17 0711, Result status: Final result    Ordering provider: Juan Rebollar MD  01/13/17 0640 Resulting lab: POINT OF CARE TEST, GLUCOSE    Specimen Information    Type Source Collected On     01/13/17 0640          Components       Value Reference Range Flag Lab   Glucose 35 70 - 99 mg/dL    Comment:  /RN Notified            Glucose by meter [[id number]] (Abnormal)  Resulted: 01/13/17 0711, Result status: Final result    Ordering provider: Juan Rebollar MD  01/13/17 0652 Resulting lab: POINT OF CARE TEST, GLUCOSE    Specimen Information    Type Source Collected On     01/13/17 0652          Components       Value Reference Range Flag Lab   Glucose 39 70 - 99 mg/dL    Comment:  /RN Notified            Glucose by meter [[id number]] (Abnormal)  Resulted: 01/13/17 0706, Result status: Final result    Ordering provider: Juan Rebollar MD  01/13/17 0702 Resulting lab: POINT OF CARE TEST, GLUCOSE    Specimen Information    Type Source Collected On     01/13/17 0702          Components       Value Reference Range Flag Lab   Glucose 62 70 - 99 mg/dL L 170            CRP inflammation [[id number]] (Abnormal)  Resulted: 01/13/17 0628, Result status: Final result    Ordering provider: Juan Rebollar MD  01/13/17 0000 Resulting lab: [facility]    Specimen Information    Type Source Collected On   Blood  01/13/17 0548          Components       Value Reference Range Flag Lab   CRP Inflammation 72.7 0.0 - 8.0 mg/L H HI            Basic metabolic panel [[id number]] (Abnormal)  Resulted: 01/13/17 0628, Result status: Final result    Ordering provider: Juan Rebollar MD  01/13/17 0000 Resulting lab: [facility]    Specimen Information    Type Source Collected On   Blood  01/13/17 0548          Components       Value Reference Range Flag Lab   Sodium 140 133 - 144 mmol/L  HI   Potassium 3.8 3.4 - 5.3 mmol/L  HI   Chloride 104 94 - 109 mmol/L  HI   Carbon  Dioxide 28 20 - 32 mmol/L  HI   Anion Gap 8 3 - 14 mmol/L  HI   Glucose 35 70 - 99 mg/dL LL HI   Comment:  Critical Value called to and read back by  PEPE SAL BY [facility]     Urea Nitrogen 18 7 - 30 mg/dL  HI   Creatinine 1.25 0.66 - 1.25 mg/dL  HI   GFR Estimate 56 >60 mL/min/1.7m2 L HI   Comment:  Non  GFR Calc   GFR Estimate If Black 67 >60 mL/min/1.7m2  HI   Comment:  African American GFR Calc   Calcium 8.7 8.5 - 10.1 mg/dL  HI            INR [[id number]] (Abnormal)  Resulted: 01/13/17 0612, Result status: Final result    Ordering provider: Shirley Chan NP  01/13/17 0000 Resulting lab: [facility]    Specimen Information    Type Source Collected On   Blood  01/13/17 0548          Components       Value Reference Range Flag Lab   INR 1.34 0.80 - 1.20  H HI            CBC with platelets differential [[id number]] (Abnormal)  Resulted: 01/13/17 0556, Result status: Final result    Ordering provider: Juan Rebollar MD  01/13/17 0000 Resulting lab: [facility]    Specimen Information    Type Source Collected On   Blood  01/13/17 0548          Components       Value Reference Range Flag Lab   WBC 14.1 4.0 - 11.0 10e9/L H HI   RBC Count 4.42 4.4 - 5.9 10e12/L  HI   Hemoglobin 12.9 13.3 - 17.7 g/dL L HI   Hematocrit 38.2 40.0 - 53.0 % L HI   MCV 86 78 - 100 fl  HI   MCH 29.2 26.5 - 33.0 pg  HI   MCHC 33.8 31.5 - 36.5 g/dL  HI   RDW 13.1 10.0 - 15.0 %  HI   Platelet Count 385 150 - 450 10e9/L  HI   Diff Method Automated Method   HI   % Neutrophils 69.8 %  HI   % Lymphocytes 20.1 %  HI   % Monocytes 7.3 %  HI   % Eosinophils 1.9 %  HI   % Basophils 0.3 %  HI   % Immature Granulocytes 0.6 %  HI   Nucleated RBCs 0 0 /100  HI   Absolute Neutrophil 9.8 1.6 - 8.3 10e9/L H HI   Absolute Lymphocytes 2.8 0.8 - 5.3 10e9/L  HI   Absolute Monocytes 1.0 0.0 - 1.3 10e9/L  HI   Absolute Eosinophils 0.3 0.0 - 0.7 10e9/L  HI   Absolute Basophils 0.0 0.0 - 0.2 10e9/L  HI   Abs  Immature Granulocytes 0.1 0 - 0.4 10e9/L  HI   Absolute Nucleated RBC 0.0   HI            Glucose by meter [[id number]] (Abnormal)  Resulted: 01/13/17 0240, Result status: Final result    Ordering provider: Juan Rebollar MD  01/13/17 0222 Resulting lab: POINT OF CARE TEST, GLUCOSE    Specimen Information    Type Source Collected On     01/13/17 0222          Components       Value Reference Range Flag Lab   Glucose 127 70 - 99 mg/dL H 170            Testing Performed By     Lab - Abbreviation Name Director Address Valid Date Range    45 - P[facility] Unknown Unknown 01/28/02 0000 - Present    170 - Unknown POINT OF CARE TEST, GLUCOSE Unknown Unknown 10/31/11 1114 - Present    210 - HI [facility] Unknown [address] [place] [id number] 05/08/15 1057 - Present               Imaging Results - 3 Days (01/13/17 - 01/13/17)      MR Foot Right w/o & w Contrast [[id number]]  Resulted: 01/13/17 1418, Result status: Final result    Ordering provider: Juan Rebollar MD  01/12/17 1448 Resulted by: Milly Marcano MD    Performed: 01/13/17 0815 - 01/13/17 0918 Resulting lab: [facility]    Narrative:           RIGHT FOOT MRI    HISTORY:  Foot ulceration, osteomyelitis.  COMPARISON:  Today's study is compared to conventional radiographs  which are dated January 11, 2017.    TECHNIQUE:  Multiplanar MR images acquired before and after the  administration of 7.5 mL intravenous Gadavist.    FINDINGS:  Abnormal marrow signal throughout the entire second  metatarsal with relative sparing of the base of the metatarsal.  The  second metatarsal is of low T1 and low T2 signal and enhances after  the administration of gadolinium.  It has a patchy lytic appearance on  the conventional radiograph and findings are classic for  osteomyelitis.  There are several tiny areas of signal void within the  shaft of the metatarsal, which could be due to gas.  Destructive  changes of the head of the second metatarsal.  Superior  subluxation of  the right second toe at the MTP joint.  The base and proximal shaft of  the second metatarsal demonstrate abnormal T1 and T2 signal with  enhancement in addition to tiny areas of signal void, suspicious for  osteomyelitis ate the base of the proximal phalanx of the second toe.  A large amount of fluid which is peripherally enhancing surrounds the  second MTP joint and extends to the ulceration along the plantar  aspect of the foot.  This is consistent with an abscess.  There is  also a small amount of fluid surrounding the flexor tendons of the  second toe, beginning at the distal shaft of the metatarsal consistent  with mild flexor tenosynovitis which is likely infected as well.    There is an impacted fracture of the head and neck of the third  metatarsal and associated signal abnormality in the shaft, neck and  head of the third metatarsal, and in the base and proximal shaft of  the proximal phalanx.  The abscess that surrounds the second MTP joint  extends to partially surround the third MTP joint as well.  Constellation of findings is very suspicious for extension of  osteomyelitis to involve the neck and head of the third metatarsal as  well, in addition to the base of the proximal phalanx of the third  toe.  Continued on page 2...      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  Healed or healing  fracture base and proximal shaft of the first metatarsal, but no  findings to suggest osteomyelitis in the first metatarsal.    Subcutaneous edema about the forefoot.  Multiple hammertoe  deformities.    Images are degraded by motion.    IMPRESSION:  1.  FINDINGS SUSPICIOUS FOR OSTEOMYELITIS INVOLVING MOST OF THE SECOND  METATARSAL, HEAD AND NECK OF THE THIRD METATARSAL, PROXIMAL BASE AND  SHAFT SECOND PROXIMAL PHALANX, AND BASE OF THE THIRD PROXIMAL PHALANX.      2.  ULCERATION ON THE PLANTAR BASE OF THE MTP JOINT EXTENDING TO AN  ABSCESS SURROUNDING THE SECOND MTP JOINT AND PARTIALLY  SURROUNDING THE  THIRD MTP JOINT.    3.  FRACTURES OF THE NECK OF THE THIRD METATARSAL, AND BASE AND  PROXIMAL SHAFT OF THE FIRST METATARSAL.    4.  SEVERE DEGENERATIVE ARTHRITIS FIRST MTP JOINT.    5.  FINDINGS OF OSTEOMYELITIS WERE DISCUSSED WITH DR. ILYA REBOLLAR  AT 1015 HOURS ON JANUARY 13, 2017.                SIGNATURE PAGE ONLY  Exam Date: Jan 13, 2017 09:18:13 AM  Author: SHILOH COMER  This report is final and signed      Specimen Information    Type Source Collected On     01/13/17 0845             CATIA Doppler No Exercise [[id number]]  Resulted: 01/13/17 0803, Result status: Final result    Ordering provider: Ilya Rebollar MD  01/12/17 1014 Resulted by: Yakov Milner MD    Performed: 01/12/17 1015 - 01/12/17 1228 Resulting lab: [facility]    Narrative:           ANKLE-BRACHIAL INDICES    REPORT:  The ankle-brachial index on the right measures 1.00, on the  left 1.04.  Absolute ankle pressure measures 119 on the right and 130  on the left.    IMPRESSION:  ADEQUATE BLOOD FLOW FOR WOUND HEALING.  Exam Date: Jan 12, 2017 12:28:00 AM  Author: YAKOV MILNER  This report is final and signed      Specimen Information    Type Source Collected On     01/12/17 1144            Testing Performed By     Lab - Abbreviation Name Director Address Valid Date Range    183 -  [facility] Unknown Unknown 11/29/12 1227 - Present            Encounter-Level Documents:     There are no encounter-level documents.      Order-Level Documents:     There are no order-level documents.

## 2017-01-11 NOTE — H&P
"[facility]    History and Physical  Hospitalist       Date of Admission:  1/11/2017  Date of Service (when I saw the patient): 01/11/2017    Assessment and Plan  Adiel Rosa Jr is a 81 year old male who presents with fall at home.     Active Problems:      Hypoglycemia due to insulin: Ran out of Trujeo so called diabetes center and they told him to use his Levemir pen instead at regular dose. He did but states he has no food in the house so has not really eaten much in the last 24 hours. He got up from chair and sort of lost his balance and fell to his knees then forward hitting his head on the carpeted floor. By the time he his his head he was already kneeling. His wife called EMS. EMS found him seated on the floor awake and alert. Blood sugars was in the 30's. He wa given further glucose in the ED and blood sugars have come up nicely. He denies taking any insulin today.       Diabetic foot ulcer: Large 4x4 calloused ulcer noted to right plantar foot at 2nd/3rd metatarsal. There is putrid drainage present. There is a corresponding wound on dorsal side of foot with same drainage. Patient states wound present \"about a month\". Lopez not recall specific injury but does state he is \"out working in the yard a lot, could have stepped on something\". Has been treating at home with epsom salt soaks and gold bond lotion. Has been draining for some time. Xray pending to evaluate for osteomyelitis, I would be surprised if he DIDN'T have osteomyelitis. May need MRI for definitive diagnosis and to learn extent of disease.       Uncontrolled diabetes with vascular complications: History peripheral neuropathy, poor compliance and poor control of diabetes. Checks blood sugars unreliable,takes medications unreliably and refused dietary education. He reports he has been receiving his Trujeo from diabetes center and when he takes it he feels his sugars are in good control.       Poor compliance to medical treatment: " "Fails to show to multiple appointments including diabetic center for follow and to get insulin pens. Has not followed with Coumadin clinic and INR subtherapeutic today. States his car is frozen and won't start. Consult for  for community services, transportation services, etc. In the past has refused services.       Chronic A-fib: Rate well controlled.       Chronic anticoagulation 2nd to above: INR 1.2, pharmacy to dose while here.       COPD,chronic: Takes Advair \"occasionally\". As this is not in our formulary will not order while here.      Fall at home ,presumably due to severe hypoglycemia: Did hit his head but he has no identifiable injury, no laceration, bruising or swelling. INR subtherapeutic, CT scan head negative for acute hemorrhage.           DVT Prophylaxis: Low Risk/Ambulatory with no VTE prophylaxis indicated  Code Status: Full Code    Disposition: Expected discharge in 1-2 days once stable.    Shirley Chan, CNP    Primary Care Physician  SASKIA Grijalva    Chief Complaint  Fall with low blood sugar    History is obtained from the patient    History of Present Illness  Adiel Rosa Jr is a 81 year old male who presented to the ED with hypoglycemia and fall at home. He reports that he ran out of his Trujeo and so took his old Levimir instead at his old dose. However, he has \"no food in the house\" so has basically not eaten over the last 24 hours or so. No food this morning, has not taken any insulin this morning either. When EMS arrived to his house they found him seated on the floor alert and conversant. He states he lost his balance and fell to his knees then fell forward and struck his forehead on the floor. He denies any loss of consciousness. EMS checked glucose an it was in the 30's. On arrival to ED glucose >100 and remained so for several hours in the ED. CT scan negative for acute hemorrhage. No neurological changes or complaints. He is on coumadin therapy so he is " admitted fo observation overnight.      Past Medical History   I have reviewed this patient's medical history and updated it with pertinent information if needed.   Past Medical History   Diagnosis Date     Diabetes mellitus without mention of complication 11/29/1999     Cough 9/11/2007     Hypertension, benign 1/1/2011     Hypercholesterolemia 1/1/2011     Acute myocardial infarction of other specified sites, episode of care unspecified 1/1/1999     Non Q wave  treated with Retavase     Esophageal reflux 1/1/2011     BPH 1/1/2011     Erectile Dysfunction 1/1/2011     COPD 1/1/2011     Hypercalcemia 1/1/2011     Benign hypertensive heart disease with congestive 1/1/2011     Chronic renal disease, stage III 9/22/2011       Past Surgical History  I have reviewed this patient's surgical history and updated it with pertinent information if needed.  Past Surgical History   Procedure Laterality Date     Nephrolithiasis       lithotripsy     Bladder stone removal       Appendectomy       Tonsillectomy       Stent x 1         Prior to Admission Medications  Prior to Admission Medications   Prescriptions Last Dose Informant Patient Reported? Taking?   ADVAIR DISKUS 250-50 MCG/DOSE diskus inhaler Unknown at Unknown time  No No   Sig: USE 1 INHALATION TWO TIMES A DAY IN THE MORNING AND IN THE EVENING APPROXIMATELY 12 HOURS APART   B-D U/F 31G X 8 MM insulin pen needle 1/11/2017 at Unknown time  No Yes   Sig: USE 5 TO 6 PEN NEEDLES DAILY OR AS DIRECTED   Cranberry 500 MG CAPS 1/10/2017 at Unknown time  Yes Yes   Sig: Take 1 capsule by mouth   Doxylamine-DM 6.25-15 MG/15ML LIQD Unknown at Unknown time  Yes No   Sig: Taking as pkg directions at night   HYDROcodone-acetaminophen (NORCO) 5-325 MG per tablet 1/10/2017 at Unknown time  No Yes   Sig: Take 1 tablet by mouth every 6 hours as needed for moderate to severe pain   Multiple Vitamins-Minerals (CENTRUM SILVER PO) 1/10/2017 at Unknown time  Yes Yes   Sig: Take 1 tablet by  mouth   NOVOLOG FLEXPEN 100 UNIT/ML soln 1/10/2017 at Unknown time  No Yes   Sig: INJECT 20 TO 30 UNITS FOUR TIMES A DAY WITH MEALS AND AT BEDTIME   ONE TOUCH ULTRA test strip 1/10/2017 at Unknown time  No Yes   Sig: USE UP TO 5 STRIPS DAILY TO TEST BLOOD GLUCOSE   acetaminophen (TYLENOL ARTHRITIS PAIN) 650 MG CR tablet Unknown at Unknown time  Yes No   Sig: Take 650 mg by mouth every 8 hours as needed   aspirin 325 MG tablet 1/10/2017 at Unknown time  Yes Yes   Sig: Take 1 tablet by mouth daily   candesartan (ATACAND) 16 MG tablet 1/10/2017 at Unknown time  No Yes   Sig: TAKE 1 TABLET DAILY   cholecalciferol (VITAMIN D3) 1000 UNIT tablet 1/10/2017 at Unknown time  Yes Yes   Sig: Take 1,000 Units by mouth daily   digoxin (LANOXIN) 125 MCG tablet 1/10/2017 at Unknown time  No Yes   Sig: TAKE 1 TABLET DAILY   insulin glargine U-300 (TOUJEO) 300 UNIT/ML PEN Past Week at Unknown time  No Yes   Sig: Inject 80 Units Subcutaneous At Bedtime   magnesium 250 MG tablet 1/10/2017 at Unknown time  Yes Yes   Sig: Take 1 tablet by mouth daily   metoprolol (TOPROL-XL) 50 MG 24 hr tablet 1/10/2017 at Unknown time  No Yes   Sig: TAKE 1 TABLET DAILY   ranitidine (ZANTAC) 150 MG tablet 1/10/2017 at Unknown time  No Yes   Sig: TAKE 1 TABLET TWICE A DAY   simvastatin (ZOCOR) 80 MG tablet 1/10/2017 at Unknown time  No Yes   Sig: TAKE 1 TABLET DAILY IN THE EVENING   warfarin (COUMADIN) 2 MG tablet 1/10/2017 at Unknown time  No Yes   Sig: TAKE 4 TABLETS ON MONDAY, WEDNESDAY, AND FRIDAY AND 3 TABLETS ALL OTHER DAYS      Facility-Administered Medications: None     Allergies  No Known Allergies    Social History  I have reviewed this patient's social history and updated it with pertinent information if needed. Adiel Rosa Jr  reports that he has quit smoking. His smoking use included Cigarettes. He has never used smokeless tobacco.    Family History  I have reviewed this patient's family history and updated it with pertinent information  if needed.   Family History   Problem Relation Age of Onset     C.A.D. Father      C.A.D.       Family hx     Heart Failure Mother      Congestive     DIABETES       Family hx     HEART DISEASE Brother      MI, cause of death     HEART DISEASE Father      MI, cause of death     Other - See Comments Brother      PVD     HEART DISEASE Mother      MI, cause of death       Review of Systems  Review Of Systems  Skin: negative, negative for, rash, itching, bruising, lumps or bumps, wounds  Eyes: negative, visual blurring  Ears/Nose/Throat: negative, tinnitus, vertigo  Respiratory: No shortness of breath, dyspnea on exertion, cough, or hemoptysis  Cardiovascular: negative for, palpitations, tachycardia, irregular heart beat, chest pain, lower extremity edema and syncope or near-syncope  Gastrointestinal: negative for, nausea, vomiting and abdominal pain  Genitourinary: negative for, dysuria, urgency and hematuria  Musculoskeletal: negative for, neck pain and joint pain  Neurologic: negative for, headaches, syncope, speech problems, incoordination and tremor  Hematologic/Lymphatic/Immunologic: positive for negative and weight loss, negative for, bleeding disorder, chills, fever, night sweats and swollen nodes  Endocrine: positive for negative, diabetes and polyuria, negative for, hot flashes and night sweats    Physical Exam  Temp: 97.8  F (36.6  C) Temp src: Oral BP: 114/55 mmHg Pulse: 97 Heart Rate: 78 Resp: 18 SpO2: 98 % O2 Device: None (Room air)    Vital Signs with Ranges  Temp:  [97  F (36.1  C)-97.8  F (36.6  C)] 97.8  F (36.6  C)  Pulse:  [97] 97  Heart Rate:  [] 78  Resp:  [16-18] 18  BP: (100-121)/(54-88) 114/55 mmHg  SpO2:  [95 %-98 %] 98 %  185 lbs 13.56 oz    EXAM:  Constitutional: no distress, pale and unkempt appearing.   Cardiovascular: negative findings: no lift, heave, or thrill, no murmurs, clicks, or gallops, positive findings: irregular rhythm  Respiratory: Percussion normal. Good diaphragmatic  excursion. Lungs clear  Head: Normocephalic. No masses, lesions, tenderness or abnormalities, negative findings: Nares normal. Septum midline. Mucosa normal. No drainage or sinus tenderness., negative findings: no wounds,swelling or bruising.  Neck: Neck supple. No adenopathy. Thyroid symmetric, normal size,, Carotids without bruits., negative findings: cervical spinal crepitus or pain on palpation over bony prominences.  Abdomen: Abdomen soft, non-tender. BS normal. No masses, organomegaly  : Deferred  NEURO: negative findings: speech normal, mental status intact, cranial nerves 2-12 intact, muscle strength normal, reflexes normal and symmetric  SKIN: no suspicious lesions or rashes  JOINT/EXTREMITIES: Large 4cm x4cm thick calloused area noted to plantar foot at 2nd/3rd metatarsal head. Central opening is 1cm x1cm and somewhat elliptical in shape. Very malodorous. Thin yellow to serosanguinous drainage present and increases with palpation of the forefoot. Patient states he does have some pain with ambulation but no pain to palpation. Beneath the calloused area the tissue is mildly boggy. There are occasional air bubbles expressed as well as drainage. This wound appears to extend all the way to the dorsal surface as he has a 1cm x1cm open wound with same drainage present. The surrounding tissue is not erythremic or warm to touch.       Data  Data reviewed today:  I personally reviewed no images or EKG's today. Foot xray pending. Head CT scan reviewed, no acute hemorrhage.     Recent Labs  Lab 01/11/17  0630   WBC 14.2*   HGB 12.4*   MCV 88      INR 1.20      POTASSIUM 4.4   CHLORIDE 102   CO2 28   BUN 28   CR 1.56*   ANIONGAP 9   AZRA 8.6   *   ALBUMIN 2.4*   PROTTOTAL 6.9   BILITOTAL 0.5   ALKPHOS 86   ALT 15   AST 10       Recent Results (from the past 24 hour(s))   CT Head w/o Contrast    Narrative    HEAD CT    FINDINGS:  There is an old right occipital infarct seen.  The  ventricular system  and cortical sulci appear normal.  Cranial vault is  intact.  The paranasal sinuses are clear.    IMPRESSION:  OLD RIGHT OCCIPITAL INFARCT.  Exam Date: Jan 11, 2017 07:30:45 AM  Author: KIET ROMANO  This report is final and signed

## 2017-01-12 DIAGNOSIS — I10 BENIGN ESSENTIAL HYPERTENSION: Primary | ICD-10-CM

## 2017-01-12 DIAGNOSIS — K21.9 ESOPHAGEAL REFLUX: ICD-10-CM

## 2017-01-12 DIAGNOSIS — I10 HTN (HYPERTENSION): ICD-10-CM

## 2017-01-12 PROBLEM — M86.171 OSTEOMYELITIS OF FOOT, RIGHT, ACUTE (H): Status: ACTIVE | Noted: 2017-01-12

## 2017-01-12 LAB
ANION GAP SERPL CALCULATED.3IONS-SCNC: 7 MMOL/L (ref 3–14)
BACTERIA SPEC CULT: NORMAL
BUN SERPL-MCNC: 19 MG/DL (ref 7–30)
CALCIUM SERPL-MCNC: 8.4 MG/DL (ref 8.5–10.1)
CHLORIDE SERPL-SCNC: 101 MMOL/L (ref 94–109)
CO2 SERPL-SCNC: 28 MMOL/L (ref 20–32)
CREAT SERPL-MCNC: 1.04 MG/DL (ref 0.66–1.25)
CRP SERPL-MCNC: 73.4 MG/L (ref 0–8)
ERYTHROCYTE [DISTWIDTH] IN BLOOD BY AUTOMATED COUNT: 12.9 % (ref 10–15)
GFR SERPL CREATININE-BSD FRML MDRD: 69 ML/MIN/1.7M2
GLUCOSE BLDC GLUCOMTR-MCNC: 182 MG/DL (ref 70–99)
GLUCOSE BLDC GLUCOMTR-MCNC: 211 MG/DL (ref 70–99)
GLUCOSE BLDC GLUCOMTR-MCNC: 221 MG/DL (ref 70–99)
GLUCOSE BLDC GLUCOMTR-MCNC: 95 MG/DL (ref 70–99)
GLUCOSE SERPL-MCNC: 143 MG/DL (ref 70–99)
HCT VFR BLD AUTO: 35.1 % (ref 40–53)
HGB BLD-MCNC: 11.7 G/DL (ref 13.3–17.7)
INR PPP: 1.17 (ref 0.8–1.2)
MCH RBC QN AUTO: 28.9 PG (ref 26.5–33)
MCHC RBC AUTO-ENTMCNC: 33.3 G/DL (ref 31.5–36.5)
MCV RBC AUTO: 87 FL (ref 78–100)
MICRO REPORT STATUS: NORMAL
PLATELET # BLD AUTO: 357 10E9/L (ref 150–450)
POTASSIUM SERPL-SCNC: 4.2 MMOL/L (ref 3.4–5.3)
PREALB SERPL IA-MCNC: 12 MG/DL (ref 15–45)
RBC # BLD AUTO: 4.05 10E12/L (ref 4.4–5.9)
SODIUM SERPL-SCNC: 136 MMOL/L (ref 133–144)
SPECIMEN SOURCE: NORMAL
WBC # BLD AUTO: 10.4 10E9/L (ref 4–11)

## 2017-01-12 PROCEDURE — 87077 CULTURE AEROBIC IDENTIFY: CPT | Performed by: INTERNAL MEDICINE

## 2017-01-12 PROCEDURE — A9585 GADOBUTROL INJECTION: HCPCS | Mod: TC

## 2017-01-12 PROCEDURE — 80048 BASIC METABOLIC PNL TOTAL CA: CPT | Performed by: NURSE PRACTITIONER

## 2017-01-12 PROCEDURE — 36415 COLL VENOUS BLD VENIPUNCTURE: CPT | Performed by: NURSE PRACTITIONER

## 2017-01-12 PROCEDURE — 84134 ASSAY OF PREALBUMIN: CPT | Performed by: NURSE PRACTITIONER

## 2017-01-12 PROCEDURE — 90662 IIV NO PRSV INCREASED AG IM: CPT | Performed by: NURSE PRACTITIONER

## 2017-01-12 PROCEDURE — 25000125 ZZHC RX 250: Performed by: INTERNAL MEDICINE

## 2017-01-12 PROCEDURE — 93922 UPR/L XTREMITY ART 2 LEVELS: CPT | Mod: TC

## 2017-01-12 PROCEDURE — A9270 NON-COVERED ITEM OR SERVICE: HCPCS | Mod: GY | Performed by: NURSE PRACTITIONER

## 2017-01-12 PROCEDURE — 25000132 ZZH RX MED GY IP 250 OP 250 PS 637: Mod: GY | Performed by: INTERNAL MEDICINE

## 2017-01-12 PROCEDURE — 85027 COMPLETE CBC AUTOMATED: CPT | Performed by: NURSE PRACTITIONER

## 2017-01-12 PROCEDURE — 86140 C-REACTIVE PROTEIN: CPT | Performed by: NURSE PRACTITIONER

## 2017-01-12 PROCEDURE — G0378 HOSPITAL OBSERVATION PER HR: HCPCS

## 2017-01-12 PROCEDURE — 25000132 ZZH RX MED GY IP 250 OP 250 PS 637: Mod: GY | Performed by: NURSE PRACTITIONER

## 2017-01-12 PROCEDURE — 90732 PPSV23 VACC 2 YRS+ SUBQ/IM: CPT | Performed by: NURSE PRACTITIONER

## 2017-01-12 PROCEDURE — 12000000 ZZH R&B MED SURG/OB

## 2017-01-12 PROCEDURE — 87186 SC STD MICRODIL/AGAR DIL: CPT | Performed by: INTERNAL MEDICINE

## 2017-01-12 PROCEDURE — 00000146 ZZHCL STATISTIC GLUCOSE BY METER IP

## 2017-01-12 PROCEDURE — 25000128 H RX IP 250 OP 636: Performed by: NURSE PRACTITIONER

## 2017-01-12 PROCEDURE — 85610 PROTHROMBIN TIME: CPT | Performed by: NURSE PRACTITIONER

## 2017-01-12 PROCEDURE — 99233 SBSQ HOSP IP/OBS HIGH 50: CPT | Performed by: INTERNAL MEDICINE

## 2017-01-12 PROCEDURE — 87070 CULTURE OTHR SPECIMN AEROBIC: CPT | Performed by: INTERNAL MEDICINE

## 2017-01-12 PROCEDURE — A9270 NON-COVERED ITEM OR SERVICE: HCPCS | Mod: GY | Performed by: INTERNAL MEDICINE

## 2017-01-12 RX ORDER — LORAZEPAM 0.5 MG/1
0.5 TABLET ORAL
Status: COMPLETED | OUTPATIENT
Start: 2017-01-12 | End: 2017-01-13

## 2017-01-12 RX ORDER — GADOBUTROL 604.72 MG/ML
7.5 INJECTION INTRAVENOUS ONCE
Status: DISCONTINUED | OUTPATIENT
Start: 2017-01-12 | End: 2017-01-12 | Stop reason: CLARIF

## 2017-01-12 RX ORDER — WARFARIN SODIUM 3 MG/1
6 TABLET ORAL
Status: COMPLETED | OUTPATIENT
Start: 2017-01-12 | End: 2017-01-12

## 2017-01-12 RX ORDER — CIPROFLOXACIN 2 MG/ML
400 INJECTION, SOLUTION INTRAVENOUS EVERY 12 HOURS
Status: DISCONTINUED | OUTPATIENT
Start: 2017-01-12 | End: 2017-01-13

## 2017-01-12 RX ADMIN — ASPIRIN 325 MG: 325 TABLET ORAL at 09:21

## 2017-01-12 RX ADMIN — DIGOXIN 125 MCG: 125 TABLET ORAL at 09:21

## 2017-01-12 RX ADMIN — METRONIDAZOLE 500 MG: 500 INJECTION, SOLUTION INTRAVENOUS at 17:40

## 2017-01-12 RX ADMIN — INSULIN ASPART 1 UNITS: 100 INJECTION, SOLUTION INTRAVENOUS; SUBCUTANEOUS at 07:09

## 2017-01-12 RX ADMIN — INFLUENZA A VIRUS A/CALIFORNIA/7/2009 X-179A (H1N1) ANTIGEN (FORMALDEHYDE INACTIVATED), INFLUENZA A VIRUS A/HONG KONG/4801/2014 X-263B (H3N2) ANTIGEN (FORMALDEHYDE INACTIVATED), AND INFLUENZA B VIRUS B/BRISBANE/60/2008 ANTIGEN (FORMALDEHYDE INACTIVATED) 0.5 ML: 60; 60; 60 INJECTION, SUSPENSION INTRAMUSCULAR at 14:11

## 2017-01-12 RX ADMIN — PNEUMOCOCCAL VACCINE POLYVALENT 0.5 ML
25; 25; 25; 25; 25; 25; 25; 25; 25; 25; 25; 25; 25; 25; 25; 25; 25; 25; 25; 25; 25; 25; 25 INJECTION, SOLUTION INTRAMUSCULAR; SUBCUTANEOUS at 14:14

## 2017-01-12 RX ADMIN — DEXTROSE MONOHYDRATE: 50 INJECTION, SOLUTION INTRAVENOUS at 03:39

## 2017-01-12 RX ADMIN — WARFARIN SODIUM 6 MG: 3 TABLET ORAL at 17:40

## 2017-01-12 RX ADMIN — CIPROFLOXACIN 400 MG: 2 INJECTION, SOLUTION INTRAVENOUS at 12:33

## 2017-01-12 RX ADMIN — RANITIDINE HYDROCHLORIDE 150 MG: 150 TABLET, FILM COATED ORAL at 21:38

## 2017-01-12 RX ADMIN — CANDESARTAN CILEXETIL 16 MG: 16 TABLET ORAL at 09:21

## 2017-01-12 RX ADMIN — METRONIDAZOLE 500 MG: 500 INJECTION, SOLUTION INTRAVENOUS at 11:25

## 2017-01-12 RX ADMIN — INSULIN GLARGINE 80 UNITS: 300 INJECTION, SOLUTION SUBCUTANEOUS at 21:38

## 2017-01-12 RX ADMIN — INSULIN ASPART 2 UNITS: 100 INJECTION, SOLUTION INTRAVENOUS; SUBCUTANEOUS at 16:54

## 2017-01-12 NOTE — PROGRESS NOTES
Spoke briefly with Adiel. Provided him with information on Meals on Wheels, [employer], and [employer] Meals as well as advance directive information. We discussed [facility], he will [facility] this. We also discussed the proposed plan for treatment of his foot wound/infection in the context of how to best keep him as healthy as possible so he in turn can continue to care for his wife as best as possible. He is agreeable to having homecare nurses come to the home to help with wound care.

## 2017-01-12 NOTE — PROGRESS NOTES
[facility]    Hospitalist Progress Note    Date of Service (when I saw the patient): 01/12/2017    Assessment and Plan  Adiel Rosa Jr is a 81 year old male who was admitted on 1/11/2017.     1.  Hypoglycemia:  This has resolved  Was due to poor intake .   Was on D5W  This has been stopped he is eating.  last A1C was 7.3 %.    2.  DM2:  His compliance has been marginal in past.  Is on trujeo and sliding scale.  Sugars here now as above have been fine.    3.  Right  Diabetic foot ulcer with osteomyelitis:  Patient has neuropathy on exam and has this ulceration on plantar surface about 1 cm diameter with what appears to be bone on bottom on probing and has on dorsum of foot  Has open ulcer again about 1 cm with some draining   No real surrounding erythema  minimally tender   Some bogginess to the area.  Can't really palpate pulses  Leg warm  No extension up higher on foot.  No real fevers here and WBC is elevated and has CRP up in 70's.  Xray to my eye shows osteomyelitis on the proximal head of  proximal tarsal bone of 2nd toe.  Luckily he is not clinically ill.  He states has been going on for last month or so at least.   I did swab of the plantar ulcer beeply.  Is not currently on abx.  I did discuss with him the severity of this problem and he is quite resistant to having anything done.  I ran it by Dr Zamora and he reccommended MRI to evaluate  tarsal bones.  He would do at least a ray amputation probably on Monday.  patient  Is resistant   I tried to get MRI this am but he stopped midway stated multiple issues   Has now agreed to have it done in am with some ativan preprocedure.  States is then going home tomorrow and might have the surgery done next week.  I thoroughly explained his current illness and the risk in not taking care of this now . Told him might get septic and die or could spread and lose his whole leg.  He  States is going home regardless. Will put on some abx now flagyl and  cipro.  Not best coverage but I can put him on this PO at discharge.  Will get at least CATIA today  Last done in 2009 were normal.    4.  Afib:  Rate controled is on warfarin  His INR is sub therapeutic.  Pharmacy is  Handling  Dosing   I will continue with this for now if he decides to have the surgery done then will need this stopped.    5.  Chronic kidney disease:  Luckily his creatinine  is quite good today at 1.o2.    6.  CAD: Per records had and anterior MI back in  1999 had reperfusion with Retavase then PCI to his proximal LAD. And had PTCA to D! Also.  Last Echo done in 2014 showed Normal LV size with mild reduct of EF.  Severe LAE,  No major valve issues.    7.  Socia Issues:  Has a long history of noncompliance and  vulnerable adult issues with his wife also.  SS has been involved and they are resistant to any hl ep.  He wants to go home and care for his wife.  Offered help and refuses at this point  SS to help and see if will accept.  discussed options  at length with patient.  Is extremely stubborn. I will contact PCP Dr LAUREN Grijalva to inform him of current issues.      DVT Prophylaxis: Warfarin  Code Status: Full Code    Disposition: Expected discharge in 1 days once patient decides to leave.    Juan Keturah    Interval History  Is alert  Feels okay no further hypoglycemia.  No sob no chest pain  Ate breakfast fine  Worried about his wife calling her several times this am.  No real pain in foot.  He is adamant abut going home in am  declines surgery  And will think about having it next week.  will try MRI again in am will give him ativan.  I explained  exhaustive his current  problems with the osteomyelitis and possible progression of this  and need for surgery   He will not have done now might have next week.    -Data reviewed today: I reviewed all new labs and imaging results over the last 24 hours. I personally reviewed no images or EKG's today.    Physical Exam  Temp: 99.3  F (37.4  C) Temp src:  Oral BP: 103/56 mmHg Pulse: 74 Heart Rate: 70 Resp: 16 SpO2: 96 % O2 Device: None (Room air)    Filed Vitals:    01/11/17 1004   Weight: 84.3 kg (185 lb 13.6 oz)     Vital Signs with Ranges  Temp:  [97.7  F (36.5  C)-99.3  F (37.4  C)] 99.3  F (37.4  C)  Pulse:  [74-79] 74  Heart Rate:  [70-87] 70  Resp:  [12-16] 16  BP: (103-134)/(47-69) 103/56 mmHg  SpO2:  [96 %] 96 %  I/O last 3 completed shifts:  In: 1992 [P.O.:600; I.V.:1392]  Out: 1010 [Urine:1010]    Peripheral IV 01/11/17 Right Lower forearm (Active)   Site Assessment WDL 1/12/2017  8:19 AM   Line Status Infusing 1/12/2017  8:19 AM   Phlebitis Scale 0-->no symptoms 1/12/2017  8:19 AM   Infiltration Scale 0 1/12/2017  8:19 AM   Extravasation? No 1/11/2017  8:00 AM   Number of days:1     No line/device    Constitutional: Alert and oriented x3. No distress    HEENT: NC/AT, PERRL, EOMI, mouth mist, neck supple, no LN.     Cardiovascular:IRRR. no Murmur, no  rubs, or gallops.  JVD flat.  Bruits no.  Pulses cant find in legs peripherally    Respiratory: Clear to auscultation bilaterally    Abdomen: Soft, NTND, no organomegaly. Bowel sounds present    Extremities:     Right foot with ulcers on dorsum and plantar surface around the base of second toe.  Plantar one is open and probing is hard feels like bone with some brownish drainage. The dorsal one is draining also purulent material both about 1 cm diameter.not much erythema  Boggy area at base of 2nd toe. Not really tender  Warm leg  Can't palpate pulses either foot.      Neuro:   Non focal, cranial nerves intact, Moves all extremities.    Medications    Warfarin Therapy Reminder         gadobutrol  7.5 mL Intravenous Once     sodium chloride (PF)  5 mL Intravenous Once     metroNIDAZOLE  500 mg Intravenous Q8H     ciprofloxacin  400 mg Intravenous Q12H     aspirin  325 mg Oral Daily     candesartan  16 mg Oral Daily     digoxin  125 mcg Oral Daily     insulin glargine U-300  80 Units Subcutaneous At Bedtime      metoprolol  50 mg Oral Daily     insulin aspart  1-7 Units Subcutaneous TID AC     insulin aspart  1-5 Units Subcutaneous At Bedtime     pneumococcal vaccine  0.5 mL Intramuscular Prior to discharge     influenza Vac Split High-Dose  0.5 mL Intramuscular Prior to discharge     ranitidine  150 mg Oral At Bedtime       Data    Recent Labs  Lab 01/12/17  0510 01/11/17  0630   WBC 10.4 14.2*   HGB 11.7* 12.4*   MCV 87 88    384   INR 1.17 1.20    139   POTASSIUM 4.2 4.4   CHLORIDE 101 102   CO2 28 28   BUN 19 28   CR 1.04 1.56*   ANIONGAP 7 9   AZRA 8.4* 8.6   * 161*   ALBUMIN  --  2.4*   PROTTOTAL  --  6.9   BILITOTAL  --  0.5   ALKPHOS  --  86   ALT  --  15   AST  --  10     LACTIC ACID   Date Value Ref Range Status   01/11/2017 1.6 0.4 - 2.0 mmol/L Final       No results found for this or any previous visit (from the past 24 hour(s)).

## 2017-01-12 NOTE — PLAN OF CARE
Problem: Goal Outcome Summary  Goal: Goal Outcome Summary  Outcome: No Change  Vitals stable, 98 % via RA. Patient appetite good. Up to shower, rt foot soaked . Left rt foot to air then 10 cc NS flushes x 2 to wound. Applied foam dressing to top and bottom of foot . Denies pain or discomfort this shift . He ambulated to bathroom with SBA otherwise used urinal.

## 2017-01-12 NOTE — PROGRESS NOTES
Adiel Rosa Jr 81 year old    Returned from MRI  Exam Performed : MRI RIGHT FOOT W/WO- NOT COMPLETED  Pushed to Reading Service?: No  Tolerated Procedure : poorly  May resume previous diet : Yes  Time Returned to Unit : 10:00  Report Given to : Min  I let Min know, via Ascom, that the patient was back in the room and ready to be hooked back up as before. I also let her know the patient had refused the exam, so we were unable to complete it.     1/12/2017 10:03 AM   Joselyn Jameson

## 2017-01-12 NOTE — UTILIZATION REVIEW
"    Admission Status; Secondary Review Determination         Under the authority of the Utilization Management Committee, the utilization review process indicated a secondary review on the above patient.  The review outcome is based on review of the medical records, discussions with staff, and applying clinical experience noted on the date of the review.        (x)      Inpatient Status Appropriate - This patient's medical care is consistent with medical management for inpatient care and reasonable inpatient medical practice.      () Observation Status Appropriate - This patient does not meet hospital inpatient criteria and is placed in observation status. If this patient's primary payer is Medicare and was admitted as an inpatient, Condition Code 44 should be used and patient status changed to \"observation\".   () Admission Status NOT Appropriate - This patient's medical care is not consistent with medical management for Inpatient or Observation Status.          RATIONALE FOR DETERMINATION     \"81 year old male who presented to the ED with hypoglycemia and fall at home. He reports that he ran out of his Trujeo and so took his old Levimir instead at his old dose. However, he has \"no food in the house\" so has basically not eaten over the last 24 hours or so. No food this morning, has not taken any insulin this morning either. When EMS arrived to his house they found him seated on the floor alert and conversant. He states he lost his balance and fell to his knees then fell forward and struck his forehead on the floor. He denies any loss of consciousness. EMS checked glucose an it was in the 30's. \"    Pt was admitted under observation status, however, x ray of his MTP on the right shows destructive lesions and he in undergoing further work-up such as MRI and has been placed on IV Abx to include IV cipro and IV flagyl due to concern for OM and some gas bubble. I spoke to the attending MD Dr Juan Rebollar and he " concurs need for further work-up and IV Abx and likely patient will stay > 2 midnights and inpatient status is appropriate.      The severity of illness, intensity of service provided, expected LOS and risk for adverse outcome make the care complex, high risk and appropriate for hospital admission.        The information on this document is developed by the utilization review team in order for the business office to ensure compliance.  This only denotes the appropriateness of proper admission status and does not reflect the quality of care rendered.         The definitions of Inpatient Status and Observation Status used in making the determination above are those provided in the CMS Coverage Manual, Chapter 1 and Chapter 6, section 70.4.      Sincerely,     CARLOS WADDELL MD    Physician Advisor  Utilization Review/ Case Management  [facility].

## 2017-01-12 NOTE — PLAN OF CARE
Problem: Goal Outcome Summary  Goal: Goal Outcome Summary  Outcome: No Change  Pt VSS, afebrile, BP low this shift 109/57, metoprolol held this morning due to low BP, Dr. Rebollar aware. Pt lungs are clear but dim, bowels active. Pt has two wounds to his right foot, one in the middle on top and one on the bottom in the middle, they appear pink, warm, and have slight clear drainage. Top wound is covered with optifoam, and bottom is open to air. Pt was scheduled for an MRI this morning of his right foot, however pt was unable to complete the whole procedure, and stopped in the beginning. Pt agreed to try MRI again tomorrow, Dr. Rebollar has ordered Ativan to be given prior to MRI. Pt was started on Cipro and Flagyl today for infection in his foot. Pt is very adamant that he needs to go home to his wife and states that she has no one to help her, and no food in the home. Pt has spoken to her several times today on the phone,  are aware and are attempting to work with pt and wife to get some help arranged. Pt is adamant that he does not was surgery for this infection, and he has made several remarks that he needs to get home, and can't have surgery at this time. Pt has been made aware of importance of treatment, and possible consequences if this isn't treated properly by Dr. Rebollar. Pt did have loose, incontient stool this afternoon, pt stated that he had to go poop, and then got up to go, his pants were falling and it appeared he had a stool, I asked the pt if he needed assistance he declined, after pt returned to bed, as I was leaving the room, it was obvious he should have had assistance in the bathroom, he had went all over the toilet and there was a small amount of stool on the floor. Pt was then cleaned up and all linens changes, bathroom was cleaned. Pt BG this AM was 143, he received 1 unit prior to breakfast, and BG at lunch was 95, pt did not receive any coverage and a juice was given,  while he awaited lunch. Pt received his flu and pneumonia vaccines today with prior approval by Dr. Rebollar.     Face to face report given with opportunity to observe patient.    Report given to STONEY Allred RN  1/12/2017  4:35 PM          Problem: Diabetes, Type 1 (Adult)  Goal: Signs and Symptoms of Listed Potential Problems Will be Absent or Manageable (Diabetes, Type 1)  Signs and symptoms of listed potential problems will be absent or manageable by discharge/transition of care (reference Diabetes, Type 1 (Adult) CPG).   Outcome: No Change    01/12/17 0819 01/12/17 1621   Diabetes, Type 1   Problems Assessed (Type 1 Diabetes) --  all   Problems Present (Type 1 Diabetes) none --

## 2017-01-12 NOTE — CONSULTS
Received phone call from JOSIAH Stoddard re: wound consult by TREVA Chan.  Upon arrival to patient room TREVA Chan present - she stated that the patient has a significant wound that will need a surgical consult and stated that we do not need to consult on this patient at this time.  Shirley will be ordering the surgical consult as well as some diagnostic tests.  Wound Care will be available in the future should our services be needed.

## 2017-01-12 NOTE — PROVIDER NOTIFICATION
Provider called to inquire about surgical consult . No surgical consult  At this time. Possible ortho consult tomorrow if  He remains here.

## 2017-01-12 NOTE — PLAN OF CARE
Problem: Goal Outcome Summary  Goal: Goal Outcome Summary  Outcome: No Change  A&O. Pt makes statement of wanting to go home to his wife this shift, but cooperative. VSS, afebrile. Denies pain.  and 143. IVF of D5 infusing. Skin with scattered bruising and left foot has optifoam covered wound on top and bottom of foot. Pt reported to nurse that he does not want surgery to his foot due to a brother passing away from a foot surgery. Now this AM the Pt reports that he may consider surgery if needed when the weather is better and his wife could be of some help. Stand by assist and using the urinal for output. Call light with in reach and used appropriately. No falls or injuries this shift.      Face to face report given with opportunity to observe patient.    Report given to STONEY Copeland   1/12/2017  7:35 AM

## 2017-01-13 LAB
ANION GAP SERPL CALCULATED.3IONS-SCNC: 8 MMOL/L (ref 3–14)
BASOPHILS # BLD AUTO: 0 10E9/L (ref 0–0.2)
BASOPHILS NFR BLD AUTO: 0.3 %
BUN SERPL-MCNC: 18 MG/DL (ref 7–30)
CALCIUM SERPL-MCNC: 8.7 MG/DL (ref 8.5–10.1)
CHLORIDE SERPL-SCNC: 104 MMOL/L (ref 94–109)
CO2 SERPL-SCNC: 28 MMOL/L (ref 20–32)
CREAT SERPL-MCNC: 1.25 MG/DL (ref 0.66–1.25)
CRP SERPL-MCNC: 72.7 MG/L (ref 0–8)
DIFFERENTIAL METHOD BLD: ABNORMAL
EOSINOPHIL # BLD AUTO: 0.3 10E9/L (ref 0–0.7)
EOSINOPHIL NFR BLD AUTO: 1.9 %
ERYTHROCYTE [DISTWIDTH] IN BLOOD BY AUTOMATED COUNT: 13.1 % (ref 10–15)
GFR SERPL CREATININE-BSD FRML MDRD: 56 ML/MIN/1.7M2
GLUCOSE BLDC GLUCOMTR-MCNC: 127 MG/DL (ref 70–99)
GLUCOSE BLDC GLUCOMTR-MCNC: 187 MG/DL (ref 70–99)
GLUCOSE BLDC GLUCOMTR-MCNC: 213 MG/DL (ref 70–99)
GLUCOSE BLDC GLUCOMTR-MCNC: 235 MG/DL (ref 70–99)
GLUCOSE BLDC GLUCOMTR-MCNC: 239 MG/DL (ref 70–99)
GLUCOSE BLDC GLUCOMTR-MCNC: 260 MG/DL (ref 70–99)
GLUCOSE BLDC GLUCOMTR-MCNC: 35 MG/DL (ref 70–99)
GLUCOSE BLDC GLUCOMTR-MCNC: 39 MG/DL (ref 70–99)
GLUCOSE BLDC GLUCOMTR-MCNC: 61 MG/DL (ref 70–99)
GLUCOSE BLDC GLUCOMTR-MCNC: 62 MG/DL (ref 70–99)
GLUCOSE BLDC GLUCOMTR-MCNC: 93 MG/DL (ref 70–99)
GLUCOSE SERPL-MCNC: 35 MG/DL (ref 70–99)
HCT VFR BLD AUTO: 38.2 % (ref 40–53)
HGB BLD-MCNC: 12.9 G/DL (ref 13.3–17.7)
IMM GRANULOCYTES # BLD: 0.1 10E9/L (ref 0–0.4)
IMM GRANULOCYTES NFR BLD: 0.6 %
INR PPP: 1.34 (ref 0.8–1.2)
LACTATE SERPL-SCNC: 1 MMOL/L (ref 0.4–2)
LYMPHOCYTES # BLD AUTO: 2.8 10E9/L (ref 0.8–5.3)
LYMPHOCYTES NFR BLD AUTO: 20.1 %
MCH RBC QN AUTO: 29.2 PG (ref 26.5–33)
MCHC RBC AUTO-ENTMCNC: 33.8 G/DL (ref 31.5–36.5)
MCV RBC AUTO: 86 FL (ref 78–100)
MONOCYTES # BLD AUTO: 1 10E9/L (ref 0–1.3)
MONOCYTES NFR BLD AUTO: 7.3 %
NEUTROPHILS # BLD AUTO: 9.8 10E9/L (ref 1.6–8.3)
NEUTROPHILS NFR BLD AUTO: 69.8 %
NRBC # BLD AUTO: 0 10*3/UL
NRBC BLD AUTO-RTO: 0 /100
PLATELET # BLD AUTO: 385 10E9/L (ref 150–450)
POTASSIUM SERPL-SCNC: 3.8 MMOL/L (ref 3.4–5.3)
RBC # BLD AUTO: 4.42 10E12/L (ref 4.4–5.9)
SODIUM SERPL-SCNC: 140 MMOL/L (ref 133–144)
WBC # BLD AUTO: 14.1 10E9/L (ref 4–11)

## 2017-01-13 PROCEDURE — 25000132 ZZH RX MED GY IP 250 OP 250 PS 637: Mod: GY | Performed by: NURSE PRACTITIONER

## 2017-01-13 PROCEDURE — A9270 NON-COVERED ITEM OR SERVICE: HCPCS | Mod: GY | Performed by: INTERNAL MEDICINE

## 2017-01-13 PROCEDURE — 27210995 ZZH RX 272: Performed by: INTERNAL MEDICINE

## 2017-01-13 PROCEDURE — 73720 MRI LWR EXTREMITY W/O&W/DYE: CPT | Mod: TC,RT

## 2017-01-13 PROCEDURE — 99232 SBSQ HOSP IP/OBS MODERATE 35: CPT | Performed by: INTERNAL MEDICINE

## 2017-01-13 PROCEDURE — 85025 COMPLETE CBC W/AUTO DIFF WBC: CPT | Performed by: NURSE PRACTITIONER

## 2017-01-13 PROCEDURE — 25000132 ZZH RX MED GY IP 250 OP 250 PS 637: Mod: GY | Performed by: INTERNAL MEDICINE

## 2017-01-13 PROCEDURE — 36415 COLL VENOUS BLD VENIPUNCTURE: CPT | Performed by: INTERNAL MEDICINE

## 2017-01-13 PROCEDURE — 83605 ASSAY OF LACTIC ACID: CPT | Performed by: INTERNAL MEDICINE

## 2017-01-13 PROCEDURE — 80048 BASIC METABOLIC PNL TOTAL CA: CPT | Performed by: NURSE PRACTITIONER

## 2017-01-13 PROCEDURE — A9585 GADOBUTROL INJECTION: HCPCS | Mod: TC

## 2017-01-13 PROCEDURE — 25000128 H RX IP 250 OP 636: Performed by: INTERNAL MEDICINE

## 2017-01-13 PROCEDURE — 25000125 ZZHC RX 250: Performed by: INTERNAL MEDICINE

## 2017-01-13 PROCEDURE — 36415 COLL VENOUS BLD VENIPUNCTURE: CPT | Performed by: NURSE PRACTITIONER

## 2017-01-13 PROCEDURE — 85610 PROTHROMBIN TIME: CPT | Performed by: NURSE PRACTITIONER

## 2017-01-13 PROCEDURE — 00000146 ZZHCL STATISTIC GLUCOSE BY METER IP

## 2017-01-13 PROCEDURE — A9270 NON-COVERED ITEM OR SERVICE: HCPCS | Mod: GY | Performed by: NURSE PRACTITIONER

## 2017-01-13 PROCEDURE — 86140 C-REACTIVE PROTEIN: CPT | Performed by: NURSE PRACTITIONER

## 2017-01-13 PROCEDURE — 12000000 ZZH R&B MED SURG/OB

## 2017-01-13 RX ORDER — GADOBUTROL 604.72 MG/ML
7.5 INJECTION INTRAVENOUS ONCE
Status: COMPLETED | OUTPATIENT
Start: 2017-01-13 | End: 2017-01-13

## 2017-01-13 RX ORDER — WARFARIN SODIUM 3 MG/1
6 TABLET ORAL
Status: DISCONTINUED | OUTPATIENT
Start: 2017-01-13 | End: 2017-01-13

## 2017-01-13 RX ADMIN — INSULIN ASPART 2 UNITS: 100 INJECTION, SOLUTION INTRAVENOUS; SUBCUTANEOUS at 11:22

## 2017-01-13 RX ADMIN — CIPROFLOXACIN 400 MG: 2 INJECTION, SOLUTION INTRAVENOUS at 00:50

## 2017-01-13 RX ADMIN — METRONIDAZOLE 500 MG: 500 INJECTION, SOLUTION INTRAVENOUS at 02:25

## 2017-01-13 RX ADMIN — ACETAMINOPHEN 650 MG: 325 TABLET, FILM COATED ORAL at 00:51

## 2017-01-13 RX ADMIN — LORAZEPAM 0.5 MG: 0.5 TABLET ORAL at 07:59

## 2017-01-13 RX ADMIN — GADOBUTROL 7.5 ML: 604.72 INJECTION INTRAVENOUS at 09:01

## 2017-01-13 RX ADMIN — CIPROFLOXACIN 400 MG: 2 INJECTION, SOLUTION INTRAVENOUS at 12:12

## 2017-01-13 RX ADMIN — AMPICILLIN SODIUM AND SULBACTAM SODIUM 3 G: 2; 1 INJECTION, POWDER, FOR SOLUTION INTRAMUSCULAR; INTRAVENOUS at 17:16

## 2017-01-13 RX ADMIN — ASPIRIN 325 MG: 325 TABLET ORAL at 09:38

## 2017-01-13 RX ADMIN — METRONIDAZOLE 500 MG: 500 INJECTION, SOLUTION INTRAVENOUS at 10:25

## 2017-01-13 RX ADMIN — AMPICILLIN SODIUM AND SULBACTAM SODIUM 3 G: 2; 1 INJECTION, POWDER, FOR SOLUTION INTRAMUSCULAR; INTRAVENOUS at 22:01

## 2017-01-13 RX ADMIN — DIGOXIN 125 MCG: 125 TABLET ORAL at 09:38

## 2017-01-13 RX ADMIN — POTASSIUM CHLORIDE: 2 INJECTION, SOLUTION, CONCENTRATE INTRAVENOUS at 17:14

## 2017-01-13 RX ADMIN — Medication 15 G: at 01:05

## 2017-01-13 RX ADMIN — INSULIN ASPART 2 UNITS: 100 INJECTION, SOLUTION INTRAVENOUS; SUBCUTANEOUS at 17:20

## 2017-01-13 RX ADMIN — RANITIDINE HYDROCHLORIDE 150 MG: 150 TABLET, FILM COATED ORAL at 20:26

## 2017-01-13 RX ADMIN — Medication 30 G: at 06:32

## 2017-01-13 NOTE — PLAN OF CARE
BS 61 at 0035. Oral Glucose given and orange juice given. Re-check was 93. One more orange juice was given to pt and tolerated well. Temp of 100.7 and was given Tylenol and went down to 99.8. Pt denies any discomfort and appears to have no s/s of hypoglycemia.  Primary nurse was notified. Lactic was ordered per protocol.

## 2017-01-13 NOTE — PROGRESS NOTES
[facility]    Hospitalist Progress Note    Date of Service (when I saw the patient): 01/13/2017    Assessment and Plan  Adiel Rosa Jr is a 81 year old male who was admitted on 1/11/2017.       1.  Diabetes mellitus:  Patient presented with significant hypoglycemia on admit.  Has been doing better since admit did have some lower sugar this am though  Is on too much Trujeo here.  Have now cut back on the dose to 50 units.  Will have continued with sliding scale coverage also.    2.  Right  Diabetic foot ulcer with osteomyelitis:  Patient has neuropathy on exam and has this ulceration on plantar surface about 1 cm diameter with what appears to be bone on bottom on probing and has on dorsum of foot  Has open ulcer again about 1 cm with some draining   No real surrounding erythema  minimally tender   Some bogginess to the area.  MRI does confirm that has osteomyelitis of 2nd and 3rd metatarsals with abscess present.  Not febrile not systemically il at this time.  Will  Have him on Unasyn.  Will absolutely need surgical intervention. Have been fighting with patient regarding having surgery and staying in hospital he has now agreed.  His wife is safely in assisted living and his dogs are being take care of in shelter.  I will contact Dr Zamora regarding issue of surgery and hope can have this done on Monday  patient is aware that will likely end up with amputation .  CATIA were performed and ok at 1 on both legs.      3.  Afib:  Rate controled is on warfarin  His INR is sub therapeutic. Have now stopped the warfarin in anticipation of surgery will put on some heparin for DVT prophylaxis.    5.  Chronic kidney disease:  Luckily his creatinine  is quite good today at 1.24.    6.  CAD: Per records had and anterior MI back in  1999 had reperfusion with Retavase then PCI to his proximal LAD. And had PTCA to D1 Also.  Last Echo done in 2014 showed Normal LV size with mild reduction of EF.  Severe LAE,  No  major valve issues.  Currently stable at this time.    7.  Social Issues:  Has a long history of noncompliance and  vulnerable adult issues with his wife also.  SS has been involved and they are resistant to any help. Currently his wife is in assisted living  She fel at home and unable to care for herself.  Their 3 dogs are now in a shelter  So he has agreed to stay here and proceed with surgery and care.    DVT Prophylaxis: Heparin SQ  Code Status: Full Code    Disposition: Expected discharge in 3-5 days once surgery o foot is taken care of.    Juan Rebollar    Interval History    Long drawn out day getting his social issues settled as above   Has no foot pain a lot of drainage from his wound  No sob chest pain or nausea had some hypoglycemia this am have cut back on his trujeo now.    -Data reviewed today: I reviewed all new labs and imaging results over the last 24 hours. I personally reviewed the MRI image(s) showing osteomyelitis and ascessof his right foot.    Physical Exam  Temp: 97.8  F (36.6  C) Temp src: Tympanic BP: (!) 104/42 mmHg   Heart Rate: 93 Resp: 20 SpO2: 94 % O2 Device: None (Room air)    Filed Vitals:    01/11/17 1004   Weight: 84.3 kg (185 lb 13.6 oz)     Vital Signs with Ranges  Temp:  [97  F (36.1  C)-100.7  F (38.2  C)] 97.8  F (36.6  C)  Heart Rate:  [74-93] 93  Resp:  [18-28] 20  BP: ()/(42-70) 104/42 mmHg  SpO2:  [93 %-96 %] 94 %  I/O last 3 completed shifts:  In: 783 [P.O.:780; I.V.:3]  Out: 125 [Urine:125]    Peripheral IV 01/12/17 Left Upper forearm (Active)   Site Assessment WDL 1/13/2017 10:15 AM   Line Status Saline locked 1/13/2017 10:15 AM   Phlebitis Scale 0-->no symptoms 1/13/2017 10:15 AM   Infiltration Scale 0 1/13/2017 10:15 AM   Number of days:1       Wound 01/12/17 Right Foot Other (comment) deep wound to middle top of right foot and bottom of middle right foot. (Active)   Site Assessment Moist;Pink;Pale;Drainage 1/13/2017  2:00 PM   Miguelina-wound Assessment  Erythema;Moist 1/13/2017  2:00 PM   Drainage Amount Moderate 1/13/2017  2:00 PM   Drainage Color/Charcteristics Serous;Other (Comment) 1/13/2017  2:00 PM   Wound Care/Cleansing Soap and water 1/13/2017  2:00 PM   Dressing Xeroform;Dry gauze 1/13/2017  2:00 PM   Dressing Status New dressing 1/13/2017  2:00 PM   Number of days:1     No line/device    Constitutional: Alert and oriented x3. No distress    HEENT: NC/AT, PERRL, EOMI, mouth moist, neck supple, no LN.     Cardiovascular: RRR. no Murmur, no  rubs, or gallops.  JVD flat.  Bruits no.  Pulses decreased    Respiratory: Clear to auscultation bilaterally    Abdomen: Soft, NTND, no organomegaly. Bowel sounds present    Extremities: Warm/dry.   Right foot abut same purulent drainage from top and bottom of foot  no erythema   bogginess noted     Neuro:   Non focal, cranial nerves intact, Moves all extremities.    Medications    IV infusion builder WITH additives       Warfarin Therapy Reminder         sodium chloride (PF)  3 mL Intracatheter Q8H     ampicillin-sulbactam (UNASYN) IV  3 g Intravenous Q6H     insulin glargine U-300  50 Units Subcutaneous At Bedtime     aspirin  325 mg Oral Daily     digoxin  125 mcg Oral Daily     metoprolol  50 mg Oral Daily     insulin aspart  1-7 Units Subcutaneous TID AC     insulin aspart  1-5 Units Subcutaneous At Bedtime     ranitidine  150 mg Oral At Bedtime       Data    Recent Labs  Lab 01/13/17  0548 01/12/17  0510 01/11/17  0630   WBC 14.1* 10.4 14.2*   HGB 12.9* 11.7* 12.4*   MCV 86 87 88    357 384   INR 1.34* 1.17 1.20    136 139   POTASSIUM 3.8 4.2 4.4   CHLORIDE 104 101 102   CO2 28 28 28   BUN 18 19 28   CR 1.25 1.04 1.56*   ANIONGAP 8 7 9   AZRA 8.7 8.4* 8.6   GLC 35* 143* 161*   ALBUMIN  --   --  2.4*   PROTTOTAL  --   --  6.9   BILITOTAL  --   --  0.5   ALKPHOS  --   --  86   ALT  --   --  15   AST  --   --  10     LACTIC ACID   Date Value Ref Range Status   01/13/2017 1.0 0.4 - 2.0 mmol/L Final    01/11/2017 1.6 0.4 - 2.0 mmol/L Final       Recent Results (from the past 24 hour(s))   MR Foot Right w/o & w Contrast    Narrative    RIGHT FOOT MRI    HISTORY:  Foot ulceration, osteomyelitis.  COMPARISON:  Today's study is compared to conventional radiographs  which are dated January 11, 2017.    TECHNIQUE:  Multiplanar MR images acquired before and after the  administration of 7.5 mL intravenous Gadavist.    FINDINGS:  Abnormal marrow signal throughout the entire second  metatarsal with relative sparing of the base of the metatarsal.  The  second metatarsal is of low T1 and low T2 signal and enhances after  the administration of gadolinium.  It has a patchy lytic appearance on  the conventional radiograph and findings are classic for  osteomyelitis.  There are several tiny areas of signal void within the  shaft of the metatarsal, which could be due to gas.  Destructive  changes of the head of the second metatarsal.  Superior subluxation of  the right second toe at the MTP joint.  The base and proximal shaft of  the second metatarsal demonstrate abnormal T1 and T2 signal with  enhancement in addition to tiny areas of signal void, suspicious for  osteomyelitis ate the base of the proximal phalanx of the second toe.  A large amount of fluid which is peripherally enhancing surrounds the  second MTP joint and extends to the ulceration along the plantar  aspect of the foot.  This is consistent with an abscess.  There is  also a small amount of fluid surrounding the flexor tendons of the  second toe, beginning at the distal shaft of the metatarsal consistent  with mild flexor tenosynovitis which is likely infected as well.    There is an impacted fracture of the head and neck of the third  metatarsal and associated signal abnormality in the shaft, neck and  head of the third metatarsal, and in the base and proximal shaft of  the proximal phalanx.  The abscess that surrounds the second MTP joint  extends to partially  surround the third MTP joint as well.  Constellation of findings is very suspicious for extension of  osteomyelitis to involve the neck and head of the third metatarsal as  well, in addition to the base of the proximal phalanx of the third  toe.  Continued on page 2...      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  Healed or healing  fracture base and proximal shaft of the first metatarsal, but no  findings to suggest osteomyelitis in the first metatarsal.    Subcutaneous edema about the forefoot.  Multiple hammertoe  deformities.    Images are degraded by motion.    IMPRESSION:  1.  FINDINGS SUSPICIOUS FOR OSTEOMYELITIS INVOLVING MOST OF THE SECOND  METATARSAL, HEAD AND NECK OF THE THIRD METATARSAL, PROXIMAL BASE AND  SHAFT SECOND PROXIMAL PHALANX, AND BASE OF THE THIRD PROXIMAL PHALANX.      2.  ULCERATION ON THE PLANTAR BASE OF THE MTP JOINT EXTENDING TO AN  ABSCESS SURROUNDING THE SECOND MTP JOINT AND PARTIALLY SURROUNDING THE  THIRD MTP JOINT.    3.  FRACTURES OF THE NECK OF THE THIRD METATARSAL, AND BASE AND  PROXIMAL SHAFT OF THE FIRST METATARSAL.    4.  SEVERE DEGENERATIVE ARTHRITIS FIRST MTP JOINT.    5.  FINDINGS OF OSTEOMYELITIS WERE DISCUSSED WITH DR. ILYA REYES  AT 1015 HOURS ON JANUARY 13, 2017.                SIGNATURE PAGE ONLY  Exam Date: Jan 13, 2017 09:18:13 AM  Author: SHILOH COMER  This report is final and signed

## 2017-01-13 NOTE — PLAN OF CARE
BG 35 this AM per Lab, administered glucose oral gel 30g, ham sandwich with perez, and 360mL of cranberry juice. BG now 62, MD aware of BG levels.

## 2017-01-13 NOTE — PLAN OF CARE
"Dr Rebollar and writer in to speak with patient at 10:45 this morning. Dr Rebollar explained in detail the MRI results of his foot, the diagnosis, the recommendations for treatment including antibiotics surgery and continued hospitalization, and the risks of patient's planned AMA discharge. Patient was informed that by insisting to leave against medical advice he risked his infection spreading, worsening,and even possible death. Still insisting on \"going home\". States he needs to get home to his wife \"she doesn't have any money or food\" also concerned re: 3 dogs at home. States has no family or friends to assist. / case management working with patient to attempt to assist with needs. States \"I'll come back Monday\"  "

## 2017-01-13 NOTE — PLAN OF CARE
Face to face report given with opportunity to observe patient.    Report given to Leonila TILLEY RN.    Pretty Bryant RN.  1/12/2017  11:28 PM

## 2017-01-13 NOTE — PROVIDER NOTIFICATION
Notified Dr. Rebollar of BS still at 35 after giving juice and 30 of oral glucose.  Pt A/O and eating a sandwich and drinking more juice.  MD said to monitor right now and continue to re-check.

## 2017-01-13 NOTE — PROVIDER NOTIFICATION
MD notified on VS T 100.7 RR 28 BP 97/46 BG 61. Lactic order. Questioned if any other labs or orders would be necessary. No new orders per MD.

## 2017-01-13 NOTE — PLAN OF CARE
Face to face report given with opportunity to observe patient.    Report given to STONEY Ribera   1/13/2017  7:52 AM

## 2017-01-13 NOTE — PLAN OF CARE
Problem: Goal Outcome Summary  Goal: Goal Outcome Summary  Outcome: No Change  At shift start OOB in room stating he was going home-adamant attitude-declined to have assessment done on feet/suma-rect areas ( states they looked around today in/at these areas)- alerted of pt's wanting to leave AMA along with charge RN (both in to see pt & calm him down & now agreeable to stay overnight), shift start accucheck= 211 & covered with SS, ordered & ate a bkfst-type supper food with tolerance, remains calm after supper & watching TV. LR=587 accucheck, 21:30 found pt roaming in room without calling for assistance & noted urine all over the floor-cleaned with new clothing/slippers provided for the pt. Alarms on for pt safety.

## 2017-01-13 NOTE — PLAN OF CARE
DATE:  1/13/2017   TIME OF RECEIPT FROM LAB:  0630  LAB TEST:  Blood glucose  LAB VALUE:  35  RESULTS GIVEN WITH READ-BACK TO (PROVIDER):  Juan Rebollar MD  TIME LAB VALUE REPORTED TO PROVIDER:   0662

## 2017-01-13 NOTE — PLAN OF CARE
Problem: Goal Outcome Summary  Goal: Goal Outcome Summary  Outcome: No Change  Pt A&O x3. Pt denies any pain throughout shift. Accu checks 260, 239 and 187; see MAR for insulin coverage. BP 91/47 and 104/42; BP medications held today per MD. Pt right foot soaked and washed with baby soap today then redressed with xeroform and gauze. Wound is draining moderate amount of white, creamy drainage. Pt planning to leave this morning AMA, but has calmed down about it since. MD aware of pt wishes about leaving. Pt tolerating regular diet. ABX continued. Alarms on and pt transfers with standby assist. Call light within reach throughout shift.     Face to face report given with opportunity to observe patient.  Report given to STONEY Olsen.    Ana Webb  1/13/2017, 4:02 PM

## 2017-01-13 NOTE — PROGRESS NOTES
"Spoke with Adiel off/on today about a variety of topics including to update him on his wifes status, to discuss his plans for treatment of his foot wound and post op needs, and help with appropriate care of his dogs.     When asked what his expectations were for treatment, he relayed that he would be staying here and having surgery next week. And was aware that he will need treatment such as dressings and therapy afterward, in a facility.     At this time we discussed that Chitra is at [facility] ([phone]) in a room that will be able to accommodate him when appropriate as well. He had no memory of having been shown pictures of the facility earlier this day by another staff member.     He was updated that his dogs will be going to [facility] ([phone]). He provided me with the names, breeds, ages, and overall health of the dogs. His goal is to get his dogs back, though in discussing his care needs he also understood that he may not get them back, he was relieved that this was a no kill shelter. At various points he became agitated and threatened that he'd get a gun and go after anyone who's hurt his dogs.     He also became very agitated discussing his son and daughter in law. He feels they will be trying to steal all his money and that they have already approached him for bank account information. He threatens to have him \"thrown back in nursing home, and his little wife too!\" if they steal his money.   "

## 2017-01-13 NOTE — PLAN OF CARE
Problem: Goal Outcome Summary  Goal: Goal Outcome Summary  Outcome: No Change  /66 mmHg  Pulse 74  Temp(Src) 99.8  F (37.7  C) (Tympanic)  Resp 28  Ht 2.134 m (7')  Wt 84.3 kg (185 lb 13.6 oz)  BMI 18.51 kg/m2  SpO2 96%  A&O. Calm and cooperative this shift. BG 61 on initial assessment, glucose gel given with BG 93 on recheck and juice given with next recheck of 127. Lactic acid ordered with VS meeting peremeters, which was 1.0. Skin with scattered bruises, wound to lower right foot covered with optifoam. IV locked. IV cipro and flagyl administered this shift. Pt up to BR multiple times this shift without using call light setting off alarm, Hospital gown and pants changed x2 from urinating on himself. No falls or injuries with continued encouragement and education on using the call light for assistance to prevent falls.  MRI plans to re-attempt a scan today at 0815 after the Pt has had breakfast and prn ativan to help him relax. Pt is aware and agrees to this time.

## 2017-01-13 NOTE — PROGRESS NOTES
Follow-up from 11/11:  Patient would benefit from getting back to his see outpatient Diabetic Education provider, Piedad Cruz, who will be able to work with Adiel with his diabetic medication management.  As I stated previously, patient failed to show to his follow-up appointment in October of 2016, which has unfortunately resulted in uncontrolled diabetes.    Writer has spoke to Piedad Cruz NP, CDE regarding this patient, and she would like to schedule this asap.    RD will continue to follow and remain available.    Mildred Amato RD

## 2017-01-14 LAB
ALBUMIN SERPL-MCNC: 2.1 G/DL (ref 3.4–5)
ALP SERPL-CCNC: 74 U/L (ref 40–150)
ALT SERPL W P-5'-P-CCNC: 11 U/L (ref 0–70)
ANION GAP SERPL CALCULATED.3IONS-SCNC: 7 MMOL/L (ref 3–14)
AST SERPL W P-5'-P-CCNC: 11 U/L (ref 0–45)
BASOPHILS # BLD AUTO: 0 10E9/L (ref 0–0.2)
BASOPHILS NFR BLD AUTO: 0.3 %
BILIRUB SERPL-MCNC: 0.4 MG/DL (ref 0.2–1.3)
BUN SERPL-MCNC: 18 MG/DL (ref 7–30)
CALCIUM SERPL-MCNC: 8.2 MG/DL (ref 8.5–10.1)
CHLORIDE SERPL-SCNC: 104 MMOL/L (ref 94–109)
CO2 SERPL-SCNC: 28 MMOL/L (ref 20–32)
CREAT SERPL-MCNC: 1.24 MG/DL (ref 0.66–1.25)
DIFFERENTIAL METHOD BLD: ABNORMAL
EOSINOPHIL # BLD AUTO: 0.2 10E9/L (ref 0–0.7)
EOSINOPHIL NFR BLD AUTO: 1.6 %
ERYTHROCYTE [DISTWIDTH] IN BLOOD BY AUTOMATED COUNT: 13.2 % (ref 10–15)
GFR SERPL CREATININE-BSD FRML MDRD: 56 ML/MIN/1.7M2
GLUCOSE BLDC GLUCOMTR-MCNC: 102 MG/DL (ref 70–99)
GLUCOSE BLDC GLUCOMTR-MCNC: 113 MG/DL (ref 70–99)
GLUCOSE BLDC GLUCOMTR-MCNC: 115 MG/DL (ref 70–99)
GLUCOSE BLDC GLUCOMTR-MCNC: 117 MG/DL (ref 70–99)
GLUCOSE BLDC GLUCOMTR-MCNC: 182 MG/DL (ref 70–99)
GLUCOSE BLDC GLUCOMTR-MCNC: 243 MG/DL (ref 70–99)
GLUCOSE BLDC GLUCOMTR-MCNC: 42 MG/DL (ref 70–99)
GLUCOSE BLDC GLUCOMTR-MCNC: 63 MG/DL (ref 70–99)
GLUCOSE BLDC GLUCOMTR-MCNC: 96 MG/DL (ref 70–99)
GLUCOSE BLDC GLUCOMTR-MCNC: 99 MG/DL (ref 70–99)
GLUCOSE SERPL-MCNC: 75 MG/DL (ref 70–99)
HCT VFR BLD AUTO: 35.2 % (ref 40–53)
HGB BLD-MCNC: 11.8 G/DL (ref 13.3–17.7)
IMM GRANULOCYTES # BLD: 0 10E9/L (ref 0–0.4)
IMM GRANULOCYTES NFR BLD: 0.4 %
INR PPP: 1.46 (ref 0.8–1.2)
LYMPHOCYTES # BLD AUTO: 1.6 10E9/L (ref 0.8–5.3)
LYMPHOCYTES NFR BLD AUTO: 14.6 %
MCH RBC QN AUTO: 29.1 PG (ref 26.5–33)
MCHC RBC AUTO-ENTMCNC: 33.5 G/DL (ref 31.5–36.5)
MCV RBC AUTO: 87 FL (ref 78–100)
MONOCYTES # BLD AUTO: 0.8 10E9/L (ref 0–1.3)
MONOCYTES NFR BLD AUTO: 7.3 %
NEUTROPHILS # BLD AUTO: 8.1 10E9/L (ref 1.6–8.3)
NEUTROPHILS NFR BLD AUTO: 75.8 %
NRBC # BLD AUTO: 0 10*3/UL
NRBC BLD AUTO-RTO: 0 /100
PLATELET # BLD AUTO: 326 10E9/L (ref 150–450)
POTASSIUM SERPL-SCNC: 4.3 MMOL/L (ref 3.4–5.3)
PROT SERPL-MCNC: 6.5 G/DL (ref 6.8–8.8)
RBC # BLD AUTO: 4.05 10E12/L (ref 4.4–5.9)
SODIUM SERPL-SCNC: 139 MMOL/L (ref 133–144)
WBC # BLD AUTO: 10.7 10E9/L (ref 4–11)

## 2017-01-14 PROCEDURE — 25000132 ZZH RX MED GY IP 250 OP 250 PS 637: Mod: GY | Performed by: NURSE PRACTITIONER

## 2017-01-14 PROCEDURE — 85025 COMPLETE CBC W/AUTO DIFF WBC: CPT | Performed by: NURSE PRACTITIONER

## 2017-01-14 PROCEDURE — 82306 VITAMIN D 25 HYDROXY: CPT | Performed by: NURSE PRACTITIONER

## 2017-01-14 PROCEDURE — 25000125 ZZHC RX 250: Performed by: INTERNAL MEDICINE

## 2017-01-14 PROCEDURE — 00000146 ZZHCL STATISTIC GLUCOSE BY METER IP

## 2017-01-14 PROCEDURE — 85610 PROTHROMBIN TIME: CPT | Performed by: NURSE PRACTITIONER

## 2017-01-14 PROCEDURE — 25000128 H RX IP 250 OP 636: Performed by: INTERNAL MEDICINE

## 2017-01-14 PROCEDURE — 80053 COMPREHEN METABOLIC PANEL: CPT | Performed by: NURSE PRACTITIONER

## 2017-01-14 PROCEDURE — 12000000 ZZH R&B MED SURG/OB

## 2017-01-14 PROCEDURE — 36415 COLL VENOUS BLD VENIPUNCTURE: CPT | Performed by: NURSE PRACTITIONER

## 2017-01-14 PROCEDURE — 99221 1ST HOSP IP/OBS SF/LOW 40: CPT | Performed by: ORTHOPAEDIC SURGERY

## 2017-01-14 PROCEDURE — 25800025 ZZH RX 258: Performed by: NURSE PRACTITIONER

## 2017-01-14 PROCEDURE — 27210995 ZZH RX 272: Performed by: INTERNAL MEDICINE

## 2017-01-14 PROCEDURE — A9270 NON-COVERED ITEM OR SERVICE: HCPCS | Mod: GY | Performed by: NURSE PRACTITIONER

## 2017-01-14 PROCEDURE — 99232 SBSQ HOSP IP/OBS MODERATE 35: CPT | Performed by: INTERNAL MEDICINE

## 2017-01-14 RX ORDER — METOPROLOL SUCCINATE 25 MG/1
25 TABLET, EXTENDED RELEASE ORAL DAILY
Status: DISCONTINUED | OUTPATIENT
Start: 2017-01-15 | End: 2017-01-16 | Stop reason: HOSPADM

## 2017-01-14 RX ORDER — HEPARIN SODIUM 5000 [USP'U]/.5ML
5000 INJECTION, SOLUTION INTRAVENOUS; SUBCUTANEOUS EVERY 8 HOURS
Status: DISCONTINUED | OUTPATIENT
Start: 2017-01-14 | End: 2017-01-16 | Stop reason: HOSPADM

## 2017-01-14 RX ADMIN — AMPICILLIN SODIUM AND SULBACTAM SODIUM 3 G: 2; 1 INJECTION, POWDER, FOR SOLUTION INTRAMUSCULAR; INTRAVENOUS at 11:14

## 2017-01-14 RX ADMIN — Medication 15 G: at 08:04

## 2017-01-14 RX ADMIN — RANITIDINE HYDROCHLORIDE 150 MG: 150 TABLET, FILM COATED ORAL at 20:03

## 2017-01-14 RX ADMIN — AMPICILLIN SODIUM AND SULBACTAM SODIUM 3 G: 2; 1 INJECTION, POWDER, FOR SOLUTION INTRAMUSCULAR; INTRAVENOUS at 05:09

## 2017-01-14 RX ADMIN — POTASSIUM CHLORIDE: 2 INJECTION, SOLUTION, CONCENTRATE INTRAVENOUS at 19:55

## 2017-01-14 RX ADMIN — HEPARIN SODIUM 5000 UNITS: 10000 INJECTION, SOLUTION INTRAVENOUS; SUBCUTANEOUS at 19:57

## 2017-01-14 RX ADMIN — DEXTROSE MONOHYDRATE 25 ML: 25 INJECTION, SOLUTION INTRAVENOUS at 02:23

## 2017-01-14 RX ADMIN — AMPICILLIN SODIUM AND SULBACTAM SODIUM 3 G: 2; 1 INJECTION, POWDER, FOR SOLUTION INTRAMUSCULAR; INTRAVENOUS at 16:29

## 2017-01-14 RX ADMIN — HEPARIN SODIUM 5000 UNITS: 10000 INJECTION, SOLUTION INTRAVENOUS; SUBCUTANEOUS at 11:46

## 2017-01-14 RX ADMIN — INSULIN ASPART 1 UNITS: 100 INJECTION, SOLUTION INTRAVENOUS; SUBCUTANEOUS at 16:29

## 2017-01-14 RX ADMIN — ACETAMINOPHEN 650 MG: 325 TABLET, FILM COATED ORAL at 05:13

## 2017-01-14 RX ADMIN — DIGOXIN 125 MCG: 125 TABLET ORAL at 09:32

## 2017-01-14 RX ADMIN — ASPIRIN 325 MG: 325 TABLET ORAL at 09:32

## 2017-01-14 RX ADMIN — AMPICILLIN SODIUM AND SULBACTAM SODIUM 3 G: 2; 1 INJECTION, POWDER, FOR SOLUTION INTRAMUSCULAR; INTRAVENOUS at 21:56

## 2017-01-14 NOTE — PROGRESS NOTES
Adiel was up in his chair this afternoon. He signed the paperwork requested by [facility] and was provided with a copy of it. He was also assisted in calling over there to talk to Chitra.

## 2017-01-14 NOTE — PLAN OF CARE
"Problem: Goal Outcome Summary  Goal: Goal Outcome Summary  Outcome: No Change  Pt A&O x3 but can be forgetful at times. Pt denies any pain. /53 and metoprolol held this AM per MD. BS 63 at shift change, PO glucose 15g given and breakfast ordered. Rechecked at 113 and then Accu check 99 this afternoon. Right foot soaked in warm water with baby soap, wound has small amount of creamy serous drainage noted on dressing. Wound redressed with xeroform and dry gauze. Pt removed IV this afternoon, new IV place in left hand; pt tolerated well. Pt tolerating regular diet and transfers with assist of one. Pt very fearful to have surgery and states \"he will die during surgery.\" Alarms on and call light within reach throughout shift.       Face to face report given with opportunity to observe patient.  Report given to Pretty SAMANO RN.    nAa Webb  1/14/2017, 3:24 PM        "

## 2017-01-14 NOTE — PLAN OF CARE
Problem: Goal Outcome Summary  Goal: Goal Outcome Summary  Outcome: No Change  Assessment and vitals as charted. Pt alert and oriented Will continue to monitor.

## 2017-01-14 NOTE — PLAN OF CARE
Dr. Maddox paged for blood sugar. Orders received to recheck blood sugar every hour for the next 3 hours. If falls below 80 page MD. Will continue to monitor.

## 2017-01-14 NOTE — PROVIDER NOTIFICATION
Updated MD on /53 and Accu check 63 this AM. MD ordered to hold Metoprolol this AM and she will make changes to other orders.

## 2017-01-14 NOTE — PROGRESS NOTES
[facility]    Hospitalist Progress Note    Date of Service (when I saw the patient): 01/14/2017    Assessment and Plan  Adiel Rosa Jr is a 81 year old male who was admitted on 1/11/2017 with fall and was found to have FS in 30s. He continues to have low FS adrien middle of night and higher reading pre-dinner. lantus dose being adjusted.  Also has right foot abscess / osteomyelitis. Awaiting foot surgery on Monday.      DM / hypoglycemia  Suspect due to poor PO intake.  Again was hypo last night - FS was 40 mg. I will cut back on lantus more today. i think he will benefit from twice daily lantu to avoid night time hypoglycemia and predinner hyperglycemia.  A1c in 9/2014 was 7.3. Will recheck A1c in am.      Right Diabetic foot ulcer / abscess with osteomyelitis:    Foot Cx growth: staph pseudintermedius and proteus mirabilis.  Cont IV unasyn. Surgery on Monday.  CATIA were performed and ok at 1 on both legs.    Afib:   Rate controled.  Holding warfarin for now in anticipation of surgery.    CKD  Cr at baseline    CAD   stable at this time.  He has not received in 3 days prior to sunday and his BP continues to be in low normal range. I have hence cut back on metoprolol dose to 25 mg qd as he needs BB due to CAD.  Per records had and anterior MI back in  1999 had reperfusion with Retavase then PCI to his proximal LAD. And had PTCA to D1 Also.    Last Echo done in 2014 showed Normal LV size with mild reduction of EF.  Severe LAE,  No major valve issues.        Protein energy malnutrition  Encourage PO intake and give nutritional supplement.    Social Issues:    Has a long history of noncompliance and  vulnerable adult issues with his wife also.  SS has been involved and they are resistant to any help. Currently his wife is in assisted living  She fel at home and unable to care for herself.  Their 3 dogs are now in a shelter  So he has agreed to stay here and proceed with surgery and care.    DVT  Prophylaxis: start Heparin SQ since INR is subtherapeutic.    Code Status: Full Code    Disposition: Expected discharge in 3-5 days once surgery of foot is taken care of.          Britt Lang    Interval History  Has no complaints today.    -Data reviewed today: I reviewed all new labs and imaging results over the last 24 hours. I personally reviewed no images or EKG's today.    Physical Exam  Temp: 98.8  F (37.1  C) Temp src: Tympanic BP: 110/53 mmHg   Heart Rate: 70 Resp: 20 SpO2: 95 % O2 Device: None (Room air)    Filed Vitals:    01/11/17 1004   Weight: 84.3 kg (185 lb 13.6 oz)     Vital Signs with Ranges  Temp:  [98  F (36.7  C)-98.8  F (37.1  C)] 98.8  F (37.1  C)  Heart Rate:  [70-80] 70  Resp:  [20] 20  BP: (104-110)/(42-70) 110/53 mmHg  SpO2:  [93 %-95 %] 95 %  I/O last 3 completed shifts:  In: 1407 [P.O.:1000; I.V.:407]  Out: 875 [Urine:875]    Peripheral IV 01/12/17 Left Upper forearm (Active)   Site Assessment WDL 1/14/2017  7:57 AM   Line Status Saline locked 1/14/2017  7:57 AM   Phlebitis Scale 0-->no symptoms 1/14/2017  7:57 AM   Infiltration Scale 0 1/14/2017  7:57 AM   Extravasation? No 1/13/2017  5:00 PM   Number of days:2       Wound 01/12/17 Right Foot Other (comment) deep wound to middle top of right foot and bottom of middle right foot. (Active)   Site Assessment UTV 1/14/2017  7:53 AM   Miguelina-wound Assessment UTV 1/14/2017  7:53 AM   Drainage Amount UTV 1/14/2017  7:53 AM   Drainage Color/Charcteristics Serous;Other (Comment) 1/13/2017  2:00 PM   Wound Care/Cleansing Soap and water 1/13/2017  2:00 PM   Dressing Xeroform;Dry gauze 1/13/2017  2:00 PM   Dressing Status New dressing 1/13/2017  2:00 PM   Number of days:2     Line/device assessment(s) completed for medical necessity    Constitutional: NAD  Alert  Respiratory: CTA b/l  Cardiovascular: S1S2 RRR  GI: soft and nontender  Skin/Integumen: dressing over right foot is c/d/i  Other:      Medications    IV infusion builder WITH additives 75  mL/hr at 01/13/17 1714     Warfarin Therapy Reminder         [START ON 1/15/2017] metoprolol  25 mg Oral Daily     sodium chloride (PF)  3 mL Intracatheter Q8H     ampicillin-sulbactam (UNASYN) IV  3 g Intravenous Q6H     insulin glargine U-300  50 Units Subcutaneous At Bedtime     aspirin  325 mg Oral Daily     digoxin  125 mcg Oral Daily     insulin aspart  1-7 Units Subcutaneous TID AC     insulin aspart  1-5 Units Subcutaneous At Bedtime     ranitidine  150 mg Oral At Bedtime       Data    Recent Labs  Lab 01/14/17  0611 01/13/17  0548 01/12/17  0510 01/11/17  0630   WBC 10.7 14.1* 10.4 14.2*   HGB 11.8* 12.9* 11.7* 12.4*   MCV 87 86 87 88    385 357 384   INR 1.46* 1.34* 1.17 1.20    140 136 139   POTASSIUM 4.3 3.8 4.2 4.4   CHLORIDE 104 104 101 102   CO2 28 28 28 28   BUN 18 18 19 28   CR 1.24 1.25 1.04 1.56*   ANIONGAP 7 8 7 9   AZRA 8.2* 8.7 8.4* 8.6   GLC 75 35* 143* 161*   ALBUMIN 2.1*  --   --  2.4*   PROTTOTAL 6.5*  --   --  6.9   BILITOTAL 0.4  --   --  0.5   ALKPHOS 74  --   --  86   ALT 11  --   --  15   AST 11  --   --  10       No results found for this or any previous visit (from the past 24 hour(s)).

## 2017-01-14 NOTE — PLAN OF CARE
Face to face report given with opportunity to observe patient.    Report given to Yari Hair   1/14/2017  7:24 AM

## 2017-01-14 NOTE — PLAN OF CARE
Problem: Goal Outcome Summary  Goal: Goal Outcome Summary  Outcome: No Change  SS in to see pt & discuss trmt/discharge plans (pt calm at present & receptive to POC), VSS-afebrile, alert but mentation distant at times, denies pain, dressings intact from previous shift, ABX's switched per MD POC with coumadin stopped due to ? Impending surgery & awaiting start of suggested heparin to admin, content to watchTV (jeopardy), all alarms on with urinal on bed for pt to reach, ordered juice/cookies inbetween meal due to his low bs on nights & having MD decrease HS scheduled ins dose.    Problem: Skin Integrity Impairment, Risk/Actual (Adult)  Goal: Skin Integrity/Wound Healing  Patient will not have a decrease in skin integrity during hospital stay.   Outcome: No Change    01/13/17 1818   Skin Integrity Impairment, Risk/Actual (Adult)   Skin Integrity/Wound Healing unable to achieve outcome  (awaiting ? surgery on RTfot/toes.)

## 2017-01-14 NOTE — PLAN OF CARE
Face to face report given with opportunity to observe patient.    Report given to Osmani LUA.    Pretty Bryant RN.  1/14/2017  12:01 AM

## 2017-01-14 NOTE — PLAN OF CARE
Problem: Goal Outcome Summary  Goal: Goal Outcome Summary  Outcome: No Change  A/O  But forgets to call for assistance then attempts bed exits to void-all alarms on for pt safety, denies pain, VSS-afebrile, continues with scheduled IVF's & ABX's ordered, ate all supper ordered + cranberry juice/coffee. RT foot dressing dry & intact. 2000-son visited with pt thru supper then left @ 1930. Pt called wife to report him being discharged to be with her @ her assisted living situation.    Problem: Diabetes, Type 1 (Adult)  Goal: Signs and Symptoms of Listed Potential Problems Will be Absent or Manageable (Diabetes, Type 1)  Signs and symptoms of listed potential problems will be absent or manageable by discharge/transition of care (reference Diabetes, Type 1 (Adult) CPG).   Outcome: No Change    01/14/17 1745   Diabetes, Type 1   Problems Assessed (Type 1 Diabetes) hypoglycemia   Problems Present (Type 1 Diabetes) (continuing to monitor & adjust ins.)

## 2017-01-14 NOTE — CONSULTS
Orthopedic Inpatient Consultation  Physician requesting consult: Dr. Rebollar  Reason for consultation: Diabetic osteomyelitis right foot    Subjective: This 81-year-old diabetic gentleman was admitted to the hospital with hypoglycemia and a diabetic ulcer on the plantar aspect of the right foot.  X-rays and MRI reveal osteomyelitis involving nearly the entirety of the 2nd metatarsal, the head and neck of the 3rd metatarsal, and the proximal phalanges of the 2nd and 3rd rays.  In addition there are fractures of the bases of the 2nd and 3rd proximal phalanges, and an abscess around the entire 2nd ray.  Orthopedics was consulted for surgical intervention.    Objective: Elderly frail-appearing male in no obvious distress.  The right foot shows a an ulcer on the plantar aspect of the 2nd metatarsal head.  No obvious purulence is emerging from the ulcer.  There is a small ulcer on the dorsal aspect of the foot between the 2nd and 3rd metatarsal heads from which robin purulence is noted.  The 2nd toe is swollen and erythematous.    X-rays: His plain films and MRI were reviewed.  In addition ABIs were performed which were normal and the right ankle.    Impression: Diabetic osteomyelitis with abscess involving most of the 2nd and 3rd rays of the right foot    Recommendation: I recommend a below the knee amputation.  The results of the CATIA suggest that he has healing potential at that level.  A lesser procedure would not resolve the infection.  A simple draining of the abscess will not resolve the osteomyelitis.  It is extremely unlikely that IV antibiotics will eradicate the osteomyelitis.  Ray resection would require resection of the 2nd and 3rd rays which is mutilating and would leave the foot nonfunctional.  A transmetatarsal amputation would not remove the entirety of the 2nd ray which has osteomyelitis to its base, according to the MRI.    I discussed the procedure ([id number]) with the patient this morning and offered to do  the procedure Monday morning.  The patient refused the amputation.  I warned the patient that without surgery the infection will spread and he could eventually die of sepsis.  He still refused.  Unless he consents, I have nothing surgical to offer him.    When Dr. Rebollar contacted me about the MRI results, Dr. Rebollar felt that the patient was amenable to a BKA.  For that reason I believe Dr. Rebollar made the patient NPO after midnight Ubaldo night. The NPO order may to be removed if the patient continues to refuse amputation.    Please reconsult me if the patient changes his mind.

## 2017-01-15 LAB
ALBUMIN SERPL-MCNC: 2.1 G/DL (ref 3.4–5)
ALP SERPL-CCNC: 71 U/L (ref 40–150)
ALT SERPL W P-5'-P-CCNC: 11 U/L (ref 0–70)
ANION GAP SERPL CALCULATED.3IONS-SCNC: 7 MMOL/L (ref 3–14)
AST SERPL W P-5'-P-CCNC: 9 U/L (ref 0–45)
BASOPHILS # BLD AUTO: 0.1 10E9/L (ref 0–0.2)
BASOPHILS NFR BLD AUTO: 0.6 %
BILIRUB SERPL-MCNC: 0.4 MG/DL (ref 0.2–1.3)
BUN SERPL-MCNC: 21 MG/DL (ref 7–30)
CALCIUM SERPL-MCNC: 8.2 MG/DL (ref 8.5–10.1)
CHLORIDE SERPL-SCNC: 104 MMOL/L (ref 94–109)
CO2 SERPL-SCNC: 29 MMOL/L (ref 20–32)
CREAT SERPL-MCNC: 1.31 MG/DL (ref 0.66–1.25)
DIFFERENTIAL METHOD BLD: ABNORMAL
EOSINOPHIL # BLD AUTO: 0.2 10E9/L (ref 0–0.7)
EOSINOPHIL NFR BLD AUTO: 2.9 %
ERYTHROCYTE [DISTWIDTH] IN BLOOD BY AUTOMATED COUNT: 13.2 % (ref 10–15)
EST. AVERAGE GLUCOSE BLD GHB EST-MCNC: 194 MG/DL
GFR SERPL CREATININE-BSD FRML MDRD: 53 ML/MIN/1.7M2
GLUCOSE BLDC GLUCOMTR-MCNC: 137 MG/DL (ref 70–99)
GLUCOSE BLDC GLUCOMTR-MCNC: 235 MG/DL (ref 70–99)
GLUCOSE BLDC GLUCOMTR-MCNC: 291 MG/DL (ref 70–99)
GLUCOSE BLDC GLUCOMTR-MCNC: 311 MG/DL (ref 70–99)
GLUCOSE BLDC GLUCOMTR-MCNC: 58 MG/DL (ref 70–99)
GLUCOSE BLDC GLUCOMTR-MCNC: 84 MG/DL (ref 70–99)
GLUCOSE BLDC GLUCOMTR-MCNC: 86 MG/DL (ref 70–99)
GLUCOSE SERPL-MCNC: 129 MG/DL (ref 70–99)
HBA1C MFR BLD: 8.4 % (ref 4.3–6)
HCT VFR BLD AUTO: 34.2 % (ref 40–53)
HGB BLD-MCNC: 11.4 G/DL (ref 13.3–17.7)
IMM GRANULOCYTES # BLD: 0.1 10E9/L (ref 0–0.4)
IMM GRANULOCYTES NFR BLD: 0.9 %
INR PPP: 1.4 (ref 0.8–1.2)
LYMPHOCYTES # BLD AUTO: 1.3 10E9/L (ref 0.8–5.3)
LYMPHOCYTES NFR BLD AUTO: 16.3 %
MCH RBC QN AUTO: 29.3 PG (ref 26.5–33)
MCHC RBC AUTO-ENTMCNC: 33.3 G/DL (ref 31.5–36.5)
MCV RBC AUTO: 88 FL (ref 78–100)
MONOCYTES # BLD AUTO: 0.7 10E9/L (ref 0–1.3)
MONOCYTES NFR BLD AUTO: 8.9 %
NEUTROPHILS # BLD AUTO: 5.8 10E9/L (ref 1.6–8.3)
NEUTROPHILS NFR BLD AUTO: 70.4 %
NRBC # BLD AUTO: 0 10*3/UL
NRBC BLD AUTO-RTO: 0 /100
PLATELET # BLD AUTO: 293 10E9/L (ref 150–450)
POTASSIUM SERPL-SCNC: 4.4 MMOL/L (ref 3.4–5.3)
PROT SERPL-MCNC: 6.2 G/DL (ref 6.8–8.8)
RBC # BLD AUTO: 3.89 10E12/L (ref 4.4–5.9)
SODIUM SERPL-SCNC: 140 MMOL/L (ref 133–144)
WBC # BLD AUTO: 8.2 10E9/L (ref 4–11)

## 2017-01-15 PROCEDURE — 25800025 ZZH RX 258: Performed by: NURSE PRACTITIONER

## 2017-01-15 PROCEDURE — 99232 SBSQ HOSP IP/OBS MODERATE 35: CPT | Performed by: INTERNAL MEDICINE

## 2017-01-15 PROCEDURE — 83036 HEMOGLOBIN GLYCOSYLATED A1C: CPT | Performed by: NURSE PRACTITIONER

## 2017-01-15 PROCEDURE — 25000125 ZZHC RX 250: Performed by: INTERNAL MEDICINE

## 2017-01-15 PROCEDURE — 25000128 H RX IP 250 OP 636: Performed by: INTERNAL MEDICINE

## 2017-01-15 PROCEDURE — 80053 COMPREHEN METABOLIC PANEL: CPT | Performed by: NURSE PRACTITIONER

## 2017-01-15 PROCEDURE — 85025 COMPLETE CBC W/AUTO DIFF WBC: CPT | Performed by: NURSE PRACTITIONER

## 2017-01-15 PROCEDURE — 36415 COLL VENOUS BLD VENIPUNCTURE: CPT | Performed by: NURSE PRACTITIONER

## 2017-01-15 PROCEDURE — 27210995 ZZH RX 272: Performed by: INTERNAL MEDICINE

## 2017-01-15 PROCEDURE — 25000132 ZZH RX MED GY IP 250 OP 250 PS 637: Mod: GY | Performed by: INTERNAL MEDICINE

## 2017-01-15 PROCEDURE — 25000132 ZZH RX MED GY IP 250 OP 250 PS 637: Mod: GY | Performed by: NURSE PRACTITIONER

## 2017-01-15 PROCEDURE — 12000000 ZZH R&B MED SURG/OB

## 2017-01-15 PROCEDURE — A9270 NON-COVERED ITEM OR SERVICE: HCPCS | Mod: GY | Performed by: INTERNAL MEDICINE

## 2017-01-15 PROCEDURE — A9270 NON-COVERED ITEM OR SERVICE: HCPCS | Mod: GY | Performed by: NURSE PRACTITIONER

## 2017-01-15 PROCEDURE — 85610 PROTHROMBIN TIME: CPT | Performed by: NURSE PRACTITIONER

## 2017-01-15 PROCEDURE — 40000788 ZZHCL STATISTIC ESTIMATED AVERAGE GLUCOSE: Performed by: NURSE PRACTITIONER

## 2017-01-15 PROCEDURE — 00000146 ZZHCL STATISTIC GLUCOSE BY METER IP

## 2017-01-15 RX ADMIN — HEPARIN SODIUM 5000 UNITS: 10000 INJECTION, SOLUTION INTRAVENOUS; SUBCUTANEOUS at 11:40

## 2017-01-15 RX ADMIN — INSULIN ASPART 4 UNITS: 100 INJECTION, SOLUTION INTRAVENOUS; SUBCUTANEOUS at 16:27

## 2017-01-15 RX ADMIN — HEPARIN SODIUM 5000 UNITS: 10000 INJECTION, SOLUTION INTRAVENOUS; SUBCUTANEOUS at 04:22

## 2017-01-15 RX ADMIN — METOPROLOL SUCCINATE 25 MG: 25 TABLET, EXTENDED RELEASE ORAL at 09:43

## 2017-01-15 RX ADMIN — POTASSIUM CHLORIDE: 2 INJECTION, SOLUTION, CONCENTRATE INTRAVENOUS at 09:39

## 2017-01-15 RX ADMIN — AMPICILLIN SODIUM AND SULBACTAM SODIUM 3 G: 2; 1 INJECTION, POWDER, FOR SOLUTION INTRAMUSCULAR; INTRAVENOUS at 04:22

## 2017-01-15 RX ADMIN — ASPIRIN 325 MG: 325 TABLET ORAL at 09:43

## 2017-01-15 RX ADMIN — INSULIN ASPART 2 UNITS: 100 INJECTION, SOLUTION INTRAVENOUS; SUBCUTANEOUS at 11:30

## 2017-01-15 RX ADMIN — AMPICILLIN SODIUM AND SULBACTAM SODIUM 3 G: 2; 1 INJECTION, POWDER, FOR SOLUTION INTRAMUSCULAR; INTRAVENOUS at 11:32

## 2017-01-15 RX ADMIN — DIGOXIN 125 MCG: 125 TABLET ORAL at 09:43

## 2017-01-15 RX ADMIN — RANITIDINE HYDROCHLORIDE 150 MG: 150 TABLET, FILM COATED ORAL at 20:27

## 2017-01-15 RX ADMIN — AMPICILLIN SODIUM AND SULBACTAM SODIUM 3 G: 2; 1 INJECTION, POWDER, FOR SOLUTION INTRAMUSCULAR; INTRAVENOUS at 16:30

## 2017-01-15 RX ADMIN — INSULIN GLARGINE 25 UNITS: 300 INJECTION, SOLUTION SUBCUTANEOUS at 09:54

## 2017-01-15 RX ADMIN — DEXTROSE MONOHYDRATE 25 ML: 25 INJECTION, SOLUTION INTRAVENOUS at 04:29

## 2017-01-15 RX ADMIN — AMPICILLIN SODIUM AND SULBACTAM SODIUM 3 G: 2; 1 INJECTION, POWDER, FOR SOLUTION INTRAMUSCULAR; INTRAVENOUS at 22:08

## 2017-01-15 RX ADMIN — HEPARIN SODIUM 5000 UNITS: 10000 INJECTION, SOLUTION INTRAVENOUS; SUBCUTANEOUS at 20:27

## 2017-01-15 NOTE — PROVIDER NOTIFICATION
Updated MD on /57 and pt has not received BP meds for couple days now. MD ordered to give BP medications.

## 2017-01-15 NOTE — PLAN OF CARE
Face to face report given with opportunity to observe patient.    Report given to Victor M TILLEY RN.    Pretty Bryant RN.  1/14/2017  11:22 PM

## 2017-01-15 NOTE — PLAN OF CARE
Problem: Goal Outcome Summary  Goal: Goal Outcome Summary  Outcome: No Change  Pt A&O x3 but forgetful at times. Temp this .2 but last checked at 98. /74 and 96/56, pt did receive 25 mg of metoprolol this AM. Wound on right foot soaked and redressed with xeroform and gauze today. Wound has moderate amount of yellow, creamy, tan drainage. Accu checks 137 and 235, see MAR for insulin coverage. Pt did receive 25 units of Toujeo insulin this AM along with regular insulin for mealtimes. Pt denies pain and states he has barely any pain during dressing change. IVF and IV ABX continued. Alarms on and call light within reach.       Face to face report given with opportunity to observe patient.  Report given to Pretty SAMANO RN.    Ana Webb  1/15/2017, 3:26 PM

## 2017-01-15 NOTE — PLAN OF CARE
Problem: Goal Outcome Summary  Goal: Goal Outcome Summary  Outcome: No Change  Pt alert and has intermittent confusion. Pt BG checked q2h, results as charted. meds as charted, assessment as charted. Pt making needs known. Pt incontinent of bladder, total bed change, and gown change.     Problem: Diabetes, Type 1 (Adult)  Goal: Signs and Symptoms of Listed Potential Problems Will be Absent or Manageable (Diabetes, Type 1)  Signs and symptoms of listed potential problems will be absent or manageable by discharge/transition of care (reference Diabetes, Type 1 (Adult) CPG).   Outcome: No Change    01/15/17 0745   Diabetes, Type 1   Problems Assessed (Type 1 Diabetes) all   Problems Present (Type 1 Diabetes) none         Problem: Skin Integrity Impairment, Risk/Actual (Adult)  Goal: Skin Integrity/Wound Healing  Patient will not have a decrease in skin integrity during hospital stay.   Outcome: No Change    01/15/17 0745   Skin Integrity Impairment, Risk/Actual (Adult)   Skin Integrity/Wound Healing making progress toward outcome           Face to face report given with opportunity to observe patient.    Report given to Yari Lyons   1/15/2017  7:52 AM

## 2017-01-15 NOTE — PROGRESS NOTES
"In talking with Adiel about his plan of care he says he does not want the surgery tomorrow. He said he just wants to spend his remaining time together with his wife. We discussed the opportunity to potentially extend that time with surgery, he refused saying \"I came into this world with two legs and I'm going out with two legs\". He said his brother refused this surgery as well, when asked about the outcome of that he replied \"he \". He is aware he may die from the infections and he feels this is better than having his leg amputated as planned. He initially requested help getting to [facility] this afternoon, he is agreeable to waiting until tomorrow to allow me to work with [facility] on placement there. Call placed to Wendy ([phone]).   "

## 2017-01-16 ENCOUNTER — APPOINTMENT (OUTPATIENT)
Dept: PHYSICAL THERAPY | Facility: HOSPITAL | Age: 82
DRG: 638 | End: 2017-01-16
Attending: INTERNAL MEDICINE
Payer: MEDICARE

## 2017-01-16 VITALS
SYSTOLIC BLOOD PRESSURE: 112 MMHG | OXYGEN SATURATION: 95 % | RESPIRATION RATE: 16 BRPM | HEART RATE: 74 BPM | BODY MASS INDEX: 25.17 KG/M2 | TEMPERATURE: 99.2 F | HEIGHT: 72 IN | WEIGHT: 185.85 LBS | DIASTOLIC BLOOD PRESSURE: 44 MMHG

## 2017-01-16 LAB
GLUCOSE BLDC GLUCOMTR-MCNC: 137 MG/DL (ref 70–99)
GLUCOSE BLDC GLUCOMTR-MCNC: 181 MG/DL (ref 70–99)
GLUCOSE BLDC GLUCOMTR-MCNC: 267 MG/DL (ref 70–99)
INR PPP: 1.2 (ref 0.8–1.2)

## 2017-01-16 PROCEDURE — 40000193 ZZH STATISTIC PT WARD VISIT: Performed by: PHYSICAL THERAPIST

## 2017-01-16 PROCEDURE — 25000132 ZZH RX MED GY IP 250 OP 250 PS 637: Mod: GY | Performed by: INTERNAL MEDICINE

## 2017-01-16 PROCEDURE — A9270 NON-COVERED ITEM OR SERVICE: HCPCS | Mod: GY | Performed by: NURSE PRACTITIONER

## 2017-01-16 PROCEDURE — 99239 HOSP IP/OBS DSCHRG MGMT >30: CPT | Performed by: INTERNAL MEDICINE

## 2017-01-16 PROCEDURE — 25000128 H RX IP 250 OP 636: Performed by: INTERNAL MEDICINE

## 2017-01-16 PROCEDURE — 85610 PROTHROMBIN TIME: CPT | Performed by: NURSE PRACTITIONER

## 2017-01-16 PROCEDURE — 25000125 ZZHC RX 250: Performed by: INTERNAL MEDICINE

## 2017-01-16 PROCEDURE — 36415 COLL VENOUS BLD VENIPUNCTURE: CPT | Performed by: NURSE PRACTITIONER

## 2017-01-16 PROCEDURE — 25000132 ZZH RX MED GY IP 250 OP 250 PS 637: Mod: GY | Performed by: NURSE PRACTITIONER

## 2017-01-16 PROCEDURE — 97530 THERAPEUTIC ACTIVITIES: CPT | Mod: GP | Performed by: PHYSICAL THERAPIST

## 2017-01-16 PROCEDURE — 00000146 ZZHCL STATISTIC GLUCOSE BY METER IP

## 2017-01-16 PROCEDURE — 27210995 ZZH RX 272: Performed by: INTERNAL MEDICINE

## 2017-01-16 PROCEDURE — 97162 PT EVAL MOD COMPLEX 30 MIN: CPT | Mod: GP | Performed by: PHYSICAL THERAPIST

## 2017-01-16 PROCEDURE — A9270 NON-COVERED ITEM OR SERVICE: HCPCS | Mod: GY | Performed by: INTERNAL MEDICINE

## 2017-01-16 RX ORDER — FUROSEMIDE 40 MG
40 TABLET ORAL DAILY
Qty: 90 TABLET | Refills: 1 | Status: SHIPPED
Start: 2017-01-16 | End: 2017-08-25

## 2017-01-16 RX ORDER — PYRIDOXINE HCL (VITAMIN B6) 100 MG
1 TABLET ORAL DAILY
Qty: 90 CAPSULE | Refills: 3 | Status: SHIPPED
Start: 2017-01-16 | End: 2017-12-19

## 2017-01-16 RX ORDER — SENNOSIDES 8.6 MG
650 CAPSULE ORAL EVERY 8 HOURS PRN
Qty: 60 TABLET | COMMUNITY
Start: 2017-01-16 | End: 2018-08-31

## 2017-01-16 RX ORDER — SIMVASTATIN 80 MG
TABLET ORAL
Qty: 90 TABLET | Refills: 3 | Status: SHIPPED
Start: 2017-01-16 | End: 2017-03-13

## 2017-01-16 RX ORDER — FUROSEMIDE 40 MG
TABLET ORAL
Qty: 90 TABLET | Refills: 1 | Status: SHIPPED | OUTPATIENT
Start: 2017-01-16 | End: 2017-01-16

## 2017-01-16 RX ORDER — MULTIVITAMIN WITH IRON
1 TABLET ORAL DAILY
Qty: 30 TABLET | Refills: 3 | Status: SHIPPED
Start: 2017-01-16 | End: 2017-03-14

## 2017-01-16 RX ORDER — METOPROLOL SUCCINATE 50 MG
TABLET, EXTENDED RELEASE 24 HR ORAL
Qty: 90 TABLET | Refills: 0 | Status: SHIPPED | OUTPATIENT
Start: 2017-01-16 | End: 2017-01-16

## 2017-01-16 RX ORDER — WARFARIN SODIUM 4 MG/1
8 TABLET ORAL
Status: DISCONTINUED | OUTPATIENT
Start: 2017-01-16 | End: 2017-01-16 | Stop reason: HOSPADM

## 2017-01-16 RX ORDER — WARFARIN SODIUM 2 MG/1
TABLET ORAL
Qty: 312 TABLET | Refills: 3 | Status: SHIPPED
Start: 2017-01-16 | End: 2017-03-13

## 2017-01-16 RX ORDER — DIGOXIN 125 MCG
125 TABLET ORAL DAILY
Qty: 90 TABLET | Refills: 0 | Status: SHIPPED
Start: 2017-01-16 | End: 2017-03-13

## 2017-01-16 RX ORDER — CANDESARTAN 16 MG/1
16 TABLET ORAL DAILY
Qty: 90 TABLET | Refills: 0 | Status: SHIPPED
Start: 2017-01-16 | End: 2017-03-14

## 2017-01-16 RX ORDER — HYDROCODONE BITARTRATE AND ACETAMINOPHEN 5; 325 MG/1; MG/1
1 TABLET ORAL EVERY 6 HOURS PRN
Qty: 30 TABLET | Refills: 0 | Status: SHIPPED | OUTPATIENT
Start: 2017-01-16 | End: 2017-02-13

## 2017-01-16 RX ORDER — ASPIRIN 325 MG
325 TABLET ORAL DAILY
Qty: 120 TABLET | Refills: 3 | Status: SHIPPED
Start: 2017-01-16 | End: 2018-02-12

## 2017-01-16 RX ORDER — METOPROLOL SUCCINATE 50 MG/1
25 TABLET, EXTENDED RELEASE ORAL DAILY
Qty: 90 TABLET | Refills: 0 | Status: SHIPPED
Start: 2017-01-16 | End: 2017-06-15

## 2017-01-16 RX ORDER — MULTIVIT WITH MINERALS/LUTEIN
1 TABLET ORAL DAILY
Qty: 90 TABLET | Refills: 3 | Status: SHIPPED
Start: 2017-01-16 | End: 2017-01-18

## 2017-01-16 RX ORDER — CANDESARTAN CILEXETIL 16 MG/1
TABLET ORAL
Qty: 90 TABLET | Refills: 0 | Status: SHIPPED | OUTPATIENT
Start: 2017-01-16 | End: 2017-01-16

## 2017-01-16 RX ADMIN — METOPROLOL SUCCINATE 25 MG: 25 TABLET, EXTENDED RELEASE ORAL at 08:32

## 2017-01-16 RX ADMIN — ASPIRIN 325 MG: 325 TABLET ORAL at 08:32

## 2017-01-16 RX ADMIN — INSULIN ASPART 1 UNITS: 100 INJECTION, SOLUTION INTRAVENOUS; SUBCUTANEOUS at 11:35

## 2017-01-16 RX ADMIN — POTASSIUM CHLORIDE: 2 INJECTION, SOLUTION, CONCENTRATE INTRAVENOUS at 02:21

## 2017-01-16 RX ADMIN — AMOXICILLIN AND CLAVULANATE POTASSIUM 1 TABLET: 875; 125 TABLET, FILM COATED ORAL at 09:46

## 2017-01-16 RX ADMIN — HEPARIN SODIUM 5000 UNITS: 10000 INJECTION, SOLUTION INTRAVENOUS; SUBCUTANEOUS at 04:55

## 2017-01-16 RX ADMIN — AMPICILLIN SODIUM AND SULBACTAM SODIUM 3 G: 2; 1 INJECTION, POWDER, FOR SOLUTION INTRAMUSCULAR; INTRAVENOUS at 04:55

## 2017-01-16 RX ADMIN — HEPARIN SODIUM 5000 UNITS: 10000 INJECTION, SOLUTION INTRAVENOUS; SUBCUTANEOUS at 11:35

## 2017-01-16 RX ADMIN — DIGOXIN 125 MCG: 125 TABLET ORAL at 08:32

## 2017-01-16 NOTE — TELEPHONE ENCOUNTER
Last office visit 01/12/16.  Lasix last filled 04/18/16 #90 with 2 refills.  Medication was discontinued 01/11/17 with admission to hospital. Please review and sign if appropriate.

## 2017-01-16 NOTE — PLAN OF CARE
Problem: Goal Outcome Summary  Goal: Goal Outcome Summary  Outcome: No Change  Physical Therapy Discharge Summary    Reason for therapy discharge:    All goals and outcomes met, no further needs identified.    Progress towards therapy goal(s). See goals on Care Plan in [facility] electronic health record for goal details.  Goals met    Therapy recommendation(s):    No further therapy is recommended.

## 2017-01-16 NOTE — PLAN OF CARE
Problem: Discharge Planning  Goal: Discharge Planning (Adult, OB, Behavioral, Peds)  Outcome: Adequate for Discharge Date Met:  01/16/17 01/16/17 1531   Discharge Planning   Patient/family verbalizes understanding of discharge plan recommendations? Yes   Medical Team notified of plan? yes   Patient discharged at 1500 PM via wheel chair accompanied by other:[facility] team member and staff. Prescriptions sent with patient to fill . All belongings sent with patient.     Discharge instructions reviewed with Wendy LUA. Listed belongings gathered and returned to patient.       Patient discharged to [facility].   Report called to [facility] RN    Core Measures and Vaccines  Core Measures applicable during stay: No.   Pneumonia and Influenza given prior to discharge, if indicated: No    Surgical Patient   Surgical Procedures during stay: No  Did patient receive discharge instruction on wound care and recognition of infection symptoms? N/A    MISC  Follow up appointment made:  No  [facility] aquired medications returned to patient: Yes  Patient reports pain was well managed at discharge: Yes    Pt upon discharge was alert and oriented, dressing change done prior to discharge. Pt assessment and vitals as charted. Pt transferring stand by assist in room. Pt belonging with pt on discharge. Pt expressed no interest in surgical interventions. Pt blood glucose as charted. Taking in and putting out adequate amounts, using call light appropriately. No falls this shift. No injuries this shift.        Problem: Diabetes, Type 1 (Adult)  Goal: Signs and Symptoms of Listed Potential Problems Will be Absent or Manageable (Diabetes, Type 1)  Signs and symptoms of listed potential problems will be absent or manageable by discharge/transition of care (reference Diabetes, Type 1 (Adult) CPG).   Outcome: Adequate for Discharge Date Met:  01/16/17 01/16/17 1531   Diabetes, Type 1   Problems Assessed (Type 1  Diabetes) all   Problems Present (Type 1 Diabetes) none         Problem: Skin Integrity Impairment, Risk/Actual (Adult)  Goal: Skin Integrity/Wound Healing  Patient will not have a decrease in skin integrity during hospital stay.   Outcome: Adequate for Discharge Date Met:  01/16/17 01/16/17 1531   Skin Integrity Impairment, Risk/Actual (Adult)   Skin Integrity/Wound Healing making progress toward outcome

## 2017-01-16 NOTE — PLAN OF CARE
"Problem: Goal Outcome Summary  Goal: Goal Outcome Summary  Outcome: No Change  Sitting up in  at shift start watching tv-denies pain, commenting on how he told hospitalist today that he is not having his \"leg cut off\" tomorrow, skin warm & dry-has the same scratches/scars, darkened areas on knees as upon admit, RT foot wrapped in bandage & wound/toes not visualized (slipper covers dressing), VSS-afebrile, continues with IVF's & ABX's as ordered, good appetite & ate all he ordered, continuing to monitor BS's due to ongoing low values on 11-7 shift.    Problem: Diabetes, Type 1 (Adult)  Goal: Signs and Symptoms of Listed Potential Problems Will be Absent or Manageable (Diabetes, Type 1)  Signs and symptoms of listed potential problems will be absent or manageable by discharge/transition of care (reference Diabetes, Type 1 (Adult) CPG).   Outcome: No Change    01/15/17 8193   Diabetes, Type 1   Problems Assessed (Type 1 Diabetes) hypoglycemia             "

## 2017-01-16 NOTE — PROGRESS NOTES
01/16/17 1002   Quick Adds   Type of Visit Initial PT Evaluation   Living Environment   Lives With spouse   Living Arrangements house   Home Accessibility stairs to enter home;stairs within home   Number of Stairs to Enter Home 5   Number of Stairs Within Home 12   Stair Railings at Home outside, present at both sides;inside, present at both sides   Transportation Available car   Living Environment Comment Pt's wife was recently moved to assisted living in [facility]   Usual Activity Tolerance moderate   Current Activity Tolerance moderate   Regular Exercise no   Equipment Currently Used at Home walker, rolling  (at times uses walker)   Functional Level Prior   Ambulation 1-->assistive equipment   Transferring 0-->independent   Toileting 0-->independent   Bathing 0-->independent   Dressing 0-->independent   Eating 0-->independent   Communication 0-->understands/communicates without difficulty   Swallowing 0-->swallows foods/liquids without difficulty   Cognition 0 - no cognition issues reported   Fall history within last six months yes   Number of times patient has fallen within last six months 1   Which of the above functional risks had a recent onset or change? none   Prior Functional Level Comment Pt reports he has been independent at home   General Information   Onset of Illness/Injury or Date of Surgery - Date 01/11/17   Referring Physician Dr. Lang   Patient/Family Goals Statement pt planning to go to assisted living with wife   Pertinent History of Current Problem (include personal factors and/or comorbidities that impact the POC) Pt admitted with hypoglycemia and also found to have diabetic foot ulcer with osteomyelitis.  Pt with h/o DM with poor control with A1C of 8.4, pt has history of noncompliance with medical treatments and recommendations and both pt and wife have had vulnerable adult filed in past.  Surgery has been recommended for the osteo however pt refusing at this time and wanting  to treat strictly with antibiotics.   Weight-Bearing Status - LLE full weight-bearing   Weight-Bearing Status - RLE full weight-bearing   General Observations Pt resting in bed, agreeable to PT eval   Cognitive Status Examination   Orientation orientation to person, place and time   Level of Consciousness alert   Follows Commands and Answers Questions 100% of the time   Personal Safety and Judgment intact   Memory intact   Pain Assessment   Patient Currently in Pain No   Integumentary/Edema   Integumentary/Edema Comments R foot wrapped   Posture    Posture Forward head position   Range of Motion (ROM)   ROM Comment Bilateral LE AROM WFL throughout   Strength   Strength Comments Bilateral hips 4+/5, bilateral knees 4+/5, bilateral ankles 4/5   Bed Mobility   Bed Mobility Comments sup>sit mod I   Transfer Skills   Transfer Comments sit<>stand CGA   Gait   Gait Comments Ambulated 10' into bathroom with FWW CGA with 1 minor LOB requiring assist to correct   Balance   Balance Comments fair+ with support from walker   Sensory Examination   Sensory Perception Comments per MD limited to no blood flow to R LE causing no pain sensation at this time   General Therapy Interventions   Planned Therapy Interventions gait training;risk factor education   Clinical Impression   Criteria for Skilled Therapeutic Intervention yes, treatment indicated   PT Diagnosis gait disturbance   Influenced by the following impairments decreased strength, decreased balance, decreased sensation   Functional limitations due to impairments decreased safety with gait and transfers   Clinical Presentation Evolving/Changing   Clinical Presentation Rationale presence of osteo with poor DM control and limited circulation to R foot which could lead to progression and worsening infection   Clinical Decision Making (Complexity) Moderate complexity   Therapy Frequency` 1 time/week   Predicted Duration of Therapy Intervention (days/wks) eval + 1 treatment  "  Anticipated Equipment Needs at Discharge other (see comments)  (4WW if wife using the one that they had at home)   Anticipated Discharge Disposition Other (see comments)  (Assisted living with currenlty no further skilled PT needs)   Risk & Benefits of therapy have been explained Yes   Patient, Family & other staff in agreement with plan of care Yes   Clinical Impression Comments Pt presents with osteo of R foot due to diabetic ulcer that pt is currently refusing to have surgery to treat.      [facility] Glass[employer]-PAC TM \"6 Clicks\"   2016, Trustees of [facility], under license to [employer].  All rights reserved.   6 Clicks Short Forms Basic Mobility Inpatient Short Form   [facility] AM-PAC  \"6 Clicks\" V.2 Basic Mobility Inpatient Short Form   1. Turning from your back to your side while in a flat bed without using bedrails? 4 - None   2. Moving from lying on your back to sitting on the side of a flat bed without using bedrails? 4 - None   3. Moving to and from a bed to a chair (including a wheelchair)? 4 - None   4. Standing up from a chair using your arms (e.g., wheelchair, or bedside chair)? 3 - A Little   5. To walk in hospital room? 3 - A Little   6. Climbing 3-5 steps with a railing? 3 - A Little   Basic Mobility Raw Score (Score out of 24.Lower scores equate to lower levels of function) 21   Total Evaluation Time   Total Evaluation Time (Minutes) 16     "

## 2017-01-16 NOTE — DISCHARGE SUMMARY
[facility]    Discharge Summary  Hospitalist    Date of Admission:  1/11/2017  Date of Discharge:  1/16/2017  2:55 PM  Discharging Provider: Alejandro Retana  Date of Service (when I saw the patient): 01/16/2017    Discharge Diagnoses  Right foot osteomyelitis.  Uncontrolled diabetes mellitus with hypoglycemia on presentation to hospital.  Peripheral vascular disease.  Permanent atrial fibrillation, rate controlled.  Stable coronary artery disease.  Chronic kidney disease    History of Present Illness    Adiel Rosa Jr is a 81 year old man who was admitted on 1/11/2017 after fall. He indicated at that time he had difficulties with his home insulin supply and recently had been using an old insulin.  He also had difficulties with enough food at home.  EMS was activated.  The patient was found to have a glucose of approximately 30. During initial evaluation, a right foot ulcer was noted felt to be a diabetic foot ulcer.  Further evaluation including magnetic resonance imaging was suggestive of osteomyelitis.  He was evaluated by Dr. Zamora, of orthopedic surgery.  Amputation was recommended.  Consistently, however, the patient has adamantly declined this.    Hospital Course  Adiel Rosa Jr was admitted on 1/11/2017.  The following problems were addressed during his hospitalization:      DM / hypoglycemia  Although he has not had marked hypoglycemia since early in his hospitalization.  Glucose levels have been relatively low wall insulin has been adjusted to less than his reported chronic dosing.  Suspect in large part his hypoglycemia has been on the basis of poor oral intake with a contribution from his active infectious process.  Over the past 24 hours, however, nighttime glucose was somewhat more elevated.  On the day of discharge, resumed a low dose of night time.  Glargine insulin continuing a lower than usual dose of daytime glargine insulin.  Have also continued a low sliding scale  mealtime regular insulin dose.  Depending on his course in the next days to week.  Alteration in his insulin dosing will no doubt be required. It is unfortunately unlikely that efforts at take glucose control will be fruitful for this patient, at least as long as he has an active infection.     Right Diabetic foot ulcer / abscess with osteomyelitis:     Evaluated by Dr. Zamora. ABIs suggested that below the knee amputation would be successful.  Unfortunately, the patient has adamantly declined  Any consideration of surgery of any kind.  I, Dr. Lang, and Dr. Zamora have discussed with the patient that any treatment short of amputation is unlikely to have meaningful benefit. E remains, however, quite opposed to any consideration of a surgical approach either now or in the future.  He describes a history of his brother which appears somewhat similar and on this basis indicates he is quite skeptical that a single surgery for him would be adequate.  He is able to volunteer that  Declining surgery may lead to his more rapid death.  He is able to acknowledge, although he is somewhat skeptical, that antibiotic and local wound treatment is unlikely to be successful.  However, he is focused on returning to his wife, who lives in an assisted living center in [place].  Although benefits are not marked.  Will continue oral antibiotics for 2 weeks with wound care.  They have requested follow-up with Dr. David Peck his primary physician.  Would be hopeful that at that point, the patient might be amenable to considering other therapy..  However, if this is not the case it is unlikely that prolonged antibiotics would be of meaningful benefit.    Permanent atrial fibrillation  Have resumed warfarin as any operation is quite unlikely. Rate control has been adequate with metoprolol as well as digoxin.  These are continued.  His prior dose of metoprolol was decreased from 50-25 mg  During this  hospitalization.    Chronic kidney disease  Renal function appears at his baseline.    CAD / s/p stent 1999  As above, continuing metoprolol at a reduced dose.  Aspirin.  Echocardiogram and been done last in 2014 which showed mild reduction in left ventricular systolic function with normal LV size and left atrial enlargement.     Alejandro Retana    Significant Results and Procedures  Osteomyelitis suggested on magnetic resonance imaging    Pending Results  These results will be followed up by Dr. Cordero   Unresulted Labs Ordered in the Past 30 Days of this Admission     Date and Time Order Name Status Description    1/14/2017 0000 25 Hydroxyvitamin D2 and D3 In process     1/12/2017 0755 Wound Culture Aerobic Bacterial Preliminary           Code Status  Full Code       Primary Care Physician  GAURAV Grijalva    Vital signs:  Temp: 99.2  F (37.3  C) Temp src: Tympanic BP: (!) 112/44 mmHg   Heart Rate: 78 Resp: 16 SpO2: 95 % O2 Device: None (Room air)   Height: 182.9 cm (6') Weight: 84.3 kg (185 lb 13.6 oz)  Estimated body mass index is 25.2 kg/(m^2) as calculated from the following:    Height as of this encounter: 1.829 m (6').    Weight as of this encounter: 84.3 kg (185 lb 13.6 oz).        Awake, alert, somewhat irritable man lying in bed on medical wards.  Speech is clear.  Oriented ×3.  Minimal distractibility.  HEENT: Pupils equal, conjugate. No icterus or nystagmus. Oral mucosa moist. No facial asymmetry.   Neck: Supple, jugular veins not elevated. Trachea midline   Chest: No chest wall movement asymmetry. Aeration preserved to bases. Accessory muscles not in use. Expiratory time not increased. No tidal wheezes. No rhonchi. No discrete crackles.   Cardiac: PMI not displaced. S1, S2 unremarkable. No S3, S4. P2 not accentuated. No murmurs. Carotid upstroke preserved. Carotid amplitude preserved.   Abdomen: Soft. No palpation or percussion tenderness. No distention. Normoactive bowel sounds. Liver and spleen  not increased in size. No bruits, masses, or pulsations.   Extremities: Right foot with ulcers both on dorsum and plantar surface atbase of second toe.  mildly boggy area at base of 2nd toe. No palpation tenderness. unable to palpate pulses bilaterally. Ulcers subsequently dressed without significant drainage      Discharge Disposition  Discharged to assisted-living  Condition at discharge: Stable    Consultations This Hospital Stay  PHARMACY TO DOSE WARFARIN  NUTRITION SERVICES ADULT IP CONSULT  SOCIAL WORK IP CONSULT  WOUND OSTOMY CONTINENCE NURSE  IP CONSULT  PHYSICAL THERAPY ADULT IP CONSULT  PHARMACY TO DOSE WARFARIN    Time Spent on This Encounter  I, Alejandro Retana, personally saw the patient today and spent greater than 30 minutes discharging this patient.    Discharge Orders    INR     Home care nursing referral     INR CLINIC REFERRAL     MD face to face encounter   Documentation of Face to Face and Certification for Home Health Services    I certify that patient: Adiel Rosa Jr is under my care and that I, or a nurse practitioner or physician's assistant working with me, had a face-to-face encounter that meets the physician face-to-face encounter requirements with this patient on: 1/16/2017.    This encounter with the patient was in whole, or in part, for the following medical condition, which is the primary reason for home health care: acute osteomyletis, uncontrolled diabetes mellitus.    I certify that, based on my findings, the following services are medically necessary home health services: Nursing.    My clinical findings support the need for the above services because: Nurse is needed: To provide assessment and oversight required in the home to assure adherence to the medical plan due to: complex wound care and monitoring of uncontrolled diabetes mellitus.    Further, I certify that my clinical findings support that this patient is homebound (i.e. absences from home require considerable  and taxing effort and are for medical reasons or [employer] services or infrequently or of short duration when for other reasons) because: Leaving home is medically contraindicated for the following reason(s): Dyspnea on exertion that makes it so they cannot leave their home for needed services without clinical deterioration and extremity wound/infection limiting mobility    Based on the above findings. I certify that this patient is confined to the home and needs intermittent skilled nursing care, physical therapy and/or speech therapy.  The patient is under my care, and I have initiated the establishment of the plan of care.  This patient will be followed by a physician who will periodically review the plan of care.  Physician/Provider to provide follow up care: GAURAV Grijalva    Attending hospital physician (the Medicare certified [place] provider): Alejandro Retana  Physician Signature: See electronic signature associated with these discharge orders.  Date: 1/16/2017     Activity   Your activity upon discharge: activity as tolerated using walker     Reason for your hospital stay   Adiel Rosa Jr is a 81 year old man who presented to the ED with hypoglycemia and fall at home. He reports difficulty with his insulin supply and enough food at home. In hospital, evaluation of a right foot ulcer demonstrated evidence of osteomyelitis. He adamantly declinced amputation which was recommended; He agreed to continued antibiotic treatment and dressing changes, with discussion indicating a low probability of these being effective. Insulin regimen was decreased from his previous because of relatively low glucose levels; further adjustment likely will be needed.     Diet   Follow this diet upon discharge: Orders Placed This Encounter  Moderate Consistent CHO Diet       Discharge Medications  Current Discharge Medication List      START taking these medications    Details   candesartan (ATACAND) 16 MG tablet Take 1 tablet  (16 mg) by mouth daily  Qty: 90 tablet, Refills: 0    Associated Diagnoses: Essential hypertension      furosemide (LASIX) 40 MG tablet Take 1 tablet (40 mg) by mouth daily  Qty: 90 tablet, Refills: 1    Associated Diagnoses: Benign essential hypertension      benzocaine-menthol (CEPACOL) 15-3.6 MG lozenge Place 1 lozenge inside cheek every hour as needed for sore throat  Qty: 30 lozenge, Refills: 3    Associated Diagnoses: Simple chronic bronchitis (H)      amoxicillin-clavulanate (AUGMENTIN) 875-125 MG per tablet Take 1 tablet by mouth every 12 hours  Qty: 28 tablet, Refills: 0    Associated Diagnoses: Osteomyelitis of foot, right, acute (H)         CONTINUE these medications which have CHANGED    Details   HYDROcodone-acetaminophen (NORCO) 5-325 MG per tablet Take 1 tablet by mouth every 6 hours as needed for moderate to severe pain  Qty: 30 tablet, Refills: 0    Associated Diagnoses: Back contusion, right, initial encounter      Cranberry 500 MG CAPS Take 1 capsule (500 mg) by mouth daily  Qty: 90 capsule, Refills: 3    Associated Diagnoses: Hypertrophy of prostate without urinary obstruction      acetaminophen (TYLENOL) 650 MG CR tablet Take 1 tablet (650 mg) by mouth every 8 hours as needed  Qty: 60 tablet    Associated Diagnoses: Osteoarthritis, unspecified osteoarthritis type, unspecified site      aspirin 325 MG tablet Take 1 tablet (325 mg) by mouth daily  Qty: 120 tablet, Refills: 3    Associated Diagnoses: Coronary artery disease without angina pectoris, unspecified vessel or lesion type, unspecified whether native or transplanted heart      fluticasone-salmeterol (ADVAIR DISKUS) 250-50 MCG/DOSE diskus inhaler USE 1 INHALATION TWO TIMES A DAY IN THE MORNING AND IN THE EVENING APPROXIMATELY 12 HOURS APART  Qty: 3 Inhaler, Refills: 1    Associated Diagnoses: Simple chronic bronchitis (H)      warfarin (COUMADIN) 2 MG tablet TAKE 4 TABLETS ON MONDAY, WEDNESDAY, AND FRIDAY AND 3 TABLETS ALL OTHER DAYS  Qty:  312 tablet, Refills: 3    Associated Diagnoses: Permanent atrial fibrillation (H)      !! insulin glargine U-300 (TOUJEO) 300 UNIT/ML injection Inject 10 Units Subcutaneous At Bedtime  Qty: 12 mL, Refills: 3    Associated Diagnoses: Type 2 diabetes mellitus without complication, with long-term current use of insulin (H)      !! insulin glargine U-300 (TOUJEO) 300 UNIT/ML injection Inject 35 Units Subcutaneous every morning  Qty: 20 mL, Refills: 3    Associated Diagnoses: Type 2 diabetes mellitus without complication, with long-term current use of insulin (H)      insulin aspart (NOVOLOG FLEXPEN) 100 UNIT/ML injection Inject 1-7 Units Subcutaneous 3 times daily (with meals) If glucose less than or equal to 150 mg/dL do not give insulin  If glucose 151-200 give 2 Units subcutaneously  If glucose 201-250 give 3 Units subcutaneously  If glucose 251-300 give 4 Units subcutaneously  If glucose 301-350 give 5 Units subcutaneously  If glucose over 350 give 7 Units subcutaneously and call physician  Qty: 100 mL, Refills: 3    Associated Diagnoses: Type 2 diabetes mellitus without complication, with long-term current use of insulin (H)      simvastatin (ZOCOR) 80 MG tablet TAKE 1 TABLET DAILY IN THE EVENING  Qty: 90 tablet, Refills: 3    Associated Diagnoses: Pure hypercholesterolemia      metoprolol (TOPROL XL) 50 MG 24 hr tablet Take 0.5 tablets (25 mg) by mouth daily  Qty: 90 tablet, Refills: 0    Associated Diagnoses: Essential hypertension      digoxin (LANOXIN) 125 MCG tablet Take 1 tablet (125 mcg) by mouth daily  Qty: 90 tablet, Refills: 0    Associated Diagnoses: Permanent atrial fibrillation (H)      Doxylamine-DM 6.25-15 MG/15ML LIQD Taking as pkg directions at night  Qty: 236 mL, Refills: 3    Associated Diagnoses: Osteomyelitis of foot, right, acute (H); Simple chronic bronchitis (H)      magnesium 250 MG tablet Take 1 tablet by mouth daily  Qty: 30 tablet, Refills: 3    Associated Diagnoses: Permanent atrial  fibrillation (H)      Multiple Vitamins-Minerals (CENTRUM SILVER) per tablet Take 1 tablet by mouth daily  Qty: 90 tablet, Refills: 3    Associated Diagnoses: Simple chronic bronchitis (H)      ranitidine (ZANTAC) 150 MG tablet Take 1 tablet (150 mg) by mouth 2 times daily  Qty: 180 tablet, Refills: 0    Associated Diagnoses: Gastroesophageal reflux disease without esophagitis      cholecalciferol (VITAMIN D3) 1000 UNIT tablet Take 1 tablet (1,000 Units) by mouth daily  Qty: 90 tablet, Refills: 3    Associated Diagnoses: Simple chronic bronchitis (H)       !! - Potential duplicate medications found. Please discuss with provider.      CONTINUE these medications which have NOT CHANGED    Details   B-D U/F 31G X 8 MM insulin pen needle USE 5 TO 6 PEN NEEDLES DAILY OR AS DIRECTED  Qty: 6 each, Refills: 3    Associated Diagnoses: Diabetes mellitus, type 2 (H)      ONE TOUCH ULTRA test strip USE UP TO 5 STRIPS DAILY TO TEST BLOOD GLUCOSE  Qty: 300 each, Refills: 1    Associated Diagnoses: Diabetes mellitus (H)           Allergies  No Known Allergies  Data  Most Recent 3 CBC's:  Recent Labs   Lab Test  01/15/17   0613  01/14/17   0611  01/13/17   0548   WBC  8.2  10.7  14.1*   HGB  11.4*  11.8*  12.9*   MCV  88  87  86   PLT  293  326  385      Most Recent 3 BMP's:  Recent Labs   Lab Test  01/15/17   0613  01/14/17   0611  01/13/17   0548   NA  140  139  140   POTASSIUM  4.4  4.3  3.8   CHLORIDE  104  104  104   CO2  29  28  28   BUN  21  18  18   CR  1.31*  1.24  1.25   ANIONGAP  7  7  8   AZRA  8.2*  8.2*  8.7   GLC  129*  75  35*     Most Recent 2 LFT's:  Recent Labs   Lab Test  01/15/17   0613  01/14/17   0611   AST  9  11   ALT  11  11   ALKPHOS  71  74   BILITOTAL  0.4  0.4     Most Recent INR's and Anticoagulation Dosing History:        Anticoagulation Dose History     Recent Dosing and Labs Latest Ref Rng 11/22/2016 1/11/2017 1/12/2017 1/13/2017 1/14/2017 1/15/2017 1/16/2017    Warfarin 3 mg - - - 6 mg - - - -     Warfarin 4 mg - - 8 mg - - - - -    INR 0.80 - 1.20 - 1.20 1.17 1.34(H) 1.46(H) 1.40(H) 1.20    INR 0.86 - 1.14 2.3(A) - - - - - -        Most Recent 3 Troponin's:  Recent Labs   Lab Test  04/06/15   0435   TROPI  <0.015  The 99th percentile for upper reference range is 0.045 ug/L.  Troponin values in   the range of 0.045 - 0.120 ug/L may be associated with risks of adverse   clinical events.   Effective 7/30/2014, the reference range for this assay has changed to reflect   new instrumentation/methodology.       Most Recent Cholesterol Panel:  Recent Labs   Lab Test  04/23/15   0922   CHOL  142   LDL  56   HDL  36*   TRIG  248*     Most Recent 6 Bacteria Isolates From Any Culture (See EPIC Reports for Culture Details):  Recent Labs   Lab Test  01/12/17   0755  01/11/17   1117   CULT  Moderate growth Proteus mirabilis  Heavy growth Staphylococcus pseudintermedius  Heavy growth Pasteurella canis Susceptibility testing in progress  Light growth Alcaligenes species  Isolated in the broth only:   Enterococcus faecalis  *  No MRSA isolated     Most Recent TSH, T4 and A1c Labs:  Recent Labs   Lab Test  01/15/17   0613   A1C  8.4*     Results for orders placed or performed during the hospital encounter of 01/11/17   XR Chest 2 Views    Narrative    PA AND LATERAL CHEST    FINDINGS:  The heart size is borderline.  The pulmonary vascularity is  normal and there is no evidence for pleural effusion.  No lung  consolidation is seen.  A coronary artery stent is in place.    IMPRESSION:  COMPARISON IS MADE TO THE PRIOR EXAMINATION COMPLETED  APRIL 6, 2015.  THE FINDINGS THAT WERE COMPATIBLE WITH CONGESTIVE  HEART FAILURE PREVIOUSLY HAVE REGRESSED, WITH NO NEW ABNORMALITIES  IDENTIFIED NOW.  Exam Date: Jan 11, 2017 09:26:05 AM  Author: ASH MANE  This report is final and signed     CT Head w/o Contrast    Narrative    HEAD CT    FINDINGS:  There is an old right occipital infarct seen.  The  ventricular system and cortical  sulci appear normal.  Cranial vault is  intact.  The paranasal sinuses are clear.    IMPRESSION:  OLD RIGHT OCCIPITAL INFARCT.  Exam Date: Jan 11, 2017 07:30:45 AM  Author: KIET ROMANO  This report is final and signed     XR Foot Right G/E 3 Views    Narrative    RIGHT FOOT  HISTORY:  This is a 81-year-old male with history of diabetic foot,  evaluate for osteomyelitis.    FINDINGS:  There is a non-acute, healing oblique fracture seen in the  proximal aspect of the first metatarsal.  Soft tissue lucencies are  seen about the second metatarsophalangeal head with bony destruction,  concerning for soft tissue abscess and osteomyelitis with bony  destruction.  A non-acute appearing impacted fracture is also seen of  the third metatarsal head.  Lucency is seen along the medial aspect of  the proximal phalanx of the third digit, adjacent to the markedly  abnormal appearing second metatarsophalangeal joint, suggesting bony  destruction.    Joint space narrowing and subchondral cystic degenerative changes are  seen at the first metatarsophalangeal joint.    IMPRESSION:  1.  SOFT TISSUE LUCENCIES SEEN ABOUT THE SECOND METATARSOPHALANGEAL  JOINT WITH BONY DESTRUCTION, CONCERNING FOR SOFT TISSUE ABSCESS AND  DESTRUCTION.  ANOTHER LUCENCY IS SEEN ALONG THE MEDIAL ASPECT OF THE  SECOND METATARSAL AT APPROXIMATELY THE MID METATARSAL LEVEL,  CONCERNING FOR A TINY GAS BUBBLE IN THIS LOCATION.    2.  THE MEDIAL ASPECT OF THE THIRD DIGIT PROXIMAL PHALANX ALSO  DEMONSTRATES A FOCAL LUCENCY, CONCERNING FOR BONY DESTRUCTION IN THIS  LOCATION.    3.  NON-ACUTE APPEARING, IMPACTED FRACTURE INVOLVING THE THIRD  METATARSAL HEAD ALSO APPEARS TO BE ASSOCIATED WITH BONY DESTRUCTION  AND MAY ALSO BE INVOLVED WITH THE ABSCESS AND OSTEOMYELITIS ASSOCIATED  WITH THE SECOND DIGIT.  Continued on page 2...        IMPRESSION:  (continued)  4.  NON-ACUTE APPEARING, HEALING FRACTURE INVOLVING THE PROXIMAL  ASPECT OF THE FIRST METATARSAL.    5.   PROMINENT DEGENERATIVE CHANGES OF THE FIRST METATARSOPHALANGEAL  JOINT.  Exam Date: Jan 11, 2017 05:52:48 PM  Author: PAUL AVITIA  This report is final and signed     US CATIA Doppler No Exercise    Narrative    ANKLE-BRACHIAL INDICES    REPORT:  The ankle-brachial index on the right measures 1.00, on the  left 1.04.  Absolute ankle pressure measures 119 on the right and 130  on the left.    IMPRESSION:  ADEQUATE BLOOD FLOW FOR WOUND HEALING.  Exam Date: Jan 12, 2017 12:28:00 AM  Author: KIET ROMANO  This report is final and signed     MR Foot Right w/o & w Contrast    Narrative    RIGHT FOOT MRI    HISTORY:  Foot ulceration, osteomyelitis.  COMPARISON:  Today's study is compared to conventional radiographs  which are dated January 11, 2017.    TECHNIQUE:  Multiplanar MR images acquired before and after the  administration of 7.5 mL intravenous Gadavist.    FINDINGS:  Abnormal marrow signal throughout the entire second  metatarsal with relative sparing of the base of the metatarsal.  The  second metatarsal is of low T1 and low T2 signal and enhances after  the administration of gadolinium.  It has a patchy lytic appearance on  the conventional radiograph and findings are classic for  osteomyelitis.  There are several tiny areas of signal void within the  shaft of the metatarsal, which could be due to gas.  Destructive  changes of the head of the second metatarsal.  Superior subluxation of  the right second toe at the MTP joint.  The base and proximal shaft of  the second metatarsal demonstrate abnormal T1 and T2 signal with  enhancement in addition to tiny areas of signal void, suspicious for  osteomyelitis ate the base of the proximal phalanx of the second toe.  A large amount of fluid which is peripherally enhancing surrounds the  second MTP joint and extends to the ulceration along the plantar  aspect of the foot.  This is consistent with an abscess.  There is  also a small amount of fluid surrounding the  flexor tendons of the  second toe, beginning at the distal shaft of the metatarsal consistent  with mild flexor tenosynovitis which is likely infected as well.    There is an impacted fracture of the head and neck of the third  metatarsal and associated signal abnormality in the shaft, neck and  head of the third metatarsal, and in the base and proximal shaft of  the proximal phalanx.  The abscess that surrounds the second MTP joint  extends to partially surround the third MTP joint as well.  Constellation of findings is very suspicious for extension of  osteomyelitis to involve the neck and head of the third metatarsal as  well, in addition to the base of the proximal phalanx of the third  toe.  Continued on page 2...      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  Healed or healing  fracture base and proximal shaft of the first metatarsal, but no  findings to suggest osteomyelitis in the first metatarsal.    Subcutaneous edema about the forefoot.  Multiple hammertoe  deformities.    Images are degraded by motion.    IMPRESSION:  1.  FINDINGS SUSPICIOUS FOR OSTEOMYELITIS INVOLVING MOST OF THE SECOND  METATARSAL, HEAD AND NECK OF THE THIRD METATARSAL, PROXIMAL BASE AND  SHAFT SECOND PROXIMAL PHALANX, AND BASE OF THE THIRD PROXIMAL PHALANX.      2.  ULCERATION ON THE PLANTAR BASE OF THE MTP JOINT EXTENDING TO AN  ABSCESS SURROUNDING THE SECOND MTP JOINT AND PARTIALLY SURROUNDING THE  THIRD MTP JOINT.    3.  FRACTURES OF THE NECK OF THE THIRD METATARSAL, AND BASE AND  PROXIMAL SHAFT OF THE FIRST METATARSAL.    4.  SEVERE DEGENERATIVE ARTHRITIS FIRST MTP JOINT.    5.  FINDINGS OF OSTEOMYELITIS WERE DISCUSSED WITH DR. ILYA REYES  AT 1015 HOURS ON JANUARY 13, 2017.                SIGNATURE PAGE ONLY  Exam Date: Jan 13, 2017 09:18:13 AM  Author: SHILOH COMER  This report is final and signed

## 2017-01-16 NOTE — PROGRESS NOTES
Followup from 1/13/17.  Patient is on moderate consistent CHO diet.  Patient continues to refuse surgery.  Glucose continues to be checked, and given insulin based on levels.  Lantus adjustments have been work in progress.  Continue to encourage PO intake and give nutritional supplement.  Please ensure that patient is scheduled with Piedad Cruz for Diabetic Education upon discharge.   RD will continue to remain available.    Mildred Aamto RD

## 2017-01-16 NOTE — PLAN OF CARE
Problem: Goal Outcome Summary  Goal: Goal Outcome Summary  Outcome: No Change  GAIT/AMBULATION: 200' with FWW SBA  EQUIPMENT NEEDS AT DISCHARGE: 4WW if wife is currenlty using the one he has used in past  D/C RECOMMENDATIONS: assisted living with no further skilled PT needs at this time  TREATMENT AT D/C: should use 4WW at all times for mobility  COMMENTS: Pt presents with osteo of R foot due to diabetic ulcer that pt is currently refusing to have surgery to treat.

## 2017-01-16 NOTE — PLAN OF CARE
Face to face report given with opportunity to observe patient.    Report given to Victor M TILLEY RN.    Pretty Bryant RN.  1/15/2017  11:06 PM

## 2017-01-17 ENCOUNTER — TELEPHONE (OUTPATIENT)
Dept: FAMILY MEDICINE | Facility: OTHER | Age: 82
End: 2017-01-17

## 2017-01-17 DIAGNOSIS — Z79.4 TYPE 2 DIABETES MELLITUS WITHOUT COMPLICATION, WITH LONG-TERM CURRENT USE OF INSULIN (H): Primary | ICD-10-CM

## 2017-01-17 DIAGNOSIS — E11.9 TYPE 2 DIABETES MELLITUS WITHOUT COMPLICATION, WITH LONG-TERM CURRENT USE OF INSULIN (H): Primary | ICD-10-CM

## 2017-01-17 LAB
DEPRECATED CALCIDIOL+CALCIFEROL SERPL-MC: NORMAL UG/L (ref 20–75)
VITAMIN D2 SERPL-MCNC: <5 UG/L
VITAMIN D3 SERPL-MCNC: 34 UG/L

## 2017-01-17 RX ORDER — SYRINGE-NEEDLE,INSULIN,0.5 ML 27GX1/2"
SYRINGE, EMPTY DISPOSABLE MISCELLANEOUS
Qty: 300 EACH | Refills: 1 | Status: SHIPPED
Start: 2017-01-17 | End: 2017-06-09

## 2017-01-17 NOTE — TELEPHONE ENCOUNTER
Patient will be coming in on Friday for a hospital follow up. Home care has been set up by Dr. Crouch, but they would like another referral done by the Primary. Please place order on Friday and state a face to face was done in encounter note  BUSHRA MUÑIZ

## 2017-01-17 NOTE — TELEPHONE ENCOUNTER
Spoke with assisted living. They are requesting vials of insulin/syringes due to patient sometimes being unable to use the pen himself. Xero Form dressings for wound on foot. Also needs new one touch blood sugar kit.   Will speak with refill.    BUSHRA MUÑIZ

## 2017-01-17 NOTE — TELEPHONE ENCOUNTER
11:54 AM    Reason for Call: Phone Call    Description: call back : new order for dressings are needed    Was an appointment offered for this call? No    Preferred method for responding to this message: Telephone Call    If we cannot reach you directly, may we leave a detailed response at the number you provided? Yes    Can this message wait until your PCP/provider returns, if available today? Not applicable, provider is in today    Annabel Denny

## 2017-01-17 NOTE — TELEPHONE ENCOUNTER
Last office visit 01/12/16.  Request for insulin, diabetic supplies, and dressing from assisted living. Per assisted living, insulin pens need to be change to vials due to nursing administering insulin for patient. No Toujeo vials.  Per diabetic education change Toujeo to Levemir. Please review and sign if appropriate.

## 2017-01-17 NOTE — TELEPHONE ENCOUNTER
1:15 PM    Reason for Call: Phone Call    Description: Michelle with [facility] is making sure that Dr. Grijalva  Is ok with them doing some cares from the order given by Miko Rodarte at [facility] please call Michelle to advise    Was an appointment offered for this call? No    Preferred method for responding to this message: [phone]    If we cannot reach you directly, may we leave a detailed response at the number you provided?  Yes      Jessa Gifford

## 2017-01-18 ENCOUNTER — TELEPHONE (OUTPATIENT)
Dept: FAMILY MEDICINE | Facility: OTHER | Age: 82
End: 2017-01-18

## 2017-01-18 ENCOUNTER — ANTICOAGULATION THERAPY VISIT (OUTPATIENT)
Dept: ANTICOAGULATION | Facility: OTHER | Age: 82
End: 2017-01-18

## 2017-01-18 ENCOUNTER — TRANSFERRED RECORDS (OUTPATIENT)
Dept: HEALTH INFORMATION MANAGEMENT | Facility: HOSPITAL | Age: 82
End: 2017-01-18

## 2017-01-18 DIAGNOSIS — M86.171 OSTEOMYELITIS OF FOOT, RIGHT, ACUTE (H): Primary | ICD-10-CM

## 2017-01-18 DIAGNOSIS — Z79.01 LONG-TERM (CURRENT) USE OF ANTICOAGULANTS: Primary | ICD-10-CM

## 2017-01-18 DIAGNOSIS — I48.20 CHRONIC ATRIAL FIBRILLATION (H): ICD-10-CM

## 2017-01-18 LAB
BACTERIA SPEC CULT: ABNORMAL
GLUCOSE BLDC GLUCOMTR-MCNC: 111 MG/DL (ref 70–99)
INR PPP: 1.2
Lab: ABNORMAL
MICRO REPORT STATUS: ABNORMAL
MICROORGANISM SPEC CULT: ABNORMAL
SPECIMEN SOURCE: ABNORMAL

## 2017-01-18 NOTE — TELEPHONE ENCOUNTER
9:07 AM    Reason for Call: Phone Call    Description: Requesting orders/prescription be sent to [facility] in [place] [phone] where patient is admitted,this is for soaking in warm water w/baby shampoo. Then Xero form dressing and wrap with gauze. Once facility has orders/prescription, they will be able to give the same wound care as the patient received here when admitted to the hospital.    Was an appointment offered for this call? No    Preferred method for responding to this message: Telephone Call    If we cannot reach you directly, may we leave a detailed response at the number you provided? Yes    Can this message wait until your PCP/provider returns, if unavailable today? No, would like orders today     Quiana Su

## 2017-01-18 NOTE — PROGRESS NOTES
ANTICOAGULATION FOLLOW-UP CLINIC VISIT    Patient Name:  Adiel Rosa Jr  Date:  1/18/2017  Contact Type:  faxed INR result from [facility]. New warfarin dosing and INR recheck date fax[facility] to [facility] and to [facility] where patient is residing    SUBJECTIVE:     Patient Findings     Positives Hospitalization, Antibiotic use or infection    Comments Discharged from hospital on 1/17/17. He is on antibiotic for foot infection. He is now is assisted living facility. Unknown recent doses of warfarin            OBJECTIVE    INR   Date Value Ref Range Status   01/18/2017 1.2  Final       ASSESSMENT / PLAN  INR assessment SUB    Recheck INR In: 2 DAYS    INR Location [facility]      Anticoagulation Summary as of 1/18/2017     INR goal 2.0-3.0   Selected INR 1.2! (1/18/2017)   Maintenance plan 8 mg (2 mg x 4) on Mon, Wed, Fri; 6 mg (2 mg x 3) all other days   Full instructions 1/18: 10 mg; 1/19: 10 mg; Otherwise 8 mg on Mon, Wed, Fri; 6 mg all other days   Weekly total 48 mg   Plan last modified Jessa Gibson RN (8/2/2016)   Next INR check 1/20/2017   Priority INR   Target end date Indefinite    Indications   Chronic atrial fibrillation (H) [I48.2]  Long-term (current) use of anticoagulants [Z79.01] [Z79.01]         Anticoagulation Episode Summary     INR check location     Preferred lab     Send INR reminders to  ANTICOAG POOL    Comments [facility] assisted living, Fax   [facility] fax: [phone], , [phone]      Anticoagulation Care Providers     Provider Role Specialty Phone number    GAURAV Grijalva MD [facility] [phone]            See the Encounter Report to view Anticoagulation Flowsheet and Dosing Calendar (Go to Encounters tab in chart review, and find the Anticoagulation Therapy Visit)        Jessa Storey, RN

## 2017-01-18 NOTE — TELEPHONE ENCOUNTER
Home care will be doing dressing changes for Adiel but has no direction on how to change and clean wound. Spoke with Maria Dolores at wound care, she didn't do the wound care but was able to pull up the flow sheet from the hospital for me to give me some directions. Notified [facility] staff that they will be using a xero form with dry gauze and krilix. Also clean wound with mild soap and irrigate with saline. Patient will be seeing Dr. Hao Grijalva on Friday. Also a snapshot has been sent to home care.   BUSHRA MUÑIZ

## 2017-01-19 ENCOUNTER — MEDICAL CORRESPONDENCE (OUTPATIENT)
Dept: HEALTH INFORMATION MANAGEMENT | Facility: HOSPITAL | Age: 82
End: 2017-01-19

## 2017-01-20 ENCOUNTER — TELEPHONE (OUTPATIENT)
Dept: FAMILY MEDICINE | Facility: OTHER | Age: 82
End: 2017-01-20

## 2017-01-20 ENCOUNTER — OFFICE VISIT (OUTPATIENT)
Dept: FAMILY MEDICINE | Facility: OTHER | Age: 82
End: 2017-01-20
Attending: FAMILY MEDICINE
Payer: MEDICARE

## 2017-01-20 ENCOUNTER — OFFICE VISIT (OUTPATIENT)
Dept: WOUND CARE | Facility: OTHER | Age: 82
End: 2017-01-20
Attending: FAMILY MEDICINE
Payer: MEDICARE

## 2017-01-20 ENCOUNTER — MEDICAL CORRESPONDENCE (OUTPATIENT)
Dept: HEALTH INFORMATION MANAGEMENT | Facility: HOSPITAL | Age: 82
End: 2017-01-20

## 2017-01-20 ENCOUNTER — ANTICOAGULATION THERAPY VISIT (OUTPATIENT)
Dept: ANTICOAGULATION | Facility: OTHER | Age: 82
End: 2017-01-20
Attending: FAMILY MEDICINE
Payer: MEDICARE

## 2017-01-20 VITALS
SYSTOLIC BLOOD PRESSURE: 122 MMHG | HEIGHT: 72 IN | OXYGEN SATURATION: 99 % | TEMPERATURE: 96.1 F | WEIGHT: 188 LBS | DIASTOLIC BLOOD PRESSURE: 62 MMHG | HEART RATE: 52 BPM | BODY MASS INDEX: 25.47 KG/M2

## 2017-01-20 VITALS — TEMPERATURE: 98.1 F | HEART RATE: 52 BPM | SYSTOLIC BLOOD PRESSURE: 122 MMHG | DIASTOLIC BLOOD PRESSURE: 62 MMHG

## 2017-01-20 DIAGNOSIS — N18.30 CHRONIC KIDNEY DISEASE, STAGE III (MODERATE) (H): ICD-10-CM

## 2017-01-20 DIAGNOSIS — Z79.01 LONG-TERM (CURRENT) USE OF ANTICOAGULANTS: ICD-10-CM

## 2017-01-20 DIAGNOSIS — L97.519 DIABETIC ULCER OF RIGHT FOOT ASSOCIATED WITH TYPE 1 DIABETES MELLITUS (H): ICD-10-CM

## 2017-01-20 DIAGNOSIS — E11.22 TYPE 2 DIABETES MELLITUS WITH STAGE 3 CHRONIC KIDNEY DISEASE, WITH LONG-TERM CURRENT USE OF INSULIN (H): ICD-10-CM

## 2017-01-20 DIAGNOSIS — M86.171 OSTEOMYELITIS OF FOOT, RIGHT, ACUTE (H): Primary | ICD-10-CM

## 2017-01-20 DIAGNOSIS — Z79.4 TYPE 2 DIABETES MELLITUS WITH STAGE 3 CHRONIC KIDNEY DISEASE, WITH LONG-TERM CURRENT USE OF INSULIN (H): ICD-10-CM

## 2017-01-20 DIAGNOSIS — I11.0 BENIGN HYPERTENSIVE HEART DISEASE WITH HEART FAILURE (H): ICD-10-CM

## 2017-01-20 DIAGNOSIS — I48.20 CHRONIC ATRIAL FIBRILLATION (H): ICD-10-CM

## 2017-01-20 DIAGNOSIS — N18.30 TYPE 2 DIABETES MELLITUS WITH STAGE 3 CHRONIC KIDNEY DISEASE, WITH LONG-TERM CURRENT USE OF INSULIN (H): ICD-10-CM

## 2017-01-20 DIAGNOSIS — E10.621 DIABETIC ULCER OF RIGHT FOOT ASSOCIATED WITH TYPE 1 DIABETES MELLITUS (H): ICD-10-CM

## 2017-01-20 DIAGNOSIS — G47.09 OTHER INSOMNIA: ICD-10-CM

## 2017-01-20 DIAGNOSIS — R19.7 DIARRHEA, UNSPECIFIED TYPE: Primary | ICD-10-CM

## 2017-01-20 LAB — INR POINT OF CARE: 1.8 (ref 0.86–1.14)

## 2017-01-20 PROCEDURE — 99214 OFFICE O/P EST MOD 30 MIN: CPT | Performed by: NURSE PRACTITIONER

## 2017-01-20 PROCEDURE — 99214 OFFICE O/P EST MOD 30 MIN: CPT | Performed by: FAMILY MEDICINE

## 2017-01-20 PROCEDURE — 85610 PROTHROMBIN TIME: CPT | Mod: QW

## 2017-01-20 PROCEDURE — 99213 OFFICE O/P EST LOW 20 MIN: CPT

## 2017-01-20 RX ORDER — SACCHAROMYCES BOULARDII 250 MG
250 CAPSULE ORAL 2 TIMES DAILY
Qty: 28 CAPSULE | Refills: 0 | Status: SHIPPED
Start: 2017-01-20 | End: 2017-02-03

## 2017-01-20 ASSESSMENT — ANXIETY QUESTIONNAIRES
3. WORRYING TOO MUCH ABOUT DIFFERENT THINGS: NOT AT ALL
7. FEELING AFRAID AS IF SOMETHING AWFUL MIGHT HAPPEN: SEVERAL DAYS
1. FEELING NERVOUS, ANXIOUS, OR ON EDGE: NOT AT ALL
GAD7 TOTAL SCORE: 1
2. NOT BEING ABLE TO STOP OR CONTROL WORRYING: NOT AT ALL
5. BEING SO RESTLESS THAT IT IS HARD TO SIT STILL: NOT AT ALL
6. BECOMING EASILY ANNOYED OR IRRITABLE: NOT AT ALL
IF YOU CHECKED OFF ANY PROBLEMS ON THIS QUESTIONNAIRE, HOW DIFFICULT HAVE THESE PROBLEMS MADE IT FOR YOU TO DO YOUR WORK, TAKE CARE OF THINGS AT HOME, OR GET ALONG WITH OTHER PEOPLE: NOT DIFFICULT AT ALL

## 2017-01-20 ASSESSMENT — PAIN SCALES - GENERAL: PAINLEVEL: NO PAIN (0)

## 2017-01-20 ASSESSMENT — PATIENT HEALTH QUESTIONNAIRE - PHQ9: 5. POOR APPETITE OR OVEREATING: NOT AT ALL

## 2017-01-20 NOTE — PROGRESS NOTES
[facility]    Adiel Rosa Jr, 81 year old, male presents with   Chief Complaint   Patient presents with     Hospital F/U     Dx osteomylitis, admitted 01/11/2017 discharged 01/16/2017. He has chronic atrial fibrillation will be seen by wound care and Coumadin clinic.  Discussion with patient in the hospital about amputation and he refused      *_* Health Care Directive *_*     declined        PAST MEDICAL HISTORY:  Past Medical History   Diagnosis Date     Diabetes mellitus without mention of complication 11/29/1999     Cough 9/11/2007     Hypertension, benign 1/1/2011     Hypercholesterolemia 1/1/2011     Acute myocardial infarction of other specified sites, episode of care unspecified 1/1/1999     Non Q wave  treated with Retavase     Esophageal reflux 1/1/2011     BPH 1/1/2011     Erectile Dysfunction 1/1/2011     COPD 1/1/2011     Hypercalcemia 1/1/2011     Benign hypertensive heart disease with congestive 1/1/2011     Chronic renal disease, stage III 9/22/2011       PAST SURGICAL HISTORY:  Past Surgical History   Procedure Laterality Date     Nephrolithiasis       lithotripsy     Bladder stone removal       Appendectomy       Tonsillectomy       Stent x 1         MEDICATIONS:  Prior to Admission medications    Medication Sig Start Date End Date Taking? Authorizing Provider   Multiple Vitamins-Minerals (CENTRUM SILVER ADULT 50+ PO)    Yes Reported, Patient   order for DME Equipment being ordered:  Warm water and baby shampoo soaks daily to affected area. Cover with Xeroform dressing and gauze until healed    Fax order to [facility]  [phone] 1/18/17  Yes Christie Quijano PA   insulin aspart (NOVOLOG VIAL) 100 UNITS/ML injection Inject 1-7 Units Subcutaneous 3 times daily (with meals) If glucose less than or equal to 150 mg/dL do not give insulin   If glucose 151-200 give 2 Units subcutaneously   If glucose 201-250 give 3 Units subcutaneously   If glucose 251-300 give 4 Units  "subcutaneously   If glucose 301-350 give 5 Units subcutaneously   If glucose over 350 give 7 Units subcutaneously and call physician 1/17/17  Yes GAURAV Grijalva MD   blood glucose monitoring (NO BRAND SPECIFIED) meter device kit Use to test blood sugar 5 times daily or as directed. 1/17/17  Yes GAURAV Grijalva MD   insulin syringe-needle U-100 (ADVOCATE INSULIN SYRINGE) 31G X 5/16\" 1 ML Use 4 syringes daily or as directed. 1/17/17  Yes GAURAV Grijalva MD   order for DME Equipment being ordered: Xero form dressing  Change dressing on foot wound daily 1/17/17  Yes GAURAV Grijalva MD   insulin detemir (LEVEMIR VIAL) 100 UNIT/ML injection Inject 35 units in the morning and 10 units at bedtime subcutaneously. 1/17/17  Yes GAURAV Grijalva MD   HYDROcodone-acetaminophen (NORCO) 5-325 MG per tablet Take 1 tablet by mouth every 6 hours as needed for moderate to severe pain 1/16/17  Yes Alejandro Fung MD   Cranberry 500 MG CAPS Take 1 capsule (500 mg) by mouth daily 1/16/17  Yes Alejandro Fung MD   acetaminophen (TYLENOL) 650 MG CR tablet Take 1 tablet (650 mg) by mouth every 8 hours as needed 1/16/17  Yes Alejandro Fung MD   aspirin 325 MG tablet Take 1 tablet (325 mg) by mouth daily 1/16/17  Yes Alejandro Fung MD   fluticasone-salmeterol (ADVAIR DISKUS) 250-50 MCG/DOSE diskus inhaler USE 1 INHALATION TWO TIMES A DAY IN THE MORNING AND IN THE EVENING APPROXIMATELY 12 HOURS APART 1/16/17  Yes Alejandro Fung MD   warfarin (COUMADIN) 2 MG tablet TAKE 4 TABLETS ON MONDAY, WEDNESDAY, AND FRIDAY AND 3 TABLETS ALL OTHER DAYS 1/16/17  Yes Alejandro Fung MD   simvastatin (ZOCOR) 80 MG tablet TAKE 1 TABLET DAILY IN THE EVENING 1/16/17  Yes Alejandro Fung MD   candesartan (ATACAND) 16 MG tablet Take 1 tablet (16 mg) by mouth daily 1/16/17  Yes Alejandro Fung MD   metoprolol (TOPROL XL) 50 MG 24 hr tablet Take 0.5 tablets (25 mg) by mouth daily 1/16/17  Yes Alejandro Fung MD   digoxin " (LANOXIN) 125 MCG tablet Take 1 tablet (125 mcg) by mouth daily 1/16/17  Yes Alejandro Fung MD   Doxylamine-DM 6.25-15 MG/15ML LIQD Taking as pkg directions at night 1/16/17  Yes Alejandro Fung MD   furosemide (LASIX) 40 MG tablet Take 1 tablet (40 mg) by mouth daily 1/16/17  Yes Alejandro Fung MD   magnesium 250 MG tablet Take 1 tablet by mouth daily 1/16/17  Yes Alejandro Fung MD   benzocaine-menthol (CEPACOL) 15-3.6 MG lozenge Place 1 lozenge inside cheek every hour as needed for sore throat 1/16/17  Yes Alejandro Fung MD   amoxicillin-clavulanate (AUGMENTIN) 875-125 MG per tablet Take 1 tablet by mouth every 12 hours 1/16/17  Yes Alejandro Fung MD   ranitidine (ZANTAC) 150 MG tablet Take 1 tablet (150 mg) by mouth 2 times daily 1/16/17  Yes Alejandro Fung MD   order for DME Equipment being ordered: Hospital Bed 1/16/17  Yes Alejandro Fung MD   B-D U/F 31G X 8 MM insulin pen needle USE 5 TO 6 PEN NEEDLES DAILY OR AS DIRECTED 4/19/16  Yes GAURAV Grijalva MD   ONE TOUCH ULTRA test strip USE UP TO 5 STRIPS DAILY TO TEST BLOOD GLUCOSE 8/24/15  Yes GAURAV Grijalva MD       ALLERGIES:   No Known Allergies    ROS:  Constitutional, neuro, ENT, endocrine, pulmonary, cardiac, gastrointestinal, genitourinary, musculoskeletal, integument and psychiatric systems are negative, except as otherwise noted.      EXAM:  /62 mmHg  Pulse 52  Temp(Src) 96.1  F (35.6  C) (Tympanic)  Ht 6' (1.829 m)  Wt 188 lb (85.276 kg)  BMI 25.49 kg/m2  SpO2 99% Body mass index is 25.49 kg/(m^2).   GENERAL APPEARANCE: healthy, alert, no distress and cooperative  EYES: Eyes grossly normal to inspection, PERRL and conjunctivae and sclerae normal  NECK: no adenopathy, no asymmetry, masses, or scars and thyroid normal to palpation  RESP: lungs clear to auscultation - no rales, rhonchi or wheezes  CV: irregularly irregular without S3-S4  MS: right foot has a small open sore on the dorsal aspect but on  the plantar aspect is a 0.5 cm diameter opening but it extends about a centimeter either way underneath the skin by probing with a Q-tip  NEURO: Normal strength and tone, mentation intact and speech normal  PSYCH: mentation appears normal and affect normal, mentally he is of sound mind.  No delusional thoughts thought content is in place.  Lab/ X-ray  Results for orders placed or performed in visit on 01/20/17 (from the past 24 hour(s))   INR point of care   Result Value Ref Range    INR Protime 1.8 (A) 0.86 - 1.14     Exam Date Exam Time Exam Date Exam Time Accession # Results       1/13/17  8:45 AM 1/13/17  9:18 AM [id number]        PACS Images      Show images for MR Foot Right w/o & w Contrast       Study Result            RIGHT FOOT MRI     HISTORY:  Foot ulceration, osteomyelitis.  COMPARISON:  Today's study is compared to conventional radiographs  which are dated January 11, 2017.     TECHNIQUE:  Multiplanar MR images acquired before and after the  administration of 7.5 mL intravenous Gadavist.     FINDINGS:  Abnormal marrow signal throughout the entire second  metatarsal with relative sparing of the base of the metatarsal.  The  second metatarsal is of low T1 and low T2 signal and enhances after  the administration of gadolinium.  It has a patchy lytic appearance on  the conventional radiograph and findings are classic for  osteomyelitis.  There are several tiny areas of signal void within the  shaft of the metatarsal, which could be due to gas.  Destructive  changes of the head of the second metatarsal.  Superior subluxation of  the right second toe at the MTP joint.  The base and proximal shaft of  the second metatarsal demonstrate abnormal T1 and T2 signal with  enhancement in addition to tiny areas of signal void, suspicious for  osteomyelitis ate the base of the proximal phalanx of the second toe.  A large amount of fluid which is peripherally enhancing surrounds the  second MTP joint and  extends to the ulceration along the plantar  aspect of the foot.  This is consistent with an abscess.  There is  also a small amount of fluid surrounding the flexor tendons of the  second toe, beginning at the distal shaft of the metatarsal consistent  with mild flexor tenosynovitis which is likely infected as well.     There is an impacted fracture of the head and neck of the third  metatarsal and associated signal abnormality in the shaft, neck and  head of the third metatarsal, and in the base and proximal shaft of  the proximal phalanx.  The abscess that surrounds the second MTP joint  extends to partially surround the third MTP joint as well.  Constellation of findings is very suspicious for extension of  osteomyelitis to involve the neck and head of the third metatarsal as  well, in addition to the base of the proximal phalanx of the third  toe.  Continued on page 2...        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  Healed or healing  fracture base and proximal shaft of the first metatarsal, but no  findings to suggest osteomyelitis in the first metatarsal.     Subcutaneous edema about the forefoot.  Multiple hammertoe  deformities.     Images are degraded by motion.     IMPRESSION:  1.  FINDINGS SUSPICIOUS FOR OSTEOMYELITIS INVOLVING MOST OF THE SECOND  METATARSAL, HEAD AND NECK OF THE THIRD METATARSAL, PROXIMAL BASE AND  SHAFT SECOND PROXIMAL PHALANX, AND BASE OF THE THIRD PROXIMAL PHALANX.        2.  ULCERATION ON THE PLANTAR BASE OF THE MTP JOINT EXTENDING TO AN  ABSCESS SURROUNDING THE SECOND MTP JOINT AND PARTIALLY SURROUNDING THE  THIRD MTP JOINT.     3.  FRACTURES OF THE NECK OF THE THIRD METATARSAL, AND BASE AND  PROXIMAL SHAFT OF THE FIRST METATARSAL.     4.  SEVERE DEGENERATIVE ARTHRITIS FIRST MTP JOINT.     5.  FINDINGS OF OSTEOMYELITIS WERE DISCUSSED WITH DR. ILYA REYES  AT 1015 HOURS ON JANUARY 13, 2017.        Exam Date Exam Time Exam Date Exam Time Accession # Results        1/12/17 11:44 AM 1/12/17 12:28 PM [id number]        PACS Images      Show images for US CATIA Doppler No Exercise       Study Result            ANKLE-BRACHIAL INDICES     REPORT:  The ankle-brachial index on the right measures 1.00, on the  left 1.04.  Absolute ankle pressure measures 119 on the right and 130  on the left.     IMPRESSION:  ADEQUATE BLOOD FLOW FOR WOUND HEALING.  Exam Date: Jan 12, 2017 12:28:00 AM  Author: KIET ROMANO  This report is final and signed            ASSESSMENT/PLAN:    ICD-10-CM    1. Osteomyelitis of foot, right, acute (H) M86.171 MRI in the hospital confirmed osteomyelitis.AB I confirmed adequate blood flow. Was offered amputation by orthopedic consultation and patient refused.  Again I recommended that.  He refuses amputation.  I told him if he changes his mind he can let us know but this infection is bad and despite doing dressing changes and antibiotics it will  most likely not clear and it will most likely  kill him.  He is of sound mind and has chosen to go against my medical advice as well as the advice of the physicians who managed him in the hospital.  He knows that if he changes his mind we can quickly get orthopedic consult for the amputation.   2. Chronic atrial fibrillation (H) I48.2 Is in Coumadin clinic and will continue to be monitored.   3. Chronic renal disease, stage III N18.3 chronic and stable   4. Benign hypertensive heart disease with heart failure (H) I11.0 stable    I50.9    5. Long-term (current) use of anticoagulants [Z79.01] Z79.01 See #2   6. Type 2 diabetes mellitus with stage 3 chronic kidney disease, with long-term current use of insulin (H) E11.22 Chronic.  [facility] at [facility] with his wife.  Trying to get a hospital bed for him.    N18.3     Z79.4          WILSON Grijalva MD  January 20, 2017

## 2017-01-20 NOTE — MR AVS SNAPSHOT
"              After Visit Summary   1/20/2017    Adiel Rosa Jr    MRN: [id number]           Patient Information     Date Of Birth          1935        Visit Information        Provider Department      1/20/2017 11:30 AM Gema Kirk NP [facility]        Care Instructions    Wound Care Instructions:  1) Wash your hands and work space  2) Gather all your supplies: soap and water (kareen's baby soap), clean washcloth or gauze, xeroform, 1/2\" Iodoform strip gauze, kerlix, tape, gauze pads  3) Cleanse wound with soap and water, rinse, dry  4) Dress wound with 1) top of foot- cover with a piece of xeroform and dry gauze, 2) bottom of foot- tuck Iodoform gauze strip into wound, cover with dry gauze and secure both dressings with kerlix and tape   5) Change dressing daily, or twice daily if possible  6) Dispose of all dirty supplies  7) Wash hands and equipment    Please report any increase in pain, fevers, chills, changes in the drainage odor.   Please call if you have any questions or concerns or if any problems develop.   Follow up in two weeks at the wound center (ER entrance, check in at admitting)           Follow-ups after your visit        Who to contact     If you have questions or need follow up information about today's clinic visit or your schedule please contact [facility] directly at [phone].  Normal or non-critical lab and imaging results will be communicated to you by MyChart, letter or phone within 4 business days after the clinic has received the results. If you do not hear from us within 7 days, please contact the clinic through MyChart or phone. If you have a critical or abnormal lab result, we will notify you by phone as soon as possible.  Submit refill requests through [employer] or call your pharmacy and they will forward the refill request to us. Please allow 3 business days for your refill to be completed.          Additional Information About Your " "Visit        MyChart Information     Alt[employer]ik lets you send messages to your doctor, view your test results, renew your prescriptions, schedule appointments and more. To sign up, go to [email or web address] . Click on \"Log in\" on the left side of the screen, which will take you to the Welcome page. Then click on \"Sign up Now\" on the right side of the page.     You will be asked to enter the access code listed below, as well as some personal information. Please follow the directions to create your username and password.     Your access code is: [email or web address]  Expires: 2017  2:01 PM     Your access code will  in 90 days. If you need help or a new code, please call your [facility] clinic or [phone].        Care EveryWhere ID     This is your Care EveryWhere ID. This could be used by other organizations to access your [facility] medical records  [id number]         Blood Pressure from Last 3 Encounters:   17 122/62   17 112/44   16 110/52    Weight from Last 3 Encounters:   17 188 lb (85.276 kg)   17 185 lb 13.6 oz (84.3 kg)   16 208 lb (94.348 kg)              Today, you had the following     No orders found for display       Primary Care Provider Office Phone # Fax #    R Hao Grijalva [phone]       [place] [phone]        Thank you!     Thank you for choosing [facility]  for your care. Our goal is always to provide you with excellent care. Hearing back from our patients is one way we can continue to improve our services. Please take a few minutes to complete the written survey that you may receive in the mail after your visit with us. Thank you!             Your Updated Medication List - Protect others around you: Learn how to safely use, store and throw away your medicines at www.disposemymeds.org.          This list is accurate as of: 17 11:39 AM.  Always use your most recent med " list.                   Brand Name Dispense Instructions for use    acetaminophen 650 MG CR tablet    TYLENOL    60 tablet    Take 1 tablet (650 mg) by mouth every 8 hours as needed       amoxicillin-clavulanate 875-125 MG per tablet    AUGMENTIN    28 tablet    Take 1 tablet by mouth every 12 hours       aspirin 325 MG tablet     120 tablet    Take 1 tablet (325 mg) by mouth daily       B-D U/F 31G X 8 MM   Generic drug:  insulin pen needle     6 each    USE 5 TO 6 PEN NEEDLES DAILY OR AS DIRECTED       benzocaine-menthol 15-3.6 MG lozenge    CEPACOL    30 lozenge    Place 1 lozenge inside cheek every hour as needed for sore throat       blood glucose monitoring meter device kit    no brand specified    1 kit    Use to test blood sugar 5 times daily or as directed.       candesartan 16 MG tablet    ATACAND    90 tablet    Take 1 tablet (16 mg) by mouth daily       CENTRUM SILVER ADULT 50+ PO          Cranberry 500 MG Caps     90 capsule    Take 1 capsule (500 mg) by mouth daily       digoxin 125 MCG tablet    LANOXIN    90 tablet    Take 1 tablet (125 mcg) by mouth daily       Doxylamine-DM 6.25-15 MG/15ML Liqd     236 mL    Taking as pkg directions at night       fluticasone-salmeterol 250-50 MCG/DOSE diskus inhaler    ADVAIR DISKUS    3 Inhaler    USE 1 INHALATION TWO TIMES A DAY IN THE MORNING AND IN THE EVENING APPROXIMATELY 12 HOURS APART       furosemide 40 MG tablet    LASIX    90 tablet    Take 1 tablet (40 mg) by mouth daily       HYDROcodone-acetaminophen 5-325 MG per tablet    NORCO    30 tablet    Take 1 tablet by mouth every 6 hours as needed for moderate to severe pain       insulin aspart 100 UNITS/ML injection    NovoLOG VIAL    30 mL    Inject 1-7 Units Subcutaneous 3 times daily (with meals) If glucose less than or equal to 150 mg/dL do not give insulin  If glucose 151-200 give 2 Units subcutaneously  If glucose 201-250 give 3 Units subcutaneously  If glucose 251-300 give 4 Units subcutaneously   "If glucose 301-350 give 5 Units subcutaneously  If glucose over 350 give 7 Units subcutaneously and call physician       insulin detemir 100 UNIT/ML injection    LEVEMIR VIAL    10 mL    Inject 35 units in the morning and 10 units at bedtime subcutaneously.       insulin syringe-needle U-100 31G X 5/16\" 1 ML    ADVOCATE INSULIN SYRINGE    300 each    Use 4 syringes daily or as directed.       magnesium 250 MG tablet     30 tablet    Take 1 tablet by mouth daily       metoprolol 50 MG 24 hr tablet    TOPROL XL    90 tablet    Take 0.5 tablets (25 mg) by mouth daily       ONE TOUCH ULTRA test strip   Generic drug:  blood glucose monitoring     300 each    USE UP TO 5 STRIPS DAILY TO TEST BLOOD GLUCOSE       * order for DME     1 Device    Equipment being ordered: Hospital Bed       * order for DME     60 each    Equipment being ordered: Xero form dressing Change dressing on foot wound daily       * order for DME     6 Month    Equipment being ordered: Warm water and baby shampoo soaks daily to affected area. Cover with Xeroform dressing and gauze until healed  Fax order to [facility] [phone]       ranitidine 150 MG tablet    ZANTAC    180 tablet    Take 1 tablet (150 mg) by mouth 2 times daily       simvastatin 80 MG tablet    ZOCOR    90 tablet    TAKE 1 TABLET DAILY IN THE EVENING       warfarin 2 MG tablet    COUMADIN    312 tablet    TAKE 4 TABLETS ON MONDAY, WEDNESDAY, AND FRIDAY AND 3 TABLETS ALL OTHER DAYS       * Notice:  This list has 3 medication(s) that are the same as other medications prescribed for you. Read the directions carefully, and ask your doctor or other care provider to review them with you.      "

## 2017-01-20 NOTE — PROGRESS NOTES
ANTICOAGULATION FOLLOW-UP CLINIC VISIT    Patient Name:  Adiel Rosa Jr  Date:  1/20/2017  Contact Type:  Face to Face    SUBJECTIVE:     Patient Findings     Positives Antibiotic use or infection    Comments Done with antibiotic on 1/30/17. Has severe infection right foot, amputation suggested while hospitalized and he refused           OBJECTIVE    INR PROTIME   Date Value Ref Range Status   01/20/2017 1.8* 0.86 - 1.14 Final       ASSESSMENT / PLAN  INR assessment SUB    Recheck INR In: 5 DAYS    INR Location [facility]      Anticoagulation Summary as of 1/20/2017     INR goal 2.0-3.0   Selected INR 1.8! (1/20/2017)   Maintenance plan 8 mg (2 mg x 4) on Mon, Wed, Fri; 6 mg (2 mg x 3) all other days   Full instructions 1/23: 6 mg; 1/25: 6 mg; 1/27: 6 mg; Otherwise 8 mg on Mon, Wed, Fri; 6 mg all other days   Weekly total 48 mg   Plan last modified Jessa Gibson RN (8/2/2016)   Next INR check 1/25/2017   Priority INR   Target end date Indefinite    Indications   Chronic atrial fibrillation (H) [I48.2]  Long-term (current) use of anticoagulants [Z79.01] [Z79.01]         Anticoagulation Episode Summary     INR check location     Preferred lab     Send INR reminders to [facility] ANTICOAG POOL    Comments [facility] assisted [facility], Fax   [facility] fax: [phone], , [phone]      Anticoagulation Care Providers     Provider Role Specialty Phone number    GAURAV Grijalva MD [facility] [phone]            See the Encounter Report to view Anticoagulation Flowsheet and Dosing Calendar (Go to Encounters tab in chart review, and find the Anticoagulation Therapy Visit)        Jessa Storey, RN

## 2017-01-20 NOTE — MR AVS SNAPSHOT
Adiel Rosa Jr   1/20/2017 10:45 AM   Anticoagulation Therapy Visit    Description:  81 year old male   Provider:   ANTI COAGULATION   Department:  [facility] Anti Coagulation           INR as of 1/20/2017     Selected INR 1.8! (1/20/2017)      Anticoagulation Summary as of 1/20/2017     INR goal 2.0-3.0   Selected INR 1.8! (1/20/2017)   Full instructions 1/23: 6 mg; 1/25: 6 mg; 1/27: 6 mg; Otherwise 8 mg on Mon, Wed, Fri; 6 mg all other days   Next INR check 1/25/2017    Indications   Chronic atrial fibrillation (H) [I48.2]  Long-term (current) use of anticoagulants [Z79.01] [Z79.01]         January 2017 Details    Sun Mon Tue Wed Thu Fri Sat     1               2               3               4               5               6               7                 8               9               10               11               12               13               14                 15               16               17               18               19               20      8 mg   See details      21      6 mg           22      6 mg         23      6 mg         24      6 mg         25            26               27               28                 29               30               31                    Date Details   01/20 This INR check       Date of next INR:  1/25/2017         How to take your warfarin dose     To take:  6 mg Take 3 of the 2 mg tablets.    To take:  8 mg Take 4 of the 2 mg tablets.

## 2017-01-20 NOTE — MR AVS SNAPSHOT
"              After Visit Summary   1/20/2017    Adiel Rosa Jr    MRN: [id number]           Patient Information     Date Of Birth          1935        Visit Information        Provider Department      1/20/2017 11:00 AM GAURAV Grijalva MD [facility] [place]        Care Instructions    Continue wound care        Follow-ups after your visit        Your next 10 appointments already scheduled     Jan 20, 2017 11:30 AM   (Arrive by 11:15 AM)   CONSULT with Gema Kirk NP   [facility] [place] ([facility] [place] [facility])    [address]  [place] [address] [phone]              Who to contact     If you have questions or need follow up information about today's clinic visit or your schedule please contact [facility] directly at [phone].  Normal or non-critical lab and imaging results will be communicated to you by MyChart, letter or phone within 4 business days after the clinic has received the results. If you do not hear from us within 7 days, please contact the clinic through MyChart or phone. If you have a critical or abnormal lab result, we will notify you by phone as soon as possible.  Submit refill requests through [employer]ly or call your pharmacy and they will forward the refill request to us. Please allow 3 business days for your refill to be completed.          Additional Information About Your Visit        MyChart Information     Partly lets you send messages to your doctor, view your test results, renew your prescriptions, schedule appointments and more. To sign up, go to [email or web address] . Click on \"Log in\" on the left side of the screen, which will take you to the Welcome page. Then click on \"Sign up Now\" on the right side of the page.     You will be asked to enter the access code listed below, as well as some personal information. Please follow the directions to create your username and password.     Your access code is: [email or web address]  Expires: " 2017  2:01 PM     Your access code will  in 90 days. If you need help or a new code, please call your [facility] or [phone].        Care EveryWhere ID     This is your Care EveryWhere ID. This could be used by other organizations to access your [place] medical records  [id number]        Your Vitals Were     Pulse Temperature Height BMI (Body Mass Index) Pulse Oximetry       52 96.1  F (35.6  C) (Tympanic) 6' (1.829 m) 25.49 kg/m2 99%        Blood Pressure from Last 3 Encounters:   17 122/62   17 112/44   16 110/52    Weight from Last 3 Encounters:   17 188 lb (85.276 kg)   17 185 lb 13.6 oz (84.3 kg)   16 208 lb (94.348 kg)              Today, you had the following     No orders found for display       Primary Care Provider Office Phone # Fax #    R Hao Grijalva [phone]       [facility] [place] [address] [place], [place] [id number]        Thank you!     Thank you for choosing [facility] [place]  for your care. Our goal is always to provide you with excellent care. Hearing back from our patients is one way we can continue to improve our services. Please take a few minutes to complete the written survey that you may receive in the mail after your visit with us. Thank you!             Your Updated Medication List - Protect others around you: Learn how to safely use, store and throw away your medicines at www.disposemymeds.org.          This list is accurate as of: 17 11:07 AM.  Always use your most recent med list.                   Brand Name Dispense Instructions for use    acetaminophen 650 MG CR tablet    TYLENOL    60 tablet    Take 1 tablet (650 mg) by mouth every 8 hours as needed       amoxicillin-clavulanate 875-125 MG per tablet    AUGMENTIN    28 tablet    Take 1 tablet by mouth every 12 hours       aspirin 325 MG tablet     120 tablet    Take 1 tablet (325 mg) by mouth daily       B-D U/F 31G X 8 MM   Generic  "drug:  insulin pen needle     6 each    USE 5 TO 6 PEN NEEDLES DAILY OR AS DIRECTED       benzocaine-menthol 15-3.6 MG lozenge    CEPACOL    30 lozenge    Place 1 lozenge inside cheek every hour as needed for sore throat       blood glucose monitoring meter device kit    no brand specified    1 kit    Use to test blood sugar 5 times daily or as directed.       candesartan 16 MG tablet    ATACAND    90 tablet    Take 1 tablet (16 mg) by mouth daily       CENTRUM SILVER ADULT 50+ PO          Cranberry 500 MG Caps     90 capsule    Take 1 capsule (500 mg) by mouth daily       digoxin 125 MCG tablet    LANOXIN    90 tablet    Take 1 tablet (125 mcg) by mouth daily       Doxylamine-DM 6.25-15 MG/15ML Liqd     236 mL    Taking as pkg directions at night       fluticasone-salmeterol 250-50 MCG/DOSE diskus inhaler    ADVAIR DISKUS    3 Inhaler    USE 1 INHALATION TWO TIMES A DAY IN THE MORNING AND IN THE EVENING APPROXIMATELY 12 HOURS APART       furosemide 40 MG tablet    LASIX    90 tablet    Take 1 tablet (40 mg) by mouth daily       HYDROcodone-acetaminophen 5-325 MG per tablet    NORCO    30 tablet    Take 1 tablet by mouth every 6 hours as needed for moderate to severe pain       insulin aspart 100 UNITS/ML injection    NovoLOG VIAL    30 mL    Inject 1-7 Units Subcutaneous 3 times daily (with meals) If glucose less than or equal to 150 mg/dL do not give insulin  If glucose 151-200 give 2 Units subcutaneously  If glucose 201-250 give 3 Units subcutaneously  If glucose 251-300 give 4 Units subcutaneously  If glucose 301-350 give 5 Units subcutaneously  If glucose over 350 give 7 Units subcutaneously and call physician       insulin detemir 100 UNIT/ML injection    LEVEMIR VIAL    10 mL    Inject 35 units in the morning and 10 units at bedtime subcutaneously.       insulin syringe-needle U-100 31G X 5/16\" 1 ML    ADVOCATE INSULIN SYRINGE    300 each    Use 4 syringes daily or as directed.       magnesium 250 MG tablet "     30 tablet    Take 1 tablet by mouth daily       metoprolol 50 MG 24 hr tablet    TOPROL XL    90 tablet    Take 0.5 tablets (25 mg) by mouth daily       ONE TOUCH ULTRA test strip   Generic drug:  blood glucose monitoring     300 each    USE UP TO 5 STRIPS DAILY TO TEST BLOOD GLUCOSE       * order for DME     1 Device    Equipment being ordered: Hospital Bed       * order for DME     60 each    Equipment being ordered: Xero form dressing Change dressing on foot wound daily       * order for DME     6 Month    Equipment being ordered: Warm water and baby shampoo soaks daily to affected area. Cover with Xeroform dressing and gauze until healed  Fax order to [facility] [phone]       ranitidine 150 MG tablet    ZANTAC    180 tablet    Take 1 tablet (150 mg) by mouth 2 times daily       simvastatin 80 MG tablet    ZOCOR    90 tablet    TAKE 1 TABLET DAILY IN THE EVENING       warfarin 2 MG tablet    COUMADIN    312 tablet    TAKE 4 TABLETS ON MONDAY, WEDNESDAY, AND FRIDAY AND 3 TABLETS ALL OTHER DAYS       * Notice:  This list has 3 medication(s) that are the same as other medications prescribed for you. Read the directions carefully, and ask your doctor or other care provider to review them with you.

## 2017-01-20 NOTE — PATIENT INSTRUCTIONS
"Wound Care Instructions:  1) Wash your hands and work space  2) Gather all your supplies: soap and water (kareen's baby soap), clean washcloth or gauze, xeroform, 1/2\" Iodoform strip gauze, kerlix, tape, gauze pads  3) Cleanse wound with soap and water, rinse, dry  4) Dress wound with 1) top of foot- cover with a piece of xeroform and dry gauze, 2) bottom of foot- tuck Iodoform gauze strip into wound, cover with dry gauze and secure both dressings with kerlix and tape   5) Change dressing daily, or twice daily if possible  6) Dispose of all dirty supplies  7) Wash hands and equipment    Please report any increase in pain, fevers, chills, changes in the drainage odor.   Please call if you have any questions or concerns or if any problems develop.   Follow up in two weeks at the wound center (ER entrance, check in at admitting)     "

## 2017-01-20 NOTE — PROGRESS NOTES
Patient is seen in consultation for Dr. David Grijalva    SUBJECTIVE:  Adiel Rosa Jr, 81 year old, male presents with the following Chief Complaint(s) with HPI to follow:   Chief Complaint   Patient presents with     WOUND CARE      HPI:  Patient is seen today for evaluation and treatment of his right foot wounds.  He was admitted recently to the hospital on 1/11/7 with this problem, as well as uncontrolled diabetes with hypoglycemia, PVD, CAD, Atrial Fib and CKD.   During this admission an CATIA and MRI of the foot were done.  The CATIA showed 1.00 on the right and 1.04 on the left, with adequate blood flow for healing.  The MRI showed findings suspicious for osteomyelitis involving most of the right second metatarsal, head and neck of the 3rd metatarsal, proximal base and shaft of the second proximal phalanx and base of the 3rd proximal phalanx.  Adiel was advised to have an amputation, which he absolutely refused.    He was discharged to an adult foster care facility on 1/16/17 (his wife lives there too).  He complains of some discomfort in the right foot, but not much and does not take anything for it.      Patient Active Problem List   Diagnosis     Chronic atrial fibrillation (H)     Advanced care planning/counseling discussion     Hypercholesterolemia     Chronic renal disease, stage III     Benign hypertensive heart disease with heart failure (H)     Hypercalcemia     Chronic airway obstruction (H)     Erectile Dysfunction     Hypertrophy of prostate without urinary obstruction     Esophageal reflux     Acute myocardial infarction of other specified sites, episode of care unspecified     Diabetes mellitus, type 2 (H)     Cough     Hypertension, benign     Long-term (current) use of anticoagulants [Z79.01]     Hypoglycemia due to insulin     Primary prostate cancer identified by needle biopsy (T1c) (H)     Osteomyelitis of foot, right, acute (H)       Past Medical History   Diagnosis Date     Diabetes  "mellitus without mention of complication 11/29/1999     Cough 9/11/2007     Hypertension, benign 1/1/2011     Hypercholesterolemia 1/1/2011     Acute myocardial infarction of other specified sites, episode of care unspecified 1/1/1999     Non Q wave  treated with Retavase     Esophageal reflux 1/1/2011     BPH 1/1/2011     Erectile Dysfunction 1/1/2011     COPD 1/1/2011     Hypercalcemia 1/1/2011     Benign hypertensive heart disease with congestive 1/1/2011     Chronic renal disease, stage III 9/22/2011       Past Surgical History   Procedure Laterality Date     Nephrolithiasis       lithotripsy     Bladder stone removal       Appendectomy       Tonsillectomy       Stent x 1         Family History   Problem Relation Age of Onset     C.A.D. Father      C.A.D.       Family hx     Heart Failure Mother      Congestive     DIABETES       Family hx     HEART DISEASE Brother      MI, cause of death     HEART DISEASE Father      MI, cause of death     Other - See Comments Brother      PVD     HEART DISEASE Mother      MI, cause of death       Social History   Substance Use Topics     Smoking status: Former Smoker     Types: Cigarettes     Smokeless tobacco: Never Used      Comment: No passive exposure; quit, 1990     Alcohol Use: Not on file       Current Outpatient Prescriptions   Medication Sig Dispense Refill     saccharomyces boulardii (FLORASTOR) 250 MG capsule Take 1 capsule (250 mg) by mouth 2 times daily for 14 days 28 capsule 0     order for DME Equipment being ordered: Kerlix 4\" rolls, # 30; 1\" transpore tape- 2 rolls; 4X4\" gauze pads- please fill a sleeve of non strerile; 1 bottle of 1/2\" iodoform strip gauze, xeroform 5X9\"- one package. 1 each 1     Multiple Vitamins-Minerals (CENTRUM SILVER ADULT 50+ PO)        order for DME Equipment being ordered:  Warm water and baby shampoo soaks daily to affected area. Cover with Xeroform dressing and gauze until healed    Fax order to [facility]  [phone] 6 " "Month 1     insulin aspart (NOVOLOG VIAL) 100 UNITS/ML injection Inject 1-7 Units Subcutaneous 3 times daily (with meals) If glucose less than or equal to 150 mg/dL do not give insulin   If glucose 151-200 give 2 Units subcutaneously   If glucose 201-250 give 3 Units subcutaneously   If glucose 251-300 give 4 Units subcutaneously   If glucose 301-350 give 5 Units subcutaneously   If glucose over 350 give 7 Units subcutaneously and call physician 30 mL 1     blood glucose monitoring (NO BRAND SPECIFIED) meter device kit Use to test blood sugar 5 times daily or as directed. 1 kit 0     insulin syringe-needle U-100 (ADVOCATE INSULIN SYRINGE) 31G X 5/16\" 1 ML Use 4 syringes daily or as directed. 300 each 1     order for DME Equipment being ordered: Xero form dressing  Change dressing on foot wound daily 60 each 3     insulin detemir (LEVEMIR VIAL) 100 UNIT/ML injection Inject 35 units in the morning and 10 units at bedtime subcutaneously. 10 mL 1     HYDROcodone-acetaminophen (NORCO) 5-325 MG per tablet Take 1 tablet by mouth every 6 hours as needed for moderate to severe pain 30 tablet 0     Cranberry 500 MG CAPS Take 1 capsule (500 mg) by mouth daily 90 capsule 3     acetaminophen (TYLENOL) 650 MG CR tablet Take 1 tablet (650 mg) by mouth every 8 hours as needed 60 tablet      aspirin 325 MG tablet Take 1 tablet (325 mg) by mouth daily 120 tablet 3     fluticasone-salmeterol (ADVAIR DISKUS) 250-50 MCG/DOSE diskus inhaler USE 1 INHALATION TWO TIMES A DAY IN THE MORNING AND IN THE EVENING APPROXIMATELY 12 HOURS APART 3 Inhaler 1     warfarin (COUMADIN) 2 MG tablet TAKE 4 TABLETS ON MONDAY, WEDNESDAY, AND FRIDAY AND 3 TABLETS ALL OTHER DAYS 312 tablet 3     simvastatin (ZOCOR) 80 MG tablet TAKE 1 TABLET DAILY IN THE EVENING 90 tablet 3     candesartan (ATACAND) 16 MG tablet Take 1 tablet (16 mg) by mouth daily 90 tablet 0     metoprolol (TOPROL XL) 50 MG 24 hr tablet Take 0.5 tablets (25 mg) by mouth daily 90 tablet 0 "     digoxin (LANOXIN) 125 MCG tablet Take 1 tablet (125 mcg) by mouth daily 90 tablet 0     Doxylamine-DM 6.25-15 MG/15ML LIQD Taking as pkg directions at night 236 mL 3     furosemide (LASIX) 40 MG tablet Take 1 tablet (40 mg) by mouth daily 90 tablet 1     magnesium 250 MG tablet Take 1 tablet by mouth daily 30 tablet 3     benzocaine-menthol (CEPACOL) 15-3.6 MG lozenge Place 1 lozenge inside cheek every hour as needed for sore throat 30 lozenge 3     amoxicillin-clavulanate (AUGMENTIN) 875-125 MG per tablet Take 1 tablet by mouth every 12 hours 28 tablet 0     ranitidine (ZANTAC) 150 MG tablet Take 1 tablet (150 mg) by mouth 2 times daily 180 tablet 0     order for DME Equipment being ordered: Hospital Bed 1 Device 0     B-D U/F 31G X 8 MM insulin pen needle USE 5 TO 6 PEN NEEDLES DAILY OR AS DIRECTED 6 each 3     ONE TOUCH ULTRA test strip USE UP TO 5 STRIPS DAILY TO TEST BLOOD GLUCOSE 300 each 1       No Known Allergies    REVIEW OF SYSTEMS  Skin: as above  Eyes: cataracts  Ears/Nose/Throat: negative  Respiratory: No shortness of breath, dyspnea on exertion, cough, or hemoptysis  Cardiovascular: negative  Gastrointestinal: negative  Genitourinary: rare incontinence, hx of prostate cancer  Musculoskeletal: walks with a walker  Neurologic: numbness or tingling of feet  Psychiatric: negative  Hematologic/Lymphatic/Immunologic: atrial fib, on coumadin  Endocrine: diabetes    OBJECTIVE:  Vital signs:  Temp: 98.1  F (36.7  C)   BP: 122/62 mmHg Pulse: 52                Estimated body mass index is 25.49 kg/(m^2) as calculated from the following:    Height as of an earlier encounter on 1/20/17: 6' (1.829 m).    Weight as of an earlier encounter on 1/20/17: 188 lb (85.276 kg).    Constitutional: alert, no distress, cooperative and pale  Head: Normocephalic. No masses, lesions, tenderness or abnormalities  Cardiovascular: RRR. No murmurs, clicks gallops or rub  Respiratory:  Good diaphragmatic excursion. Lungs  "clear  Gastrointestinal: Abdomen soft, non-tender. BS normal. No masses, organomegaly  : Deferred  Musculoskeletal: extremities normal- no gross deformities noted, gait normal and normal muscle tone  Skin:        Wound description:  Type of Wound- diabetic ulcer; Location- plantar aspect of right foot/3rd metatarsal, Drainage amount-moderate, Drainage color-serosanguinous,  Odor- none; Wound bed-pale pink, Surrounding skin-very macerated and white.       Measurements: not measured, but is approx 1.3 X 0.5 cm, with undermining noted circumfrentially, the deepest area is at 1:00 and is approx 1.0 cm deep       Dressing change:  Wound cleansed with soap and water, dressed with 1/2\" Iodoform gauze tucked into the wound and undermined edges, with dry gauze over and secured with a kerlix.        Wound description:  Type of Wound- diabetic ulcer; Location- top of foot, near base of 3rd toe, Drainage amount-small, Drainage color-serous,  Odor- none; Wound bed-100% tan and soft (note, tissue gives when probed and seems to be tunneled to base of foot, but is still intact today), Surrounding skin-red.       Measurements: approx 1.0 x 1.0 cm       Dressing change:  Wound cleansed with soap and water, dressed with xeroform gauze, with dry gauze over and secured with kerlix.      Neurologic: Gait unsteady, walks with a walker. Transfers well.  Sensation decreased in feet.  Psychiatric: mentation appears normal and affect normal/bright    ASSESSMENT / PLAN:  (R19.7) Diarrhea, unspecified type  (primary encounter diagnosis)  Comment: diarrhea is likely due to antibiotic, we will try a probiotic, whichever he chooses  Plan: saccharomyces boulardii (FLORASTOR) 250 MG         capsule          (E10.621,  L97.103) Diabetic ulcer of right foot associated with type 1 diabetes mellitus (H)  Comment: Patient has been told again by Dr. Grijalva that we cannot likely heal this ulcer.  He still does not want an amputation, unless he becomes " too uncomfortable to stand the pain.  The staff at his foster home can dress the wound daily, but no more for now.  Plan: order for DME, WOUND CARE REFERRAL ([place])        Supplies ordered, instructions given for wound care         He will follow up in two weeks in the wound center.  He may not need more, unless he has an issue. He is well aware that there is no goal to heal.  I just want him to be well established with wound care just in case he needs the dressing re-evaluated and adjusted.    Gema Kirk  CNP  Wound Care and Urology

## 2017-01-20 NOTE — NURSING NOTE
Chief Complaint   Patient presents with     Hospital F/U     Dx osteomylitis, admitted 01/11/2017 discharged 01/16/2017     *_* Health Care Directive *_*     declined        Initial /62 mmHg  Pulse 52  Temp(Src) 96.1  F (35.6  C) (Tympanic)  Ht 6' (1.829 m)  Wt 188 lb (85.276 kg)  BMI 25.49 kg/m2  SpO2 99% Estimated body mass index is 25.49 kg/(m^2) as calculated from the following:    Height as of this encounter: 6' (1.829 m).    Weight as of this encounter: 188 lb (85.276 kg).  BP completed using cuff size: regular    BUSHRA MUÑIZ

## 2017-01-21 ASSESSMENT — ANXIETY QUESTIONNAIRES: GAD7 TOTAL SCORE: 1

## 2017-01-21 ASSESSMENT — PATIENT HEALTH QUESTIONNAIRE - PHQ9: SUM OF ALL RESPONSES TO PHQ QUESTIONS 1-9: 0

## 2017-01-25 ENCOUNTER — CARE COORDINATION (OUTPATIENT)
Dept: CARE COORDINATION | Facility: OTHER | Age: 82
End: 2017-01-25

## 2017-01-25 ENCOUNTER — ANTICOAGULATION THERAPY VISIT (OUTPATIENT)
Dept: ANTICOAGULATION | Facility: OTHER | Age: 82
End: 2017-01-25

## 2017-01-25 DIAGNOSIS — Z76.89 HEALTH CARE HOME: Primary | ICD-10-CM

## 2017-01-25 DIAGNOSIS — Z79.01 LONG-TERM (CURRENT) USE OF ANTICOAGULANTS: Primary | ICD-10-CM

## 2017-01-25 DIAGNOSIS — I48.20 CHRONIC ATRIAL FIBRILLATION (H): ICD-10-CM

## 2017-01-25 LAB — INR PPP: 1.6

## 2017-01-25 NOTE — PROGRESS NOTES
Clinic Care Coordination Contact  Care Team Conversations    Spoke with Dr. Hao Grijalva regarding patient's appointment last Friday.  Patient currently resides at [facility] in [address].  Patient's spouse on active care coordination and due to concerns with his status, request to follow in care coordination.  Unable to directly speak to patient about it, but did communicate with Jules RN at [facility] and PCP.  She reports that patient is receiving homecare services for wound care.  Staff has been educated on how to do dressing changes.  States wound without S&S of infection and minimal drainage.  Wound is deep to bone at this time.  Reports [facility] has been contacted and are awaiting to screen him and wife for elderly waiver.  He reported to staff this date that he is planning to go home as he cannot afford the cost.  Call was received from son to update staff when he went to check on home, the power had been shut off.  Staff assisted patient to contact public utilities, and there is a balance of $1,400.00.  Concerns with cognitive status.  Jules reports he covers well, but when information is discussed a second time he does not seem to be aware that he ever heard.  Example was that he had been explained cost of facility, and then when re-explained, he had acted that he never heard information and required explained in detail again.  When EMT when to home to pick-up his spouse when he was hospitalized, there was no food or heat.  Noted to have piles of dog feces throughout home and very unclean environment. Provided with care coordinator number this date.  Plan at this time is to continue with weekly follow up with [facility].   Reports VA was filed when he arrived at facility, and plan is if he and wife leave that they will file another VA and notify care coordination to enable [facility] adult protection to be contacted.     Christie Greer, RN-BSN  RN Clinic Care  Coordinator  [facility]  Phone:  [phone]

## 2017-01-26 ENCOUNTER — OFFICE VISIT (OUTPATIENT)
Dept: WOUND CARE | Facility: OTHER | Age: 82
End: 2017-01-26
Attending: FAMILY MEDICINE
Payer: MEDICARE

## 2017-01-26 ENCOUNTER — TELEPHONE (OUTPATIENT)
Dept: FAMILY MEDICINE | Facility: OTHER | Age: 82
End: 2017-01-26

## 2017-01-26 VITALS — SYSTOLIC BLOOD PRESSURE: 109 MMHG | DIASTOLIC BLOOD PRESSURE: 60 MMHG | TEMPERATURE: 97.6 F | HEART RATE: 79 BPM

## 2017-01-26 DIAGNOSIS — M86.171 ACUTE OSTEOMYELITIS OF RIGHT FOOT (H): ICD-10-CM

## 2017-01-26 DIAGNOSIS — E11.621 DIABETIC ULCER OF RIGHT FOOT ASSOCIATED WITH TYPE 2 DIABETES MELLITUS (H): Primary | ICD-10-CM

## 2017-01-26 DIAGNOSIS — L97.519 DIABETIC ULCER OF RIGHT FOOT ASSOCIATED WITH TYPE 2 DIABETES MELLITUS (H): Primary | ICD-10-CM

## 2017-01-26 PROCEDURE — 99212 OFFICE O/P EST SF 10 MIN: CPT

## 2017-01-26 PROCEDURE — 99213 OFFICE O/P EST LOW 20 MIN: CPT | Performed by: NURSE PRACTITIONER

## 2017-01-26 NOTE — TELEPHONE ENCOUNTER
Spoke with staff states when pulling out the packing a piece of bone moved while doing wound care. When repacking wound they were unable to get as much packing in. Has patient staying off his foot. Notified Piedad in wound care. She will see patient at 3pm today. Notified staff to keep wound clean until appointment.  BUSHRA MUÑIZ

## 2017-01-26 NOTE — TELEPHONE ENCOUNTER
Patient blood sugar was 394 at lunch time, patient was given 7 units per sliding scale. According to hospital orders facility has to notified Doctors office about any glucose over 350.  Please advise   BUSHRA MUÑIZ

## 2017-01-26 NOTE — TELEPHONE ENCOUNTER
10:11 AM    Reason for Call: Phone Call    Description: When Swati was changing his wound today part of his bone came out    Was an appointment offered for this call? No    Preferred method for responding to this message: Telephone Call    If we cannot reach you directly, may we leave a detailed response at the number you provided? Yes    Can this message wait until your PCP/provider returns, if available today? n/a    Tiffanie Edwards

## 2017-01-26 NOTE — TELEPHONE ENCOUNTER
1:00 PM    Reason for Call: Phone Call    Description: Wendy called regarding his blood sugars being raised, wants to speak to the nurse    Was an appointment offered for this call? Yes    Preferred method for responding to this message: Telephone Call [phone]      If we cannot reach you directly, may we leave a detailed response at the number you provided? Yes    Can this message wait until your PCP/provider returns, if available today? Not applicable, PCP is in    Angela Stanton

## 2017-01-26 NOTE — PATIENT INSTRUCTIONS
"Wound Care Instructions:  1) Wash your hands and work space  2) Gather all your supplies: soap and water (kareen's baby soap), clean washcloth or gauze, xeroform, 1/2\" Iodoform strip gauze, kerlix, tape, gauze pads  3) Cleanse wound with soap and water, rinse, dry  4) Dress wound with 1) top of foot- cover with a piece of xeroform and dry gauze, 2) bottom of foot- tuck Iodoform gauze strip (can try the 1/4\") into wound (okay to push the bone to the side), cover with dry gauze and secure both dressings with kerlix and tape   5) Change dressing daily, or twice daily if possible  6) Dispose of all dirty supplies  7) Wash hands and equipment    Watch for any increase in pain, fevers, chills, changes in the drainage odor or mentation, please be seen by ED.   Please call if you have any questions or concerns or if any problems develop.   Follow up in two weeks at the wound center (ER entrance, check in at admitting)     If you decide to have bone pieces removed, please make an appointment with Dr. Elizalde  If you decide anything else as what was discussed, please call Dr. Grijalva     "

## 2017-01-26 NOTE — MR AVS SNAPSHOT
"              After Visit Summary   1/26/2017    Adiel Rosa Jr    MRN: [id number]           Patient Information     Date Of Birth          1935        Visit Information        Provider Department      1/26/2017 3:00 PM Piedad Cruz NP [facility] [place]        Care Instructions    Wound Care Instructions:  1) Wash your hands and work space  2) Gather all your supplies: soap and water (kareen's baby soap), clean washcloth or gauze, xeroform, 1/2\" Iodoform strip gauze, kerlix, tape, gauze pads  3) Cleanse wound with soap and water, rinse, dry  4) Dress wound with 1) top of foot- cover with a piece of xeroform and dry gauze, 2) bottom of foot- tuck Iodoform gauze strip (can try the 1/4\") into wound (okay to push the bone to the side), cover with dry gauze and secure both dressings with kerlix and tape   5) Change dressing daily, or twice daily if possible  6) Dispose of all dirty supplies  7) Wash hands and equipment    Watch for any increase in pain, fevers, chills, changes in the drainage odor or mentation, please be seen by ED.   Please call if you have any questions or concerns or if any problems develop.   Follow up in two weeks at the wound center (ER entrance, check in at admitting)     If you decide to have bone pieces removed, please make an appointment with Dr. Elizalde  If you decide anything else as what was discussed, please call Dr. Grijalva           Follow-ups after your visit        Your next 10 appointments already scheduled     Feb 06, 2017 10:00 AM   (Arrive by 9:45 AM)   Return Visit with Gema Kirk NP   [facility] [place] ([facility])    [address]  [place] [address] [phone]              Who to contact     If you have questions or need follow up information about today's clinic visit or your schedule please contact [facility] directly at [phone].  Normal or non-critical lab and imaging results will be communicated to " "you by MyChart, letter or phone within 4 business days after the clinic has received the results. If you do not hear from us within 7 days, please contact the clinic through [employer] or phone. If you have a critical or abnormal lab result, we will notify you by phone as soon as possible.  Submit refill requests through [employer] or call your pharmacy and they will forward the refill request to us. Please allow 3 business days for your refill to be completed.          Additional Information About Your Visit        [employer] Information     [employer] lets you send messages to your doctor, view your test results, renew your prescriptions, schedule appointments and more. To sign up, go to [email or web address] . Click on \"Log in\" on the left side of the screen, which will take you to the Welcome page. Then click on \"Sign up Now\" on the right side of the page.     You will be asked to enter the access code listed below, as well as some personal information. Please follow the directions to create your username and password.     Your access code is: [email or web address]  Expires: 2017  2:01 PM     Your access code will  in 90 days. If you need help or a new code, please call your [facility] clinic or [phone].        Care EveryWhere ID     This is your Care EveryWhere ID. This could be used by other organizations to access your [facility] medical records  [id number]        Your Vitals Were     Pulse Temperature                79 97.6  F (36.4  C)           Blood Pressure from Last 3 Encounters:   17 109/60   17 122/62   17 122/62    Weight from Last 3 Encounters:   17 188 lb (85.276 kg)   17 185 lb 13.6 oz (84.3 kg)   16 208 lb (94.348 kg)              Today, you had the following     No orders found for display       Primary Care Provider Office Phone # Fax #    R Hao Grijalva [phone]       [facility] [place] [address] [place] [id number]        Thank " you!     Thank you for choosing [facility]  for your care. Our goal is always to provide you with excellent care. Hearing back from our patients is one way we can continue to improve our services. Please take a few minutes to complete the written survey that you may receive in the mail after your visit with us. Thank you!             Your Updated Medication List - Protect others around you: Learn how to safely use, store and throw away your medicines at www.disposemymeds.org.          This list is accurate as of: 1/26/17  3:54 PM.  Always use your most recent med list.                   Brand Name Dispense Instructions for use    acetaminophen 650 MG CR tablet    TYLENOL    60 tablet    Take 1 tablet (650 mg) by mouth every 8 hours as needed       amoxicillin-clavulanate 875-125 MG per tablet    AUGMENTIN    28 tablet    Take 1 tablet by mouth every 12 hours       aspirin 325 MG tablet     120 tablet    Take 1 tablet (325 mg) by mouth daily       B-D U/F 31G X 8 MM   Generic drug:  insulin pen needle     6 each    USE 5 TO 6 PEN NEEDLES DAILY OR AS DIRECTED       benzocaine-menthol 15-3.6 MG lozenge    CEPACOL    30 lozenge    Place 1 lozenge inside cheek every hour as needed for sore throat       blood glucose monitoring meter device kit    no brand specified    1 kit    Use to test blood sugar 5 times daily or as directed.       candesartan 16 MG tablet    ATACAND    90 tablet    Take 1 tablet (16 mg) by mouth daily       CENTRUM SILVER ADULT 50+ PO          Cranberry 500 MG Caps     90 capsule    Take 1 capsule (500 mg) by mouth daily       digoxin 125 MCG tablet    LANOXIN    90 tablet    Take 1 tablet (125 mcg) by mouth daily       * Doxylamine-DM 6.25-15 MG/15ML Liqd     236 mL    Taking as pkg directions at night       * Doxylamine-DM 6.25-15 MG/15ML Liqd     236 mL    Taking as pkg directions at night       fluticasone-salmeterol 250-50 MCG/DOSE diskus inhaler    ADVAIR DISKUS    3 Inhaler     "USE 1 INHALATION TWO TIMES A DAY IN THE MORNING AND IN THE EVENING APPROXIMATELY 12 HOURS APART       furosemide 40 MG tablet    LASIX    90 tablet    Take 1 tablet (40 mg) by mouth daily       HYDROcodone-acetaminophen 5-325 MG per tablet    NORCO    30 tablet    Take 1 tablet by mouth every 6 hours as needed for moderate to severe pain       insulin aspart 100 UNITS/ML injection    NovoLOG VIAL    30 mL    Inject 1-7 Units Subcutaneous 3 times daily (with meals) If glucose less than or equal to 150 mg/dL do not give insulin  If glucose 151-200 give 2 Units subcutaneously  If glucose 201-250 give 3 Units subcutaneously  If glucose 251-300 give 4 Units subcutaneously  If glucose 301-350 give 5 Units subcutaneously  If glucose over 350 give 7 Units subcutaneously and call physician       insulin detemir 100 UNIT/ML injection    LEVEMIR VIAL    10 mL    Inject 35 units in the morning and 10 units at bedtime subcutaneously.       insulin syringe-needle U-100 31G X 5/16\" 1 ML    ADVOCATE INSULIN SYRINGE    300 each    Use 4 syringes daily or as directed.       magnesium 250 MG tablet     30 tablet    Take 1 tablet by mouth daily       metoprolol 50 MG 24 hr tablet    TOPROL XL    90 tablet    Take 0.5 tablets (25 mg) by mouth daily       ONE TOUCH ULTRA test strip   Generic drug:  blood glucose monitoring     300 each    USE UP TO 5 STRIPS DAILY TO TEST BLOOD GLUCOSE       * order for DME     1 Device    Equipment being ordered: Hospital Bed       * order for DME     60 each    Equipment being ordered: Xero form dressing Change dressing on foot wound daily       * order for DME     6 Month    Equipment being ordered: Warm water and baby shampoo soaks daily to affected area. Cover with Xeroform dressing and gauze until healed  Fax order to [facility] [phone]       order for DME     1 each    Equipment being ordered: Kerlix 4\" rolls, # 30; 1\" transpore tape- 2 rolls; 4X4\" gauze pads- please fill a sleeve of non " "strerile; 1 bottle of 1/2\" iodoform strip gauze, xeroform 5X9\"- one package.       ranitidine 150 MG tablet    ZANTAC    180 tablet    Take 1 tablet (150 mg) by mouth 2 times daily       saccharomyces boulardii 250 MG capsule    FLORASTOR    28 capsule    Take 1 capsule (250 mg) by mouth 2 times daily for 14 days       simvastatin 80 MG tablet    ZOCOR    90 tablet    TAKE 1 TABLET DAILY IN THE EVENING       warfarin 2 MG tablet    COUMADIN    312 tablet    TAKE 4 TABLETS ON MONDAY, WEDNESDAY, AND FRIDAY AND 3 TABLETS ALL OTHER DAYS       * Notice:  This list has 5 medication(s) that are the same as other medications prescribed for you. Read the directions carefully, and ask your doctor or other care provider to review them with you.      "

## 2017-01-27 DIAGNOSIS — E11.9 TYPE 2 DIABETES MELLITUS WITHOUT COMPLICATION, WITH LONG-TERM CURRENT USE OF INSULIN (H): Primary | ICD-10-CM

## 2017-01-27 DIAGNOSIS — E11.9 DIABETES MELLITUS (H): ICD-10-CM

## 2017-01-27 DIAGNOSIS — Z79.4 TYPE 2 DIABETES MELLITUS WITHOUT COMPLICATION, WITH LONG-TERM CURRENT USE OF INSULIN (H): Primary | ICD-10-CM

## 2017-01-30 ENCOUNTER — TELEPHONE (OUTPATIENT)
Dept: FAMILY MEDICINE | Facility: OTHER | Age: 82
End: 2017-01-30

## 2017-01-30 DIAGNOSIS — E11.9 TYPE 2 DIABETES MELLITUS WITHOUT COMPLICATION, WITH LONG-TERM CURRENT USE OF INSULIN (H): ICD-10-CM

## 2017-01-30 DIAGNOSIS — E11.9 DIABETES MELLITUS (H): Primary | ICD-10-CM

## 2017-01-30 DIAGNOSIS — Z79.4 TYPE 2 DIABETES MELLITUS WITHOUT COMPLICATION, WITH LONG-TERM CURRENT USE OF INSULIN (H): ICD-10-CM

## 2017-01-30 NOTE — TELEPHONE ENCOUNTER
10:52 AM    Reason for Call: Phone Call    Description: Patient hasn't gotten his diabetes supplies. Swati  From assisted living  [facility], said they have to be written a certain way. Please call [phone]    Was an appointment offered for this call? No    Preferred method for responding to this message: Telephone Call    If we cannot reach you directly, may we leave a detailed response at the number you provided? Yes    Can this message wait until your PCP/provider returns, if available today? n/a    Tiffanie Edwards

## 2017-01-31 NOTE — PROGRESS NOTES
"SUBJECTIVE:  Adiel Rosa Jr, 81 year old, male presents with the following Chief Complaint(s) with HPI to follow:  Chief Complaint   Patient presents with     WOUND CARE        Wound Care    HPI:  Adiel is here today with an aide from his Assisted Living for the reassessment and treatment of right foot diabetic wound.  Here's the back story, as provided by the patient:  Adiel isn't sure when it officially started, but does recall dropping a small refrigerator about 3 months ago on the top of his foot.    He never went in for it.    He does remember his toes discoloring.    On 1/11/2017, Adiel was brought to the ED via ambulance for a BGs in the 30s.    That's when it was discovered that he had a diabetic foot ulcer.   It was recommended that he should have a Right BKA, but the patient refused.    He continues to refuse today states \"I'd rather die than have anything cut off my body\".    Adiel continues to walk on it and doesn't have any offloading shoe/device.    Past treatments, xeroform and gauze.    Josiah Kirk NP was able to see the patient on 1/20/17 and changed his dressings to the following:    Top of foot: xeroform daily; bottom of foot: iodoform packing, changed daily.    We were asked to see Adiel today because \"a bone has moved\".     Denies any fevers, chills, and/or malodorous drainage.   PMH: uncontrollable diabetes mellitus, type 2.  A1C      8.4   1/15/2017      Patient Active Problem List   Diagnosis     Chronic atrial fibrillation (H)     Advanced care planning/counseling discussion     Hypercholesterolemia     Chronic renal disease, stage III     Benign hypertensive heart disease with heart failure (H)     Hypercalcemia     Chronic airway obstruction (H)     Erectile Dysfunction     Hypertrophy of prostate without urinary obstruction     Esophageal reflux     Acute myocardial infarction of other specified sites, episode of care unspecified     Diabetes mellitus, type 2 (H)     Cough " "    Hypertension, benign     Long-term (current) use of anticoagulants [Z79.01]     Hypoglycemia due to insulin     Primary prostate cancer identified by needle biopsy (T1c) (H)     Osteomyelitis of foot, right, acute (H)     Health Care Home       Past Medical History   Diagnosis Date     Diabetes mellitus without mention of complication 11/29/1999     Cough 9/11/2007     Hypertension, benign 1/1/2011     Hypercholesterolemia 1/1/2011     Acute myocardial infarction of other specified sites, episode of care unspecified 1/1/1999     Non Q wave  treated with Retavase     Esophageal reflux 1/1/2011     BPH 1/1/2011     Erectile Dysfunction 1/1/2011     COPD 1/1/2011     Hypercalcemia 1/1/2011     Benign hypertensive heart disease with congestive 1/1/2011     Chronic renal disease, stage III 9/22/2011       Past Surgical History   Procedure Laterality Date     Nephrolithiasis       lithotripsy     Bladder stone removal       Appendectomy       Tonsillectomy       Stent x 1         Family History   Problem Relation Age of Onset     C.A.D. Father      C.A.D.       Family hx     Heart Failure Mother      Congestive     DIABETES       Family hx     HEART DISEASE Brother      MI, cause of death     HEART DISEASE Father      MI, cause of death     Other - See Comments Brother      PVD     HEART DISEASE Mother      MI, cause of death       Social History   Substance Use Topics     Smoking status: Former Smoker     Types: Cigarettes     Smokeless tobacco: Never Used      Comment: No passive exposure; quit, 1990     Alcohol Use: Not on file       Current Outpatient Prescriptions   Medication Sig Dispense Refill     order for DME Equipment being ordered:     1. Darco Orthowedge  Size XL  Disp: 1 (right foot)   Refill: 0 1 each 0     saccharomyces boulardii (FLORASTOR) 250 MG capsule Take 1 capsule (250 mg) by mouth 2 times daily for 14 days 28 capsule 0     order for DME Equipment being ordered: Kerlix 4\" rolls, # 30; 1\" " "transpore tape- 2 rolls; 4X4\" gauze pads- please fill a sleeve of non strerile; 1 bottle of 1/2\" iodoform strip gauze, xeroform 5X9\"- one package. 1 each 1     Doxylamine-DM 6.25-15 MG/15ML LIQD Taking as pkg directions at night 236 mL 1     Multiple Vitamins-Minerals (CENTRUM SILVER ADULT 50+ PO)        order for DME Equipment being ordered:  Warm water and baby shampoo soaks daily to affected area. Cover with Xeroform dressing and gauze until healed    Fax order to [facility]  [phone] 6 Month 1     insulin aspart (NOVOLOG VIAL) 100 UNITS/ML injection Inject 1-7 Units Subcutaneous 3 times daily (with meals) If glucose less than or equal to 150 mg/dL do not give insulin   If glucose 151-200 give 2 Units subcutaneously   If glucose 201-250 give 3 Units subcutaneously   If glucose 251-300 give 4 Units subcutaneously   If glucose 301-350 give 5 Units subcutaneously   If glucose over 350 give 7 Units subcutaneously and call physician 30 mL 1     insulin syringe-needle U-100 (ADVOCATE INSULIN SYRINGE) 31G X 5/16\" 1 ML Use 4 syringes daily or as directed. 300 each 1     order for DME Equipment being ordered: Xero form dressing  Change dressing on foot wound daily 60 each 3     insulin detemir (LEVEMIR VIAL) 100 UNIT/ML injection Inject 35 units in the morning and 10 units at bedtime subcutaneously. 10 mL 1     HYDROcodone-acetaminophen (NORCO) 5-325 MG per tablet Take 1 tablet by mouth every 6 hours as needed for moderate to severe pain 30 tablet 0     Cranberry 500 MG CAPS Take 1 capsule (500 mg) by mouth daily 90 capsule 3     acetaminophen (TYLENOL) 650 MG CR tablet Take 1 tablet (650 mg) by mouth every 8 hours as needed 60 tablet      aspirin 325 MG tablet Take 1 tablet (325 mg) by mouth daily 120 tablet 3     fluticasone-salmeterol (ADVAIR DISKUS) 250-50 MCG/DOSE diskus inhaler USE 1 INHALATION TWO TIMES A DAY IN THE MORNING AND IN THE EVENING APPROXIMATELY 12 HOURS APART 3 Inhaler 1     warfarin " (COUMADIN) 2 MG tablet TAKE 4 TABLETS ON MONDAY, WEDNESDAY, AND FRIDAY AND 3 TABLETS ALL OTHER DAYS 312 tablet 3     simvastatin (ZOCOR) 80 MG tablet TAKE 1 TABLET DAILY IN THE EVENING 90 tablet 3     candesartan (ATACAND) 16 MG tablet Take 1 tablet (16 mg) by mouth daily 90 tablet 0     metoprolol (TOPROL XL) 50 MG 24 hr tablet Take 0.5 tablets (25 mg) by mouth daily 90 tablet 0     digoxin (LANOXIN) 125 MCG tablet Take 1 tablet (125 mcg) by mouth daily 90 tablet 0     Doxylamine-DM 6.25-15 MG/15ML LIQD Taking as pkg directions at night 236 mL 3     furosemide (LASIX) 40 MG tablet Take 1 tablet (40 mg) by mouth daily 90 tablet 1     magnesium 250 MG tablet Take 1 tablet by mouth daily 30 tablet 3     benzocaine-menthol (CEPACOL) 15-3.6 MG lozenge Place 1 lozenge inside cheek every hour as needed for sore throat 30 lozenge 3     amoxicillin-clavulanate (AUGMENTIN) 875-125 MG per tablet Take 1 tablet by mouth every 12 hours 28 tablet 0     ranitidine (ZANTAC) 150 MG tablet Take 1 tablet (150 mg) by mouth 2 times daily 180 tablet 0     order for DME Equipment being ordered: Hospital Bed 1 Device 0     B-D U/F 31G X 8 MM insulin pen needle USE 5 TO 6 PEN NEEDLES DAILY OR AS DIRECTED 6 each 3     blood glucose monitoring (ONE TOUCH ULTRA) test strip Use to test blood sugar 5 times daily due to patient being on sliding scale. 300 each 3     blood glucose monitoring (NO BRAND SPECIFIED) meter device kit Use to test blood sugar 5 times daily due to patient being on sliding scale. 1 kit 0       No Known Allergies    REVIEW OF SYSTEMS  Skin: as noted above  Eyes: history of black spots (REFUSES to go to the eye MD)  Ears/Nose/Throat: positive for [place]  Respiratory: No shortness of breath, dyspnea on exertion, cough, or hemoptysis  Cardiovascular: negative  Gastrointestinal: negative  Genitourinary: negative  Musculoskeletal: positive for arthritis; no foot pain  Neurologic: positive for numbness or tingling of  "feet  Psychiatric: negative  Hematologic/Lymphatic/Immunologic: negative  Endocrine: positive for diabetes    OBJECTIVE:  /60 mmHg  Pulse 79  Temp(Src) 97.6  F (36.4  C)  Constitutional: alert and no distress  Cardiovascular: Irregular rhythm. No murmurs, clicks gallops or rub  Respiratory: Good diaphragmatic excursion. Lungs clear  Gastrointestinal: Abdomen soft, non-tender. BS normal.   Skin:  Wound description: Type of Wound- diabetic foot ulcer; Location- right foot (dorsal), Drainage amount-small, Drainage color- serosanguineous  , Odor- none; Wound bed-100% pale pink, Surrounding skin-discolored (brownish).  Measurements: 0.7 x 0.5  Dressing change: Wound cleansed with soap and water, dried, dressed with xeroform, dry gauze, and secured with medipore tape.     Wound description: Type of Wound- diabetic foot ulcer; Location- right foot (plantar), Drainage amount-small/moderate, Drainage color- tan/brown  , Odor- none; Wound bed- unable to visualize base, noted a large bone fragment that's blocking the opening, Surrounding skin-calloused.  Measurements: 1.6 x 1 x 3.5 cm  Dressing change: Wound cleansed with soap and water, dried, gently packed with iodoform packing strip dressed with xeroform, dry gauze, and secured with medipore tape.      Psychiatric: mentation appears normal and affect normal/bright    Washed foot with soap and water, dired.    Noted the bone fragment.    Spoke to Dr. Grijalva and Dr. Zamora about the bone fragment.    Dr. Zamora stated that if the patient would like it removed, please make a clinic appointment   Told Adiel the plan.  He states \"No way in h*ll\"      Told the aide that the wound can still be packed (as best as it can) by pushing the bone to the side.      LABS  Results for orders placed or performed in visit on 01/25/17   INR   Result Value Ref Range    INR 1.6        ASSESSMENT / PLAN:  1. Diabetic ulcer of right foot associated with type 2 diabetes mellitus " "(H)  Continue with the same wound care plan.      Order sent to [place] for offloading shoe.      - order for DME; Equipment being ordered:     1. Darco Orthowedge  Size XL  Disp: 1 (right foot)   Refill: 0  Dispense: 1 each; Refill: 0    2. Acute osteomyelitis of right foot (H)  As mentioned above  - order for DME; Equipment being ordered:     1. Darco Orthowedge  Size XL  Disp: 1 (right foot)   Refill: 0  Dispense: 1 each; Refill: 0      Patient Instructions   Wound Care Instructions:  1) Wash your hands and work space  2) Gather all your supplies: soap and water (kareen's baby soap), clean washcloth or gauze, xeroform, 1/2\" Iodoform strip gauze, kerlix, tape, gauze pads  3) Cleanse wound with soap and water, rinse, dry  4) Dress wound with 1) top of foot- cover with a piece of xeroform and dry gauze, 2) bottom of foot- tuck Iodoform gauze strip (can try the 1/4\") into wound (okay to push the bone to the side), cover with dry gauze and secure both dressings with kerlix and tape   5) Change dressing daily, or twice daily if possible  6) Dispose of all dirty supplies  7) Wash hands and equipment    Watch for any increase in pain, fevers, chills, changes in the drainage odor or mentation, please be seen by ED.   Please call if you have any questions or concerns or if any problems develop.   Follow up in two weeks at the wound center (ER entrance, check in at admitting)     If you decide to have bone pieces removed, please make an appointment with Dr. Elizalde  If you decide anything else as what was discussed, please call Dr. Grijalva       Time: 45 minutes  Barrier: stubborn   Willingness to learn: accepting    Piedad Cruz RN [facility]  Disease Management    Cc: Dr. SASKIA Grijalva          "

## 2017-02-01 ENCOUNTER — ANTICOAGULATION THERAPY VISIT (OUTPATIENT)
Dept: ANTICOAGULATION | Facility: OTHER | Age: 82
End: 2017-02-01

## 2017-02-01 ENCOUNTER — CARE COORDINATION (OUTPATIENT)
Dept: CARE COORDINATION | Facility: OTHER | Age: 82
End: 2017-02-01

## 2017-02-01 DIAGNOSIS — Z79.01 LONG-TERM (CURRENT) USE OF ANTICOAGULANTS: Primary | ICD-10-CM

## 2017-02-01 DIAGNOSIS — I48.20 CHRONIC ATRIAL FIBRILLATION (H): ICD-10-CM

## 2017-02-01 LAB — INR PPP: 2.4

## 2017-02-01 NOTE — PROGRESS NOTES
ANTICOAGULATION FOLLOW-UP CLINIC VISIT    Patient Name:  Adiel Rosa Jr  Date:  2/1/2017  Contact Type:  faxed INR result from [facility] service. New warfarin dosing and INR recheck date faxed back to [facility] and to [facility] as that is where patient is residing.    SUBJECTIVE:     Patient Findings     Positives Medication Changes    Comments Antibiotics completed           OBJECTIVE    INR   Date Value Ref Range Status   02/01/2017 2.4  Final       ASSESSMENT / PLAN  INR assessment THER    Recheck INR In: 2 WEEKS    INR Location [facility]      Anticoagulation Summary as of 2/1/2017     INR goal 2.0-3.0   Selected INR 2.4 (2/1/2017)   Maintenance plan 8 mg (2 mg x 4) on Mon, Wed, Fri; 6 mg (2 mg x 3) all other days   Full instructions 8 mg on Mon, Wed, Fri; 6 mg all other days   Weekly total 48 mg   Plan last modified Jessa Gibson RN (8/2/2016)   Next INR check    Priority INR   Target end date Indefinite    Indications   Chronic atrial fibrillation (H) [I48.2]  Long-term (current) use of anticoagulants [Z79.01] [Z79.01]         Anticoagulation Episode Summary     INR check location     Preferred lab     Send INR reminders to [facility] ANTICOAG POOL    Comments [facility] assisted living, Fax   [facility] fax: [phone], , [phone]      Anticoagulation Care Providers     Provider Role Specialty Phone number    GAURAV Grijalva MD [facility] [phone]            See the Encounter Report to view Anticoagulation Flowsheet and Dosing Calendar (Go to Encounters tab in chart review, and find the Anticoagulation Therapy Visit)        Jessa Storey RN

## 2017-02-02 ASSESSMENT — ENCOUNTER SYMPTOMS
COUGH: 0
ABDOMINAL DISTENTION: 0
HEADACHES: 0
NECK PAIN: 0
NAUSEA: 0
PALPITATIONS: 0
ABDOMINAL PAIN: 0
CONFUSION: 0
COLOR CHANGE: 0
VOICE CHANGE: 0
FREQUENCY: 0
DIAPHORESIS: 0
WEAKNESS: 0
SHORTNESS OF BREATH: 0
VOMITING: 0
DIZZINESS: 1
FEVER: 0
FLANK PAIN: 0
DYSURIA: 0
WHEEZING: 0
CHEST TIGHTNESS: 0
MYALGIAS: 0
NUMBNESS: 0
ANAL BLEEDING: 0
BACK PAIN: 0
LIGHT-HEADEDNESS: 0
SLEEP DISTURBANCE: 0
CHILLS: 0
BLOOD IN STOOL: 0

## 2017-02-06 ENCOUNTER — HOSPITAL ENCOUNTER (OUTPATIENT)
Dept: WOUND CARE | Facility: HOSPITAL | Age: 82
Discharge: HOME | End: 2017-02-06
Attending: NURSE PRACTITIONER | Admitting: NURSE PRACTITIONER
Payer: MEDICARE

## 2017-02-06 ENCOUNTER — MEDICAL CORRESPONDENCE (OUTPATIENT)
Dept: HEALTH INFORMATION MANAGEMENT | Facility: HOSPITAL | Age: 82
End: 2017-02-06

## 2017-02-06 VITALS — HEART RATE: 82 BPM | TEMPERATURE: 98.2 F | DIASTOLIC BLOOD PRESSURE: 61 MMHG | SYSTOLIC BLOOD PRESSURE: 111 MMHG

## 2017-02-06 DIAGNOSIS — E10.621 DIABETIC ULCER OF RIGHT FOOT ASSOCIATED WITH TYPE 1 DIABETES MELLITUS (H): Primary | ICD-10-CM

## 2017-02-06 DIAGNOSIS — L97.519 DIABETIC ULCER OF RIGHT FOOT ASSOCIATED WITH TYPE 1 DIABETES MELLITUS (H): Primary | ICD-10-CM

## 2017-02-06 PROCEDURE — 99213 OFFICE O/P EST LOW 20 MIN: CPT

## 2017-02-06 NOTE — PROGRESS NOTES
"Adiel Rosa , 1935  Chief Complaint   Patient presents with     WOUND CARE       Patient Active Problem List   Diagnosis     Chronic atrial fibrillation (H)     Advanced care planning/counseling discussion     Hypercholesterolemia     Chronic renal disease, stage III     Benign hypertensive heart disease with heart failure (H)     Hypercalcemia     Chronic airway obstruction (H)     Erectile Dysfunction     Hypertrophy of prostate without urinary obstruction     Esophageal reflux     Acute myocardial infarction of other specified sites, episode of care unspecified     Diabetes mellitus, type 2 (H)     Cough     Hypertension, benign     Long-term (current) use of anticoagulants [Z79.01]     Hypoglycemia due to insulin     Primary prostate cancer identified by needle biopsy (T1c) (H)     Osteomyelitis of foot, right, acute (H)     Health Care Home       Concerns/Comments since last visits: Adiel is here with aid from [facility] assisted living. They sent written communication stating that over the weekend there was a significant increase in drainage over the weekend and a foul odor. The home care nurse also reports that during dressing change she had put light pressure on his foot and heard a \"pop\" and subsequently a large amt of drainage came out. He has been ambulating even with encouragement from staff to be pushed in W/C.  During the first few minutes of the appt Mesfin had made reference to his would not healing. He has been seen by Dr. Zamora and amputation was recommended and antibiotic tx was not seen as an option due to the extent of the infection and damage. We had an in depth conversation about the osteomyelitis infection of his foot and pt is aware this is not going heal and continues to refuse recommended amputation, understands the goal of being seen today is to provide him a dressing to control drainage and odor. He states that he is 82 years old and is going to die with both " of his legs.       Objective:   Dorsal L foot wound: 0.5x0.5cm surface level open area, small amt of serous drainage.  Plantar L foot wound: 1.2x0.9x1.8cm. Undermines from 11-3:00, 1.4cm at the deepest, moderate serosanguinous drainage. Can probe to bone.  Periwound skin is excessively warm blanchable red/maroon.  No odor or purulent drainage.    Procedures:   Wound bed flushed with NS and foot cleansed with soap and water.  Loosely packed silver hydrofiber to plantar foot ulceration.  Xeroform to dorsal to dorsal foot ulceration.  Covered with dry gauze affixed with kerlix.    Assessments:  See pictures taken this visit.  No improvement. Drainage and odor controlled with dressings.  Evidence of infection with warmth and erythema.  Pt refuses tx and understands that this will deteriorate further.    Plan of care:   Pts goal is to manage drainage and odor at this time.  Understands that he will need to return to orthopedic surgeon if he would like treatment.    Supplies provided:  NA    Education:  Written wound care instructions/education provided. Patient verbalized understanding of instruction/education.    Nurse face to face time: 35min    CC: GAURAV Grijalva

## 2017-02-07 NOTE — PROGRESS NOTES
Clinic Care Coordination Contact  Care Team Conversations    Attempted to contact Sola at [facility] assisted living with messages left for update on status.  Verified patient does remain at facility.  Will plan to f/u in 2 weeks.     Christie Greer, RN-BSN  RN Clinic Care Coordinator  [facility]  Phone:  [phone]

## 2017-02-08 DIAGNOSIS — Z79.4 TYPE 2 DIABETES MELLITUS WITHOUT COMPLICATION, WITH LONG-TERM CURRENT USE OF INSULIN (H): Primary | ICD-10-CM

## 2017-02-08 DIAGNOSIS — E11.9 TYPE 2 DIABETES MELLITUS WITHOUT COMPLICATION, WITH LONG-TERM CURRENT USE OF INSULIN (H): Primary | ICD-10-CM

## 2017-02-09 ENCOUNTER — MEDICAL CORRESPONDENCE (OUTPATIENT)
Dept: HEALTH INFORMATION MANAGEMENT | Facility: HOSPITAL | Age: 82
End: 2017-02-09

## 2017-02-09 ENCOUNTER — CARE COORDINATION (OUTPATIENT)
Dept: CARE COORDINATION | Facility: OTHER | Age: 82
End: 2017-02-09

## 2017-02-09 NOTE — PROGRESS NOTES
Clinic Care Coordination Contact  Care Team Conversations    Spoke with Dr. Hao Grijalva this date regarding patient.  He received call from Madyson with [facility] to update that patient has been refusing wound care and concerns with wound infection.  They discussed having a mini mental exam done to assess cognitive status.  Care Coordinator spoke with Madyson and informed her to fax requested orders to care coordinator and provided with direct line and fax number.   Reports her concerns that patient has been more resistive to cares and becoming more angry.  Dr. Grijalva is aware.  Awaiting for agency to fax orders for OT evaluation.     Christie Greer, RN-BSN  RN Clinic Care Coordinator  [facility]  Phone:  [phone]

## 2017-02-09 NOTE — PROGRESS NOTES
Received fax from [facility] for request for OT order to evaluate.  Orders signed by Dr. Bui and faxed back to agency.     Christie Greer, RN-BSN  RN Clinic Care Coordinator  [facility]

## 2017-02-10 ENCOUNTER — TRANSFERRED RECORDS (OUTPATIENT)
Dept: HEALTH INFORMATION MANAGEMENT | Facility: HOSPITAL | Age: 82
End: 2017-02-10

## 2017-02-10 RX ORDER — INSULIN DETEMIR 100 [IU]/ML
INJECTION, SOLUTION SUBCUTANEOUS
Qty: 10 ML | Refills: 2 | Status: SHIPPED | OUTPATIENT
Start: 2017-02-10 | End: 2017-03-30

## 2017-02-11 DIAGNOSIS — I48.20 CHRONIC ATRIAL FIBRILLATION (H): Primary | ICD-10-CM

## 2017-02-13 ENCOUNTER — CARE COORDINATION (OUTPATIENT)
Dept: CARE COORDINATION | Facility: OTHER | Age: 82
End: 2017-02-13

## 2017-02-13 ENCOUNTER — TELEPHONE (OUTPATIENT)
Dept: FAMILY MEDICINE | Facility: OTHER | Age: 82
End: 2017-02-13

## 2017-02-13 DIAGNOSIS — S20.221A BACK CONTUSION, RIGHT, INITIAL ENCOUNTER: ICD-10-CM

## 2017-02-13 RX ORDER — HYDROCODONE BITARTRATE AND ACETAMINOPHEN 5; 325 MG/1; MG/1
TABLET ORAL
Qty: 30 TABLET | Refills: 0 | Status: SHIPPED | OUTPATIENT
Start: 2017-02-13 | End: 2017-02-24

## 2017-02-13 RX ORDER — WARFARIN SODIUM 2 MG/1
TABLET ORAL
Qty: 312 TABLET | Refills: 0 | Status: SHIPPED | OUTPATIENT
Start: 2017-02-13 | End: 2017-03-13

## 2017-02-13 NOTE — PROGRESS NOTES
Clinic Care Coordination Contact  Care Team Conversations    Voice mail is received from Madyson Vitale RN with [facility].  Message stated Mini Mental was completed with low score and offered to fax copy.  She also had questions about seeking guardianship for patient ad questioned if care coordinator was doing this.  F/U call is made and VM left with fax # to send copy of mini-mental exam.  Message is also left that care coordinator is not seeking guardianship and this is being worked on by [facility] if needed.  Phone call is placed to [facility] and spoke with Swati.  Discussed message received from Madyson, and she was aware of his cognitive testing.  Patient has verbalized to RN at assisted Living that he is aware that he could die due to this wound and risk of sepsis.  Told her if it gets that bad, he knows he will need to consider amputation.  He is managing well at assisted living.  Facility permitted him and spouse to have one of their dogs from home come and live with them  Reports that Tiffanie,  of facility has started the initial contact with [facility] and at this time patient cannot be screened until their house is up for sale.  At this time, they have adeuate funds to reside at assisted living.  Decision made since facility is currently primary caregiver that they will follow through with any contact needed at [facility].  Since patient safe and has assisted living and homecare, will plan to f/u in 1 month.  Primary reason for care coordination is to ensure that patient and his spouse do not attempt to return to home as they cannot manage independently and would need further evaluation.     Christie Greer RN-BSN  RN Clinic Care Coordinator  [facility]  Phone:  [phone]

## 2017-02-13 NOTE — TELEPHONE ENCOUNTER
10:12 AM    Reason for Call: Phone Call    Description: Nurse from [facility] would like to speak to nurse regarding a blood sugar of patient. Please call back at your earliest convenience [phone]    Was an appointment offered for this call? No    Preferred method for responding to this message: Telephone Call    If we cannot reach you directly, may we leave a detailed response at the number you provided? Yes    Can this message wait until your PCP/provider returns, if available today? YES    Caryn Canela

## 2017-02-13 NOTE — TELEPHONE ENCOUNTER
Nursing home notified by VM of 's recommendations and if any direct further questions was given our direct line.  Rosalind Freeman LPN

## 2017-02-13 NOTE — TELEPHONE ENCOUNTER
"Nurse called from NH and wanted to report patient had a BG of 353 before lunch yesterday orders state \"notified physician with BG > 350 and give 7 units and recheck BG.\" Patients sugar did come down and nursing home is wanting to know do you want to be notified on every BG that is > 350? Please advise.  Rosalind Freeman, SHERLY    "

## 2017-02-14 ENCOUNTER — MEDICAL CORRESPONDENCE (OUTPATIENT)
Dept: HEALTH INFORMATION MANAGEMENT | Facility: HOSPITAL | Age: 82
End: 2017-02-14

## 2017-02-15 ENCOUNTER — ANTICOAGULATION THERAPY VISIT (OUTPATIENT)
Dept: ANTICOAGULATION | Facility: OTHER | Age: 82
End: 2017-02-15

## 2017-02-15 DIAGNOSIS — I48.20 CHRONIC ATRIAL FIBRILLATION (H): ICD-10-CM

## 2017-02-15 DIAGNOSIS — Z79.01 LONG-TERM (CURRENT) USE OF ANTICOAGULANTS: ICD-10-CM

## 2017-02-15 LAB — INR PPP: 3.7

## 2017-02-15 NOTE — PROGRESS NOTES
ANTICOAGULATION FOLLOW-UP CLINIC VISIT    Patient Name:  Adiel Rosa Jr  Date:  2/15/2017  Contact Type:  Telephone/ spoke with homecare nurse, Talyor re: INR result, warfarin dosing and INR recheck date. she verbalized understanding and will notify assisted living of warfarin dosing.    SUBJECTIVE:     Patient Findings     Positives No Problem Findings           OBJECTIVE    INR   Date Value Ref Range Status   02/15/2017 3.7  Final       ASSESSMENT / PLAN  INR assessment SUPRA    Recheck INR In: 1 WEEK    INR Location Homecare INR      Anticoagulation Summary as of 2/15/2017     INR goal 2.0-3.0   Today's INR 3.7!   Maintenance plan 8 mg (2 mg x 4) on Mon, Fri; 6 mg (2 mg x 3) all other days   Full instructions 2/15: Hold; Otherwise 8 mg on Mon, Fri; 6 mg all other days   Weekly total 46 mg   Plan last modified Jessa Storey RN (2/15/2017)   Next INR check 2/22/2017   Priority INR   Target end date Indefinite    Indications   Chronic atrial fibrillation (H) [I48.2]  Long-term (current) use of anticoagulants [Z79.01] [Z79.01]         Anticoagulation Episode Summary     INR check location     Preferred lab     Send INR reminders to [facility] ANTICOAG POOL    Comments [facility] assisted living, Fax   [facility] fax: [phone], , [phone]      Anticoagulation Care Providers     Provider Role Specialty Phone number    GAURAV Grijalva MD [facility] [phone]            See the Encounter Report to view Anticoagulation Flowsheet and Dosing Calendar (Go to Encounters tab in chart review, and find the Anticoagulation Therapy Visit)        Jessa Storey RN

## 2017-02-15 NOTE — MR AVS SNAPSHOT
Adiel Rosa    2/15/2017   Anticoagulation Therapy Visit    Description:  81 year old male   Provider:  GAURAV Grijalva MD   Department:  Hc Anti Coagulation           INR as of 2/15/2017     Today's INR 3.7!      Anticoagulation Summary as of 2/15/2017     INR goal 2.0-3.0   Today's INR 3.7!   Full instructions 2/15: Hold; Otherwise 8 mg on Mon, Fri; 6 mg all other days   Next INR check 2/22/2017    Indications   Chronic atrial fibrillation (H) [I48.2]  Long-term (current) use of anticoagulants [Z79.01] [Z79.01]         February 2017 Details    Sun Mon Tue Wed Thu Fri Sat        1               2               3               4                 5               6               7               8               9               10               11                 12               13               14               15      Hold   See details      16      6 mg         17      8 mg         18      6 mg           19      6 mg         20      8 mg         21      6 mg         22            23               24               25                 26               27               28                    Date Details   02/15 This INR check       Date of next INR:  2/22/2017         How to take your warfarin dose     To take:  6 mg Take 3 of the 2 mg tablets.    To take:  8 mg Take 4 of the 2 mg tablets.    Hold Do not take your warfarin dose. See the Details table to the right for additional instructions.

## 2017-02-19 DIAGNOSIS — E78.00 PURE HYPERCHOLESTEROLEMIA: ICD-10-CM

## 2017-02-19 DIAGNOSIS — I10 HTN (HYPERTENSION): ICD-10-CM

## 2017-02-20 RX ORDER — SIMVASTATIN 80 MG
TABLET ORAL
Qty: 90 TABLET | Refills: 0 | Status: SHIPPED | OUTPATIENT
Start: 2017-02-20 | End: 2018-02-12

## 2017-02-20 RX ORDER — DIGOXIN 125 MCG
TABLET ORAL
Qty: 90 TABLET | Refills: 0 | Status: SHIPPED | OUTPATIENT
Start: 2017-02-20 | End: 2017-03-14

## 2017-02-23 ENCOUNTER — ANTICOAGULATION THERAPY VISIT (OUTPATIENT)
Dept: ANTICOAGULATION | Facility: OTHER | Age: 82
End: 2017-02-23

## 2017-02-23 DIAGNOSIS — I48.20 CHRONIC ATRIAL FIBRILLATION (H): ICD-10-CM

## 2017-02-23 DIAGNOSIS — Z79.01 LONG-TERM (CURRENT) USE OF ANTICOAGULANTS: ICD-10-CM

## 2017-02-23 LAB — INR PPP: 4

## 2017-02-23 NOTE — MR AVS SNAPSHOT
Adiel Brunerman    2/23/2017   Anticoagulation Therapy Visit    Description:  81 year old male   Provider:  GAURAV Grijalva MD   Department:  Hc Anti Coagulation           INR as of 2/23/2017     Today's INR 4.0!      Anticoagulation Summary as of 2/23/2017     INR goal 2.0-3.0   Today's INR 4.0!   Full instructions 2/23: Hold; 2/24: Hold; Otherwise 8 mg on Mon, Fri; 6 mg all other days   Next INR check 2/27/2017    Indications   Chronic atrial fibrillation (H) [I48.2]  Long-term (current) use of anticoagulants [Z79.01] [Z79.01]         Your next Anticoagulation Clinic appointment(s)     Feb 27, 2017  2:30 PM CST   Anticoagulation Visit with  ANTI COAGULATION   [facility] [place] [facility]    [address] [address]   [phone]              February 2017 Details    Sun Mon Tue Wed Thu Fri Sat        1               2               3               4                 5               6               7               8               9               10               11                 12               13               14               15               16               17               18                 19               20               21               22               23      Hold   See details      24      Hold         25      6 mg           26      6 mg         27            28                    Date Details   02/23 This INR check       Date of next INR:  2/27/2017         How to take your warfarin dose     To take:  6 mg Take 3 of the 2 mg tablets.    To take:  8 mg Take 4 of the 2 mg tablets.    Hold Do not take your warfarin dose. See the Details table to the right for additional instructions.

## 2017-02-23 NOTE — PROGRESS NOTES
ANTICOAGULATION FOLLOW-UP CLINIC VISIT    Patient Name:  Adiel Rosa Jr  Date:  2/23/2017  Contact Type:  Telephone/ spoke with homecare nurse, jennifer Vazquez: INR result, warfarin dosing and INR recheck date. He is seeing his MD on 2/27/17 so will check his INR at clinic before his appt with his MD.     SUBJECTIVE:     Patient Findings     Comments Very dark urine, ? UTI.  Sees Dr Grijalva on Monday 2/27           OBJECTIVE    INR   Date Value Ref Range Status   02/23/2017 4.0  Final       ASSESSMENT / PLAN  INR assessment SUPRA    Recheck INR In: 4 DAYS    INR Location Clinic      Anticoagulation Summary as of 2/23/2017     INR goal 2.0-3.0   Today's INR 4.0!   Maintenance plan 8 mg (2 mg x 4) on Mon, Fri; 6 mg (2 mg x 3) all other days   Full instructions 2/23: Hold; 2/24: Hold; Otherwise 8 mg on Mon, Fri; 6 mg all other days   Weekly total 46 mg   Plan last modified Jessa Storey RN (2/15/2017)   Next INR check 2/27/2017   Priority INR   Target end date Indefinite    Indications   Chronic atrial fibrillation (H) [I48.2]  Long-term (current) use of anticoagulants [Z79.01] [Z79.01]         Anticoagulation Episode Summary     INR check location     Preferred lab     Send INR reminders to [facility] POOL    Comments [facility] assisted [facility], Fax   [facility] fax: [phone], , [phone]      Anticoagulation Care Providers     Provider Role Specialty Phone number    GAURAV Grijalva MD [facility] [phone]            See the Encounter Report to view Anticoagulation Flowsheet and Dosing Calendar (Go to Encounters tab in chart review, and find the Anticoagulation Therapy Visit)        Jessa Storey, RN

## 2017-02-24 DIAGNOSIS — S20.221A BACK CONTUSION, RIGHT, INITIAL ENCOUNTER: ICD-10-CM

## 2017-02-27 ENCOUNTER — OFFICE VISIT (OUTPATIENT)
Dept: FAMILY MEDICINE | Facility: OTHER | Age: 82
End: 2017-02-27
Attending: FAMILY MEDICINE
Payer: MEDICARE

## 2017-02-27 ENCOUNTER — TELEPHONE (OUTPATIENT)
Dept: FAMILY MEDICINE | Facility: OTHER | Age: 82
End: 2017-02-27

## 2017-02-27 ENCOUNTER — MEDICAL CORRESPONDENCE (OUTPATIENT)
Dept: HEALTH INFORMATION MANAGEMENT | Facility: HOSPITAL | Age: 82
End: 2017-02-27

## 2017-02-27 ENCOUNTER — ANTICOAGULATION THERAPY VISIT (OUTPATIENT)
Dept: ANTICOAGULATION | Facility: OTHER | Age: 82
End: 2017-02-27
Attending: FAMILY MEDICINE
Payer: MEDICARE

## 2017-02-27 VITALS
TEMPERATURE: 97.4 F | SYSTOLIC BLOOD PRESSURE: 110 MMHG | OXYGEN SATURATION: 83 % | DIASTOLIC BLOOD PRESSURE: 54 MMHG | HEART RATE: 85 BPM

## 2017-02-27 DIAGNOSIS — I48.20 CHRONIC ATRIAL FIBRILLATION (H): ICD-10-CM

## 2017-02-27 DIAGNOSIS — Z79.01 LONG-TERM (CURRENT) USE OF ANTICOAGULANTS: ICD-10-CM

## 2017-02-27 DIAGNOSIS — M86.271 SUBACUTE OSTEOMYELITIS OF RIGHT FOOT (H): Primary | ICD-10-CM

## 2017-02-27 LAB
ALBUMIN SERPL-MCNC: 2.9 G/DL (ref 3.4–5)
ALP SERPL-CCNC: 114 U/L (ref 40–150)
ALT SERPL W P-5'-P-CCNC: 470 U/L (ref 0–70)
ANION GAP SERPL CALCULATED.3IONS-SCNC: 5 MMOL/L (ref 3–14)
AST SERPL W P-5'-P-CCNC: 240 U/L (ref 0–45)
BASOPHILS # BLD AUTO: 0 10E9/L (ref 0–0.2)
BASOPHILS NFR BLD AUTO: 0.3 %
BILIRUB SERPL-MCNC: 0.5 MG/DL (ref 0.2–1.3)
BUN SERPL-MCNC: 62 MG/DL (ref 7–30)
CALCIUM SERPL-MCNC: 9.4 MG/DL (ref 8.5–10.1)
CHLORIDE SERPL-SCNC: 98 MMOL/L (ref 94–109)
CO2 SERPL-SCNC: 34 MMOL/L (ref 20–32)
CREAT SERPL-MCNC: 1.44 MG/DL (ref 0.66–1.25)
DIFFERENTIAL METHOD BLD: ABNORMAL
DIGOXIN SERPL-MCNC: 1.9 UG/L (ref 0.5–2)
EOSINOPHIL # BLD AUTO: 0.2 10E9/L (ref 0–0.7)
EOSINOPHIL NFR BLD AUTO: 1.8 %
ERYTHROCYTE [DISTWIDTH] IN BLOOD BY AUTOMATED COUNT: 14.1 % (ref 10–15)
ERYTHROCYTE [SEDIMENTATION RATE] IN BLOOD BY WESTERGREN METHOD: 63 MM/H (ref 0–20)
GFR SERPL CREATININE-BSD FRML MDRD: 47 ML/MIN/1.7M2
GLUCOSE SERPL-MCNC: 58 MG/DL (ref 70–99)
HCT VFR BLD AUTO: 37.6 % (ref 40–53)
HGB BLD-MCNC: 12.7 G/DL (ref 13.3–17.7)
IMM GRANULOCYTES # BLD: 0.1 10E9/L (ref 0–0.4)
IMM GRANULOCYTES NFR BLD: 0.6 %
INR POINT OF CARE: 1.6 (ref 0.86–1.14)
LYMPHOCYTES # BLD AUTO: 1.8 10E9/L (ref 0.8–5.3)
LYMPHOCYTES NFR BLD AUTO: 13.6 %
MCH RBC QN AUTO: 28.9 PG (ref 26.5–33)
MCHC RBC AUTO-ENTMCNC: 33.8 G/DL (ref 31.5–36.5)
MCV RBC AUTO: 86 FL (ref 78–100)
MONOCYTES # BLD AUTO: 1 10E9/L (ref 0–1.3)
MONOCYTES NFR BLD AUTO: 7.5 %
NEUTROPHILS # BLD AUTO: 9.9 10E9/L (ref 1.6–8.3)
NEUTROPHILS NFR BLD AUTO: 76.2 %
NRBC # BLD AUTO: 0 10*3/UL
NRBC BLD AUTO-RTO: 0 /100
PLATELET # BLD AUTO: 347 10E9/L (ref 150–450)
POTASSIUM SERPL-SCNC: 4.8 MMOL/L (ref 3.4–5.3)
PROT SERPL-MCNC: 7.2 G/DL (ref 6.8–8.8)
RBC # BLD AUTO: 4.4 10E12/L (ref 4.4–5.9)
SODIUM SERPL-SCNC: 137 MMOL/L (ref 133–144)
WBC # BLD AUTO: 13 10E9/L (ref 4–11)

## 2017-02-27 PROCEDURE — 85025 COMPLETE CBC W/AUTO DIFF WBC: CPT | Performed by: FAMILY MEDICINE

## 2017-02-27 PROCEDURE — 85652 RBC SED RATE AUTOMATED: CPT | Performed by: FAMILY MEDICINE

## 2017-02-27 PROCEDURE — 85610 PROTHROMBIN TIME: CPT | Mod: QW

## 2017-02-27 PROCEDURE — 99214 OFFICE O/P EST MOD 30 MIN: CPT | Performed by: FAMILY MEDICINE

## 2017-02-27 PROCEDURE — 99212 OFFICE O/P EST SF 10 MIN: CPT

## 2017-02-27 PROCEDURE — 80053 COMPREHEN METABOLIC PANEL: CPT | Performed by: FAMILY MEDICINE

## 2017-02-27 PROCEDURE — 80162 ASSAY OF DIGOXIN TOTAL: CPT | Performed by: FAMILY MEDICINE

## 2017-02-27 RX ORDER — HYDROCODONE BITARTRATE AND ACETAMINOPHEN 5; 325 MG/1; MG/1
TABLET ORAL
Qty: 30 TABLET | Refills: 0 | Status: SHIPPED | OUTPATIENT
Start: 2017-02-27 | End: 2017-03-17

## 2017-02-27 NOTE — PROGRESS NOTES
[facility]    Adiel Rosa Jr, 81 year old, male presents with   Chief Complaint   Patient presents with     Musculoskeletal Problem     waekness in both legs, patient states that there is no tingling in legs but sharp pains occur when trying to lift legs. Patient has been using Icy-Hot to legs but it is not helping. no injuries have occured. Duration about 1 week.     *_* Health Care Directive *_*     declined        PAST MEDICAL HISTORY:  Past Medical History   Diagnosis Date     Acute myocardial infarction of other specified sites, episode of care unspecified 1/1/1999     Non Q wave  treated with Retavase     Benign hypertensive heart disease with congestive 1/1/2011     BPH 1/1/2011     Chronic renal disease, stage III 9/22/2011     COPD 1/1/2011     Cough 9/11/2007     Diabetes mellitus without mention of complication 11/29/1999     Erectile Dysfunction 1/1/2011     Esophageal reflux 1/1/2011     Hypercalcemia 1/1/2011     Hypercholesterolemia 1/1/2011     Hypertension, benign 1/1/2011       PAST SURGICAL HISTORY:  Past Surgical History   Procedure Laterality Date     Nephrolithiasis       lithotripsy     Bladder stone removal       Appendectomy       Tonsillectomy       Stent x 1         MEDICATIONS:  Prior to Admission medications    Medication Sig Start Date End Date Taking? Authorizing Provider   HYDROcodone-acetaminophen (NORCO) 5-325 MG per tablet TAKE 1 TABLET BY MOUTH EVERY 6 HOURS AS NEEDED FOR MODERATE TO SEVERE PAIN 2/27/17  Yes GAURAV Grijalva MD   digoxin (LANOXIN) 125 MCG tablet TAKE 1 TABLET DAILY 2/20/17  Yes GAURAV Grijalva MD   simvastatin (ZOCOR) 80 MG tablet TAKE 1 TABLET DAILY IN THE EVENING 2/20/17  Yes GAURAV Grijalva MD   warfarin (COUMADIN) 2 MG tablet TAKE 4 TABLETS ON MONDAY, WEDNESDAY, AND FRIDAY AND 3 TABLETS ALL OTHER DAYS 2/13/17  Yes GAURAV Grijalva MD   LEVEMIR VIAL 100 UNIT/ML soln INJECT 35 UNITS EVERY MORNING AND 10 UNITS AT BEDTIME SUBCUTANEOUSLY  "2/10/17  Yes GAURAV Grijalva MD   blood glucose monitoring (ONE TOUCH ULTRA) test strip Use to test blood sugar 5 times daily due to patient being on sliding scale. 1/30/17  Yes GAURAV Grijalva MD   blood glucose monitoring (NO BRAND SPECIFIED) meter device kit Use to test blood sugar 5 times daily due to patient being on sliding scale. 1/30/17  Yes GAURAV Grijalva MD   order for DME Equipment being ordered:     1. [employer]owedge  Size XL  Disp: 1 (right foot)   Refill: 0 1/26/17  Yes Piedad Cruz NP   order for DME Equipment being ordered: Kerlix 4\" rolls, # 30; 1\" transpore tape- 2 rolls; 4X4\" gauze pads- please fill a sleeve of non strerile; 1 bottle of 1/2\" iodoform strip gauze, xeroform 5X9\"- one package. 1/20/17  Yes Gema Kirk, NP   Doxylamine-DM 6.25-15 MG/15ML LIQD Taking as pkg directions at night 1/20/17  Yes GAURAV Grijalva MD   Multiple Vitamins-Minerals (CENTRUM SILVER ADULT 50+ PO)    Yes Reported, Patient   order for DME Equipment being ordered:  Warm water and baby shampoo soaks daily to affected area. Cover with Xeroform dressing and gauze until healed    Fax order to [facility]  [phone] 1/18/17  Yes Christie Quijano, PA   insulin aspart (NOVOLOG VIAL) 100 UNITS/ML injection Inject 1-7 Units Subcutaneous 3 times daily (with meals) If glucose less than or equal to 150 mg/dL do not give insulin   If glucose 151-200 give 2 Units subcutaneously   If glucose 201-250 give 3 Units subcutaneously   If glucose 251-300 give 4 Units subcutaneously   If glucose 301-350 give 5 Units subcutaneously   If glucose over 350 give 7 Units subcutaneously and call physician 1/17/17  Yes GAURAV Grijalva MD   insulin syringe-needle U-100 (ADVOCATE INSULIN SYRINGE) 31G X 5/16\" 1 ML Use 4 syringes daily or as directed. 1/17/17  Yes GAURAV Grijalva MD   order for DME Equipment being ordered: Xero form dressing  Change dressing on foot wound daily 1/17/17  Yes GAURAV Grijalva MD   Cranberry 500 MG CAPS " Take 1 capsule (500 mg) by mouth daily 1/16/17  Yes Alejandro Fung MD   acetaminophen (TYLENOL) 650 MG CR tablet Take 1 tablet (650 mg) by mouth every 8 hours as needed 1/16/17  Yes Alejandro Fung MD   aspirin 325 MG tablet Take 1 tablet (325 mg) by mouth daily 1/16/17  Yes Alejandro Fung MD   fluticasone-salmeterol (ADVAIR DISKUS) 250-50 MCG/DOSE diskus inhaler USE 1 INHALATION TWO TIMES A DAY IN THE MORNING AND IN THE EVENING APPROXIMATELY 12 HOURS APART 1/16/17  Yes Alejandro Fung MD   warfarin (COUMADIN) 2 MG tablet TAKE 4 TABLETS ON MONDAY, WEDNESDAY, AND FRIDAY AND 3 TABLETS ALL OTHER DAYS 1/16/17  Yes Alejandro Fung MD   simvastatin (ZOCOR) 80 MG tablet TAKE 1 TABLET DAILY IN THE EVENING 1/16/17  Yes Alejandro Fung MD   candesartan (ATACAND) 16 MG tablet Take 1 tablet (16 mg) by mouth daily 1/16/17  Yes Alejandro Fung MD   metoprolol (TOPROL XL) 50 MG 24 hr tablet Take 0.5 tablets (25 mg) by mouth daily 1/16/17  Yes Alejandro Fung MD   digoxin (LANOXIN) 125 MCG tablet Take 1 tablet (125 mcg) by mouth daily 1/16/17  Yes Alejandro Fung MD   Doxylamine-DM 6.25-15 MG/15ML LIQD Taking as pkg directions at night 1/16/17  Yes Alejandro Fung MD   furosemide (LASIX) 40 MG tablet Take 1 tablet (40 mg) by mouth daily 1/16/17  Yes Alejandro Fung MD   magnesium 250 MG tablet Take 1 tablet by mouth daily 1/16/17  Yes Alejandro Fung MD   benzocaine-menthol (CEPACOL) 15-3.6 MG lozenge Place 1 lozenge inside cheek every hour as needed for sore throat 1/16/17  Yes Alejandro Fung MD   ranitidine (ZANTAC) 150 MG tablet Take 1 tablet (150 mg) by mouth 2 times daily 1/16/17  Yes Alejandro Fung MD   order for DME Equipment being ordered: Hospital Bed 1/16/17  Yes Alejandro Fung MD   B-D U/F 31G X 8 MM insulin pen needle USE 5 TO 6 PEN NEEDLES DAILY OR AS DIRECTED 4/19/16  Yes GAURAV Grijalva MD       ALLERGIES:   No Known Allergies    ROS:  Constitutional,  neuro, ENT, endocrine, pulmonary, cardiac, gastrointestinal, genitourinary, musculoskeletal, integument and psychiatric systems are negative, except as otherwise noted.      EXAM:  /54 (BP Location: Left arm, Patient Position: Chair, Cuff Size: Adult Regular)  Pulse 85  Temp 97.4  F (36.3  C) (Tympanic)  SpO2 (!) 83% There is no height or weight on file to calculate BMI.   GENERAL APPEARANCE: healthy, alert and no distress, sat questionable with irreg pulse from chronic a fib  EYES: Eyes grossly normal to inspection, PERRL and conjunctivae and sclerae normal  RESP: lungs clear to auscultation - no rales, rhonchi or wheezes  CARD: irreg irreg without S3,S4, murmur.  SKIN: right foot wound has healed over on the dorsal aspect.  On the plantar aspect there is packing in there but it is significantly smaller.  Lab/ X-ray  Results for orders placed or performed in visit on 02/27/17 (from the past 24 hour(s))   INR point of care   Result Value Ref Range    INR Protime 1.6 (A) 0.86 - 1.14       ASSESSMENT/PLAN:    ICD-10-CM    1. Subacute osteomyelitis of right foot (H) M86.271 CBC with platelets differential, sedimentation rate, CMP done for follow up of the osteomyelitis.  Referral to Ortho for evaluation.  Patient now is agreeable to having a amputation if medically needed.  We'll continue with dressing changes.  If blood pressure is under 100 they will call reduce antihypertensive treatment.     Erythrocyte sedimentation rate auto     Comprehensive metabolic panel     ORTHOPEDICS ADULT REFERRAL   2. Chronic atrial fibrillation (H) I48.2 Digoxin level         R. Hao Grijalva MD  February 27, 2017

## 2017-02-27 NOTE — TELEPHONE ENCOUNTER
Left message that the written RX is ready at the [facility]  registration to be picked up.

## 2017-02-27 NOTE — TELEPHONE ENCOUNTER
9:37 AM    Reason for Call: Phone Call    Description: Wendy from [facility] called. She would like to discuss some things regarding pts appt today before the appt which is at 2:30. Please call her back at [phone]  .  Was an appointment offered for this call? No    Preferred method for responding to this message: Telephone Call    If we cannot reach you directly, may we leave a detailed response at the number you provided? Yes    Can this message wait until your PCP/provider returns, if available today? Not applicable    Nannette Lopes

## 2017-02-27 NOTE — TELEPHONE ENCOUNTER
"Spoke with the nurse at [facility]. Patient is having increased pain, weakness and physical declining. Requesting to check a digoxin level. Also patient is having low blood pressures, requesting a possible guideline on BP's to hold medication if too low. Patient has also changed his mind on how he feels about his foot amputation. States\" if it comes down to it he will get it amputated, doesn't want to die because of it.\" notified nurse you will discuss in office. She will also discuss with patient before appointment. A log book of BP's will be sent with patient today.   BUSHRA MUÑIZ     "

## 2017-02-27 NOTE — MR AVS SNAPSHOT
Adiel Rosa Jr   2/27/2017 2:30 PM   Anticoagulation Therapy Visit    Description:  81 year old male   Provider:   ANTI COAGULATION   Department:  [facility]c Anti Coagulation           INR as of 2/27/2017     Today's INR 1.6!      Anticoagulation Summary as of 2/27/2017     INR goal 2.0-3.0   Today's INR 1.6!   Full instructions 2/27: 10 mg; Otherwise 8 mg on Mon, Fri; 6 mg all other days   Next INR check 3/6/2017    Indications   Chronic atrial fibrillation (H) [I48.2]  Long-term (current) use of anticoagulants [Z79.01] [Z79.01]         February 2017 Details    Sun Mon Tue Wed Thu Fri Sat        1               2               3               4                 5               6               7               8               9               10               11                 12               13               14               15               16               17               18                 19               20               21               22               23               24               25                 26               27      10 mg   See details      28      6 mg              Date Details   02/27 This INR check               How to take your warfarin dose     To take:  6 mg Take 3 of the 2 mg tablets.    To take:  10 mg Take 5 of the 2 mg tablets.           March 2017 Details    Sun Mon Tue Wed Thu Fri Sat        1      6 mg         2      6 mg         3      8 mg         4      6 mg           5      6 mg         6            7               8               9               10               11                 12               13               14               15               16               17               18                 19               20               21               22               23               24               25                 26               27               28               29               30               31                 Date Details   No additional details    Date of next  INR:  3/6/2017         How to take your warfarin dose     To take:  6 mg Take 3 of the 2 mg tablets.    To take:  8 mg Take 4 of the 2 mg tablets.

## 2017-02-27 NOTE — NURSING NOTE
Chief Complaint   Patient presents with     Musculoskeletal Problem     waekness in both legs, patient states that there is no tingling in legs but sharp pains occur when trying to lift legs. Patient has been using Icy-Hot to legs but it is not helping. no injuries have occured. Duration about 1 week.       Initial /54 (BP Location: Left arm, Patient Position: Chair, Cuff Size: Adult Regular)  Pulse 85  Temp 97.4  F (36.3  C) (Tympanic)  SpO2 (!) 83% Estimated body mass index is 25.5 kg/(m^2) as calculated from the following:    Height as of 1/20/17: 6' (1.829 m).    Weight as of 1/20/17: 188 lb (85.3 kg).  Medication Reconciliation: complete   Debo Soliz CMA(AAMA)

## 2017-02-27 NOTE — MR AVS SNAPSHOT
After Visit Summary   2/27/2017    Adiel Rosa Jr    MRN: [id number]           Patient Information     Date Of Birth          1935        Visit Information        Provider Department      2/27/2017 2:45 PM GAURAV Grijalva MD [facility]        Today's Diagnoses     Subacute osteomyelitis of right foot (H)    -  1    Chronic atrial fibrillation (H)          Care Instructions    We will call with the labs.          Follow-ups after your visit        Additional Services     ORTHOPEDICS ADULT REFERRAL       Your provider has referred you to: Dr. Zamora    Please be aware that coverage of these services is subject to the terms and limitations of your health insurance plan.  Call member services at your health plan with any benefit or coverage questions.      Please bring the following to your appointment:    >>   Any x-rays, CTs or MRIs which have been performed.  Contact the facility where they were done to arrange for  prior to your scheduled appointment.    >>   List of current medications   >>   This referral request   >>   Any documents/labs given to you for this referral                  Who to contact     If you have questions or need follow up information about today's clinic visit or your schedule please contact [facility] directly at [phone].  Normal or non-critical lab and imaging results will be communicated to you by MyChart, letter or phone within 4 business days after the clinic has received the results. If you do not hear from us within 7 days, please contact the clinic through MyChart or phone. If you have a critical or abnormal lab result, we will notify you by phone as soon as possible.  Submit refill requests through [employer] or call your pharmacy and they will forward the refill request to us. Please allow 3 business days for your refill to be completed.          Additional Information About Your Visit        MyChart Information      "[employer] lets you send messages to your doctor, view your test results, renew your prescriptions, schedule appointments and more. To sign up, go to [email or web address] . Click on \"Log in\" on the left side of the screen, which will take you to the Welcome page. Then click on \"Sign up Now\" on the right side of the page.     You will be asked to enter the access code listed below, as well as some personal information. Please follow the directions to create your username and password.     Your access code is: [email or web address]  Expires: 2017  2:01 PM     Your access code will  in 90 days. If you need help or a new code, please call your [facility] clinic or [phone].        Care EveryWhere ID     This is your [facility] EveryWhere ID. This could be used by other organizations to access your [facility] medical records  [id number]        Your Vitals Were     Pulse Temperature Pulse Oximetry             85 97.4  F (36.3  C) (Tympanic) 83%          Blood Pressure from Last 3 Encounters:   17 110/54   17 111/61   17 109/60    Weight from Last 3 Encounters:   17 188 lb (85.3 kg)   17 185 lb 13.6 oz (84.3 kg)   16 208 lb (94.3 kg)              We Performed the Following     CBC with platelets differential     Comprehensive metabolic panel     Digoxin level     Erythrocyte sedimentation rate auto     ORTHOPEDICS ADULT REFERRAL        Primary Care Provider Office Phone # Fax #    R Hao Grijalva [phone]       [facility] [place] [address] [place] [id number]        Thank you!     Thank you for choosing [facility]  for your care. Our goal is always to provide you with excellent care. Hearing back from our patients is one way we can continue to improve our services. Please take a few minutes to complete the written survey that you may receive in the mail after your visit with us. Thank you!             Your Updated Medication List - Protect " others around you: Learn how to safely use, store and throw away your medicines at www.disposemymeds.org.          This list is accurate as of: 2/27/17  2:59 PM.  Always use your most recent med list.                   Brand Name Dispense Instructions for use    acetaminophen 650 MG CR tablet    TYLENOL    60 tablet    Take 1 tablet (650 mg) by mouth every 8 hours as needed       aspirin 325 MG tablet     120 tablet    Take 1 tablet (325 mg) by mouth daily       B-D U/F 31G X 8 MM   Generic drug:  insulin pen needle     6 each    USE 5 TO 6 PEN NEEDLES DAILY OR AS DIRECTED       benzocaine-menthol 15-3.6 MG lozenge    CEPACOL    30 lozenge    Place 1 lozenge inside cheek every hour as needed for sore throat       blood glucose monitoring meter device kit    no brand specified    1 kit    Use to test blood sugar 5 times daily due to patient being on sliding scale.       blood glucose monitoring test strip    ONE TOUCH ULTRA    300 each    Use to test blood sugar 5 times daily due to patient being on sliding scale.       candesartan 16 MG tablet    ATACAND    90 tablet    Take 1 tablet (16 mg) by mouth daily       CENTRUM SILVER ADULT 50+ PO          Cranberry 500 MG Caps     90 capsule    Take 1 capsule (500 mg) by mouth daily       * digoxin 125 MCG tablet    LANOXIN    90 tablet    Take 1 tablet (125 mcg) by mouth daily       * digoxin 125 MCG tablet    LANOXIN    90 tablet    TAKE 1 TABLET DAILY       * Doxylamine-DM 6.25-15 MG/15ML Liqd     236 mL    Taking as pkg directions at night       * Doxylamine-DM 6.25-15 MG/15ML Liqd     236 mL    Taking as pkg directions at night       fluticasone-salmeterol 250-50 MCG/DOSE diskus inhaler    ADVAIR DISKUS    3 Inhaler    USE 1 INHALATION TWO TIMES A DAY IN THE MORNING AND IN THE EVENING APPROXIMATELY 12 HOURS APART       furosemide 40 MG tablet    LASIX    90 tablet    Take 1 tablet (40 mg) by mouth daily       HYDROcodone-acetaminophen 5-325 MG per tablet    NORCO     "30 tablet    TAKE 1 TABLET BY MOUTH EVERY 6 HOURS AS NEEDED FOR MODERATE TO SEVERE PAIN       insulin aspart 100 UNITS/ML injection    NovoLOG VIAL    30 mL    Inject 1-7 Units Subcutaneous 3 times daily (with meals) If glucose less than or equal to 150 mg/dL do not give insulin  If glucose 151-200 give 2 Units subcutaneously  If glucose 201-250 give 3 Units subcutaneously  If glucose 251-300 give 4 Units subcutaneously  If glucose 301-350 give 5 Units subcutaneously  If glucose over 350 give 7 Units subcutaneously and call physician       insulin syringe-needle U-100 31G X 5/16\" 1 ML    ADVOCATE INSULIN SYRINGE    300 each    Use 4 syringes daily or as directed.       LEVEMIR VIAL 100 UNIT/ML injection   Generic drug:  insulin detemir     10 mL    INJECT 35 UNITS EVERY MORNING AND 10 UNITS AT BEDTIME SUBCUTANEOUSLY       magnesium 250 MG tablet     30 tablet    Take 1 tablet by mouth daily       metoprolol 50 MG 24 hr tablet    TOPROL XL    90 tablet    Take 0.5 tablets (25 mg) by mouth daily       * order for DME     1 Device    Equipment being ordered: Hospital Bed       * order for DME     60 each    Equipment being ordered: Xero form dressing Change dressing on foot wound daily       * order for DME     6 Month    Equipment being ordered: Warm water and baby shampoo soaks daily to affected area. Cover with Xeroform dressing and gauze until healed  Fax order to [facility] [phone]       order for DME     1 each    Equipment being ordered: Kerlix 4\" rolls, # 30; 1\" transpore tape- 2 rolls; 4X4\" gauze pads- please fill a sleeve of non strerile; 1 bottle of 1/2\" iodoform strip gauze, xeroform 5X9\"- one package.       * order for DME     1 each    Equipment being ordered:   1. Darco Orthowedge Size XL Disp: 1 (right foot)  Refill: 0       ranitidine 150 MG tablet    ZANTAC    180 tablet    Take 1 tablet (150 mg) by mouth 2 times daily       * simvastatin 80 MG tablet    ZOCOR    90 tablet    TAKE 1 TABLET " DAILY IN THE EVENING       * simvastatin 80 MG tablet    ZOCOR    90 tablet    TAKE 1 TABLET DAILY IN THE EVENING       * warfarin 2 MG tablet    COUMADIN    312 tablet    TAKE 4 TABLETS ON MONDAY, WEDNESDAY, AND FRIDAY AND 3 TABLETS ALL OTHER DAYS       * warfarin 2 MG tablet    COUMADIN    312 tablet    TAKE 4 TABLETS ON MONDAY, WEDNESDAY, AND FRIDAY AND 3 TABLETS ALL OTHER DAYS       * Notice:  This list has 12 medication(s) that are the same as other medications prescribed for you. Read the directions carefully, and ask your doctor or other care provider to review them with you.

## 2017-02-27 NOTE — PROGRESS NOTES
ANTICOAGULATION FOLLOW-UP CLINIC VISIT    Patient Name:  Adiel Rosa Jr  Date:  2/27/2017  Contact Type:  Face to Face    SUBJECTIVE:     Patient Findings     Comments Foot ulcer no better per caregiver with patient. Patient is no longer walking and is using wheelchair.  He has lost significant amount of weight since I saw him last. New warfarin dosing and INR recheck date [facility]           OBJECTIVE    INR Protime   Date Value Ref Range Status   02/27/2017 1.6 (A) 0.86 - 1.14 Final       ASSESSMENT / PLAN  INR assessment SUB    Recheck INR In: 1 WEEK    INR Location [facility]      Anticoagulation Summary as of 2/27/2017     INR goal 2.0-3.0   Today's INR 1.6!   Maintenance plan 8 mg (2 mg x 4) on Mon, Fri; 6 mg (2 mg x 3) all other days   Full instructions 2/27: 10 mg; Otherwise 8 mg on Mon, Fri; 6 mg all other days   Weekly total 46 mg   Plan last modified Jessa Storey RN (2/15/2017)   Next INR check 3/6/2017   Priority INR   Target end date Indefinite    Indications   Chronic atrial fibrillation (H) [I48.2]  Long-term (current) use of anticoagulants [Z79.01] [Z79.01]         Anticoagulation Episode Summary     INR check location     Preferred lab     Send INR reminders to [facility] ANTICOAG POOL    Comments [facility] assisted [facility], Fax   [facility] fax: [phone], Ph, [phone]      Anticoagulation Care Providers     Provider Role Specialty Phone number    GAURAV Grijalva MD [facility] [phone]            See the Encounter Report to view Anticoagulation Flowsheet and Dosing Calendar (Go to Encounters tab in chart review, and find the Anticoagulation Therapy Visit)        Jessa Storey, RN

## 2017-02-28 ENCOUNTER — TELEPHONE (OUTPATIENT)
Dept: FAMILY MEDICINE | Facility: OTHER | Age: 82
End: 2017-02-28

## 2017-02-28 NOTE — TELEPHONE ENCOUNTER
Patient was seen yesterday, was told to call if systaltic BP is below 100. They are wondering what they should do on the weekends when we are not here Possible hold BP medication?  Please advise   BUSHRA MUÑIZ

## 2017-03-02 DIAGNOSIS — M86.171 OSTEOMYELITIS OF FOOT, RIGHT, ACUTE (H): Primary | ICD-10-CM

## 2017-03-02 NOTE — TELEPHONE ENCOUNTER
3:00 PM    Reason for Call: Phone Call    Description: Pt is having wound care done through [facility]. His top wound on his foot has broken open and they would like ok to pack. They are assuming it would be the same as the orders for packing his bottom wound, but still need auth. Can fax auth to [phone].    Was an appointment offered for this call? No    Preferred method for responding to this message: fax [phone]    If we cannot reach you directly, may we leave a detailed response at the number you provided? No    Can this message wait until your PCP/provider returns, if available today? Not applicable,     Quiana Su

## 2017-03-06 ENCOUNTER — ANTICOAGULATION THERAPY VISIT (OUTPATIENT)
Dept: ANTICOAGULATION | Facility: OTHER | Age: 82
End: 2017-03-06

## 2017-03-06 DIAGNOSIS — Z79.01 LONG-TERM (CURRENT) USE OF ANTICOAGULANTS: ICD-10-CM

## 2017-03-06 DIAGNOSIS — I48.20 CHRONIC ATRIAL FIBRILLATION (H): ICD-10-CM

## 2017-03-06 LAB — INR PPP: 1.7

## 2017-03-06 NOTE — MR AVS SNAPSHOT
Adiel VILLATORO Moe    3/6/2017   Anticoagulation Therapy Visit    Description:  81 year old male   Provider:  GAURAV Grijalva MD   Department:  Hc Anti Coagulation           INR as of 3/6/2017     Today's INR 1.7!      Anticoagulation Summary as of 3/6/2017     INR goal 2.0-3.0   Today's INR 1.7!   Full instructions 3/6: 10 mg; 3/7: 8 mg; Otherwise 8 mg on Mon, Fri; 6 mg all other days   Next INR check 3/13/2017    Indications   Chronic atrial fibrillation (H) [I48.2]  Long-term (current) use of anticoagulants [Z79.01] [Z79.01]         March 2017 Details    Sun Mon Tue Wed Thu Fri Sat        1               2               3               4                 5               6      10 mg   See details      7      8 mg         8      6 mg         9      6 mg         10      8 mg         11      6 mg           12      6 mg         13            14               15               16               17               18                 19               20               21               22               23               24               25                 26               27               28               29               30               31                 Date Details   03/06 This INR check       Date of next INR:  3/13/2017         How to take your warfarin dose     To take:  6 mg Take 3 of the 2 mg tablets.    To take:  8 mg Take 4 of the 2 mg tablets.    To take:  10 mg Take 5 of the 2 mg tablets.

## 2017-03-06 NOTE — PROGRESS NOTES
ANTICOAGULATION FOLLOW-UP CLINIC VISIT    Patient Name:  Adiel Rosa Jr  Date:  3/6/2017  Contact Type:  INR result faxed to warfarin clinic from [facility].  New warfarin dosing and INR recheck date faxed to both [facility] and to [facility]. They are to notify us if any bleeding/bruising, changes in diet/meds/activity or questions.     SUBJECTIVE:     Patient Findings     Comments Has an upcoming appt with ortho re: possible surgical removal of foot.  His appt is 3/10 and ortho will notify us after seeing him what is needed.           OBJECTIVE    INR   Date Value Ref Range Status   03/06/2017 1.7  Final       ASSESSMENT / PLAN  INR assessment SUB    Recheck INR In: 1 WEEK    INR Location [facility]      Anticoagulation Summary as of 3/6/2017     INR goal 2.0-3.0   Today's INR 1.7!   Maintenance plan 8 mg (2 mg x 4) on Mon, Fri; 6 mg (2 mg x 3) all other days   Full instructions 3/6: 10 mg; 3/7: 8 mg; Otherwise 8 mg on Mon, Fri; 6 mg all other days   Weekly total 46 mg   Plan last modified Jessa Storey RN (2/15/2017)   Next INR check 3/13/2017   Priority INR   Target end date Indefinite    Indications   Chronic atrial fibrillation (H) [I48.2]  Long-term (current) use of anticoagulants [Z79.01] [Z79.01]         Anticoagulation Episode Summary     INR check location     Preferred lab     Send INR reminders to [facility] POOL    Comments [place] [facility], Fax   [facility] fax: [phone], , [phone]      Anticoagulation Care Providers     Provider Role Specialty Phone number    GAURAV Grijalva MD [facility] [phone]            See the Encounter Report to view Anticoagulation Flowsheet and Dosing Calendar (Go to Encounters tab in chart review, and find the Anticoagulation Therapy Visit)        Jessa Storey, RN

## 2017-03-10 ENCOUNTER — APPOINTMENT (OUTPATIENT)
Dept: GENERAL RADIOLOGY | Facility: OTHER | Age: 82
End: 2017-03-10
Attending: ORTHOPAEDIC SURGERY
Payer: MEDICARE

## 2017-03-10 ENCOUNTER — OFFICE VISIT (OUTPATIENT)
Dept: ORTHOPEDICS | Facility: OTHER | Age: 82
End: 2017-03-10
Attending: FAMILY MEDICINE
Payer: MEDICARE

## 2017-03-10 VITALS
WEIGHT: 192 LBS | BODY MASS INDEX: 26.01 KG/M2 | DIASTOLIC BLOOD PRESSURE: 58 MMHG | HEART RATE: 74 BPM | OXYGEN SATURATION: 98 % | RESPIRATION RATE: 18 BRPM | SYSTOLIC BLOOD PRESSURE: 118 MMHG | TEMPERATURE: 98.3 F | HEIGHT: 72 IN

## 2017-03-10 DIAGNOSIS — L97.514: Primary | ICD-10-CM

## 2017-03-10 DIAGNOSIS — M86.671 CHRONIC OSTEOMYELITIS OF RIGHT FOOT (H): Primary | ICD-10-CM

## 2017-03-10 PROCEDURE — 99213 OFFICE O/P EST LOW 20 MIN: CPT | Performed by: ORTHOPAEDIC SURGERY

## 2017-03-10 PROCEDURE — 99213 OFFICE O/P EST LOW 20 MIN: CPT

## 2017-03-10 ASSESSMENT — PAIN SCALES - GENERAL: PAINLEVEL: NO PAIN (0)

## 2017-03-10 NOTE — PROGRESS NOTES
Chief complaint: Diabetic osteomyelitis right foot  PCP: Hao Grijalva M.D.    Subjective: This 81-year-old diabetic man was admitted to this hospital in January of this year with hypoglycemia and a diabetic ulcer on the plantar aspect of the right foot.  X-rays and MRI of the right foot reveals osteomyelitis involving the proximal phalanges and metatarsals of the 2nd and 3rd rays with an abscess around the entire 2nd ray.  In addition there were pathologic fractures at the bases of the 2nd and 3rd proximal phalanges. Cultures grew out mixed gram-negative and gram-positive organisms,  ABIs performed in the hospital where normal at the right ankle.  I was asked to see him in consultation.  I recommended a below knee amputation.  There was too much bone involvement to do multiple ray resections or a transmetatarsal amputation.  The patient refused.  Instead, he was treated with IV antibiotics.  He saw his PCP last week in posthospitalization follow-up.  At that time a new area of drainage had opened on the dorsum of the foot at the base of the 2nd toe draining purulent fluid.  The PCP had a robin discussion with him and convinced him that an amputation was in his best interest.  He therefore referred him here today.  He states that his foot gives him no pain.  He uses the foot to propel himself in a wheelchair.  He also stands on it when he transfers.    His complete medical history including medications, problem list, allergies, social and family histories, and ten system review of systems were reviewed.  At present he is not on antibiotics.  He lives in an assisted living facility where he has an attendant 24 hours a day, and daily dressing changes.    Objective: Elderly male who presents in a wheelchair.  His left foot had a dry sterile dressing on it.  Upon dressing removal the foot was found to be minimally swollen.  There is induration dorsally at the base of the 2nd and 3rd toes.  There is a pinpoint sinus  "opening between the 2nd and 3rd toes which has sealed over and is not draining anything.  On the plantar aspect of the foot beneath the 2nd metatarsal head there is a full-thickness ulcer packed with Aquacel or a similar colloid product.  There is no erythema around it.  There is no purulence in the ulcer.  There is no swelling or tenderness or erythema around the 1st metatarsal.    X-ray; I reviewed the MRI of 1/13/17.  The findings are as described above, however I also see increased uptake within the 1st metatarsal and some new bone formation around the 1st metatarsal shaft suggesting a healed fracture or osteomyelitis of that bone with a surrounding involucrum.  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    Impression: Diabetic osteomyelitis of 2 or 3 rays in the right foot with adjacent 2nd ray abscess.    Discussion: The patient has no recollection that he agreed to an amputation when he saw his PCP recently.  He states that his foot does not hurt him at this time and that \"no one is going to cut it off\".  On the other hand, his foot looks much better now than it did when I saw him in consultation in the hospital.  I explained to him that the bone infections will never be eradicated.  His drainage and swelling  will wax and wane but never go away.  When it waxes he will be at risk of it spreading, and possibly making him septic.  He was told he can die of sepsis.  He wants to continue conservative care for now and if the infection worsens in the future he said he will reconsider his refusal of amputation.  I discussed this with his PCP who agreed to monitor him and refer him back if the condition worsens and amputation becomes mandatory.    Plan: I still recommend a below knee amputation on the right.  "

## 2017-03-10 NOTE — NURSING NOTE
Chief Complaint   Patient presents with     Musculoskeletal Problem     right foot pain       Initial /58 (BP Location: Right leg, Patient Position: Chair, Cuff Size: Adult Regular)  Pulse 74  Temp 98.3  F (36.8  C) (Tympanic)  Resp 18  Ht 6' (1.829 m)  Wt 192 lb (87.1 kg)  SpO2 98%  BMI 26.04 kg/m2 Estimated body mass index is 26.04 kg/(m^2) as calculated from the following:    Height as of this encounter: 6' (1.829 m).    Weight as of this encounter: 192 lb (87.1 kg).  Medication Reconciliation: complete   Pamela M Lechevalier LPN

## 2017-03-10 NOTE — MR AVS SNAPSHOT
"              After Visit Summary   3/10/2017    Adiel Rosa Jr    MRN: [id number]           Patient Information     Date Of Birth          1935        Visit Information        Provider Department      3/10/2017 2:20 PM Chauncey Zamora MD  [facility]        Today's Diagnoses     Chronic osteomyelitis of right foot (H)    -  1       Follow-ups after your visit        Your next 10 appointments already scheduled     Apr 04, 2017  1:15 PM CDT   (Arrive by 1:00 PM)   Office Visit with GAURAV Grijalva MD   [facility]    [address]  [facility] [address]   [phone]           Bring a current list of meds and any records pertaining to this visit.  For Physicals, please bring immunization records and any forms needing to be filled out.    Please arrive 15 minutes early to complete paperwork and register.              Who to contact     If you have questions or need follow up information about today's clinic visit or your schedule please contact  [facility]TH[facility] directly at [phone].  Normal or non-critical lab and imaging results will be communicated to you by Boom[employer] Fashionhart, letter or phone within 4 business days after the clinic has received the results. If you do not hear from us within 7 days, please contact the clinic through Boom[employer] Fashion[employer] or phone. If you have a critical or abnormal lab result, we will notify you by phone as soon as possible.  Submit refill requests through ZEB or call your pharmacy and they will forward the refill request to us. Please allow 3 business days for your refill to be completed.          Additional Information About Your Visit        Boommy Fashionhart Information     ZEB lets you send messages to your doctor, view your test results, renew your prescriptions, schedule appointments and more. To sign up, go to [email or web address] . Click on \"Log in\" on the left side of the screen, which will take you to the Welcome page. Then click on \"Sign up " "Now\" on the right side of the page.     You will be asked to enter the access code listed below, as well as some personal information. Please follow the directions to create your username and password.     Your access code is: [email or web address]  Expires: 2017  2:01 PM     Your access code will  in 90 days. If you need help or a new code, please call your [facility] or [phone].        Care EveryWhere ID     This is your Care EveryWhere ID. This could be used by other organizations to access your [facility] medical records  [id number]        Your Vitals Were     Pulse Temperature Respirations Height Pulse Oximetry BMI (Body Mass Index)    74 98.3  F (36.8  C) (Tympanic) 18 6' (1.829 m) 98% 26.04 kg/m2       Blood Pressure from Last 3 Encounters:   03/10/17 118/58   17 110/54   17 111/61    Weight from Last 3 Encounters:   03/10/17 192 lb (87.1 kg)   17 188 lb (85.3 kg)   17 185 lb 13.6 oz (84.3 kg)              Today, you had the following     No orders found for display       Primary Care Provider Office Phone # Fax #    R Hao Grijalva [phone]       [place] [phone]        Thank you!     Thank you for choosing  [facility]  for your care. Our goal is always to provide you with excellent care. Hearing back from our patients is one way we can continue to improve our services. Please take a few minutes to complete the written survey that you may receive in the mail after your visit with us. Thank you!             Your Updated Medication List - Protect others around you: Learn how to safely use, store and throw away your medicines at www.disposemymeds.org.          This list is accurate as of: 3/10/17  2:49 PM.  Always use your most recent med list.                   Brand Name Dispense Instructions for use    acetaminophen 650 MG CR tablet    TYLENOL    60 tablet    Take 1 tablet (650 mg) by mouth every 8 hours " as needed       aspirin 325 MG tablet     120 tablet    Take 1 tablet (325 mg) by mouth daily       B-D U/F 31G X 8 MM   Generic drug:  insulin pen needle     6 each    USE 5 TO 6 PEN NEEDLES DAILY OR AS DIRECTED       benzocaine-menthol 15-3.6 MG lozenge    CEPACOL    30 lozenge    Place 1 lozenge inside cheek every hour as needed for sore throat       blood glucose monitoring meter device kit    no brand specified    1 kit    Use to test blood sugar 5 times daily due to patient being on sliding scale.       blood glucose monitoring test strip    ONE TOUCH ULTRA    300 each    Use to test blood sugar 5 times daily due to patient being on sliding scale.       candesartan 16 MG tablet    ATACAND    90 tablet    Take 1 tablet (16 mg) by mouth daily       CENTRUM SILVER ADULT 50+ PO          Cranberry 500 MG Caps     90 capsule    Take 1 capsule (500 mg) by mouth daily       * digoxin 125 MCG tablet    LANOXIN    90 tablet    Take 1 tablet (125 mcg) by mouth daily       * digoxin 125 MCG tablet    LANOXIN    90 tablet    TAKE 1 TABLET DAILY       * Doxylamine-DM 6.25-15 MG/15ML Liqd     236 mL    Taking as pkg directions at night       * Doxylamine-DM 6.25-15 MG/15ML Liqd     236 mL    Taking as pkg directions at night       fluticasone-salmeterol 250-50 MCG/DOSE diskus inhaler    ADVAIR DISKUS    3 Inhaler    USE 1 INHALATION TWO TIMES A DAY IN THE MORNING AND IN THE EVENING APPROXIMATELY 12 HOURS APART       furosemide 40 MG tablet    LASIX    90 tablet    Take 1 tablet (40 mg) by mouth daily       HYDROcodone-acetaminophen 5-325 MG per tablet    NORCO    30 tablet    TAKE 1 TABLET BY MOUTH EVERY 6 HOURS AS NEEDED FOR MODERATE TO SEVERE PAIN       insulin aspart 100 UNITS/ML injection    NovoLOG VIAL    30 mL    Inject 1-7 Units Subcutaneous 3 times daily (with meals) If glucose less than or equal to 150 mg/dL do not give insulin  If glucose 151-200 give 2 Units subcutaneously  If glucose 201-250 give 3 Units  "subcutaneously  If glucose 251-300 give 4 Units subcutaneously  If glucose 301-350 give 5 Units subcutaneously  If glucose over 350 give 7 Units subcutaneously and call physician       insulin syringe-needle U-100 31G X 5/16\" 1 ML    ADVOCATE INSULIN SYRINGE    300 each    Use 4 syringes daily or as directed.       LEVEMIR VIAL 100 UNIT/ML injection   Generic drug:  insulin detemir     10 mL    INJECT 35 UNITS EVERY MORNING AND 10 UNITS AT BEDTIME SUBCUTANEOUSLY       magnesium 250 MG tablet     30 tablet    Take 1 tablet by mouth daily       metoprolol 50 MG 24 hr tablet    TOPROL XL    90 tablet    Take 0.5 tablets (25 mg) by mouth daily       * order for DME     1 Device    Equipment being ordered: Hospital Bed       * order for DME     60 each    Equipment being ordered: Xero form dressing Change dressing on foot wound daily       * order for DME     6 Month    Equipment being ordered: Warm water and baby shampoo soaks daily to affected area. Cover with Xeroform dressing and gauze until healed  Fax order to [facility] [phone]       order for DME     1 each    Equipment being ordered: Kerlix 4\" rolls, # 30; 1\" transpore tape- 2 rolls; 4X4\" gauze pads- please fill a sleeve of non strerile; 1 bottle of 1/2\" iodoform strip gauze, xeroform 5X9\"- one package.       * order for DME     1 each    Equipment being ordered:   1. Darco Orthowedge Size XL Disp: 1 (right foot)  Refill: 0       * order for DME     1 Application    Equipment being ordered: Daily wound change- on top of foot.  Do same dressing change as bottom of the foot.       ranitidine 150 MG tablet    ZANTAC    180 tablet    Take 1 tablet (150 mg) by mouth 2 times daily       * simvastatin 80 MG tablet    ZOCOR    90 tablet    TAKE 1 TABLET DAILY IN THE EVENING       * simvastatin 80 MG tablet    ZOCOR    90 tablet    TAKE 1 TABLET DAILY IN THE EVENING       * warfarin 2 MG tablet    COUMADIN    312 tablet    TAKE 4 TABLETS ON MONDAY, WEDNESDAY, " AND FRIDAY AND 3 TABLETS ALL OTHER DAYS       * warfarin 2 MG tablet    COUMADIN    312 tablet    TAKE 4 TABLETS ON MONDAY, WEDNESDAY, AND FRIDAY AND 3 TABLETS ALL OTHER DAYS       * Notice:  This list has 13 medication(s) that are the same as other medications prescribed for you. Read the directions carefully, and ask your doctor or other care provider to review them with you.

## 2017-03-13 ENCOUNTER — ANTICOAGULATION THERAPY VISIT (OUTPATIENT)
Dept: ANTICOAGULATION | Facility: OTHER | Age: 82
End: 2017-03-13

## 2017-03-13 ENCOUNTER — CARE COORDINATION (OUTPATIENT)
Dept: ANTICOAGULATION | Facility: OTHER | Age: 82
End: 2017-03-13

## 2017-03-13 DIAGNOSIS — I48.20 CHRONIC ATRIAL FIBRILLATION (H): ICD-10-CM

## 2017-03-13 DIAGNOSIS — Z79.01 LONG-TERM (CURRENT) USE OF ANTICOAGULANTS: ICD-10-CM

## 2017-03-13 DIAGNOSIS — Z53.9 ERRONEOUS ENCOUNTER--DISREGARD: Primary | ICD-10-CM

## 2017-03-13 LAB — INR PPP: 2.9

## 2017-03-13 RX ORDER — WARFARIN SODIUM 2 MG/1
TABLET ORAL
Qty: 312 TABLET | Refills: 0 | COMMUNITY
Start: 2017-03-13 | End: 2018-02-13

## 2017-03-13 NOTE — MR AVS SNAPSHOT
Adiel Rosa Jr   3/13/2017   Anticoagulation Therapy Visit    Description:  81 year old male   Provider:  GAURAV Grijalva MD   Department:  [facility] Anti Coagulation           INR as of 3/13/2017     Today's INR       Anticoagulation Summary as of 3/13/2017     INR goal 2.0-3.0   Today's INR    Next INR check     Indications   Chronic atrial fibrillation (H) [I48.2]  Long-term (current) use of anticoagulants [Z79.01] [Z79.01]         March 2017 Details    Sun Mon Tue Wed Thu Fri Sat        1               2               3               4                 5               6      10 mg   See details      7      8 mg         8      6 mg         9      6 mg         10      8 mg         11      6 mg           12      6 mg         13            14               15               16               17               18                 19               20               21               22               23               24               25                 26               27               28               29               30               31                 Date Details   03/06 This INR check       Date of next INR:  3/13/2017         How to take your warfarin dose     To take:  6 mg Take 3 of the 2 mg tablets.    To take:  8 mg Take 4 of the 2 mg tablets.    To take:  10 mg Take 5 of the 2 mg tablets.

## 2017-03-13 NOTE — PROGRESS NOTES
ANTICOAGULATION FOLLOW-UP CLINIC VISIT    Patient Name:  Adiel Rosa Jr  Date:  3/13/2017  Contact Type:  Telephone/ Madyson Gupta HC nurse    SUBJECTIVE:     Patient Findings     Positives No Problem Findings    Comments Philippe Coughlin HC nurse telephoned  clinic with INR results and other pertinent information.  No changes noted.  Homecare visits pt 3x/wk.  INR results/Coumadin dosing and INR recheck information discussed.  Homecare nurse to fax documentation to this  clinic that will be faxed to [facility] staff.  Call ended with questions answered and understanding stated.             OBJECTIVE    INR   Date Value Ref Range Status   03/13/2017 2.9  Final       ASSESSMENT / PLAN  INR assessment THER    Recheck INR In: 10 DAYS    INR Location [facility]      Anticoagulation Summary as of 3/13/2017     INR goal 2.0-3.0   Today's INR 2.9   Maintenance plan 8 mg (2 mg x 4) on Mon, Fri; 6 mg (2 mg x 3) all other days   Full instructions 8 mg on Mon, Fri; 6 mg all other days   Weekly total 46 mg   No change documented Jessa Gibson RN   Plan last modified Jessa Storey RN (2/15/2017)   Next INR check 3/23/2017   Priority INR   Target end date Indefinite    Indications   Chronic atrial fibrillation (H) [I48.2]  Long-term (current) use of anticoagulants [Z79.01] [Z79.01]         Anticoagulation Episode Summary     INR check location     Preferred lab     Send INR reminders to  ANTICOAG POOL    Comments [facility] assisted [facility], Fax   [facility] fax: [phone], , [phone]      Anticoagulation Care Providers     Provider Role Specialty Phone number    GAURAV Grijalva MD [facility] [phone]            See the Encounter Report to view Anticoagulation Flowsheet and Dosing Calendar (Go to Encounters tab in chart review, and find the Anticoagulation Therapy Visit)        Jessa Gibson RN

## 2017-03-14 DIAGNOSIS — K21.9 GASTROESOPHAGEAL REFLUX DISEASE WITHOUT ESOPHAGITIS: ICD-10-CM

## 2017-03-14 DIAGNOSIS — I10 HTN (HYPERTENSION): ICD-10-CM

## 2017-03-14 DIAGNOSIS — I10 ESSENTIAL HYPERTENSION: ICD-10-CM

## 2017-03-14 DIAGNOSIS — I48.21 PERMANENT ATRIAL FIBRILLATION (H): ICD-10-CM

## 2017-03-16 RX ORDER — DIGOXIN 125 UG/1
TABLET ORAL
Qty: 90 TABLET | Refills: 0 | Status: SHIPPED | OUTPATIENT
Start: 2017-03-16 | End: 2017-08-25

## 2017-03-16 RX ORDER — CANDESARTAN 16 MG/1
TABLET ORAL
Qty: 90 TABLET | Refills: 1 | Status: SHIPPED | OUTPATIENT
Start: 2017-03-16 | End: 2018-02-08

## 2017-03-16 RX ORDER — MAGNESIUM 250 MG
TABLET ORAL
Qty: 30 TABLET | Refills: 3 | Status: SHIPPED | OUTPATIENT
Start: 2017-03-16 | End: 2017-07-27

## 2017-03-16 NOTE — TELEPHONE ENCOUNTER
PCP Dr. Hao Grijalva. Last office visit 02/27/17.  Magnesium last filled 01/16/17 #30 with 3 refills by Dr. Retana. Medication pended.

## 2017-03-16 NOTE — TELEPHONE ENCOUNTER
Zantac      Last Written Prescription Date: 1/16/17  Last Fill Quantity: 180,  # refills: 0   Last Office Visit with [facility], [facility] or [facility] prescribing provider: 3/10/17                                         Next 5 appointments (look out 90 days)     Apr 04, 2017  1:15 PM CDT   (Arrive by 1:00 PM)   Office Visit with GAURAV Grijalva MD   [facility] [place] ([facility] [place] [facility])    [address] [place] [address]   [phone]                  Magnesium      Last Written Prescription Date: 1/16/17  Last Fill Quantity: 30,  # refills: 3   Last Office Visit with [facility], [facility] or [facility] prescribing provider: 3/10/17                                         Next 5 appointments (look out 90 days)     Apr 04, 2017  1:15 PM CDT   (Arrive by 1:00 PM)   Office Visit with GAURAV Grijalva MD   [facility] ([facility] [place] [facility])    [address]  [place] [address]   [phone]                  Digox      Last Written Prescription Date: 2/20/17  Last Fill Quantity: 90, # refills: 0  Last Office Visit with [facility], [facility] or [facility] prescribing provider: 3/10/17  Next 5 appointments (look out 90 days)     Apr 04, 2017  1:15 PM CDT   (Arrive by 1:00 PM)   Office Visit with GAURAV Grijalva MD   [facility] ([facility] [place] [facility])    [address]  [place] [address]   [phone]                   Potassium   Date Value Ref Range Status   02/27/2017 4.8 3.4 - 5.3 mmol/L Final     Creatinine   Date Value Ref Range Status   02/27/2017 1.44 (H) 0.66 - 1.25 mg/dL Final     BP Readings from Last 3 Encounters:   03/10/17 118/58   02/27/17 110/54   02/06/17 111/61     Atacand      Last Written Prescription Date: 1/16/17  Last Fill Quantity: 90, # refills: 0  Last Office Visit with [facility], [facility] or [facility] prescribing provider: 3/10/17  Next 5 appointments (look out 90 days)     Apr 04, 2017  1:15 PM CDT   (Arrive by 1:00 PM)   Office Visit with GAURAV Grijalva MD   [facility] ([facility] [place] [facility])     [address] [place] [address]   [phone]                   Potassium   Date Value Ref Range Status   02/27/2017 4.8 3.4 - 5.3 mmol/L Final     Creatinine   Date Value Ref Range Status   02/27/2017 1.44 (H) 0.66 - 1.25 mg/dL Final     BP Readings from Last 3 Encounters:   03/10/17 118/58   02/27/17 110/54   02/06/17 111/61

## 2017-03-17 ENCOUNTER — ANTICOAGULATION THERAPY VISIT (OUTPATIENT)
Dept: ANTICOAGULATION | Facility: OTHER | Age: 82
End: 2017-03-17

## 2017-03-17 DIAGNOSIS — E11.9 DIABETES MELLITUS, TYPE 2 (H): ICD-10-CM

## 2017-03-17 DIAGNOSIS — Z79.01 LONG-TERM (CURRENT) USE OF ANTICOAGULANTS: ICD-10-CM

## 2017-03-17 DIAGNOSIS — I48.20 CHRONIC ATRIAL FIBRILLATION (H): ICD-10-CM

## 2017-03-17 DIAGNOSIS — S20.221A BACK CONTUSION, RIGHT, INITIAL ENCOUNTER: ICD-10-CM

## 2017-03-17 LAB — INR PPP: 2.9

## 2017-03-17 RX ORDER — HYDROCODONE BITARTRATE AND ACETAMINOPHEN 5; 325 MG/1; MG/1
TABLET ORAL
Qty: 30 TABLET | Refills: 0 | Status: SHIPPED | OUTPATIENT
Start: 2017-03-17 | End: 2017-04-10

## 2017-03-17 RX ORDER — PEN NEEDLE, DIABETIC 31 GX5/16"
NEEDLE, DISPOSABLE MISCELLANEOUS
Qty: 600 EACH | Refills: 2 | Status: SHIPPED | OUTPATIENT
Start: 2017-03-17 | End: 2019-01-01

## 2017-03-17 NOTE — MR AVS SNAPSHOT
Adiel Rosa Jr   3/17/2017   Anticoagulation Therapy Visit    Description:  81 year old male   Provider:  GAURAV Grijalva MD   Department:  Hc Anti Coagulation           INR as of 3/17/2017     Today's INR 2.9      Anticoagulation Summary as of 3/17/2017     INR goal 2.0-3.0   Today's INR 2.9   Full instructions 8 mg on Mon, Fri; 6 mg all other days   Next INR check 3/24/2017    Indications   Chronic atrial fibrillation (H) [I48.2]  Long-term (current) use of anticoagulants [Z79.01] [Z79.01]         March 2017 Details    Sun Mon Tue Wed Thu Fri Sat        1               2               3               4                 5               6               7               8               9               10               11                 12               13               14               15               16               17      8 mg   See details      18      6 mg           19      6 mg         20      8 mg         21      6 mg         22      6 mg         23      6 mg         24            25                 26               27               28               29               30               31                 Date Details   03/17 This INR check       Date of next INR:  3/24/2017         How to take your warfarin dose     To take:  6 mg Take 3 of the 2 mg tablets.    To take:  8 mg Take 4 of the 2 mg tablets.

## 2017-03-17 NOTE — PROGRESS NOTES
ANTICOAGULATION FOLLOW-UP CLINIC VISIT    Patient Name:  Adiel Rosa Jr  Date:  3/17/2017  Contact Type:  INR result faxed to warfarin clinic from assisted living facility. New warfarin dosing and INR recheck date faxed back to assisted living facility. They are to notify warfarin clinic if patient has any bleeding/bruising, changes in diet/meds/activity or questions.     SUBJECTIVE:     Patient Findings     Positives No Problem Findings           OBJECTIVE    INR   Date Value Ref Range Status   03/17/2017 2.9  Final       ASSESSMENT / PLAN  INR assessment THER    Recheck INR In: 1 WEEK    INR Location [facility]      Anticoagulation Summary as of 3/17/2017     INR goal 2.0-3.0   Today's INR 2.9   Maintenance plan 8 mg (2 mg x 4) on Mon, Fri; 6 mg (2 mg x 3) all other days   Full instructions 8 mg on Mon, Fri; 6 mg all other days   Weekly total 46 mg   No change documented Jessa Storey RN   Plan last modified Jessa Storey RN (2/15/2017)   Next INR check 3/24/2017   Priority INR   Target end date Indefinite    Indications   Chronic atrial fibrillation (H) [I48.2]  Long-term (current) use of anticoagulants [Z79.01] [Z79.01]         Anticoagulation Episode Summary     INR check location     Preferred lab     Send INR reminders to HC ANTICOAG POOL    Comments [facility] assisted [facility], Fax   [facility] fax: [phone], , [phone]      Anticoagulation Care Providers     Provider Role Specialty Phone number    GAURAV Grijalva MD [facility] [phone]            See the Encounter Report to view Anticoagulation Flowsheet and Dosing Calendar (Go to Encounters tab in chart review, and find the Anticoagulation Therapy Visit)        Jessa Storey RN

## 2017-03-21 NOTE — PROGRESS NOTES
ANTICOAGULATION FOLLOW-UP CLINIC VISIT    Patient Name:  Adiel Rosa Jr  Date:  3/21/2017  Contact Type:  call received from homecare requesting INR recheck on 3/23/17 instead of 3/24/17.    SUBJECTIVE:     Patient Findings     Positives No Problem Findings    Comments 3/21/17  Request from homecare agency to do INR on 3/23/17 instead of 3/24/17           OBJECTIVE    INR   Date Value Ref Range Status   03/17/2017 2.9  Final       ASSESSMENT / PLAN  INR assessment THER    Recheck INR In: 1 WEEK    INR Location [facility]      Anticoagulation Summary as of 3/17/2017     INR goal 2.0-3.0   Today's INR 2.9   Maintenance plan 8 mg (2 mg x 4) on Mon, Fri; 6 mg (2 mg x 3) all other days   Full instructions 8 mg on Mon, Fri; 6 mg all other days   Weekly total 46 mg   No change documented Jessa Storey RN   Plan last modified Jessa Storey RN (2/15/2017)   Next INR check 3/23/2017   Priority INR   Target end date Indefinite    Indications   Chronic atrial fibrillation (H) [I48.2]  Long-term (current) use of anticoagulants [Z79.01] [Z79.01]         Anticoagulation Episode Summary     INR check location     Preferred lab     Send INR reminders to HC ANTICOAG POOL    Comments [facility] assisted [facility], Fax   [facility] fax: [phone], , [phone]      Anticoagulation Care Providers     Provider Role Specialty Phone number    GAURAV Grijalva MD [facility] [phone]            See the Encounter Report to view Anticoagulation Flowsheet and Dosing Calendar (Go to Encounters tab in chart review, and find the Anticoagulation Therapy Visit)        Jessa Storey RN

## 2017-03-23 ENCOUNTER — ANTICOAGULATION THERAPY VISIT (OUTPATIENT)
Dept: ANTICOAGULATION | Facility: OTHER | Age: 82
End: 2017-03-23

## 2017-03-23 DIAGNOSIS — I48.20 CHRONIC ATRIAL FIBRILLATION (H): ICD-10-CM

## 2017-03-23 DIAGNOSIS — Z79.01 LONG-TERM (CURRENT) USE OF ANTICOAGULANTS: ICD-10-CM

## 2017-03-23 LAB — INR PPP: 2.8

## 2017-03-23 NOTE — MR AVS SNAPSHOT
Adiel VILLATORO Moe    3/23/2017   Anticoagulation Therapy Visit    Description:  81 year old male   Provider:  GAURAV Grijalva MD   Department:  Hc Anti Coagulation           INR as of 3/23/2017     Today's INR 2.8      Anticoagulation Summary as of 3/23/2017     INR goal 2.0-3.0   Today's INR 2.8   Full instructions 8 mg on Mon, Fri; 6 mg all other days   Next INR check 4/6/2017    Indications   Chronic atrial fibrillation (H) [I48.2]  Long-term (current) use of anticoagulants [Z79.01] [Z79.01]         March 2017 Details    Sun Mon Tue Wed Thu Fri Sat        1               2               3               4                 5               6               7               8               9               10               11                 12               13               14               15               16               17               18                 19               20               21               22               23      6 mg   See details      24      8 mg         25      6 mg           26      6 mg         27      8 mg         28      6 mg         29      6 mg         30      6 mg         31      8 mg           Date Details   03/23 This INR check               How to take your warfarin dose     To take:  6 mg Take 3 of the 2 mg tablets.    To take:  8 mg Take 4 of the 2 mg tablets.           April 2017 Details    Sun Mon Tue Wed Thu Fri Sat           1      6 mg           2      6 mg         3      8 mg         4      6 mg         5      6 mg         6            7               8                 9               10               11               12               13               14               15                 16               17               18               19               20               21               22                 23               24               25               26               27               28               29                 30                      Date Details   No  additional details    Date of next INR:  4/6/2017         How to take your warfarin dose     To take:  6 mg Take 3 of the 2 mg tablets.    To take:  8 mg Take 4 of the 2 mg tablets.

## 2017-03-23 NOTE — PROGRESS NOTES
ANTICOAGULATION FOLLOW-UP CLINIC VISIT    Patient Name:  Adiel Rosa Jr  Date:  3/23/2017  Contact Type. Telephone/ spoke to homecare nurse, jennifer Vazquez: INR result, warfarin dosing and INR recheck date. She repeated back dosing and recheck date accurately and has no questions. [facility]     SUBJECTIVE:     Patient Findings     Comments No antibiotics           OBJECTIVE    INR   Date Value Ref Range Status   03/23/2017 2.8  Final       ASSESSMENT / PLAN  INR assessment THER    Recheck INR In: 2 WEEKS    INR Location [facility]      Anticoagulation Summary as of 3/23/2017     INR goal 2.0-3.0   Today's INR 2.8   Maintenance plan 8 mg (2 mg x 4) on Mon, Fri; 6 mg (2 mg x 3) all other days   Full instructions 8 mg on Mon, Fri; 6 mg all other days   Weekly total 46 mg   No change documented Jessa Storey RN   Plan last modified Jessa Storey RN (2/15/2017)   Next INR check 4/6/2017   Priority INR   Target end date Indefinite    Indications   Chronic atrial fibrillation (H) [I48.2]  Long-term (current) use of anticoagulants [Z79.01] [Z79.01]         Anticoagulation Episode Summary     INR check location     Preferred lab     Send INR reminders to HC ANTICOAG POOL    Comments [facility] assisted [facility], Fax   [facility] fax: [phone], , [phone]      Anticoagulation Care Providers     Provider Role Specialty Phone number    GAURAV Grijalva MD [facility] [phone]            See the Encounter Report to view Anticoagulation Flowsheet and Dosing Calendar (Go to Encounters tab in chart review, and find the Anticoagulation Therapy Visit)        Jessa Storey RN

## 2017-03-30 DIAGNOSIS — E11.9 TYPE 2 DIABETES MELLITUS WITHOUT COMPLICATION, WITH LONG-TERM CURRENT USE OF INSULIN (H): ICD-10-CM

## 2017-03-30 DIAGNOSIS — Z79.4 TYPE 2 DIABETES MELLITUS WITHOUT COMPLICATION, WITH LONG-TERM CURRENT USE OF INSULIN (H): ICD-10-CM

## 2017-04-04 ENCOUNTER — OFFICE VISIT (OUTPATIENT)
Dept: FAMILY MEDICINE | Facility: OTHER | Age: 82
End: 2017-04-04
Attending: FAMILY MEDICINE
Payer: MEDICARE

## 2017-04-04 VITALS
DIASTOLIC BLOOD PRESSURE: 54 MMHG | TEMPERATURE: 96.5 F | OXYGEN SATURATION: 98 % | HEART RATE: 51 BPM | SYSTOLIC BLOOD PRESSURE: 108 MMHG | HEIGHT: 72 IN

## 2017-04-04 DIAGNOSIS — M86.171 OSTEOMYELITIS OF FOOT, RIGHT, ACUTE (H): Primary | ICD-10-CM

## 2017-04-04 PROCEDURE — 99213 OFFICE O/P EST LOW 20 MIN: CPT | Performed by: FAMILY MEDICINE

## 2017-04-04 PROCEDURE — 99212 OFFICE O/P EST SF 10 MIN: CPT

## 2017-04-04 RX ORDER — INSULIN DETEMIR 100 [IU]/ML
INJECTION, SOLUTION SUBCUTANEOUS
Qty: 10 ML | Refills: 0 | Status: SHIPPED | OUTPATIENT
Start: 2017-04-04 | End: 2017-05-16

## 2017-04-04 ASSESSMENT — PAIN SCALES - GENERAL: PAINLEVEL: NO PAIN (0)

## 2017-04-04 NOTE — NURSING NOTE
Chief Complaint   Patient presents with     Musculoskeletal Problem     Follow up right foot. needs dressing change today.        Initial /54 (BP Location: Left arm, Patient Position: Chair, Cuff Size: Adult Regular)  Pulse 51  Temp 96.5  F (35.8  C) (Tympanic)  Ht 6' (1.829 m)  SpO2 98% Estimated body mass index is 26.04 kg/(m^2) as calculated from the following:    Height as of 3/10/17: 6' (1.829 m).    Weight as of 3/10/17: 192 lb (87.1 kg).  Medication Reconciliation: complete     BUSHRA MUÑIZ

## 2017-04-04 NOTE — TELEPHONE ENCOUNTER
levemir         Last Written Prescription Date: 2/10/17  Last Fill Quantity: 10 ml, # refills: 2  Last Office Visit with [facility], P or  Health prescribing provider:  2/27/17   Next 5 appointments (look out 90 days)     Apr 04, 2017  1:15 PM CDT   (Arrive by 1:00 PM)   Office Visit with GAURAV Grijalva MD   [facility] ([facility])    [address] [place] [address]   [phone]                   BP Readings from Last 3 Encounters:   03/10/17 118/58   02/27/17 110/54   02/06/17 111/61     Lab Results   Component Value Date    MICROL 269 03/12/2014     Lab Results   Component Value Date    UMALCR 213.49 03/12/2014     Creatinine   Date Value Ref Range Status   02/27/2017 1.44 (H) 0.66 - 1.25 mg/dL Final   ]  GFR Estimate   Date Value Ref Range Status   02/27/2017 47 (L) >60 mL/min/1.7m2 Final     Comment:     Non  GFR Calc   01/15/2017 53 (L) >60 mL/min/1.7m2 Final     Comment:     Non  GFR Calc   01/14/2017 56 (L) >60 mL/min/1.7m2 Final     Comment:     Non  GFR Calc     GFR Estimate If Black   Date Value Ref Range Status   02/27/2017 57 (L) >60 mL/min/1.7m2 Final     Comment:      GFR Calc   01/15/2017 64 >60 mL/min/1.7m2 Final     Comment:      GFR Calc   01/14/2017 68 >60 mL/min/1.7m2 Final     Comment:      GFR Calc     Lab Results   Component Value Date    CHOL 142 04/23/2015     Lab Results   Component Value Date    HDL 36 04/23/2015     Lab Results   Component Value Date    LDL 56 04/23/2015     Lab Results   Component Value Date    TRIG 248 04/23/2015     Lab Results   Component Value Date    CHOLHDLRATIO 3.9 04/23/2015     Lab Results   Component Value Date     02/27/2017     Lab Results   Component Value Date     02/27/2017     Lab Results   Component Value Date    A1C 8.4 01/15/2017    A1C 7.3 09/14/2016    A1C 7.4 04/11/2016    A1C 8.6 10/20/2015    A1C 8.8% 07/27/2015      Potassium   Date Value Ref Range Status   02/27/2017 4.8 3.4 - 5.3 mmol/L Final

## 2017-04-04 NOTE — MR AVS SNAPSHOT
"              After Visit Summary   4/4/2017    Adiel Rosa Jr    MRN: [id number]           Patient Information     Date Of Birth          1935        Visit Information        Provider Department      4/4/2017 1:15 PM [facility]        Care Instructions    Return in a month        Follow-ups after your visit        Follow-up notes from your care team     Return in about 1 month (around 5/4/2017).      Your next 10 appointments already scheduled     Apr 04, 2017  1:15 PM CDT   (Arrive by 1:00 PM)   Office Visit with GAURAV Grijalva MD   [facility] [place] ([facility] [place] [facility])    [address]  [place] [address]   [phone]           Bring a current list of meds and any records pertaining to this visit.  For Physicals, please bring immunization records and any forms needing to be filled out.    Please arrive 15 minutes early to complete paperwork and register.              Who to contact     If you have questions or need follow up information about today's clinic visit or your schedule please contact [facility] directly at [phone].  Normal or non-critical lab and imaging results will be communicated to you by Networkhart, letter or phone within 4 business days after the clinic has received the results. If you do not hear from us within 7 days, please contact the clinic through Networkhart or phone. If you have a critical or abnormal lab result, we will notify you by phone as soon as possible.  Submit refill requests through SHADO or call your pharmacy and they will forward the refill request to us. Please allow 3 business days for your refill to be completed.          Additional Information About Your Visit        MyChart Information     SHADO lets you send messages to your doctor, view your test results, renew your prescriptions, schedule appointments and more. To sign up, go to [email or web address] . Click on \"Log in\" on the left side of the " "screen, which will take you to the Welcome page. Then click on \"Sign up Now\" on the right side of the page.     You will be asked to enter the access code listed below, as well as some personal information. Please follow the directions to create your username and password.     Your access code is: [email or web address]  Expires: 2017  3:01 PM     Your access code will  in 90 days. If you need help or a new code, please call your [facility] clinic or [phone].        Care EveryWhere ID     This is your Care EveryWhere ID. This could be used by other organizations to access your [facility] medical records  [id number]        Your Vitals Were     Pulse Temperature Height Pulse Oximetry          51 96.5  F (35.8  C) (Tympanic) 6' (1.829 m) 98%         Blood Pressure from Last 3 Encounters:   17 108/54   03/10/17 118/58   17 110/54    Weight from Last 3 Encounters:   03/10/17 192 lb (87.1 kg)   17 188 lb (85.3 kg)   17 185 lb 13.6 oz (84.3 kg)              Today, you had the following     No orders found for display       Primary Care Provider Office Phone # Fax #    R Hao Grijalva [phone]       [facility] [address] [place] [address]        Thank you!     Thank you for choosing [facility]  for your care. Our goal is always to provide you with excellent care. Hearing back from our patients is one way we can continue to improve our services. Please take a few minutes to complete the written survey that you may receive in the mail after your visit with us. Thank you!             Your Updated Medication List - Protect others around you: Learn how to safely use, store and throw away your medicines at www.disposemymeds.org.          This list is accurate as of: 17  1:07 PM.  Always use your most recent med list.                   Brand Name Dispense Instructions for use    acetaminophen 650 MG CR tablet    TYLENOL    60 tablet    Take 1 " tablet (650 mg) by mouth every 8 hours as needed       aspirin 325 MG tablet     120 tablet    Take 1 tablet (325 mg) by mouth daily       B-D U/F 31G X 8 MM   Generic drug:  insulin pen needle     600 each    USE 5 TO 6 PEN NEEDLES DAILY OR AS DIRECTED       benzocaine-menthol 15-3.6 MG lozenge    CEPACOL    30 lozenge    Place 1 lozenge inside cheek every hour as needed for sore throat       blood glucose monitoring meter device kit    no brand specified    1 kit    Use to test blood sugar 5 times daily due to patient being on sliding scale.       blood glucose monitoring test strip    ONE TOUCH ULTRA    300 each    Use to test blood sugar 5 times daily due to patient being on sliding scale.       candesartan 16 MG tablet    ATACAND    90 tablet    TAKE 1 TABLET BY MOUTH EVERY DAY       CENTRUM SILVER ADULT 50+ PO          Cranberry 500 MG Caps     90 capsule    Take 1 capsule (500 mg) by mouth daily       DIGOX 125 MCG tablet   Generic drug:  digoxin     90 tablet    TAKE ONE TABLET BY MOUTH EVERY DAY       Doxylamine-DM 6.25-15 MG/15ML Liqd     236 mL    Taking as pkg directions at night       fluticasone-salmeterol 250-50 MCG/DOSE diskus inhaler    ADVAIR DISKUS    3 Inhaler    USE 1 INHALATION TWO TIMES A DAY IN THE MORNING AND IN THE EVENING APPROXIMATELY 12 HOURS APART       furosemide 40 MG tablet    LASIX    90 tablet    Take 1 tablet (40 mg) by mouth daily       HYDROcodone-acetaminophen 5-325 MG per tablet    NORCO    30 tablet    TAKE 1 TABLET BY MOUTH EVERY 6 HOURS AS NEEDED FOR MODERATE TO SEVERE PAIN       insulin aspart 100 UNITS/ML injection    NovoLOG VIAL    30 mL    Inject 1-7 Units Subcutaneous 3 times daily (with meals) If glucose less than or equal to 150 mg/dL do not give insulin  If glucose 151-200 give 2 Units subcutaneously  If glucose 201-250 give 3 Units subcutaneously  If glucose 251-300 give 4 Units subcutaneously  If glucose 301-350 give 5 Units subcutaneously  If glucose over 350  "give 7 Units subcutaneously and call physician       insulin syringe-needle U-100 31G X 5/16\" 1 ML    ADVOCATE INSULIN SYRINGE    300 each    Use 4 syringes daily or as directed.       LEVEMIR VIAL 100 UNIT/ML injection   Generic drug:  insulin detemir     10 mL    INJECT 35 UNITS EVERY MORNING AND 10 UNITS AT BEDTIME SUBCUTANEOUSLY       metoprolol 50 MG 24 hr tablet    TOPROL XL    90 tablet    Take 0.5 tablets (25 mg) by mouth daily       * order for DME     1 Device    Equipment being ordered: Hospital Bed       * order for DME     60 each    Equipment being ordered: Xero form dressing Change dressing on foot wound daily       * order for DME     6 Month    Equipment being ordered: Warm water and baby shampoo soaks daily to affected area. Cover with Xeroform dressing and gauze until healed  Fax order to [facility] [phone]       order for DME     1 each    Equipment being ordered: Kerlix 4\" rolls, # 30; 1\" transpore tape- 2 rolls; 4X4\" gauze pads- please fill a sleeve of non strerile; 1 bottle of 1/2\" iodoform strip gauze, xeroform 5X9\"- one package.       * order for DME     1 each    Equipment being ordered:   1. Darco Orthowedge Size XL Disp: 1 (right foot)  Refill: 0       * order for DME     1 Application    Equipment being ordered: Daily wound change- on top of foot.  Do same dressing change as bottom of the foot.       ranitidine 150 MG tablet    ZANTAC    180 tablet    TAKE ONE TABLET BY MOUTH TWICE DAILY       simvastatin 80 MG tablet    ZOCOR    90 tablet    TAKE 1 TABLET DAILY IN THE EVENING       SM MAGNESIUM 250 MG tablet   Generic drug:  magnesium     30 tablet    TAKE ONE TABLET BY MOUTH EVERY DAY       warfarin 2 MG tablet    COUMADIN    312 tablet    Take 8mg Mon/Fri; 6mg all other days or as directed by [facility]       * Notice:  This list has 5 medication(s) that are the same as other medications prescribed for you. Read the directions carefully, and ask your doctor or " other care provider to review them with you.

## 2017-04-04 NOTE — PROGRESS NOTES
[facility]    Adiel Rosa Jr, 81 year old, male presents with   Chief Complaint   Patient presents with     Musculoskeletal Problem     Follow up right foot. needs dressing change today.        PAST MEDICAL HISTORY:  Past Medical History:   Diagnosis Date     Acute myocardial infarction of other specified sites, episode of care unspecified 1/1/1999    Non Q wave  treated with Retavase     Benign hypertensive heart disease with congestive 1/1/2011     BPH 1/1/2011     Chronic renal disease, stage III 9/22/2011     COPD 1/1/2011     Cough 9/11/2007     Diabetes mellitus without mention of complication 11/29/1999     Erectile Dysfunction 1/1/2011     Esophageal reflux 1/1/2011     Hypercalcemia 1/1/2011     Hypercholesterolemia 1/1/2011     Hypertension, benign 1/1/2011       PAST SURGICAL HISTORY:  Past Surgical History:   Procedure Laterality Date     appendectomy       bladder stone removal       Nephrolithiasis      lithotripsy     stent x 1       tonsillectomy         MEDICATIONS:  Prior to Admission medications    Medication Sig Start Date End Date Taking? Authorizing Provider   LEVEMIR VIAL 100 UNIT/ML soln INJECT 35 UNITS EVERY MORNING AND 10 UNITS AT BEDTIME SUBCUTANEOUSLY 4/4/17  Yes GAURAV Grijalva MD   B-D U/F 31G X 8 MM insulin pen needle USE 5 TO 6 PEN NEEDLES DAILY OR AS DIRECTED 3/17/17  Yes GAURAV Grijalva MD   HYDROcodone-acetaminophen (NORCO) 5-325 MG per tablet TAKE 1 TABLET BY MOUTH EVERY 6 HOURS AS NEEDED FOR MODERATE TO SEVERE PAIN 3/17/17  Yes Swapnil Mackay MD   ranitidine (ZANTAC) 150 MG tablet TAKE ONE TABLET BY MOUTH TWICE DAILY 3/16/17  Yes GAURAV Grijalva MD   SM MAGNESIUM 250 MG tablet TAKE ONE TABLET BY MOUTH EVERY DAY 3/16/17  Yes Asif Miller MD   DIGOX 125 MCG tablet TAKE ONE TABLET BY MOUTH EVERY DAY 3/16/17  Yes GAURAV Grijalva MD   candesartan (ATACAND) 16 MG tablet TAKE 1 TABLET BY MOUTH EVERY DAY 3/16/17  Yes GAURAV Grijalva MD   warfarin (COUMADIN)  "2 MG tablet Take 8mg Mon/Fri; 6mg all other days or as directed by [facility] 3/13/17  Yes GAURAV Grijalva MD   order for DME Equipment being ordered: Daily wound change- on top of foot.   Do same dressing change as bottom of the foot. 3/2/17  Yes GAURAV Grijalva MD   simvastatin (ZOCOR) 80 MG tablet TAKE 1 TABLET DAILY IN THE EVENING 2/20/17  Yes GAURAV Grijalva MD   blood glucose monitoring (ONE TOUCH ULTRA) test strip Use to test blood sugar 5 times daily due to patient being on sliding scale. 1/30/17  Yes GAURAV Grijalva MD   blood glucose monitoring (NO BRAND SPECIFIED) meter device kit Use to test blood sugar 5 times daily due to patient being on sliding scale. 1/30/17  Yes GAURAV Grijalva MD   order for DME Equipment being ordered:     1. Darco Orthowedge  Size XL  Disp: 1 (right foot)   Refill: 0 1/26/17  Yes Piedad Cruz, NP   order for DME Equipment being ordered: Kerlix 4\" rolls, # 30; 1\" transpore tape- 2 rolls; 4X4\" gauze pads- please fill a sleeve of non strerile; 1 bottle of 1/2\" iodoform strip gauze, xeroform 5X9\"- one package. 1/20/17  Yes Gema Kirk, NP   Doxylamine-DM 6.25-15 MG/15ML LIQD Taking as pkg directions at night 1/20/17  Yes GAURAV Grijalva MD   Multiple Vitamins-Minerals (CENTRUM SILVER ADULT 50+ PO)    Yes Reported, Patient   order for DME Equipment being ordered:  Warm water and baby shampoo soaks daily to affected area. Cover with Xeroform dressing and gauze until healed    Fax order to [facility]  [phone] 1/18/17  Yes Christie Quijano, PA   insulin aspart (NOVOLOG VIAL) 100 UNITS/ML injection Inject 1-7 Units Subcutaneous 3 times daily (with meals) If glucose less than or equal to 150 mg/dL do not give insulin   If glucose 151-200 give 2 Units subcutaneously   If glucose 201-250 give 3 Units subcutaneously   If glucose 251-300 give 4 Units subcutaneously   If glucose 301-350 give 5 Units subcutaneously   If glucose over 350 give 7 Units subcutaneously " "and call physician 1/17/17  Yes GAURAV Grijalva MD   insulin syringe-needle U-100 (ADVOCATE INSULIN SYRINGE) 31G X 5/16\" 1 ML Use 4 syringes daily or as directed. 1/17/17  Yes GAURAV Grijalva MD   order for DME Equipment being ordered: Xero form dressing  Change dressing on foot wound daily 1/17/17  Yes GAURAV Grijalva MD   Cranberry 500 MG CAPS Take 1 capsule (500 mg) by mouth daily 1/16/17  Yes Alejandro Fung MD   acetaminophen (TYLENOL) 650 MG CR tablet Take 1 tablet (650 mg) by mouth every 8 hours as needed 1/16/17  Yes Alejandro Fung MD   aspirin 325 MG tablet Take 1 tablet (325 mg) by mouth daily 1/16/17  Yes Alejandro Fung MD   fluticasone-salmeterol (ADVAIR DISKUS) 250-50 MCG/DOSE diskus inhaler USE 1 INHALATION TWO TIMES A DAY IN THE MORNING AND IN THE EVENING APPROXIMATELY 12 HOURS APART 1/16/17  Yes Alejandro Fung MD   metoprolol (TOPROL XL) 50 MG 24 hr tablet Take 0.5 tablets (25 mg) by mouth daily 1/16/17  Yes Alejandro Fung MD   furosemide (LASIX) 40 MG tablet Take 1 tablet (40 mg) by mouth daily 1/16/17  Yes Alejandro Fung MD   benzocaine-menthol (CEPACOL) 15-3.6 MG lozenge Place 1 lozenge inside cheek every hour as needed for sore throat 1/16/17  Yes Alejandro Fung MD   order for DME Equipment being ordered: Hospital Bed 1/16/17  Yes Alejandro Fung MD       ALLERGIES:   No Known Allergies    ROS:  Constitutional, HEENT, cardiovascular, pulmonary, gi and gu systems are negative, except as otherwise noted.      EXAM:  /54 (BP Location: Left arm, Patient Position: Chair, Cuff Size: Adult Regular)  Pulse 51  Temp 96.5  F (35.8  C) (Tympanic)  Ht 6' (1.829 m)  SpO2 98% There is no height or weight on file to calculate BMI.   GENERAL APPEARANCE: healthy, alert and no distress  RESP: breathing comfortably  ORTHO: rright foot dorsal aspect just a small little puncture site.  On the plantar aspect of his buttock 3 or 4 mm diameter opening.  He is able to bear " weight.  Lab/ X-ray  No results found for this or any previous visit (from the past 24 hour(s)).    ASSESSMENT/PLAN:    ICD-10-CM    1. Osteomyelitis of foot, right, acute (H) M86.171 Wound is healing.  I explained that he still has a deep bone infection and that would not resolve spontaneously.  At this point he does not want to see Ortho.  Seems to be improving.  We'll see him in a month.  Wound was redressed today.  Diabetes is stable         WILSON Grijalva MD  April 4, 2017

## 2017-04-06 ENCOUNTER — ANTICOAGULATION THERAPY VISIT (OUTPATIENT)
Dept: ANTICOAGULATION | Facility: OTHER | Age: 82
End: 2017-04-06

## 2017-04-06 DIAGNOSIS — Z79.01 LONG-TERM (CURRENT) USE OF ANTICOAGULANTS: ICD-10-CM

## 2017-04-06 DIAGNOSIS — I48.20 CHRONIC ATRIAL FIBRILLATION (H): ICD-10-CM

## 2017-04-06 LAB — INR PPP: 2.6

## 2017-04-06 NOTE — MR AVS SNAPSHOT
Adiel Rosa Jr   4/6/2017   Anticoagulation Therapy Visit    Description:  81 year old male   Provider:  GAURAV Grijalva MD   Department:  Hc Anti Coagulation           INR as of 4/6/2017     Today's INR 2.6      Anticoagulation Summary as of 4/6/2017     INR goal 2.0-3.0   Today's INR 2.6   Full instructions 8 mg on Mon, Fri; 6 mg all other days   Next INR check 4/27/2017    Indications   Chronic atrial fibrillation (H) [I48.2]  Long-term (current) use of anticoagulants [Z79.01] [Z79.01]         April 2017 Details    Sun Mon Tue Wed Thu Fri Sat           1                 2               3               4               5               6      6 mg   See details      7      8 mg         8      6 mg           9      6 mg         10      8 mg         11      6 mg         12      6 mg         13      6 mg         14      8 mg         15      6 mg           16      6 mg         17      8 mg         18      6 mg         19      6 mg         20      6 mg         21      8 mg         22      6 mg           23      6 mg         24      8 mg         25      6 mg         26      6 mg         27            28               29                 30                      Date Details   04/06 This INR check       Date of next INR:  4/27/2017         How to take your warfarin dose     To take:  6 mg Take 3 of the 2 mg tablets.    To take:  8 mg Take 4 of the 2 mg tablets.

## 2017-04-06 NOTE — PROGRESS NOTES
ANTICOAGULATION FOLLOW-UP CLINIC VISIT    Patient Name:  Adiel Rosa Jr  Date:  4/6/2017  Contact Type:  Telephone/ spoke to homecare nurse re: INR result, warfarin dosing and INR recheck date. She repeated back dosing and recheck date accurately and has no questions. Dosing and recheck date also faxed to [facility] and to [facility].     SUBJECTIVE:     Patient Findings     Positives No Problem Findings           OBJECTIVE    INR   Date Value Ref Range Status   04/06/2017 2.6  Final       ASSESSMENT / PLAN  INR assessment THER    Recheck INR In: 3 WEEKS    INR Location [facility] INR      Anticoagulation Summary as of 4/6/2017     INR goal 2.0-3.0   Today's INR 2.6   Maintenance plan 8 mg (2 mg x 4) on Mon, Fri; 6 mg (2 mg x 3) all other days   Full instructions 8 mg on Mon, Fri; 6 mg all other days   Weekly total 46 mg   No change documented Jessa Storey RN   Plan last modified Jessa Storey RN (2/15/2017)   Next INR check 4/27/2017   Priority INR   Target end date Indefinite    Indications   Chronic atrial fibrillation (H) [I48.2]  Long-term (current) use of anticoagulants [Z79.01] [Z79.01]         Anticoagulation Episode Summary     INR check location     Preferred lab     Send INR reminders to [facility] POOL    Comments [facility] assisted [facility], Fax   [facility] fax: [phone], , [phone]      Anticoagulation Care Providers     Provider Role Specialty Phone number    GAURAV Grijalva MD [facility] [phone]            See the Encounter Report to view Anticoagulation Flowsheet and Dosing Calendar (Go to Encounters tab in chart review, and find the Anticoagulation Therapy Visit)        Jessa Storey RN

## 2017-04-10 DIAGNOSIS — S20.221A BACK CONTUSION, RIGHT, INITIAL ENCOUNTER: ICD-10-CM

## 2017-04-10 RX ORDER — HYDROCODONE BITARTRATE AND ACETAMINOPHEN 5; 325 MG/1; MG/1
TABLET ORAL
Qty: 30 TABLET | Refills: 0 | Status: SHIPPED | OUTPATIENT
Start: 2017-04-10 | End: 2017-04-28

## 2017-04-10 NOTE — TELEPHONE ENCOUNTER
Norco       Last Written Prescription Date:  3/17/2017  Last Fill Quantity: 30,   # refills: 0  Last Office Visit with [facility], [facility] or [facility] prescribing provider: 4/4/2017  Future Office visit:    Next 5 appointments (look out 90 days)     May 05, 2017  1:15 PM CDT   (Arrive by 1:00 PM)   Office Visit with GAURAV Grijalva MD   [facility] ([facility])    [address] [place] [address]   [phone]                   Routing refill request to provider for review/approval because:  Drug not on the [facility], [facility] or [facility] refill protocol or controlled substance

## 2017-04-11 NOTE — TELEPHONE ENCOUNTER
Left message that the written RX is ready at the [facility]  registration to be picked up.

## 2017-04-14 DIAGNOSIS — K21.9 ESOPHAGEAL REFLUX: ICD-10-CM

## 2017-04-14 DIAGNOSIS — I10 HTN (HYPERTENSION): ICD-10-CM

## 2017-04-14 RX ORDER — CANDESARTAN CILEXETIL 16 MG/1
TABLET ORAL
Qty: 90 TABLET | Refills: 2 | Status: SHIPPED | OUTPATIENT
Start: 2017-04-14 | End: 2018-04-26

## 2017-04-14 RX ORDER — METOPROLOL SUCCINATE 50 MG/1
TABLET, EXTENDED RELEASE ORAL
Qty: 90 TABLET | Refills: 2 | Status: SHIPPED | OUTPATIENT
Start: 2017-04-14 | End: 2018-02-12

## 2017-04-17 NOTE — PROGRESS NOTES
ANTICOAGULATION FOLLOW-UP CLINIC VISIT    Patient Name:  Adiel Rosa Jr  Date:  1/25/2017  Contact Type:  Telephone/ message left on [facility] voicemail by [facility] nurse, jennifer Armas: INR result. New warfarin dosing and INR recheck date faxed, by this nurse, to  [facility] and also to [facility] facility. They are to notify warfarin clinic if patient has any bleeding/bruising, changes in diet, meds activity or questions.     SUBJECTIVE:     Patient Findings     Positives Antibiotic use or infection, No Problem Findings    Comments Continues on antibiotics           OBJECTIVE    INR   Date Value Ref Range Status   01/25/2017 1.6  Final       ASSESSMENT / PLAN  INR assessment SUB    Recheck INR In: 1 WEEK    INR Location [facility] INR      Anticoagulation Summary as of 1/25/2017     INR goal 2.0-3.0   Selected INR 1.6! (1/25/2017)   Maintenance plan 8 mg (2 mg x 4) on Mon, Wed, Fri; 6 mg (2 mg x 3) all other days   Full instructions 1/25: 10 mg; Otherwise 8 mg on Mon, Wed, Fri; 6 mg all other days   Weekly total 48 mg   Plan last modified Jessa Gibson RN (8/2/2016)   Next INR check 2/1/2017   Priority INR   Target end date Indefinite    Indications   Chronic atrial fibrillation (H) [I48.2]  Long-term (current) use of anticoagulants [Z79.01] [Z79.01]         Anticoagulation Episode Summary     INR check location     Preferred lab     Send INR reminders to [facility] ANTICOAG POOL    Comments [facility] assisted [facility], Fax   [facility] fax: [phone], , [phone]      Anticoagulation Care Providers     Provider Role Specialty Phone number    GAURAV Grijalva MD [facility] [phone]            See the Encounter Report to view Anticoagulation Flowsheet and Dosing Calendar (Go to Encounters tab in chart review, and find the Anticoagulation Therapy Visit)        Jessa Storey RN                  Please call her to schedule CT scan orders in

## 2017-04-21 ENCOUNTER — MEDICAL CORRESPONDENCE (OUTPATIENT)
Dept: HEALTH INFORMATION MANAGEMENT | Facility: HOSPITAL | Age: 82
End: 2017-04-21

## 2017-04-24 ENCOUNTER — CARE COORDINATION (OUTPATIENT)
Dept: CARE COORDINATION | Facility: OTHER | Age: 82
End: 2017-04-24

## 2017-04-24 NOTE — PROGRESS NOTES
Clinic Care Coordination Contact  Care Team Conversations    Phone call is placed to [facility] this date and spoke with Jules LUA.  Reports patient does continue to reside at facility.  Inquired on condition of his foot, and she reports that it is better, remains infected but no further deterioration.  At this point, placement is planned to be permanent.  Care Coordination is dc'd this date.  Instructed Jules that if anything should change or any discussion regarding discharge occur to contact care coordinator at any time.     Christie Greer RN-BSN  RN Clinic Care Coordinator  [facility]  Phone:  [phone]

## 2017-04-27 ENCOUNTER — ANTICOAGULATION THERAPY VISIT (OUTPATIENT)
Dept: ANTICOAGULATION | Facility: OTHER | Age: 82
End: 2017-04-27

## 2017-04-27 DIAGNOSIS — I48.20 CHRONIC ATRIAL FIBRILLATION (H): ICD-10-CM

## 2017-04-27 DIAGNOSIS — Z79.01 LONG-TERM (CURRENT) USE OF ANTICOAGULANTS: ICD-10-CM

## 2017-04-27 LAB — INR PPP: 2.1

## 2017-04-27 NOTE — MR AVS SNAPSHOT
Adiel VILLATORO Moe    4/27/2017   Anticoagulation Therapy Visit    Description:  81 year old male   Provider:  GAURAV Grijalva MD   Department:  [facility] Anti Coagulation           INR as of 4/27/2017     Today's INR 2.1      Anticoagulation Summary as of 4/27/2017     INR goal 2.0-3.0   Today's INR 2.1   Full instructions 8 mg on Mon, Fri; 6 mg all other days   Next INR check 5/18/2017    Indications   Chronic atrial fibrillation (H) [I48.2]  Long-term (current) use of anticoagulants [Z79.01] [Z79.01]         April 2017 Details    Sun Mon Tue Wed Thu Fri Sat           1                 2               3               4               5               6               7               8                 9               10               11               12               13               14               15                 16               17               18               19               20               21               22                 23               24               25               26               27      6 mg   See details      28      8 mg         29      6 mg           30      6 mg                Date Details   04/27 This INR check               How to take your warfarin dose     To take:  6 mg Take 3 of the 2 mg tablets.    To take:  8 mg Take 4 of the 2 mg tablets.           May 2017 Details    Sun Mon Tue Wed Thu Fri Sat      1      8 mg         2      6 mg         3      6 mg         4      6 mg         5      8 mg         6      6 mg           7      6 mg         8      8 mg         9      6 mg         10      6 mg         11      6 mg         12      8 mg         13      6 mg           14      6 mg         15      8 mg         16      6 mg         17      6 mg         18            19               20                 21               22               23               24               25               26               27                 28               29               30               31                    Date Details   No additional details    Date of next INR:  5/18/2017         How to take your warfarin dose     To take:  6 mg Take 3 of the 2 mg tablets.    To take:  8 mg Take 4 of the 2 mg tablets.

## 2017-04-27 NOTE — PROGRESS NOTES
ANTICOAGULATION FOLLOW-UP CLINIC VISIT    Patient Name:  Adiel Rosa Jr  Date:  4/27/2017  Contact Type:  Telephone/ spoke to homecare nurse re: INR result, warfarin dosing and INR recheck date. She repeated it back accurately and has no questions. Warfarin dosing and recheck date also faxed to assisted living facility and to homecare agency    SUBJECTIVE:     Patient Findings     Positives No Problem Findings           OBJECTIVE    INR   Date Value Ref Range Status   04/27/2017 2.1  Final       ASSESSMENT / PLAN  INR assessment THER    Recheck INR In: 3 WEEKS    INR Location [facility]      Anticoagulation Summary as of 4/27/2017     INR goal 2.0-3.0   Today's INR 2.1   Maintenance plan 8 mg (2 mg x 4) on Mon, Fri; 6 mg (2 mg x 3) all other days   Full instructions 8 mg on Mon, Fri; 6 mg all other days   Weekly total 46 mg   Plan last modified Jessa Storey RN (2/15/2017)   Next INR check 5/18/2017   Priority INR   Target end date Indefinite    Indications   Chronic atrial fibrillation (H) [I48.2]  Long-term (current) use of anticoagulants [Z79.01] [Z79.01]         Anticoagulation Episode Summary     INR check location     Preferred lab     Send INR reminders to [facility] ANTICOAG POOL    Comments [facility] assisted living, Fax   [facility] fax: [phone], , [phone]      Anticoagulation Care Providers     Provider Role Specialty Phone number    GAURAV Grijalva MD [facility] [phone]            See the Encounter Report to view Anticoagulation Flowsheet and Dosing Calendar (Go to Encounters tab in chart review, and find the Anticoagulation Therapy Visit)        Jessa Storey RN

## 2017-04-28 DIAGNOSIS — S20.221A BACK CONTUSION, RIGHT, INITIAL ENCOUNTER: ICD-10-CM

## 2017-05-01 RX ORDER — HYDROCODONE BITARTRATE AND ACETAMINOPHEN 5; 325 MG/1; MG/1
TABLET ORAL
Qty: 30 TABLET | Refills: 0 | Status: SHIPPED | OUTPATIENT
Start: 2017-05-01 | End: 2017-05-30

## 2017-05-01 NOTE — TELEPHONE ENCOUNTER
Left message that the written RX is ready at the [facility]  registration to be picked up.

## 2017-05-01 NOTE — TELEPHONE ENCOUNTER
Norco      Last Written Prescription Date:  4/0/17  Last Fill Quantity: 30,   # refills: 0  Last Office Visit with [facility], [facility] or [facility] prescribing provider: 4/4/17  Future Office visit:    Next 5 appointments (look out 90 days)     May 05, 2017  1:15 PM CDT   (Arrive by 1:00 PM)   Office Visit with GAURAV Grijalva MD   [facility] ([facility] [place] [facility])    [address] [place] [address]  [place] [address]   [phone]                   Routing refill request to provider for review/approval because:  Drug not on the [facility], [facility] or [facility] refill protocol or controlled substance

## 2017-05-05 ENCOUNTER — OFFICE VISIT (OUTPATIENT)
Dept: FAMILY MEDICINE | Facility: OTHER | Age: 82
End: 2017-05-05
Attending: FAMILY MEDICINE
Payer: MEDICARE

## 2017-05-05 VITALS
OXYGEN SATURATION: 96 % | BODY MASS INDEX: 24.71 KG/M2 | TEMPERATURE: 97.6 F | SYSTOLIC BLOOD PRESSURE: 115 MMHG | DIASTOLIC BLOOD PRESSURE: 72 MMHG | HEIGHT: 72 IN | RESPIRATION RATE: 18 BRPM | WEIGHT: 182.4 LBS | HEART RATE: 70 BPM

## 2017-05-05 DIAGNOSIS — M86.671 CHRONIC OSTEOMYELITIS OF RIGHT FOOT (H): Primary | ICD-10-CM

## 2017-05-05 PROCEDURE — 99213 OFFICE O/P EST LOW 20 MIN: CPT

## 2017-05-05 PROCEDURE — 99213 OFFICE O/P EST LOW 20 MIN: CPT | Performed by: FAMILY MEDICINE

## 2017-05-05 ASSESSMENT — PAIN SCALES - GENERAL: PAINLEVEL: NO PAIN (0)

## 2017-05-05 NOTE — MR AVS SNAPSHOT
"              After Visit Summary   2017    Adiel Rosa Jr    MRN: [id number]           Patient Information     Date Of Birth          1935        Visit Information        Provider Department      2017 1:15 PM GAURAV Grijalva MD [facility]        Today's Diagnoses     Chronic osteomyelitis of right foot (H)    -  1      Care Instructions    Continue with the daily dressing changes        Follow-ups after your visit        Who to contact     If you have questions or need follow up information about today's clinic visit or your schedule please contact [facility] directly at [phone].  Normal or non-critical lab and imaging results will be communicated to you by [employer], letter or phone within 4 business days after the clinic has received the results. If you do not hear from us within 7 days, please contact the clinic through [employer] or phone. If you have a critical or abnormal lab result, we will notify you by phone as soon as possible.  Submit refill requests through [employer] or call your pharmacy and they will forward the refill request to us. Please allow 3 business days for your refill to be completed.          Additional Information About Your Visit        MyChart Information     [employer] lets you send messages to your doctor, view your test results, renew your prescriptions, schedule appointments and more. To sign up, go to [email or web address] . Click on \"Log in\" on the left side of the screen, which will take you to the Welcome page. Then click on \"Sign up Now\" on the right side of the page.     You will be asked to enter the access code listed below, as well as some personal information. Please follow the directions to create your username and password.     Your access code is: [email or web address]  Expires: 8/3/2017  1:47 PM     Your access code will  in 90 days. If you need help or a new code, please call your [facility] [place] or [phone].        Care " EveryWhere ID     This is your Care EveryWhere ID. This could be used by other organizations to access your [facility] medical records  [id number]        Your Vitals Were     Pulse Temperature Respirations Height Pulse Oximetry BMI (Body Mass Index)    70 97.6  F (36.4  C) (Tympanic) 18 6' (1.829 m) 96% 24.74 kg/m2       Blood Pressure from Last 3 Encounters:   05/05/17 115/72   04/04/17 108/54   03/10/17 118/58    Weight from Last 3 Encounters:   05/05/17 182 lb 6.4 oz (82.7 kg)   03/10/17 192 lb (87.1 kg)   01/20/17 188 lb (85.3 kg)              Today, you had the following     No orders found for display       Primary Care Provider Office Phone # Fax #    R Hao Grijalva [phone]       [place] [phone]        Thank you!     Thank you for choosing [facility]  for your care. Our goal is always to provide you with excellent care. Hearing back from our patients is one way we can continue to improve our services. Please take a few minutes to complete the written survey that you may receive in the mail after your visit with us. Thank you!             Your Updated Medication List - Protect others around you: Learn how to safely use, store and throw away your medicines at www.disposemymeds.org.          This list is accurate as of: 5/5/17  1:47 PM.  Always use your most recent med list.                   Brand Name Dispense Instructions for use    acetaminophen 650 MG CR tablet    TYLENOL    60 tablet    Take 1 tablet (650 mg) by mouth every 8 hours as needed       aspirin 325 MG tablet     120 tablet    Take 1 tablet (325 mg) by mouth daily       B-D U/F 31G X 8 MM   Generic drug:  insulin pen needle     600 each    USE 5 TO 6 PEN NEEDLES DAILY OR AS DIRECTED       benzocaine-menthol 15-3.6 MG lozenge    CEPACOL    30 lozenge    Place 1 lozenge inside cheek every hour as needed for sore throat       blood glucose monitoring meter device kit  "   no brand specified    1 kit    Use to test blood sugar 5 times daily due to patient being on sliding scale.       blood glucose monitoring test strip    ONE TOUCH ULTRA    300 each    Use to test blood sugar 5 times daily due to patient being on sliding scale.       * candesartan 16 MG tablet    ATACAND    90 tablet    TAKE 1 TABLET BY MOUTH EVERY DAY       * ATACAND 16 MG tablet   Generic drug:  candesartan     90 tablet    TAKE 1 TABLET DAILY       CENTRUM SILVER ADULT 50+ PO          Cranberry 500 MG Caps     90 capsule    Take 1 capsule (500 mg) by mouth daily       DIGOX 125 MCG tablet   Generic drug:  digoxin     90 tablet    TAKE ONE TABLET BY MOUTH EVERY DAY       Doxylamine-DM 6.25-15 MG/15ML Liqd     236 mL    Taking as pkg directions at night       fluticasone-salmeterol 250-50 MCG/DOSE diskus inhaler    ADVAIR DISKUS    3 Inhaler    USE 1 INHALATION TWO TIMES A DAY IN THE MORNING AND IN THE EVENING APPROXIMATELY 12 HOURS APART       furosemide 40 MG tablet    LASIX    90 tablet    Take 1 tablet (40 mg) by mouth daily       HYDROcodone-acetaminophen 5-325 MG per tablet    NORCO    30 tablet    TAKE 1 TABLET BY MOUTH EVERY 6 HOURS AS NEEDED FOR MODERATE TO SEVERE PAIN       insulin aspart 100 UNITS/ML injection    NovoLOG VIAL    30 mL    Inject 1-7 Units Subcutaneous 3 times daily (with meals) If glucose less than or equal to 150 mg/dL do not give insulin  If glucose 151-200 give 2 Units subcutaneously  If glucose 201-250 give 3 Units subcutaneously  If glucose 251-300 give 4 Units subcutaneously  If glucose 301-350 give 5 Units subcutaneously  If glucose over 350 give 7 Units subcutaneously and call physician       insulin syringe-needle U-100 31G X 5/16\" 1 ML    ADVOCATE INSULIN SYRINGE    300 each    Use 4 syringes daily or as directed.       LEVEMIR VIAL 100 UNIT/ML injection   Generic drug:  insulin detemir     10 mL    INJECT 35 UNITS EVERY MORNING AND 10 UNITS AT BEDTIME SUBCUTANEOUSLY       * " "metoprolol 50 MG 24 hr tablet    TOPROL XL    90 tablet    Take 0.5 tablets (25 mg) by mouth daily       * metoprolol 50 MG 24 hr tablet    TOPROL-XL    90 tablet    TAKE 1 TABLET DAILY       * order for DME     1 Device    Equipment being ordered: Hospital Bed       * order for DME     60 each    Equipment being ordered: Xero form dressing Change dressing on foot wound daily       * order for DME     6 Month    Equipment being ordered: Warm water and baby shampoo soaks daily to affected area. Cover with Xeroform dressing and gauze until healed  Fax order to [facility] [phone]       order for DME     1 each    Equipment being ordered: Kerlix 4\" rolls, # 30; 1\" transpore tape- 2 rolls; 4X4\" gauze pads- please fill a sleeve of non strerile; 1 bottle of 1/2\" iodoform strip gauze, xeroform 5X9\"- one package.       * order for DME     1 each    Equipment being ordered:   1. Darco Orthowedge Size XL Disp: 1 (right foot)  Refill: 0       * order for DME     1 Application    Equipment being ordered: Daily wound change- on top of foot.  Do same dressing change as bottom of the foot.       * ranitidine 150 MG tablet    ZANTAC    180 tablet    TAKE ONE TABLET BY MOUTH TWICE DAILY       * ranitidine 150 MG tablet    ZANTAC    180 tablet    TAKE 1 TABLET TWICE A DAY       simvastatin 80 MG tablet    ZOCOR    90 tablet    TAKE 1 TABLET DAILY IN THE EVENING       SM MAGNESIUM 250 MG tablet   Generic drug:  magnesium     30 tablet    TAKE ONE TABLET BY MOUTH EVERY DAY       warfarin 2 MG tablet    COUMADIN    312 tablet    Take 8mg Mon/Fri; 6mg all other days or as directed by [facility]       * Notice:  This list has 11 medication(s) that are the same as other medications prescribed for you. Read the directions carefully, and ask your doctor or other care provider to review them with you.      "

## 2017-05-05 NOTE — NURSING NOTE
Chief Complaint   Patient presents with     RECHECK     Right foot follow up        Initial /72 (BP Location: Left arm, Patient Position: Chair, Cuff Size: Adult Large)  Pulse 70  Temp 97.6  F (36.4  C) (Tympanic)  Resp 18  Ht 6' (1.829 m)  Wt 182 lb 6.4 oz (82.7 kg)  SpO2 96%  BMI 24.74 kg/m2 Estimated body mass index is 24.74 kg/(m^2) as calculated from the following:    Height as of this encounter: 6' (1.829 m).    Weight as of this encounter: 182 lb 6.4 oz (82.7 kg).  Medication Reconciliation: unable or not appropriate to perform\  Rosalind Freeman LPN

## 2017-05-05 NOTE — PROGRESS NOTES
[facility]    Adiel Rosa Jr, 81 year old, male presents with   Chief Complaint   Patient presents with     RECHECK     Right foot follow up        PAST MEDICAL HISTORY:  Past Medical History:   Diagnosis Date     Acute myocardial infarction of other specified sites, episode of care unspecified 1/1/1999    Non Q wave  treated with Retavase     Benign hypertensive heart disease with congestive 1/1/2011     BPH 1/1/2011     Chronic renal disease, stage III 9/22/2011     COPD 1/1/2011     Cough 9/11/2007     Diabetes mellitus without mention of complication 11/29/1999     Erectile Dysfunction 1/1/2011     Esophageal reflux 1/1/2011     Hypercalcemia 1/1/2011     Hypercholesterolemia 1/1/2011     Hypertension, benign 1/1/2011       PAST SURGICAL HISTORY:  Past Surgical History:   Procedure Laterality Date     appendectomy       bladder stone removal       Nephrolithiasis      lithotripsy     stent x 1       tonsillectomy         MEDICATIONS:  Prior to Admission medications    Medication Sig Start Date End Date Taking? Authorizing Provider   HYDROcodone-acetaminophen (NORCO) 5-325 MG per tablet TAKE 1 TABLET BY MOUTH EVERY 6 HOURS AS NEEDED FOR MODERATE TO SEVERE PAIN 5/1/17  Yes Yasmin Grijalva MD   ranitidine (ZANTAC) 150 MG tablet TAKE 1 TABLET TWICE A DAY 4/14/17  Yes GAURAV Grijalva MD   ATACAND 16 MG tablet TAKE 1 TABLET DAILY 4/14/17  Yes GAURAV Grijalva MD   metoprolol (TOPROL-XL) 50 MG 24 hr tablet TAKE 1 TABLET DAILY 4/14/17  Yes GAURAV Grijalva MD   LEVEMIR VIAL 100 UNIT/ML soln INJECT 35 UNITS EVERY MORNING AND 10 UNITS AT BEDTIME SUBCUTANEOUSLY 4/4/17  Yes GAURAV Grijalva MD   B-D U/F 31G X 8 MM insulin pen needle USE 5 TO 6 PEN NEEDLES DAILY OR AS DIRECTED 3/17/17  Yes GAURAV Grijalva MD   ranitidine (ZANTAC) 150 MG tablet TAKE ONE TABLET BY MOUTH TWICE DAILY 3/16/17  Yes GAURAV Grijalva MD   SM MAGNESIUM 250 MG tablet TAKE ONE TABLET BY MOUTH EVERY DAY 3/16/17  Yes Asif Miller,  "MD   DIGOX 125 MCG tablet TAKE ONE TABLET BY MOUTH EVERY DAY 3/16/17  Yes GAURAV Grijalva MD   candesartan (ATACAND) 16 MG tablet TAKE 1 TABLET BY MOUTH EVERY DAY 3/16/17  Yes GAURAV Grijalva MD   warfarin (COUMADIN) 2 MG tablet Take 8mg Mon/Fri; 6mg all other days or as directed by [facility] 3/13/17  Yes GAURAV Grijalva MD   order for DME Equipment being ordered: Daily wound change- on top of foot.   Do same dressing change as bottom of the foot. 3/2/17  Yes GAURAV Grijalva MD   simvastatin (ZOCOR) 80 MG tablet TAKE 1 TABLET DAILY IN THE EVENING 2/20/17  Yes GAURAV Grijalva MD   blood glucose monitoring (ONE TOUCH ULTRA) test strip Use to test blood sugar 5 times daily due to patient being on sliding scale. 1/30/17  Yes GAURAV Grijalva MD   blood glucose monitoring (NO BRAND SPECIFIED) meter device kit Use to test blood sugar 5 times daily due to patient being on sliding scale. 1/30/17  Yes GAURAV Grijalva MD   order for DME Equipment being ordered:     1. Darco Orthowedge  Size XL  Disp: 1 (right foot)   Refill: 0 1/26/17  Yes Piedad Cruz, NP   order for DME Equipment being ordered: Kerlix 4\" rolls, # 30; 1\" transpore tape- 2 rolls; 4X4\" gauze pads- please fill a sleeve of non strerile; 1 bottle of 1/2\" iodoform strip gauze, xeroform 5X9\"- one package. 1/20/17  Yes Gema Kirk, NP   Doxylamine-DM 6.25-15 MG/15ML LIQD Taking as pkg directions at night 1/20/17  Yes GAURAV Grijalva MD   Multiple Vitamins-Minerals (CENTRUM SILVER ADULT 50+ PO)    Yes Reported, Patient   order for DME Equipment being ordered:  Warm water and baby shampoo soaks daily to affected area. Cover with Xeroform dressing and gauze until healed    Fax order to [facility]  [phone] 1/18/17  Yes Christie Quijano, PA   insulin aspart (NOVOLOG VIAL) 100 UNITS/ML injection Inject 1-7 Units Subcutaneous 3 times daily (with meals) If glucose less than or equal to 150 mg/dL do not give insulin   If glucose 151-200 give 2 " "Units subcutaneously   If glucose 201-250 give 3 Units subcutaneously   If glucose 251-300 give 4 Units subcutaneously   If glucose 301-350 give 5 Units subcutaneously   If glucose over 350 give 7 Units subcutaneously and call physician 1/17/17  Yes GAURAV Grijalva MD   insulin syringe-needle U-100 (ADVOCATE INSULIN SYRINGE) 31G X 5/16\" 1 ML Use 4 syringes daily or as directed. 1/17/17  Yes GAURAV Grijalva MD   order for DME Equipment being ordered: Xero form dressing  Change dressing on foot wound daily 1/17/17  Yes GAURAV Grijalva MD   Cranberry 500 MG CAPS Take 1 capsule (500 mg) by mouth daily 1/16/17  Yes Alejandro Fung MD   acetaminophen (TYLENOL) 650 MG CR tablet Take 1 tablet (650 mg) by mouth every 8 hours as needed 1/16/17  Yes Alejandro Fung MD   aspirin 325 MG tablet Take 1 tablet (325 mg) by mouth daily 1/16/17  Yes Alejandro Fung MD   fluticasone-salmeterol (ADVAIR DISKUS) 250-50 MCG/DOSE diskus inhaler USE 1 INHALATION TWO TIMES A DAY IN THE MORNING AND IN THE EVENING APPROXIMATELY 12 HOURS APART 1/16/17  Yes Alejandro Fung MD   metoprolol (TOPROL XL) 50 MG 24 hr tablet Take 0.5 tablets (25 mg) by mouth daily 1/16/17  Yes Alejandro Fung MD   furosemide (LASIX) 40 MG tablet Take 1 tablet (40 mg) by mouth daily 1/16/17  Yes Alejandro Fung MD   benzocaine-menthol (CEPACOL) 15-3.6 MG lozenge Place 1 lozenge inside cheek every hour as needed for sore throat 1/16/17  Yes Alejandro Fung MD   order for DME Equipment being ordered: Hospital Bed 1/16/17  Yes Alejandro Fung MD       ALLERGIES:   No Known Allergies    ROS:  Constitutional, HEENT, cardiovascular, pulmonary, gi and gu systems are negative, except as otherwise noted.      EXAM:  /72 (BP Location: Left arm, Patient Position: Chair, Cuff Size: Adult Large)  Pulse 70  Temp 97.6  F (36.4  C) (Tympanic)  Resp 18  Ht 6' (1.829 m)  Wt 182 lb 6.4 oz (82.7 kg)  SpO2 96%  BMI 24.74 kg/m2 Body mass index is " 24.74 kg/(m^2).   GENERAL APPEARANCE: healthy, alert and no distress  RESP: breathing comfortably  ORTHO: right foot has a small area that's treating both the dorsal and plantar aspects of the forefoot he has no pain with this.  Lab/ X-ray  No results found for this or any previous visit (from the past 24 hour(s)).    ASSESSMENT/PLAN:    ICD-10-CM    1. Chronic osteomyelitis of right foot (H) M86.671 The foot was redressed today.  We'll do daily dressing changes.  Adiel is had multiple conversations regarding the infection in the bone and the need for surgery.  He's refused.  He's asymptomatic with this at this point.  He does not want to see the orthopedist's.  We'll continue with daily dressing change.  Here with his care provider         WILSON Grijalva MD  May 5, 2017

## 2017-05-16 DIAGNOSIS — E11.9 TYPE 2 DIABETES MELLITUS WITHOUT COMPLICATION, WITH LONG-TERM CURRENT USE OF INSULIN (H): ICD-10-CM

## 2017-05-16 DIAGNOSIS — Z79.4 TYPE 2 DIABETES MELLITUS WITHOUT COMPLICATION, WITH LONG-TERM CURRENT USE OF INSULIN (H): ICD-10-CM

## 2017-05-17 RX ORDER — INSULIN DETEMIR 100 [IU]/ML
INJECTION, SOLUTION SUBCUTANEOUS
Qty: 10 ML | Refills: 1 | Status: SHIPPED | OUTPATIENT
Start: 2017-05-17 | End: 2017-06-06

## 2017-05-18 ENCOUNTER — ANTICOAGULATION THERAPY VISIT (OUTPATIENT)
Dept: ANTICOAGULATION | Facility: OTHER | Age: 82
End: 2017-05-18

## 2017-05-18 ENCOUNTER — TELEPHONE (OUTPATIENT)
Dept: FAMILY MEDICINE | Facility: OTHER | Age: 82
End: 2017-05-18

## 2017-05-18 DIAGNOSIS — I48.20 CHRONIC ATRIAL FIBRILLATION (H): ICD-10-CM

## 2017-05-18 DIAGNOSIS — Z79.01 LONG-TERM (CURRENT) USE OF ANTICOAGULANTS: ICD-10-CM

## 2017-05-18 LAB — INR PPP: 2.2

## 2017-05-18 NOTE — PROGRESS NOTES
ANTICOAGULATION FOLLOW-UP CLINIC VISIT    Patient Name:  Adiel Rosa Jr  Date:  5/18/2017  Contact Type:  INR result faxed from assisted living facility to warfarin clinic. New warfarin dosing and INR recheck date faxed back to assisted living facility. Staff to notify warfarin clinic if patient has any bleeding/bruising, changes in diet/meds/activity or questions    SUBJECTIVE:     Patient Findings     Comments Activity has increased. Is now walking again.           OBJECTIVE    INR   Date Value Ref Range Status   05/18/2017 2.2  Final       ASSESSMENT / PLAN  INR assessment THER    Recheck INR In: 4 WEEKS    INR Location [facility]      Anticoagulation Summary as of 5/18/2017     INR goal 2.0-3.0   Today's INR 2.2   Maintenance plan 8 mg (2 mg x 4) on Mon, Fri; 6 mg (2 mg x 3) all other days   Full instructions 8 mg on Mon, Fri; 6 mg all other days   Weekly total 46 mg   No change documented Jessa Storey RN   Plan last modified Jessa Storey RN (2/15/2017)   Next INR check 6/15/2017   Priority INR   Target end date Indefinite    Indications   Chronic atrial fibrillation (H) [I48.2]  Long-term (current) use of anticoagulants [Z79.01] [Z79.01]         Anticoagulation Episode Summary     INR check location     Preferred lab     Send INR reminders to [facility] POOL    Comments [facility] assisted [facility], Fax   done with homecare      Anticoagulation Care Providers     Provider Role Specialty Phone number    GAURAV Grijalva MD [facility] [phone]            See the Encounter Report to view Anticoagulation Flowsheet and Dosing Calendar (Go to Encounters tab in chart review, and find the Anticoagulation Therapy Visit)        Jessa Storey RN

## 2017-05-18 NOTE — MR AVS SNAPSHOT
Adiel Rosa    5/18/2017   Anticoagulation Therapy Visit    Description:  81 year old male   Provider:  GAURAV Grijalva MD   Department:  [facility] Anti Coagulation           INR as of 5/18/2017     Today's INR 2.2      Anticoagulation Summary as of 5/18/2017     INR goal 2.0-3.0   Today's INR 2.2   Full instructions 8 mg on Mon, Fri; 6 mg all other days   Next INR check 6/15/2017    Indications   Chronic atrial fibrillation (H) [I48.2]  Long-term (current) use of anticoagulants [Z79.01] [Z79.01]         May 2017 Details    Sun Mon Tue Wed Thu Fri Sat      1               2               3               4               5               6                 7               8               9               10               11               12               13                 14               15               16               17               18      6 mg   See details      19      8 mg         20      6 mg           21      6 mg         22      8 mg         23      6 mg         24      6 mg         25      6 mg         26      8 mg         27      6 mg           28      6 mg         29      8 mg         30      6 mg         31      6 mg             Date Details   05/18 This INR check               How to take your warfarin dose     To take:  6 mg Take 3 of the 2 mg tablets.    To take:  8 mg Take 4 of the 2 mg tablets.           June 2017 Details    Sun Mon Tue Wed Thu Fri Sat         1      6 mg         2      8 mg         3      6 mg           4      6 mg         5      8 mg         6      6 mg         7      6 mg         8      6 mg         9      8 mg         10      6 mg           11      6 mg         12      8 mg         13      6 mg         14      6 mg         15            16               17                 18               19               20               21               22               23               24                 25               26               27               28               29                30                 Date Details   No additional details    Date of next INR:  6/15/2017         How to take your warfarin dose     To take:  6 mg Take 3 of the 2 mg tablets.    To take:  8 mg Take 4 of the 2 mg tablets.

## 2017-05-18 NOTE — TELEPHONE ENCOUNTER
10:30 AM    Reason for Call: Phone Call    Description: Swati, nurse from [facility], is requesting for orders for patient's INR. If you could call back at your earliest convenience [phone]    Was an appointment offered for this call? No    Preferred method for responding to this message: Telephone Call    If we cannot reach you directly, may we leave a detailed response at the number you provided? Yes    Can this message wait until your PCP/provider returns, if available today? YES    Caryn Canela

## 2017-05-30 DIAGNOSIS — S20.221A BACK CONTUSION, RIGHT, INITIAL ENCOUNTER: ICD-10-CM

## 2017-06-01 RX ORDER — HYDROCODONE BITARTRATE AND ACETAMINOPHEN 5; 325 MG/1; MG/1
TABLET ORAL
Qty: 30 TABLET | Refills: 0 | Status: SHIPPED | OUTPATIENT
Start: 2017-06-01 | End: 2017-07-18

## 2017-06-01 NOTE — TELEPHONE ENCOUNTER
Prescription picked up by David Baird (his wife runs assisted living and he is running errand for her) ID Verified YES

## 2017-06-01 NOTE — TELEPHONE ENCOUNTER
Left message that the written RX is ready at the [facility]  registration to be picked up.

## 2017-06-06 DIAGNOSIS — Z79.4 TYPE 2 DIABETES MELLITUS WITHOUT COMPLICATION, WITH LONG-TERM CURRENT USE OF INSULIN (H): ICD-10-CM

## 2017-06-06 DIAGNOSIS — E11.9 TYPE 2 DIABETES MELLITUS WITHOUT COMPLICATION, WITH LONG-TERM CURRENT USE OF INSULIN (H): ICD-10-CM

## 2017-06-08 RX ORDER — INSULIN DETEMIR 100 [IU]/ML
INJECTION, SOLUTION SUBCUTANEOUS
Qty: 10 ML | Refills: 0 | Status: SHIPPED | OUTPATIENT
Start: 2017-06-08 | End: 2017-07-21

## 2017-06-08 NOTE — TELEPHONE ENCOUNTER
novolog 100ml vial          Last Written Prescription Date: 3/4/15  Last Fill Quantity: 30ml, # refills: 0  Last Office Visit with G, P or  Health prescribing provider:  5/5/17   Next 5 appointments (look out 90 days)     Barron 15, 2017  8:45 AM CDT   (Arrive by 8:30 AM)   Office Visit with GAURAV Grijalav MD   [facility] ([facility])    [address] [place] [address]   [phone]                   BP Readings from Last 3 Encounters:   05/05/17 115/72   04/04/17 108/54   03/10/17 118/58     Lab Results   Component Value Date    MICROL 269 03/12/2014     Lab Results   Component Value Date    UMALCR 213.49 03/12/2014     Creatinine   Date Value Ref Range Status   02/27/2017 1.44 (H) 0.66 - 1.25 mg/dL Final   ]  GFR Estimate   Date Value Ref Range Status   02/27/2017 47 (L) >60 mL/min/1.7m2 Final     Comment:     Non  GFR Calc   01/15/2017 53 (L) >60 mL/min/1.7m2 Final     Comment:     Non  GFR Calc   01/14/2017 56 (L) >60 mL/min/1.7m2 Final     Comment:     Non  GFR Calc     GFR Estimate If Black   Date Value Ref Range Status   02/27/2017 57 (L) >60 mL/min/1.7m2 Final     Comment:      GFR Calc   01/15/2017 64 >60 mL/min/1.7m2 Final     Comment:      GFR Calc   01/14/2017 68 >60 mL/min/1.7m2 Final     Comment:      GFR Calc     Lab Results   Component Value Date    CHOL 142 04/23/2015     Lab Results   Component Value Date    HDL 36 04/23/2015     Lab Results   Component Value Date    LDL 56 04/23/2015     Lab Results   Component Value Date    TRIG 248 04/23/2015     Lab Results   Component Value Date    CHOLHDLRATIO 3.9 04/23/2015     Lab Results   Component Value Date     02/27/2017     Lab Results   Component Value Date     02/27/2017     Lab Results   Component Value Date    A1C 8.4 01/15/2017    A1C 7.3 09/14/2016    A1C 7.4 04/11/2016    A1C 8.6 10/20/2015    A1C 8.8%  07/27/2015     Potassium   Date Value Ref Range Status   02/27/2017 4.8 3.4 - 5.3 mmol/L Final     levemire 100ml vial          Last Written Prescription Date: 1/17/17  Last Fill Quantity: 10ml, # refills: 0  Last Office Visit with G, P or [facility] prescribing provider:  5/5/17   Next 5 appointments (look out 90 days)     Barron 15, 2017  8:45 AM CDT   (Arrive by 8:30 AM)   Office Visit with GAURAV Grijalva MD   [facility] ([facility])    [address] [facility] [address]   [phone]                   BP Readings from Last 3 Encounters:   05/05/17 115/72   04/04/17 108/54   03/10/17 118/58     Lab Results   Component Value Date    MICROL 269 03/12/2014     Lab Results   Component Value Date    UMALCR 213.49 03/12/2014     Creatinine   Date Value Ref Range Status   02/27/2017 1.44 (H) 0.66 - 1.25 mg/dL Final   ]  GFR Estimate   Date Value Ref Range Status   02/27/2017 47 (L) >60 mL/min/1.7m2 Final     Comment:     Non  GFR Calc   01/15/2017 53 (L) >60 mL/min/1.7m2 Final     Comment:     Non  GFR Calc   01/14/2017 56 (L) >60 mL/min/1.7m2 Final     Comment:     Non  GFR Calc     GFR Estimate If Black   Date Value Ref Range Status   02/27/2017 57 (L) >60 mL/min/1.7m2 Final     Comment:      GFR Calc   01/15/2017 64 >60 mL/min/1.7m2 Final     Comment:      GFR Calc   01/14/2017 68 >60 mL/min/1.7m2 Final     Comment:      GFR Calc     Lab Results   Component Value Date    CHOL 142 04/23/2015     Lab Results   Component Value Date    HDL 36 04/23/2015     Lab Results   Component Value Date    LDL 56 04/23/2015     Lab Results   Component Value Date    TRIG 248 04/23/2015     Lab Results   Component Value Date    CHOLHDLRATIO 3.9 04/23/2015     Lab Results   Component Value Date     02/27/2017     Lab Results   Component Value Date     02/27/2017     Lab Results   Component Value Date     A1C 8.4 01/15/2017    A1C 7.3 09/14/2016    A1C 7.4 04/11/2016    A1C 8.6 10/20/2015    A1C 8.8% 07/27/2015     Potassium   Date Value Ref Range Status   02/27/2017 4.8 3.4 - 5.3 mmol/L Final

## 2017-06-09 DIAGNOSIS — E11.9 TYPE 2 DIABETES MELLITUS WITHOUT COMPLICATION, WITH LONG-TERM CURRENT USE OF INSULIN (H): ICD-10-CM

## 2017-06-09 DIAGNOSIS — Z79.4 TYPE 2 DIABETES MELLITUS WITHOUT COMPLICATION, WITH LONG-TERM CURRENT USE OF INSULIN (H): ICD-10-CM

## 2017-06-13 RX ORDER — SYRINGE AND NEEDLE,INSULIN,1ML 31 GX5/16"
SYRINGE, EMPTY DISPOSABLE MISCELLANEOUS
Qty: 400 EACH | Refills: 3 | Status: SHIPPED | OUTPATIENT
Start: 2017-06-13 | End: 2018-02-10

## 2017-06-15 ENCOUNTER — ANTICOAGULATION THERAPY VISIT (OUTPATIENT)
Dept: ANTICOAGULATION | Facility: OTHER | Age: 82
End: 2017-06-15
Attending: FAMILY MEDICINE
Payer: MEDICARE

## 2017-06-15 ENCOUNTER — OFFICE VISIT (OUTPATIENT)
Dept: FAMILY MEDICINE | Facility: OTHER | Age: 82
End: 2017-06-15
Attending: FAMILY MEDICINE
Payer: MEDICARE

## 2017-06-15 VITALS
HEART RATE: 70 BPM | DIASTOLIC BLOOD PRESSURE: 62 MMHG | WEIGHT: 187.2 LBS | BODY MASS INDEX: 25.39 KG/M2 | SYSTOLIC BLOOD PRESSURE: 114 MMHG | OXYGEN SATURATION: 98 % | TEMPERATURE: 97.4 F

## 2017-06-15 DIAGNOSIS — Z79.01 LONG-TERM (CURRENT) USE OF ANTICOAGULANTS: ICD-10-CM

## 2017-06-15 DIAGNOSIS — M86.671 CHRONIC OSTEOMYELITIS OF RIGHT FOOT (H): Primary | ICD-10-CM

## 2017-06-15 DIAGNOSIS — I48.20 CHRONIC ATRIAL FIBRILLATION (H): ICD-10-CM

## 2017-06-15 LAB — INR POINT OF CARE: 2.2 (ref 0.86–1.14)

## 2017-06-15 PROCEDURE — 99213 OFFICE O/P EST LOW 20 MIN: CPT | Performed by: FAMILY MEDICINE

## 2017-06-15 PROCEDURE — 99212 OFFICE O/P EST SF 10 MIN: CPT

## 2017-06-15 PROCEDURE — 85610 PROTHROMBIN TIME: CPT | Mod: QW,ZL

## 2017-06-15 ASSESSMENT — PAIN SCALES - GENERAL: PAINLEVEL: NO PAIN (0)

## 2017-06-15 NOTE — MR AVS SNAPSHOT
Adiel Rosa Jr   6/15/2017 10:00 AM   Anticoagulation Therapy Visit    Description:  81 year old male   Provider:   ANTI COAGULATION   Department:  Hc Anti Coagulation           INR as of 6/15/2017     Today's INR 2.2      Anticoagulation Summary as of 6/15/2017     INR goal 2.0-3.0   Today's INR 2.2   Full instructions 8 mg on Mon, Fri; 6 mg all other days   Next INR check 7/13/2017    Indications   Chronic atrial fibrillation (H) [I48.2]  Long-term (current) use of anticoagulants [Z79.01] [Z79.01]         Your next Anticoagulation Clinic appointment(s)     Barron 15, 2017 10:00 AM CDT   Anticoagulation Visit with  ANTI COAGULATION   [facility] ([facility] )    [address] [place] [address]   [phone]              June 2017 Details    Sun Mon Tue Wed Thu Fri Sat         1               2               3                 4               5               6               7               8               9               10                 11               12               13               14               15      6 mg   See details      16      8 mg         17      6 mg           18      6 mg         19      8 mg         20      6 mg         21      6 mg         22      6 mg         23      8 mg         24      6 mg           25      6 mg         26      8 mg         27      6 mg         28      6 mg         29      6 mg         30      8 mg           Date Details   06/15 This INR check               How to take your warfarin dose     To take:  6 mg Take 3 of the 2 mg tablets.    To take:  8 mg Take 4 of the 2 mg tablets.           July 2017 Details    Sun Mon Tue Wed Thu Fri Sat           1      6 mg           2      6 mg         3      8 mg         4      6 mg         5      6 mg         6      6 mg         7      8 mg         8      6 mg           9      6 mg         10      8 mg         11      6 mg         12      6 mg         13            14               15                  16               17               18               19               20               21               22                 23               24               25               26               27               28               29                 30               31                     Date Details   No additional details    Date of next INR:  7/13/2017         How to take your warfarin dose     To take:  6 mg Take 3 of the 2 mg tablets.    To take:  8 mg Take 4 of the 2 mg tablets.

## 2017-06-15 NOTE — PROGRESS NOTES
"[facility]    Adiel Rosa Jr, 81 year old, male presents with   Chief Complaint   Patient presents with     Musculoskeletal Problem     1 month follow up on foot. Patient states foot has improved and no pain in the foot        PAST MEDICAL HISTORY:  Past Medical History:   Diagnosis Date     Acute myocardial infarction of other specified sites, episode of care unspecified 1/1/1999    Non Q wave  treated with Retavase     Benign hypertensive heart disease with congestive 1/1/2011     BPH 1/1/2011     Chronic renal disease, stage III 9/22/2011     COPD 1/1/2011     Cough 9/11/2007     Diabetes mellitus without mention of complication 11/29/1999     Erectile Dysfunction 1/1/2011     Esophageal reflux 1/1/2011     Hypercalcemia 1/1/2011     Hypercholesterolemia 1/1/2011     Hypertension, benign 1/1/2011       PAST SURGICAL HISTORY:  Past Surgical History:   Procedure Laterality Date     appendectomy       bladder stone removal       Nephrolithiasis      lithotripsy     stent x 1       tonsillectomy         MEDICATIONS:  Prior to Admission medications    Medication Sig Start Date End Date Taking? Authorizing Provider   ULTICARE INSULIN SYRINGE 31G X 5/16\" 1 ML USE FOUR TIMES DAILY 6/13/17  Yes Yasmin Grijalva MD   NOVOLOG VIAL 100 UNIT/ML soln INJECT 1-7 UNITS SUBCUTANEOUSLY THREE TIMES DAILY WITH MEALS. IF GLUCOSE LESS THAN OR EQUAL TO 150MG DO NOT GIVE. 151-200=2U, 201-250= 3U, 2 6/8/17  Yes GAURAV Grijalva MD   LEVEMIR VIAL 100 UNIT/ML soln INJECT 35 UNITS EVERY MORNING AND 10 UNITS AT BEDTIME SUBCUTANEOUSLY 6/8/17  Yes GAURAV Grijalva MD   HYDROcodone-acetaminophen (NORCO) 5-325 MG per tablet TAKE ONE TABLET BY MOUTH EVERY 6 HOURS AS NEEDED FOR MODERATE TO SEVERE PAIN 6/1/17  Yes GAURAV Grijalva MD   ranitidine (ZANTAC) 150 MG tablet TAKE 1 TABLET TWICE A DAY 4/14/17  Yes GAURAV Grijalva MD   ATACAND 16 MG tablet TAKE 1 TABLET DAILY 4/14/17  Yes GAURAV Grijalva MD   metoprolol (TOPROL-XL) 50 " "MG 24 hr tablet TAKE 1 TABLET DAILY 4/14/17  Yes GAURAV Grijalva MD   B-D U/F 31G X 8 MM insulin pen needle USE 5 TO 6 PEN NEEDLES DAILY OR AS DIRECTED 3/17/17  Yes GAURAV Grijalva MD   ranitidine (ZANTAC) 150 MG tablet TAKE ONE TABLET BY MOUTH TWICE DAILY 3/16/17  Yes GAURAV Grijalva MD   SM MAGNESIUM 250 MG tablet TAKE ONE TABLET BY MOUTH EVERY DAY 3/16/17  Yes Asif Miller MD   DIGOX 125 MCG tablet TAKE ONE TABLET BY MOUTH EVERY DAY 3/16/17  Yes GAURAV Grijalva MD   candesartan (ATACAND) 16 MG tablet TAKE 1 TABLET BY MOUTH EVERY DAY 3/16/17  Yes GAURAV Grijalva MD   warfarin (COUMADIN) 2 MG tablet Take 8mg Mon/Fri; 6mg all other days or as directed by [facility] 3/13/17  Yes GAURAV Grijalva MD   order for DME Equipment being ordered: Daily wound change- on top of foot.   Do same dressing change as bottom of the foot. 3/2/17  Yes GAURAV Grijalva MD   simvastatin (ZOCOR) 80 MG tablet TAKE 1 TABLET DAILY IN THE EVENING 2/20/17  Yes GAURAV Grijalva MD   blood glucose monitoring (ONE TOUCH ULTRA) test strip Use to test blood sugar 5 times daily due to patient being on sliding scale. 1/30/17  Yes GAURAV Grijalva MD   blood glucose monitoring (NO BRAND SPECIFIED) meter device kit Use to test blood sugar 5 times daily due to patient being on sliding scale. 1/30/17  Yes GAURAV Grijalva MD   order for DME Equipment being ordered:     1. Darco Orthowedge  Size XL  Disp: 1 (right foot)   Refill: 0 1/26/17  Yes Piedad Cruz NP   order for DME Equipment being ordered: Kerlix 4\" rolls, # 30; 1\" transpore tape- 2 rolls; 4X4\" gauze pads- please fill a sleeve of non strerile; 1 bottle of 1/2\" iodoform strip gauze, xeroform 5X9\"- one package. 1/20/17  Yes Reagan, Egma M, NP   Doxylamine-DM 6.25-15 MG/15ML LIQD Taking as pkg directions at night 1/20/17  Yes GAURAV Grijalva MD   Multiple Vitamins-Minerals (CENTRUM SILVER ADULT 50+ PO)    Yes Reported, Patient   order for DME Equipment being ordered:  Warm water and " baby shampoo soaks daily to affected area. Cover with Xeroform dressing and gauze until healed    Fax order to [facility]  [phone] 1/18/17  Yes Christie Quijano PA   order for DME Equipment being ordered: Xero form dressing  Change dressing on foot wound daily 1/17/17  Yes GAURAV Grijalva MD   Cranberry 500 MG CAPS Take 1 capsule (500 mg) by mouth daily 1/16/17  Yes Alejandro Fung MD   acetaminophen (TYLENOL) 650 MG CR tablet Take 1 tablet (650 mg) by mouth every 8 hours as needed 1/16/17  Yes Alejandro Fung MD   aspirin 325 MG tablet Take 1 tablet (325 mg) by mouth daily 1/16/17  Yes Alejandro Fung MD   fluticasone-salmeterol (ADVAIR DISKUS) 250-50 MCG/DOSE diskus inhaler USE 1 INHALATION TWO TIMES A DAY IN THE MORNING AND IN THE EVENING APPROXIMATELY 12 HOURS APART 1/16/17  Yes Alejandro Fung MD   furosemide (LASIX) 40 MG tablet Take 1 tablet (40 mg) by mouth daily 1/16/17  Yes Alejandro Fung MD   benzocaine-menthol (CEPACOL) 15-3.6 MG lozenge Place 1 lozenge inside cheek every hour as needed for sore throat 1/16/17  Yes Alejandro Fung MD   order for DME Equipment being ordered: Hospital Bed 1/16/17  Yes Alejandro Fung MD       ALLERGIES:   No Known Allergies    ROS:  Constitutional, HEENT, cardiovascular, pulmonary, gi and gu systems are negative, except as otherwise noted.      EXAM:  /62 (BP Location: Left arm, Patient Position: Chair, Cuff Size: Adult Regular)  Pulse 70  Temp 97.4  F (36.3  C) (Tympanic)  Wt 187 lb 3.2 oz (84.9 kg)  SpO2 98%  BMI 25.39 kg/m2 Body mass index is 25.39 kg/(m^2).   GENERAL APPEARANCE: healthy, alert and no distress  SKIN: right foot wound with exposed.  Minimal drainage dorsal aspect.  Plantar aspect notes it is not healed over but there is no drainage.  He has no tenderness.  Lab/ X-ray  No results found for this or any previous visit (from the past 24 hour(s)).    ASSESSMENT/PLAN:    ICD-10-CM    1. Chronic osteomyelitis  of right foot (H) M86.67 He seems to be doing well.  Several times he was offered definitive treatment of this and he refused.  We'll see him in 3 months.  Keep with daily dressing change.  If he develops severe pain or redness coming up his leg or other acute issues I did tell him that that means it's getting worse and he needs to get something done about it. Even when the wound was  acute he did not want any surgery.         WILSON Grijalva MD  Riddhi 15, 2017

## 2017-06-15 NOTE — MR AVS SNAPSHOT
"              After Visit Summary   6/15/2017    Adiel Rosa Jr    MRN: [id number]           Patient Information     Date Of Birth          1935        Visit Information        Provider Department      6/15/2017 8:45 AM GAURAV Grijalva MD [facility]        Care Instructions    Return in 3 months          Follow-ups after your visit        Follow-up notes from your care team     Return in about 3 months (around 9/15/2017).      Your next 10 appointments already scheduled     Barron 15, 2017 10:00 AM CDT   Anticoagulation Visit with HC ANTI COAGULATION   [facility] ([facility] [place] )    [address]  [place] [address]   [phone]              Who to contact     If you have questions or need follow up information about today's clinic visit or your schedule please contact [facility] directly at [phone].  Normal or non-critical lab and imaging results will be communicated to you by MyChart, letter or phone within 4 business days after the clinic has received the results. If you do not hear from us within 7 days, please contact the clinic through MyChart or phone. If you have a critical or abnormal lab result, we will notify you by phone as soon as possible.  Submit refill requests through [employer] or call your pharmacy and they will forward the refill request to us. Please allow 3 business days for your refill to be completed.          Additional Information About Your Visit        MyChart Information     [employer] lets you send messages to your doctor, view your test results, renew your prescriptions, schedule appointments and more. To sign up, go to [email or web address] . Click on \"Log in\" on the left side of the screen, which will take you to the Welcome page. Then click on \"Sign up Now\" on the right side of the page.     You will be asked to enter the access code listed below, as well as some personal information. Please follow the " directions to create your username and password.     Your access code is: [email or web address]  Expires: 8/3/2017  1:47 PM     Your access code will  in 90 days. If you need help or a new code, please call your [facility] clinic or [phone].        Care EveryWhere ID     This is your Care EveryWhere ID. This could be used by other organizations to access your [facility] medical records  [id number]        Your Vitals Were     Pulse Temperature Pulse Oximetry BMI (Body Mass Index)          70 97.4  F (36.3  C) (Tympanic) 98% 25.39 kg/m2         Blood Pressure from Last 3 Encounters:   06/15/17 114/62   17 115/72   17 108/54    Weight from Last 3 Encounters:   06/15/17 187 lb 3.2 oz (84.9 kg)   17 182 lb 6.4 oz (82.7 kg)   03/10/17 192 lb (87.1 kg)              Today, you had the following     No orders found for display       Primary Care Provider Office Phone # Fax #    R Hao Grijalva [phone]       [facility] [place] [address] [place], [place] [address] [facility]        Thank you!     Thank you for choosing [facility]  for your care. Our goal is always to provide you with excellent care. Hearing back from our patients is one way we can continue to improve our services. Please take a few minutes to complete the written survey that you may receive in the mail after your visit with us. Thank you!             Your Updated Medication List - Protect others around you: Learn how to safely use, store and throw away your medicines at www.disposemymeds.org.          This list is accurate as of: 6/15/17  8:52 AM.  Always use your most recent med list.                   Brand Name Dispense Instructions for use    acetaminophen 650 MG CR tablet    TYLENOL    60 tablet    Take 1 tablet (650 mg) by mouth every 8 hours as needed       aspirin 325 MG tablet     120 tablet    Take 1 tablet (325 mg) by mouth daily       B-D U/F 31G X 8 MM   Generic drug:  insulin pen needle     600  each    USE 5 TO 6 PEN NEEDLES DAILY OR AS DIRECTED       benzocaine-menthol 15-3.6 MG lozenge    CEPACOL    30 lozenge    Place 1 lozenge inside cheek every hour as needed for sore throat       blood glucose monitoring meter device kit    no brand specified    1 kit    Use to test blood sugar 5 times daily due to patient being on sliding scale.       blood glucose monitoring test strip    ONE TOUCH ULTRA    300 each    Use to test blood sugar 5 times daily due to patient being on sliding scale.       * candesartan 16 MG tablet    ATACAND    90 tablet    TAKE 1 TABLET BY MOUTH EVERY DAY       * ATACAND 16 MG tablet   Generic drug:  candesartan     90 tablet    TAKE 1 TABLET DAILY       CENTRUM SILVER ADULT 50+ PO          Cranberry 500 MG Caps     90 capsule    Take 1 capsule (500 mg) by mouth daily       DIGOX 125 MCG tablet   Generic drug:  digoxin     90 tablet    TAKE ONE TABLET BY MOUTH EVERY DAY       Doxylamine-DM 6.25-15 MG/15ML Liqd     236 mL    Taking as pkg directions at night       fluticasone-salmeterol 250-50 MCG/DOSE diskus inhaler    ADVAIR DISKUS    3 Inhaler    USE 1 INHALATION TWO TIMES A DAY IN THE MORNING AND IN THE EVENING APPROXIMATELY 12 HOURS APART       furosemide 40 MG tablet    LASIX    90 tablet    Take 1 tablet (40 mg) by mouth daily       HYDROcodone-acetaminophen 5-325 MG per tablet    NORCO    30 tablet    TAKE ONE TABLET BY MOUTH EVERY 6 HOURS AS NEEDED FOR MODERATE TO SEVERE PAIN       LEVEMIR VIAL 100 UNIT/ML injection   Generic drug:  insulin detemir     10 mL    INJECT 35 UNITS EVERY MORNING AND 10 UNITS AT BEDTIME SUBCUTANEOUSLY       metoprolol 50 MG 24 hr tablet    TOPROL-XL    90 tablet    TAKE 1 TABLET DAILY       NovoLOG VIAL 100 UNITS/ML injection   Generic drug:  insulin aspart     30 mL    INJECT 1-7 UNITS SUBCUTANEOUSLY THREE TIMES DAILY WITH MEALS. IF GLUCOSE LESS THAN OR EQUAL TO 150MG DO NOT GIVE. 151-200=2U, 201-250= 3U, 2       * order for DME     1 Device     "Equipment being ordered: Hospital Bed       * order for DME     60 each    Equipment being ordered: Xero form dressing Change dressing on foot wound daily       * order for DME     6 Month    Equipment being ordered: Warm water and baby shampoo soaks daily to affected area. Cover with Xeroform dressing and gauze until healed  Fax order to [facility] [phone]       order for DME     1 each    Equipment being ordered: Kerlix 4\" rolls, # 30; 1\" transpore tape- 2 rolls; 4X4\" gauze pads- please fill a sleeve of non strerile; 1 bottle of 1/2\" iodoform strip gauze, xeroform 5X9\"- one package.       * order for DME     1 each    Equipment being ordered:   1. Darco Orthowedge Size XL Disp: 1 (right foot)  Refill: 0       * order for DME     1 Application    Equipment being ordered: Daily wound change- on top of foot.  Do same dressing change as bottom of the foot.       * ranitidine 150 MG tablet    ZANTAC    180 tablet    TAKE ONE TABLET BY MOUTH TWICE DAILY       * ranitidine 150 MG tablet    ZANTAC    180 tablet    TAKE 1 TABLET TWICE A DAY       simvastatin 80 MG tablet    ZOCOR    90 tablet    TAKE 1 TABLET DAILY IN THE EVENING       SM MAGNESIUM 250 MG tablet   Generic drug:  magnesium     30 tablet    TAKE ONE TABLET BY MOUTH EVERY DAY       ULTICARE INSULIN SYRINGE 31G X 5/16\" 1 ML   Generic drug:  insulin syringe-needle U-100     400 each    USE FOUR TIMES DAILY       warfarin 2 MG tablet    COUMADIN    312 tablet    Take 8mg Mon/Fri; 6mg all other days or as directed by [facility]       * Notice:  This list has 9 medication(s) that are the same as other medications prescribed for you. Read the directions carefully, and ask your doctor or other care provider to review them with you.      "

## 2017-06-15 NOTE — PROGRESS NOTES
ANTICOAGULATION FOLLOW-UP CLINIC VISIT    Patient Name:  Adiel Rosa Jr  Date:  6/15/2017  Contact Type:  Face to Face    SUBJECTIVE:     Patient Findings     Positives Missed doses (Missed 1 dose on 6.10.17 - 6mg)           OBJECTIVE    INR Protime   Date Value Ref Range Status   06/15/2017 2.2 (A) 0.86 - 1.14 Final       ASSESSMENT / PLAN  INR assessment THER    Recheck INR In: 4 WEEKS    INR Location Clinic      Anticoagulation Summary as of 6/15/2017     INR goal 2.0-3.0   Today's INR 2.2   Maintenance plan 8 mg (2 mg x 4) on Mon, Fri; 6 mg (2 mg x 3) all other days   Full instructions 8 mg on Mon, Fri; 6 mg all other days   Weekly total 46 mg   No change documented Frandy Alaniz RN   Plan last modified Jessa Storey RN (2/15/2017)   Next INR check 7/13/2017   Priority INR   Target end date Indefinite    Indications   Chronic atrial fibrillation (H) [I48.2]  Long-term (current) use of anticoagulants [Z79.01] [Z79.01]         Anticoagulation Episode Summary     INR check location     Preferred lab     Send INR reminders to HC ANTICOAG POOL    Comments [facility] assisted [facility], Fax   done with homecare      Anticoagulation Care Providers     Provider Role Specialty Phone number    GAURAV Grijalva MD [facility] [phone]            See the Encounter Report to view Anticoagulation Flowsheet and Dosing Calendar (Go to Encounters tab in chart review, and find the Anticoagulation Therapy Visit)        FRANDY ALANIZ RN

## 2017-06-15 NOTE — NURSING NOTE
Chief Complaint   Patient presents with     Musculoskeletal Problem     1 month follow up on foot. Patient states foot has improved and no pain in the foot        Initial /62 (BP Location: Left arm, Patient Position: Chair, Cuff Size: Adult Regular)  Pulse 70  Temp 97.4  F (36.3  C) (Tympanic)  Wt 187 lb 3.2 oz (84.9 kg)  SpO2 98%  BMI 25.39 kg/m2 Estimated body mass index is 25.39 kg/(m^2) as calculated from the following:    Height as of 5/5/17: 6' (1.829 m).    Weight as of this encounter: 187 lb 3.2 oz (84.9 kg).  Medication Reconciliation: complete   Debo Soliz CMA(AAMA)

## 2017-06-16 ENCOUNTER — MEDICAL CORRESPONDENCE (OUTPATIENT)
Dept: HEALTH INFORMATION MANAGEMENT | Facility: HOSPITAL | Age: 82
End: 2017-06-16

## 2017-07-13 ENCOUNTER — ANTICOAGULATION THERAPY VISIT (OUTPATIENT)
Dept: ANTICOAGULATION | Facility: OTHER | Age: 82
End: 2017-07-13

## 2017-07-13 DIAGNOSIS — I48.20 CHRONIC ATRIAL FIBRILLATION (H): ICD-10-CM

## 2017-07-13 DIAGNOSIS — Z79.01 LONG-TERM (CURRENT) USE OF ANTICOAGULANTS: ICD-10-CM

## 2017-07-13 LAB — INR PPP: 2.3

## 2017-07-13 NOTE — MR AVS SNAPSHOT
Adiel VILLATORO Moe    7/13/2017   Anticoagulation Therapy Visit    Description:  82 year old male   Provider:  GAURAV Grijalva MD   Department:  Hc Anti Coagulation           INR as of 7/13/2017     Today's INR 2.3      Anticoagulation Summary as of 7/13/2017     INR goal 2.0-3.0   Today's INR 2.3   Full instructions 8 mg on Mon, Fri; 6 mg all other days   Next INR check 8/10/2017    Indications   Chronic atrial fibrillation (H) [I48.2]  Long-term (current) use of anticoagulants [Z79.01] [Z79.01]         July 2017 Details    Sun Mon Tue Wed Thu Fri Sat           1                 2               3               4               5               6               7               8                 9               10               11               12               13      6 mg   See details      14      8 mg         15      6 mg           16      6 mg         17      8 mg         18      6 mg         19      6 mg         20      6 mg         21      8 mg         22      6 mg           23      6 mg         24      8 mg         25      6 mg         26      6 mg         27      6 mg         28      8 mg         29      6 mg           30      6 mg         31      8 mg               Date Details   07/13 This INR check               How to take your warfarin dose     To take:  6 mg Take 3 of the 2 mg tablets.    To take:  8 mg Take 4 of the 2 mg tablets.           August 2017 Details    Sun Mon Tue Wed Thu Fri Sat       1      6 mg         2      6 mg         3      6 mg         4      8 mg         5      6 mg           6      6 mg         7      8 mg         8      6 mg         9      6 mg         10            11               12                 13               14               15               16               17               18               19                 20               21               22               23               24               25               26                 27               28               29                30               31                  Date Details   No additional details    Date of next INR:  8/10/2017         How to take your warfarin dose     To take:  6 mg Take 3 of the 2 mg tablets.    To take:  8 mg Take 4 of the 2 mg tablets.

## 2017-07-13 NOTE — PROGRESS NOTES
ANTICOAGULATION FOLLOW-UP CLINIC VISIT    Patient Name:  Adiel Rosa Jr  Date:  7/13/2017  Contact Type:  INR result faxed to warfarin clinic from assisted living facility nurse. New warfarin dosing and INR recheck date faxed back to assisted living. Staff to notify warfarin clinic if any patient has any bleeding/bruising, changes in diet/meds/activity or questions    SUBJECTIVE:     Patient Findings     Positives No Problem Findings           OBJECTIVE    INR   Date Value Ref Range Status   07/13/2017 2.3  Final       ASSESSMENT / PLAN  INR assessment THER    Recheck INR In: 4 WEEKS    INR Location [facility]      Anticoagulation Summary as of 7/13/2017     INR goal 2.0-3.0   Today's INR 2.3   Maintenance plan 8 mg (2 mg x 4) on Mon, Fri; 6 mg (2 mg x 3) all other days   Full instructions 8 mg on Mon, Fri; 6 mg all other days   Weekly total 46 mg   No change documented Jessa Storey RN   Plan last modified Jessa Storey RN (2/15/2017)   Next INR check 8/10/2017   Priority INR   Target end date Indefinite    Indications   Chronic atrial fibrillation (H) [I48.2]  Long-term (current) use of anticoagulants [Z79.01] [Z79.01]         Anticoagulation Episode Summary     INR check location     Preferred lab     Send INR reminders to [facility] POOL    Comments [facility], Fax   done with homecare      Anticoagulation Care Providers     Provider Role Specialty Phone number    GAURAV Grijalva MD [facility] [phone]            See the Encounter Report to view Anticoagulation Flowsheet and Dosing Calendar (Go to Encounters tab in chart review, and find the Anticoagulation Therapy Visit)        Jessa Storey RN

## 2017-07-18 DIAGNOSIS — S20.221A BACK CONTUSION, RIGHT, INITIAL ENCOUNTER: ICD-10-CM

## 2017-07-21 DIAGNOSIS — E11.9 TYPE 2 DIABETES MELLITUS WITHOUT COMPLICATION, WITH LONG-TERM CURRENT USE OF INSULIN (H): ICD-10-CM

## 2017-07-21 DIAGNOSIS — Z79.4 TYPE 2 DIABETES MELLITUS WITHOUT COMPLICATION, WITH LONG-TERM CURRENT USE OF INSULIN (H): ICD-10-CM

## 2017-07-21 RX ORDER — HYDROCODONE BITARTRATE AND ACETAMINOPHEN 5; 325 MG/1; MG/1
TABLET ORAL
Qty: 30 TABLET | Refills: 0 | Status: SHIPPED | OUTPATIENT
Start: 2017-07-21 | End: 2017-09-11

## 2017-07-21 NOTE — TELEPHONE ENCOUNTER
norco      Last Written Prescription Date: 6/1/17  Last Fill Quantity: 30,  # refills: 0   Last Office Visit with G, P or [facility] prescribing provider: 6/13/17

## 2017-07-24 NOTE — TELEPHONE ENCOUNTER
Pt notified that the written RX is ready at the [facility]  registration to be picked up.   Facility picks up

## 2017-07-25 RX ORDER — INSULIN DETEMIR 100 [IU]/ML
INJECTION, SOLUTION SUBCUTANEOUS
Qty: 10 ML | Refills: 0 | Status: SHIPPED | OUTPATIENT
Start: 2017-07-25 | End: 2017-08-07

## 2017-07-25 NOTE — TELEPHONE ENCOUNTER
Espinoza  Last office visit: 6/15/17  Last refill: 6/8/17 10 ml, 0 R.  Last Lipid: 4/25/15  Last Micor: 5/16/16  Last Hgb A1c: 1/15/17 8.4.  Medication pended.  Please advise.  Thank you.

## 2017-07-27 DIAGNOSIS — I48.21 PERMANENT ATRIAL FIBRILLATION (H): ICD-10-CM

## 2017-08-07 DIAGNOSIS — Z79.4 TYPE 2 DIABETES MELLITUS WITHOUT COMPLICATION, WITH LONG-TERM CURRENT USE OF INSULIN (H): ICD-10-CM

## 2017-08-07 DIAGNOSIS — E11.9 TYPE 2 DIABETES MELLITUS WITHOUT COMPLICATION, WITH LONG-TERM CURRENT USE OF INSULIN (H): ICD-10-CM

## 2017-08-08 ENCOUNTER — ANTICOAGULATION THERAPY VISIT (OUTPATIENT)
Dept: ANTICOAGULATION | Facility: OTHER | Age: 82
End: 2017-08-08

## 2017-08-08 DIAGNOSIS — Z79.01 LONG-TERM (CURRENT) USE OF ANTICOAGULANTS: ICD-10-CM

## 2017-08-08 DIAGNOSIS — I48.20 CHRONIC ATRIAL FIBRILLATION (H): ICD-10-CM

## 2017-08-08 LAB — INR PPP: 2.5

## 2017-08-08 NOTE — PROGRESS NOTES
ANTICOAGULATION FOLLOW-UP CLINIC VISIT    Patient Name:  Adiel Rosa Jr  Date:  8/8/2017  Contact Type:  INR result faxed by assisted living facility    SUBJECTIVE:     Patient Findings     Positives No Problem Findings    Comments INR done by assisted living nurse and result faxed to warfarin clinic. New warfarin dosing and INR recheck date faxed back to assisted living. Staff to notify warfarin clinic if he has any bleeding/bruising, changes in diet/meds/activity or questions.            OBJECTIVE    INR   Date Value Ref Range Status   08/08/2017 2.5  Final       ASSESSMENT / PLAN  INR assessment THER    Recheck INR In: 4 WEEKS    INR Location [facility]      Anticoagulation Summary as of 8/8/2017     INR goal 2.0-3.0   Today's INR 2.5   Maintenance plan 8 mg (2 mg x 4) on Mon, Fri; 6 mg (2 mg x 3) all other days   Full instructions 8 mg on Mon, Fri; 6 mg all other days   Weekly total 46 mg   No change documented Jessa Storey RN   Plan last modified Jessa Storey RN (2/15/2017)   Next INR check 9/5/2017   Priority INR   Target end date Indefinite    Indications   Chronic atrial fibrillation (H) [I48.2]  Long-term (current) use of anticoagulants [Z79.01] [Z79.01]         Anticoagulation Episode Summary     INR check location     Preferred lab     Send INR reminders to [facility] POOL    Comments [facility], Fax   done with homecare      Anticoagulation Care Providers     Provider Role Specialty Phone number    GAURAV Grijalva MD [facility] [phone]            See the Encounter Report to view Anticoagulation Flowsheet and Dosing Calendar (Go to Encounters tab in chart review, and find the Anticoagulation Therapy Visit)        Jessa Storey RN

## 2017-08-08 NOTE — MR AVS SNAPSHOT
Adiel VILLATORO Moe    8/8/2017   Anticoagulation Therapy Visit    Description:  82 year old male   Provider:  GAURAV Grijalva MD   Department:  Hc Anti Coagulation           INR as of 8/8/2017     Today's INR 2.5      Anticoagulation Summary as of 8/8/2017     INR goal 2.0-3.0   Today's INR 2.5   Full instructions 8 mg on Mon, Fri; 6 mg all other days   Next INR check 9/5/2017    Indications   Chronic atrial fibrillation (H) [I48.2]  Long-term (current) use of anticoagulants [Z79.01] [Z79.01]         August 2017 Details    Sun Mon Tue Wed Thu Fri Sat       1               2               3               4               5                 6               7               8      6 mg   See details      9      6 mg         10      6 mg         11      8 mg         12      6 mg           13      6 mg         14      8 mg         15      6 mg         16      6 mg         17      6 mg         18      8 mg         19      6 mg           20      6 mg         21      8 mg         22      6 mg         23      6 mg         24      6 mg         25      8 mg         26      6 mg           27      6 mg         28      8 mg         29      6 mg         30      6 mg         31      6 mg            Date Details   08/08 This INR check               How to take your warfarin dose     To take:  6 mg Take 3 of the 2 mg tablets.    To take:  8 mg Take 4 of the 2 mg tablets.           September 2017 Details    Sun Mon Tue Wed Thu Fri Sat          1      8 mg         2      6 mg           3      6 mg         4      8 mg         5            6               7               8               9                 10               11               12               13               14               15               16                 17               18               19               20               21               22               23                 24               25               26               27               28               29                30                Date Details   No additional details    Date of next INR:  9/5/2017         How to take your warfarin dose     To take:  6 mg Take 3 of the 2 mg tablets.    To take:  8 mg Take 4 of the 2 mg tablets.

## 2017-08-09 NOTE — TELEPHONE ENCOUNTER
Levemir          Last Written Prescription Date: 7/25/17  Last Fill Quantity: 10ml, # refills: 0  Last Office Visit with G, [facility] or [facility] prescribing provider:  6/15/17        BP Readings from Last 3 Encounters:   06/15/17 114/62   05/05/17 115/72   04/04/17 108/54     Lab Results   Component Value Date    MICROL 269 03/12/2014     Lab Results   Component Value Date    UMALCR 213.49 03/12/2014     Creatinine   Date Value Ref Range Status   02/27/2017 1.44 (H) 0.66 - 1.25 mg/dL Final   ]  GFR Estimate   Date Value Ref Range Status   02/27/2017 47 (L) >60 mL/min/1.7m2 Final     Comment:     Non  GFR Calc   01/15/2017 53 (L) >60 mL/min/1.7m2 Final     Comment:     Non  GFR Calc   01/14/2017 56 (L) >60 mL/min/1.7m2 Final     Comment:     Non  GFR Calc     GFR Estimate If Black   Date Value Ref Range Status   02/27/2017 57 (L) >60 mL/min/1.7m2 Final     Comment:      GFR Calc   01/15/2017 64 >60 mL/min/1.7m2 Final     Comment:      GFR Calc   01/14/2017 68 >60 mL/min/1.7m2 Final     Comment:      GFR Calc     Lab Results   Component Value Date    CHOL 142 04/23/2015     Lab Results   Component Value Date    HDL 36 04/23/2015     Lab Results   Component Value Date    LDL 56 04/23/2015     Lab Results   Component Value Date    TRIG 248 04/23/2015     Lab Results   Component Value Date    CHOLHDLRATIO 3.9 04/23/2015     Lab Results   Component Value Date     02/27/2017     Lab Results   Component Value Date     02/27/2017     Lab Results   Component Value Date    A1C 8.4 01/15/2017    A1C 7.3 09/14/2016    A1C 7.4 04/11/2016    A1C 8.6 10/20/2015    A1C 8.8% 07/27/2015     Potassium   Date Value Ref Range Status   02/27/2017 4.8 3.4 - 5.3 mmol/L Final

## 2017-08-10 RX ORDER — INSULIN DETEMIR 100 [IU]/ML
INJECTION, SOLUTION SUBCUTANEOUS
Qty: 10 ML | Refills: 0 | Status: SHIPPED | OUTPATIENT
Start: 2017-08-10 | End: 2017-09-01

## 2017-08-25 DIAGNOSIS — I10 BENIGN ESSENTIAL HYPERTENSION: ICD-10-CM

## 2017-08-25 DIAGNOSIS — Z00.00 HEALTH CARE MAINTENANCE: Primary | ICD-10-CM

## 2017-08-25 DIAGNOSIS — I10 HTN (HYPERTENSION): ICD-10-CM

## 2017-08-25 RX ORDER — FUROSEMIDE 40 MG
TABLET ORAL
Qty: 31 TABLET | Refills: 3 | Status: SHIPPED | OUTPATIENT
Start: 2017-08-25 | End: 2017-11-17

## 2017-08-25 RX ORDER — MULTIVIT-MIN/FA/LYCOPEN/LUTEIN .4-300-25
TABLET ORAL
Qty: 31 TABLET | Refills: 5 | Status: SHIPPED | OUTPATIENT
Start: 2017-08-25 | End: 2018-03-12

## 2017-08-28 RX ORDER — DIGOXIN 125 UG/1
TABLET ORAL
Qty: 90 TABLET | Refills: 0 | Status: SHIPPED | OUTPATIENT
Start: 2017-08-28 | End: 2017-11-17

## 2017-08-31 ENCOUNTER — ANTICOAGULATION THERAPY VISIT (OUTPATIENT)
Dept: ANTICOAGULATION | Facility: OTHER | Age: 82
End: 2017-08-31

## 2017-08-31 DIAGNOSIS — I48.20 CHRONIC ATRIAL FIBRILLATION (H): ICD-10-CM

## 2017-08-31 DIAGNOSIS — Z79.01 LONG-TERM (CURRENT) USE OF ANTICOAGULANTS: ICD-10-CM

## 2017-08-31 LAB — INR PPP: 2.7

## 2017-08-31 NOTE — MR AVS SNAPSHOT
Adiel VILLATORO Moe    8/31/2017   Anticoagulation Therapy Visit    Description:  82 year old male   Provider:  GAURAV Grijalva MD   Department:  Hc Anti Coagulation           INR as of 8/31/2017     Today's INR 2.7      Anticoagulation Summary as of 8/31/2017     INR goal 2.0-3.0   Today's INR 2.7   Full instructions 8 mg on Mon, Fri; 6 mg all other days   Next INR check 9/28/2017    Indications   Chronic atrial fibrillation (H) [I48.2]  Long-term (current) use of anticoagulants [Z79.01] [Z79.01]         August 2017 Details    Sun Mon Tue Wed Thu Fri Sat       1               2               3               4               5                 6               7               8               9               10               11               12                 13               14               15               16               17               18               19                 20               21               22               23               24               25               26                 27               28               29               30               31      6 mg   See details         Date Details   08/31 This INR check               How to take your warfarin dose     To take:  6 mg Take 3 of the 2 mg tablets.           September 2017 Details    Sun Mon Tue Wed Thu Fri Sat          1      8 mg         2      6 mg           3      6 mg         4      8 mg         5      6 mg         6      6 mg         7      6 mg         8      8 mg         9      6 mg           10      6 mg         11      8 mg         12      6 mg         13      6 mg         14      6 mg         15      8 mg         16      6 mg           17      6 mg         18      8 mg         19      6 mg         20      6 mg         21      6 mg         22      8 mg         23      6 mg           24      6 mg         25      8 mg         26      6 mg         27      6 mg         28            29               30                Date Details    No additional details    Date of next INR:  9/28/2017         How to take your warfarin dose     To take:  6 mg Take 3 of the 2 mg tablets.    To take:  8 mg Take 4 of the 2 mg tablets.

## 2017-08-31 NOTE — PROGRESS NOTES
ANTICOAGULATION FOLLOW-UP CLINIC VISIT    Patient Name:  Adiel Rosa Jr  Date:  8/31/2017  Contact Type:  INR result faxed, from assisted living nurse, to warfarin clinic    SUBJECTIVE:     Patient Findings     Comments INR result faxed from assisted living facility to warfarin clinic. New warfarin dosing and INR recheck date faxed back to assisted living. Staff to notify warfarin clinic if he has any bleeding/bruising, changes in diet/meds/activity or questions           OBJECTIVE    INR   Date Value Ref Range Status   08/31/2017 2.7  Final       ASSESSMENT / PLAN  INR assessment THER    Recheck INR In: 4 WEEKS    INR Location [facility]      Anticoagulation Summary as of 8/31/2017     INR goal 2.0-3.0   Today's INR 2.7   Maintenance plan 8 mg (2 mg x 4) on Mon, Fri; 6 mg (2 mg x 3) all other days   Full instructions 8 mg on Mon, Fri; 6 mg all other days   Weekly total 46 mg   No change documented Jessa Storey RN   Plan last modified Jessa Storey RN (2/15/2017)   Next INR check 9/28/2017   Priority INR   Target end date Indefinite    Indications   Chronic atrial fibrillation (H) [I48.2]  Long-term (current) use of anticoagulants [Z79.01] [Z79.01]         Anticoagulation Episode Summary     INR check location     Preferred lab     Send INR reminders to [facility] POOL    Comments [facility], Fax   done with homecare      Anticoagulation Care Providers     Provider Role Specialty Phone number    GAURAV Grijalva MD [facility] [phone]            See the Encounter Report to view Anticoagulation Flowsheet and Dosing Calendar (Go to Encounters tab in chart review, and find the Anticoagulation Therapy Visit)        Jessa Storey RN

## 2017-09-01 DIAGNOSIS — E11.9 TYPE 2 DIABETES MELLITUS WITHOUT COMPLICATION, WITH LONG-TERM CURRENT USE OF INSULIN (H): ICD-10-CM

## 2017-09-01 DIAGNOSIS — Z79.4 TYPE 2 DIABETES MELLITUS WITHOUT COMPLICATION, WITH LONG-TERM CURRENT USE OF INSULIN (H): ICD-10-CM

## 2017-09-01 NOTE — TELEPHONE ENCOUNTER
Levemir         Last Written Prescription Date: 08/10/2017  Last Fill Quantity: 10ml, # refills: 0  Last Office Visit with G, P or [facility] prescribing provider:  06/15/2017        BP Readings from Last 3 Encounters:   06/15/17 114/62   05/05/17 115/72   04/04/17 108/54     Lab Results   Component Value Date    MICROL 269 03/12/2014     Lab Results   Component Value Date    UMALCR 213.49 03/12/2014     Creatinine   Date Value Ref Range Status   02/27/2017 1.44 (H) 0.66 - 1.25 mg/dL Final   ]  GFR Estimate   Date Value Ref Range Status   02/27/2017 47 (L) >60 mL/min/1.7m2 Final     Comment:     Non  GFR Calc   01/15/2017 53 (L) >60 mL/min/1.7m2 Final     Comment:     Non  GFR Calc   01/14/2017 56 (L) >60 mL/min/1.7m2 Final     Comment:     Non  GFR Calc     GFR Estimate If Black   Date Value Ref Range Status   02/27/2017 57 (L) >60 mL/min/1.7m2 Final     Comment:      GFR Calc   01/15/2017 64 >60 mL/min/1.7m2 Final     Comment:      GFR Calc   01/14/2017 68 >60 mL/min/1.7m2 Final     Comment:      GFR Calc     Lab Results   Component Value Date    CHOL 142 04/23/2015     Lab Results   Component Value Date    HDL 36 04/23/2015     Lab Results   Component Value Date    LDL 56 04/23/2015     Lab Results   Component Value Date    TRIG 248 04/23/2015     Lab Results   Component Value Date    CHOLHDLRATIO 3.9 04/23/2015     Lab Results   Component Value Date     02/27/2017     Lab Results   Component Value Date     02/27/2017     Lab Results   Component Value Date    A1C 8.4 01/15/2017    A1C 7.3 09/14/2016    A1C 7.4 04/11/2016    A1C 8.6 10/20/2015    A1C 8.8% 07/27/2015     Potassium   Date Value Ref Range Status   02/27/2017 4.8 3.4 - 5.3 mmol/L Final

## 2017-09-05 RX ORDER — INSULIN DETEMIR 100 [IU]/ML
INJECTION, SOLUTION SUBCUTANEOUS
Qty: 10 ML | Refills: 0 | Status: SHIPPED | OUTPATIENT
Start: 2017-09-05 | End: 2017-09-19

## 2017-09-11 DIAGNOSIS — S20.221A BACK CONTUSION, RIGHT, INITIAL ENCOUNTER: ICD-10-CM

## 2017-09-12 RX ORDER — HYDROCODONE BITARTRATE AND ACETAMINOPHEN 5; 325 MG/1; MG/1
TABLET ORAL
Qty: 30 TABLET | Refills: 0 | Status: SHIPPED | OUTPATIENT
Start: 2017-09-12 | End: 2017-10-12

## 2017-09-12 NOTE — TELEPHONE ENCOUNTER
Left message that the written RX is ready at the [facility]  registration to be picked up.

## 2017-09-12 NOTE — TELEPHONE ENCOUNTER
Controlled Substance Refill Request for Norco  Problem List Complete:  No     PROVIDER TO CONSIDER COMPLETION OF PROBLEM LIST AND OVERVIEW/CONTROLLED SUBSTANCE AGREEMENT    Last Written Prescription Date:  7.21.17  Last Fill Quantity: 30,   # refills: 0    Last Office Visit with [facility] primary care provider: 6.15.17    Future Office visit:     Controlled substance agreement on file: No.     Processing:  Patient will  in clinic     checked in past 6 months?  No, route to RN

## 2017-09-19 DIAGNOSIS — E11.9 TYPE 2 DIABETES MELLITUS WITHOUT COMPLICATION, WITH LONG-TERM CURRENT USE OF INSULIN (H): ICD-10-CM

## 2017-09-19 DIAGNOSIS — Z79.4 TYPE 2 DIABETES MELLITUS WITHOUT COMPLICATION, WITH LONG-TERM CURRENT USE OF INSULIN (H): ICD-10-CM

## 2017-09-19 NOTE — TELEPHONE ENCOUNTER
Levemir         Last Written Prescription Date: 9/05/2017  Last Fill Quantity: 10mL, # refills: 0  Last Office Visit with G, P or [facility] prescribing provider:  6/15/2017        BP Readings from Last 3 Encounters:   06/15/17 114/62   05/05/17 115/72   04/04/17 108/54     Lab Results   Component Value Date    MICROL 269 03/12/2014     Lab Results   Component Value Date    UMALCR 213.49 03/12/2014     Creatinine   Date Value Ref Range Status   02/27/2017 1.44 (H) 0.66 - 1.25 mg/dL Final   ]  GFR Estimate   Date Value Ref Range Status   02/27/2017 47 (L) >60 mL/min/1.7m2 Final     Comment:     Non  GFR Calc   01/15/2017 53 (L) >60 mL/min/1.7m2 Final     Comment:     Non  GFR Calc   01/14/2017 56 (L) >60 mL/min/1.7m2 Final     Comment:     Non  GFR Calc     GFR Estimate If Black   Date Value Ref Range Status   02/27/2017 57 (L) >60 mL/min/1.7m2 Final     Comment:      GFR Calc   01/15/2017 64 >60 mL/min/1.7m2 Final     Comment:      GFR Calc   01/14/2017 68 >60 mL/min/1.7m2 Final     Comment:      GFR Calc     Lab Results   Component Value Date    CHOL 142 04/23/2015     Lab Results   Component Value Date    HDL 36 04/23/2015     Lab Results   Component Value Date    LDL 56 04/23/2015     Lab Results   Component Value Date    TRIG 248 04/23/2015     Lab Results   Component Value Date    CHOLHDLRATIO 3.9 04/23/2015     Lab Results   Component Value Date     02/27/2017     Lab Results   Component Value Date     02/27/2017     Lab Results   Component Value Date    A1C 8.4 01/15/2017    A1C 7.3 09/14/2016    A1C 7.4 04/11/2016    A1C 8.6 10/20/2015    A1C 8.8% 07/27/2015     Potassium   Date Value Ref Range Status   02/27/2017 4.8 3.4 - 5.3 mmol/L Final

## 2017-09-20 RX ORDER — INSULIN DETEMIR 100 [IU]/ML
INJECTION, SOLUTION SUBCUTANEOUS
Qty: 10 ML | Refills: 1 | Status: SHIPPED | OUTPATIENT
Start: 2017-09-20 | End: 2017-10-11

## 2017-09-29 ENCOUNTER — ANTICOAGULATION THERAPY VISIT (OUTPATIENT)
Dept: ANTICOAGULATION | Facility: OTHER | Age: 82
End: 2017-09-29

## 2017-09-29 DIAGNOSIS — Z79.01 LONG-TERM (CURRENT) USE OF ANTICOAGULANTS: ICD-10-CM

## 2017-09-29 DIAGNOSIS — I48.20 CHRONIC ATRIAL FIBRILLATION (H): ICD-10-CM

## 2017-09-29 LAB — INR PPP: 2.8

## 2017-09-29 NOTE — PROGRESS NOTES
ANTICOAGULATION FOLLOW-UP CLINIC VISIT    Patient Name:  Adiel Rosa Jr  Date:  9/29/2017  Contact Type:  INR result faxed from assisted living nurse to warfarin clinic    SUBJECTIVE:     Patient Findings     Positives No Problem Findings    Comments INR result faxed from assisted living facility nurse, to warfarin clinic. New warfarin dosing and INR recheck date faxed back to [facility] facility. Staff to notify warfarin clinic if patient has any bleeding/bruising, changes in diet/meds/activity or questions.            OBJECTIVE    INR   Date Value Ref Range Status   09/29/2017 2.8  Final       ASSESSMENT / PLAN  INR assessment THER    Recheck INR In: 4 WEEKS    INR Location [facility]      Anticoagulation Summary as of 9/29/2017     INR goal 2.0-3.0   Today's INR 2.8   Maintenance plan 8 mg (2 mg x 4) on Mon, Fri; 6 mg (2 mg x 3) all other days   Full instructions 8 mg on Mon, Fri; 6 mg all other days   Weekly total 46 mg   No change documented Jessa Storey RN   Plan last modified Jessa Storey RN (2/15/2017)   Next INR check 10/27/2017   Priority INR   Target end date Indefinite    Indications   Chronic atrial fibrillation (H) [I48.2]  Long-term (current) use of anticoagulants [Z79.01] [Z79.01]         Anticoagulation Episode Summary     INR check location     Preferred lab     Send INR reminders to [facility] POOL    Comments [facility], Fax   done with homecare      Anticoagulation Care Providers     Provider Role Specialty Phone number    GAURAV Grijalva MD [facility] [phone]            See the Encounter Report to view Anticoagulation Flowsheet and Dosing Calendar (Go to Encounters tab in chart review, and find the Anticoagulation Therapy Visit)        Jessa Storey RN

## 2017-09-29 NOTE — MR AVS SNAPSHOT
Adiel VILLATORO Moe    9/29/2017   Anticoagulation Therapy Visit    Description:  82 year old male   Provider:  GAURAV Grijalva MD   Department:  Hc Anti Coagulation           INR as of 9/29/2017     Today's INR 2.8      Anticoagulation Summary as of 9/29/2017     INR goal 2.0-3.0   Today's INR 2.8   Full instructions 8 mg on Mon, Fri; 6 mg all other days   Next INR check 10/27/2017    Indications   Chronic atrial fibrillation (H) [I48.2]  Long-term (current) use of anticoagulants [Z79.01] [Z79.01]         September 2017 Details    Sun Mon Tue Wed Thu Fri Sat          1               2                 3               4               5               6               7               8               9                 10               11               12               13               14               15               16                 17               18               19               20               21               22               23                 24               25               26               27               28               29      8 mg   See details      30      6 mg          Date Details   09/29 This INR check               How to take your warfarin dose     To take:  6 mg Take 3 of the 2 mg tablets.    To take:  8 mg Take 4 of the 2 mg tablets.           October 2017 Details    Sun Mon Tue Wed Thu Fri Sat     1      6 mg         2      8 mg         3      6 mg         4      6 mg         5      6 mg         6      8 mg         7      6 mg           8      6 mg         9      8 mg         10      6 mg         11      6 mg         12      6 mg         13      8 mg         14      6 mg           15      6 mg         16      8 mg         17      6 mg         18      6 mg         19      6 mg         20      8 mg         21      6 mg           22      6 mg         23      8 mg         24      6 mg         25      6 mg         26      6 mg         27            28                 29               30                31                    Date Details   No additional details    Date of next INR:  10/27/2017         How to take your warfarin dose     To take:  6 mg Take 3 of the 2 mg tablets.    To take:  8 mg Take 4 of the 2 mg tablets.

## 2017-10-02 DIAGNOSIS — Z79.4 TYPE 2 DIABETES MELLITUS WITHOUT COMPLICATION, WITH LONG-TERM CURRENT USE OF INSULIN (H): ICD-10-CM

## 2017-10-02 DIAGNOSIS — E11.9 TYPE 2 DIABETES MELLITUS WITHOUT COMPLICATION, WITH LONG-TERM CURRENT USE OF INSULIN (H): ICD-10-CM

## 2017-10-03 NOTE — TELEPHONE ENCOUNTER
NOVOLOG VIAL 100 UNIT/ML soln       Last Written Prescription Date: 6/8/17  Last Fill Quantity: 30ml, # refills: 0  Last Office Visit with [facility] or [facility] prescribing provider:  6/15/17        BP Readings from Last 3 Encounters:   06/15/17 114/62   05/05/17 115/72   04/04/17 108/54     Lab Results   Component Value Date    MICROL 269 03/12/2014     Lab Results   Component Value Date    UMALCR 213.49 03/12/2014     Creatinine   Date Value Ref Range Status   02/27/2017 1.44 (H) 0.66 - 1.25 mg/dL Final   ]  GFR Estimate   Date Value Ref Range Status   02/27/2017 47 (L) >60 mL/min/1.7m2 Final     Comment:     Non  GFR Calc   01/15/2017 53 (L) >60 mL/min/1.7m2 Final     Comment:     Non  GFR Calc   01/14/2017 56 (L) >60 mL/min/1.7m2 Final     Comment:     Non  GFR Calc     GFR Estimate If Black   Date Value Ref Range Status   02/27/2017 57 (L) >60 mL/min/1.7m2 Final     Comment:      GFR Calc   01/15/2017 64 >60 mL/min/1.7m2 Final     Comment:      GFR Calc   01/14/2017 68 >60 mL/min/1.7m2 Final     Comment:      GFR Calc     Lab Results   Component Value Date    CHOL 142 04/23/2015     Lab Results   Component Value Date    HDL 36 04/23/2015     Lab Results   Component Value Date    LDL 56 04/23/2015     Lab Results   Component Value Date    TRIG 248 04/23/2015     Lab Results   Component Value Date    CHOLHDLRATIO 3.9 04/23/2015     Lab Results   Component Value Date     02/27/2017     Lab Results   Component Value Date     02/27/2017     Lab Results   Component Value Date    A1C 8.4 01/15/2017    A1C 7.3 09/14/2016    A1C 7.4 04/11/2016    A1C 8.6 10/20/2015    A1C 8.8% 07/27/2015     Potassium   Date Value Ref Range Status   02/27/2017 4.8 3.4 - 5.3 mmol/L Final

## 2017-10-06 ENCOUNTER — TELEPHONE (OUTPATIENT)
Dept: FAMILY MEDICINE | Facility: OTHER | Age: 82
End: 2017-10-06

## 2017-10-06 NOTE — TELEPHONE ENCOUNTER
If the foot has healed and no open areas they can stop foot treatments but should continue to monitor in case it reoccurs

## 2017-10-06 NOTE — TELEPHONE ENCOUNTER
10:27 AM    Reason for Call: Phone Call    Description: Wanda from [facility] called regarding the patient, she said the paintent's foot has healed so she would like to know if they can stop or need to continue the foot cares for him. Please call her back at [phone]    Was an appointment offered for this call? No    Preferred method for responding to this message: Telephone Call     What is your phone number ?    [phone]    If we cannot reach you directly, may we leave a detailed response at the number you provided? Yes    Can this message wait until your PCP/provider returns, if available today? PCP is in    Angela Stanton

## 2017-10-07 ENCOUNTER — HISTORY (OUTPATIENT)
Dept: FAMILY MEDICINE | Facility: OTHER | Age: 82
End: 2017-10-07

## 2017-10-07 ENCOUNTER — OFFICE VISIT - GICH (OUTPATIENT)
Dept: FAMILY MEDICINE | Facility: OTHER | Age: 82
End: 2017-10-07

## 2017-10-07 ENCOUNTER — HOSPITAL ENCOUNTER (OUTPATIENT)
Dept: RADIOLOGY | Facility: OTHER | Age: 82
End: 2017-10-07
Attending: NURSE PRACTITIONER

## 2017-10-07 ENCOUNTER — COMMUNICATION - GICH (OUTPATIENT)
Dept: FAMILY MEDICINE | Facility: OTHER | Age: 82
End: 2017-10-07

## 2017-10-07 DIAGNOSIS — J22 ACUTE LOWER RESPIRATORY INFECTION: ICD-10-CM

## 2017-10-07 DIAGNOSIS — R05.9 COUGH: ICD-10-CM

## 2017-10-07 DIAGNOSIS — J44.9 CHRONIC OBSTRUCTIVE PULMONARY DISEASE (H): ICD-10-CM

## 2017-10-07 LAB
ABSOLUTE BASOPHILS - HISTORICAL: 0 THOU/CU MM
ABSOLUTE EOSINOPHILS - HISTORICAL: 0.2 THOU/CU MM
ABSOLUTE IMMATURE GRANULOCYTES(METAS,MYELOS,PROS) - HISTORICAL: 0.1 THOU/CU MM
ABSOLUTE LYMPHOCYTES - HISTORICAL: 1.5 THOU/CU MM (ref 0.9–2.9)
ABSOLUTE MONOCYTES - HISTORICAL: 0.8 THOU/CU MM
ABSOLUTE NEUTROPHILS - HISTORICAL: 6.6 THOU/CU MM (ref 1.7–7)
BASOPHILS # BLD AUTO: 0.2 %
EOSINOPHIL NFR BLD AUTO: 2.4 %
ERYTHROCYTE [DISTWIDTH] IN BLOOD BY AUTOMATED COUNT: 13.2 % (ref 11.5–15.5)
HCT VFR BLD AUTO: 39 % (ref 37–53)
HEMOGLOBIN: 13.2 G/DL (ref 13.5–17.5)
IMMATURE GRANULOCYTES(METAS,MYELOS,PROS) - HISTORICAL: 1.3 %
LYMPHOCYTES NFR BLD AUTO: 16.5 % (ref 20–44)
MCH RBC QN AUTO: 29.9 PG (ref 26–34)
MCHC RBC AUTO-ENTMCNC: 33.8 G/DL (ref 32–36)
MCV RBC AUTO: 88 FL (ref 80–100)
MONOCYTES NFR BLD AUTO: 8.7 %
NEUTROPHILS NFR BLD AUTO: 70.9 % (ref 42–72)
PLATELET # BLD AUTO: 287 THOU/CU MM (ref 140–440)
PMV BLD: 10.1 FL (ref 6.5–11)
RED BLOOD COUNT - HISTORICAL: 4.41 MIL/CU MM (ref 4.3–5.9)
WHITE BLOOD COUNT - HISTORICAL: 9.3 THOU/CU MM (ref 4.5–11)

## 2017-10-09 ENCOUNTER — ANTICOAGULATION THERAPY VISIT (OUTPATIENT)
Dept: ANTICOAGULATION | Facility: OTHER | Age: 82
End: 2017-10-09

## 2017-10-09 DIAGNOSIS — I48.20 CHRONIC ATRIAL FIBRILLATION (H): ICD-10-CM

## 2017-10-09 DIAGNOSIS — Z79.01 LONG-TERM (CURRENT) USE OF ANTICOAGULANTS: ICD-10-CM

## 2017-10-09 LAB — INR PPP: 5.1

## 2017-10-09 RX ORDER — CEFUROXIME AXETIL 500 MG/1
500 TABLET ORAL 2 TIMES DAILY
COMMUNITY
End: 2017-10-17

## 2017-10-09 RX ORDER — PREDNISONE 20 MG/1
TABLET ORAL DAILY
COMMUNITY
End: 2017-10-16

## 2017-10-09 NOTE — MR AVS SNAPSHOT
Adiel Rosa Jr   10/9/2017   Anticoagulation Therapy Visit    Description:  82 year old male   Provider:  GAURAV Grijalva MD   Department:  Hc Anti Coagulation           INR as of 10/9/2017     Today's INR 5.1!      Anticoagulation Summary as of 10/9/2017     INR goal 2.0-3.0   Today's INR 5.1!   Full instructions 10/9: Hold; 10/10: Hold; Otherwise 8 mg on Mon, Fri; 6 mg all other days   Next INR check 10/11/2017    Indications   Chronic atrial fibrillation (H) [I48.2]  Long-term (current) use of anticoagulants [Z79.01] [Z79.01]         October 2017 Details    Sun Mon Tue Wed Thu Fri Sat     1               2               3               4               5               6               7                 8               9      Hold   See details      10      Hold         11            12               13               14                 15               16               17               18               19               20               21                 22               23               24               25               26               27               28                 29               30               31                    Date Details   10/09 This INR check       Date of next INR:  10/11/2017         How to take your warfarin dose     To take:  6 mg Take 3 of the 2 mg tablets.    Hold Do not take your warfarin dose. See the Details table to the right for additional instructions.

## 2017-10-09 NOTE — PROGRESS NOTES
ANTICOAGULATION FOLLOW-UP CLINIC VISIT    Patient Name:  Adiel Rosa Jr  Date:  10/9/2017  Contact Type:  INR done by assisted living staff and result faxed to warfarin clinic    SUBJECTIVE:     Patient Findings     Positives Change in medications, Antibiotic use or infection    Comments INR done by assisted living staff Swati adan, and result faxed to warfarin clinic. Patient was seen at [facility] for respiratory tract infection on 10/7/17 and was started on Ceftin and prednisone.  Per nursing communication sheet prednisone will be completed on 10/12 and antibiotic on 10/17/17.  New warfarin dosing and INR recheck date faxed back to assisted living facility. Staff to notify warfarin clinic if patient has any bleeding/bruising, changes in diet/med/activity or questions.            OBJECTIVE    INR   Date Value Ref Range Status   10/09/2017 5.1  Final       ASSESSMENT / PLAN  INR assessment SUPRA    Recheck INR In: 2 DAYS    INR Location [facility]      Anticoagulation Summary as of 10/9/2017     INR goal 2.0-3.0   Today's INR 5.1!   Maintenance plan 8 mg (2 mg x 4) on Mon, Fri; 6 mg (2 mg x 3) all other days   Full instructions 10/9: Hold; 10/10: Hold; Otherwise 8 mg on Mon, Fri; 6 mg all other days   Weekly total 46 mg   Plan last modified Jessa Storey RN (2/15/2017)   Next INR check 10/11/2017   Priority INR   Target end date Indefinite    Indications   Chronic atrial fibrillation (H) [I48.2]  Long-term (current) use of anticoagulants [Z79.01] [Z79.01]         Anticoagulation Episode Summary     INR check location     Preferred lab     Send INR reminders to  ANTICOAG POOL    Comments [facility] assisted [facility], Fax   done with homecare      Anticoagulation Care Providers     Provider Role Specialty Phone number    GAURAV Grijalva MD Responsible Family Practice [phone]            See the Encounter Report to view Anticoagulation Flowsheet and Dosing Calendar (Go to Encounters  tab in chart review, and find the Anticoagulation Therapy Visit)        Jessa Storey RN

## 2017-10-09 NOTE — Clinical Note
Adiel Hilton had INR of 5.1 today.  Apparently seen at [facility] on 10/7 and put on prednisone and Ceftin, both of which can elevate INR.  No bleeding/bruising. I am holding warfarin x 2 days and having INR rechecked on 10/11/17.  Jessa Storey RN Anticoagulation clinic

## 2017-10-11 ENCOUNTER — ANTICOAGULATION THERAPY VISIT (OUTPATIENT)
Dept: ANTICOAGULATION | Facility: OTHER | Age: 82
End: 2017-10-11

## 2017-10-11 DIAGNOSIS — I48.20 CHRONIC ATRIAL FIBRILLATION (H): ICD-10-CM

## 2017-10-11 DIAGNOSIS — E11.9 TYPE 2 DIABETES MELLITUS WITHOUT COMPLICATION, WITH LONG-TERM CURRENT USE OF INSULIN (H): ICD-10-CM

## 2017-10-11 DIAGNOSIS — Z79.01 LONG-TERM (CURRENT) USE OF ANTICOAGULANTS: ICD-10-CM

## 2017-10-11 DIAGNOSIS — Z79.4 TYPE 2 DIABETES MELLITUS WITHOUT COMPLICATION, WITH LONG-TERM CURRENT USE OF INSULIN (H): ICD-10-CM

## 2017-10-11 LAB — INR PPP: 2.6

## 2017-10-11 NOTE — MR AVS SNAPSHOT
Adiel VILLATORO Moe    10/11/2017   Anticoagulation Therapy Visit    Description:  82 year old male   Provider:  GAURAV Grijalva MD   Department:  Hc Anti Coagulation           INR as of 10/11/2017     Today's INR 2.6      Anticoagulation Summary as of 10/11/2017     INR goal 2.0-3.0   Today's INR 2.6   Full instructions 10/11: 3 mg; 10/12: 4 mg; 10/13: 6 mg; 10/14: 4 mg; 10/15: 4 mg; Otherwise 8 mg on Mon, Fri; 6 mg all other days   Next INR check 10/16/2017    Indications   Chronic atrial fibrillation (H) [I48.2]  Long-term (current) use of anticoagulants [Z79.01] [Z79.01]         October 2017 Details    Sun Mon Tue Wed Thu Fri Sat     1               2               3               4               5               6               7                 8               9               10               11      3 mg   See details      12      4 mg         13      6 mg         14      4 mg           15      4 mg         16            17               18               19               20               21                 22               23               24               25               26               27               28                 29               30               31                    Date Details   10/11 This INR check       Date of next INR:  10/16/2017         How to take your warfarin dose     To take:  3 mg Take 1.5 of the 2 mg tablets.    To take:  4 mg Take 2 of the 2 mg tablets.    To take:  6 mg Take 3 of the 2 mg tablets.    To take:  8 mg Take 4 of the 2 mg tablets.

## 2017-10-11 NOTE — TELEPHONE ENCOUNTER
Levemir         Last Written Prescription Date: 9/20/17  Last Fill Quantity: 10 mL, # refills: 1  Last Office Visit with G, P or [facility] prescribing provider:  6/15/17        BP Readings from Last 3 Encounters:   06/15/17 114/62   05/05/17 115/72   04/04/17 108/54     Lab Results   Component Value Date    MICROL 269 03/12/2014     Lab Results   Component Value Date    UMALCR 213.49 03/12/2014     Creatinine   Date Value Ref Range Status   02/27/2017 1.44 (H) 0.66 - 1.25 mg/dL Final   ]  GFR Estimate   Date Value Ref Range Status   02/27/2017 47 (L) >60 mL/min/1.7m2 Final     Comment:     Non  GFR Calc   01/15/2017 53 (L) >60 mL/min/1.7m2 Final     Comment:     Non  GFR Calc   01/14/2017 56 (L) >60 mL/min/1.7m2 Final     Comment:     Non  GFR Calc     GFR Estimate If Black   Date Value Ref Range Status   02/27/2017 57 (L) >60 mL/min/1.7m2 Final     Comment:      GFR Calc   01/15/2017 64 >60 mL/min/1.7m2 Final     Comment:      GFR Calc   01/14/2017 68 >60 mL/min/1.7m2 Final     Comment:      GFR Calc     Lab Results   Component Value Date    CHOL 142 04/23/2015     Lab Results   Component Value Date    HDL 36 04/23/2015     Lab Results   Component Value Date    LDL 56 04/23/2015     Lab Results   Component Value Date    TRIG 248 04/23/2015     Lab Results   Component Value Date    CHOLHDLRATIO 3.9 04/23/2015     Lab Results   Component Value Date     02/27/2017     Lab Results   Component Value Date     02/27/2017     Lab Results   Component Value Date    A1C 8.4 01/15/2017    A1C 7.3 09/14/2016    A1C 7.4 04/11/2016    A1C 8.6 10/20/2015    A1C 8.8% 07/27/2015     Potassium   Date Value Ref Range Status   02/27/2017 4.8 3.4 - 5.3 mmol/L Final

## 2017-10-11 NOTE — PROGRESS NOTES
ANTICOAGULATION FOLLOW-UP CLINIC VISIT    Patient Name:  Adiel Rosa Jr  Date:  10/11/2017  Contact Type:  Telephone/ spoke to nurse, Swati, via phone    SUBJECTIVE:     Patient Findings     Positives Change in medications, Antibiotic use or infection    Comments Incoming call from Swati at [facility] assisted [facility].  She called to report INR of 2.6 for patient today.  She states that the prednisone will be done tomorrow.  Ceftin will be completed on 10/17/17.   I told her that I will fax warfarin dosing and INR recheck date to her.  Per Swati, their fax machine is out of toner and will not get the new one till tomorrow so I called her and per phone, gave her warfarin dosing instructions.  She states that they will probably get the faxed copy tomorrow when new toner installed.  Swati verbalized understanding of dosing and has no questions.            OBJECTIVE    INR   Date Value Ref Range Status   10/11/2017 2.6  Final       ASSESSMENT / PLAN  INR assessment THER    Recheck INR In: 4 DAYS    INR Location [facility]      Anticoagulation Summary as of 10/11/2017     INR goal 2.0-3.0   Today's INR 2.6   Maintenance plan 8 mg (2 mg x 4) on Mon, Fri; 6 mg (2 mg x 3) all other days   Full instructions 10/11: 3 mg; 10/12: 4 mg; 10/13: 6 mg; 10/14: 4 mg; 10/15: 4 mg; Otherwise 8 mg on Mon, Fri; 6 mg all other days   Weekly total 46 mg   Plan last modified Jessa Storey RN (2/15/2017)   Next INR check 10/16/2017   Priority INR   Target end date Indefinite    Indications   Chronic atrial fibrillation (H) [I48.2]  Long-term (current) use of anticoagulants [Z79.01] [Z79.01]         Anticoagulation Episode Summary     INR check location     Preferred lab     Send INR reminders to  ANTICOAG POOL    Comments [facility] assisted living, Fax   done with homecare      Anticoagulation Care Providers     Provider Role Specialty Phone number    GAURAV Grijalva MD Responsible Family Practice  [phone]            See the Encounter Report to view Anticoagulation Flowsheet and Dosing Calendar (Go to Encounters tab in chart review, and find the Anticoagulation Therapy Visit)        Jessa Storey RN

## 2017-10-12 DIAGNOSIS — S20.221A BACK CONTUSION, RIGHT, INITIAL ENCOUNTER: ICD-10-CM

## 2017-10-12 RX ORDER — HYDROCODONE BITARTRATE AND ACETAMINOPHEN 5; 325 MG/1; MG/1
TABLET ORAL
Qty: 30 TABLET | Refills: 0 | Status: SHIPPED | OUTPATIENT
Start: 2017-10-12 | End: 2017-11-17

## 2017-10-12 NOTE — TELEPHONE ENCOUNTER
Left message at [facility] that the written RX is ready at the [facility]  registration to be picked up.

## 2017-10-12 NOTE — TELEPHONE ENCOUNTER
norco      Last Written Prescription Date: 9/12/17  Last Fill Quantity: 30,  # refills: 0   Last Office Visit with G, P or [facility] prescribing provider: 6/15/17

## 2017-10-13 RX ORDER — INSULIN DETEMIR 100 [IU]/ML
INJECTION, SOLUTION SUBCUTANEOUS
Qty: 10 ML | Refills: 0 | Status: SHIPPED | OUTPATIENT
Start: 2017-10-13 | End: 2017-10-17

## 2017-10-16 ENCOUNTER — ANTICOAGULATION THERAPY VISIT (OUTPATIENT)
Dept: ANTICOAGULATION | Facility: OTHER | Age: 82
End: 2017-10-16

## 2017-10-16 DIAGNOSIS — Z79.01 LONG-TERM (CURRENT) USE OF ANTICOAGULANTS: ICD-10-CM

## 2017-10-16 DIAGNOSIS — I48.20 CHRONIC ATRIAL FIBRILLATION (H): ICD-10-CM

## 2017-10-16 LAB — INR PPP: 2

## 2017-10-16 NOTE — MR AVS SNAPSHOT
Adiel Rosa Jr   10/16/2017   Anticoagulation Therapy Visit    Description:  82 year old male   Provider:  GAURAV Grijalva MD   Department:  Hc Anti Coagulation           INR as of 10/16/2017     Today's INR 2.0      Anticoagulation Summary as of 10/16/2017     INR goal 2.0-3.0   Today's INR 2.0   Full instructions 8 mg on Mon, Fri; 6 mg all other days   Next INR check 10/23/2017    Indications   Chronic atrial fibrillation (H) [I48.2]  Long-term (current) use of anticoagulants [Z79.01] [Z79.01]         October 2017 Details    Sun Mon Tue Wed Thu Fri Sat     1               2               3               4               5               6               7                 8               9               10               11               12               13               14                 15               16      8 mg   See details      17      6 mg         18      6 mg         19      6 mg         20      8 mg         21      6 mg           22      6 mg         23            24               25               26               27               28                 29               30               31                    Date Details   10/16 This INR check       Date of next INR:  10/23/2017         How to take your warfarin dose     To take:  6 mg Take 3 of the 2 mg tablets.    To take:  8 mg Take 4 of the 2 mg tablets.

## 2017-10-16 NOTE — PROGRESS NOTES
ANTICOAGULATION FOLLOW-UP CLINIC VISIT    Patient Name:  Adiel Rosa Jr  Date:  10/16/2017  Contact Type:  INR done by assisted living nurse and result faxed to warfarin clinic    SUBJECTIVE:     Patient Findings     Positives Change in medications    Comments INR done by assisted living nurse and result faxed to warfarin clinic. New warfarin dosing and INR recheck date faxed back to assisted living facility. Per previous anticoagulation clinic note, Prednisone has been completed and Ceftin will be completed on 10/17/17.  Staff to notify warfarin clinic if patient has any bleeding/bruising, changes in diet/med/activity or questions.            OBJECTIVE    INR   Date Value Ref Range Status   10/16/2017 2.0  Final       ASSESSMENT / PLAN  INR assessment THER    Recheck INR In: 1 WEEK    INR Location [facility]      Anticoagulation Summary as of 10/16/2017     INR goal 2.0-3.0   Today's INR 2.0   Maintenance plan 8 mg (2 mg x 4) on Mon, Fri; 6 mg (2 mg x 3) all other days   Full instructions 8 mg on Mon, Fri; 6 mg all other days   Weekly total 46 mg   No change documented Jessa Storey RN   Plan last modified Jessa Storey RN (2/15/2017)   Next INR check 10/23/2017   Priority INR   Target end date Indefinite    Indications   Chronic atrial fibrillation (H) [I48.2]  Long-term (current) use of anticoagulants [Z79.01] [Z79.01]         Anticoagulation Episode Summary     INR check location     Preferred lab     Send INR reminders to [facility] POOL    Comments [facility] assisted [facility], Fax   done with homecare      Anticoagulation Care Providers     Provider Role Specialty Phone number    GAURAV Grijalva MD [facility] [phone]            See the Encounter Report to view Anticoagulation Flowsheet and Dosing Calendar (Go to Encounters tab in chart review, and find the Anticoagulation Therapy Visit)        Jessa Storey RN

## 2017-10-17 ENCOUNTER — TELEPHONE (OUTPATIENT)
Dept: FAMILY MEDICINE | Facility: OTHER | Age: 82
End: 2017-10-17

## 2017-10-17 DIAGNOSIS — Z79.4 TYPE 2 DIABETES MELLITUS WITHOUT COMPLICATION, WITH LONG-TERM CURRENT USE OF INSULIN (H): ICD-10-CM

## 2017-10-17 DIAGNOSIS — E11.9 TYPE 2 DIABETES MELLITUS WITHOUT COMPLICATION, WITH LONG-TERM CURRENT USE OF INSULIN (H): ICD-10-CM

## 2017-10-17 NOTE — TELEPHONE ENCOUNTER
Per fax yesterday about sliding scale more directions. Staff will fax us the question   BUSHRA MUÑIZ

## 2017-10-17 NOTE — TELEPHONE ENCOUNTER
10:25 AM    Reason for Call: Phone Call    Description: Wendy from [facility] called and stated she has a question on an order that you sent for insulin. Please call her back at [phone]    Was an appointment offered for this call? No  If yes : Appointment type              Date    Preferred method for responding to this message: Telephone Call  What is your phone number ?    If we cannot reach you directly, may we leave a detailed response at the number you provided? Yes    Can this message wait until your PCP/provider returns, if available today? Not applicable    Nannette Lopes

## 2017-10-17 NOTE — LETTER
[facility]  [address] [place] [address]            Adiel Rosa Jr     [facility] [address]      October 20, 2017       Dear Adiel Rosa Jr,    APPOINTMENT REMINDER:       Our record indicates that it is time for you to be seen for a diabetic follow up.    You may call our office at [phone] to schedule an appointment.    Please disregard this notice if you have already made an appointment.      Sincerely,    Jem Grijalva MD

## 2017-10-20 RX ORDER — INSULIN DETEMIR 100 [IU]/ML
INJECTION, SOLUTION SUBCUTANEOUS
Qty: 10 ML | Refills: 0 | Status: SHIPPED | OUTPATIENT
Start: 2017-10-20 | End: 2017-11-08

## 2017-10-20 NOTE — TELEPHONE ENCOUNTER
LEVEMIR VIAL 100 UNIT/ML soln      Last Written Prescription Date: 10/13/2017  Last Fill Quantity: 10ml,  # refills: 0   Last Office Visit with [facility], [facility] or [facility] prescribing provider: 06/15/17

## 2017-10-23 ENCOUNTER — ANTICOAGULATION THERAPY VISIT (OUTPATIENT)
Dept: ANTICOAGULATION | Facility: OTHER | Age: 82
End: 2017-10-23

## 2017-10-23 DIAGNOSIS — Z79.01 LONG-TERM (CURRENT) USE OF ANTICOAGULANTS: ICD-10-CM

## 2017-10-23 DIAGNOSIS — I48.20 CHRONIC ATRIAL FIBRILLATION (H): ICD-10-CM

## 2017-10-23 LAB — INR PPP: 2.5

## 2017-10-23 NOTE — PROGRESS NOTES
ANTICOAGULATION FOLLOW-UP CLINIC VISIT    Patient Name:  Adiel Rosa Jr  Date:  10/23/2017  Contact Type:  INR done by assisted living nurse and result faxed to warfarin clinic    SUBJECTIVE:     Patient Findings     Positives No Problem Findings    Comments INR done by nurse at assisted living facility and result faxed to warfarin clinic. New warfarin dosing and INR recheck date faxed back to assisted living facility. Staff to notify warfarin clinic if patient has any bleeding/bruising, changes in diet/meds/activity or questions.            OBJECTIVE    INR   Date Value Ref Range Status   10/23/2017 2.5  Final       ASSESSMENT / PLAN  No question data found.  Anticoagulation Summary as of 10/23/2017     INR goal 2.0-3.0   Today's INR 2.5   Maintenance plan 8 mg (2 mg x 4) on Mon, Fri; 6 mg (2 mg x 3) all other days   Full instructions 8 mg on Mon, Fri; 6 mg all other days   Weekly total 46 mg   No change documented Jessa Storey RN   Plan last modified Jessa Storey RN (2/15/2017)   Next INR check 11/20/2017   Priority INR   Target end date Indefinite    Indications   Chronic atrial fibrillation (H) [I48.2]  Long-term (current) use of anticoagulants [Z79.01] [Z79.01]         Anticoagulation Episode Summary     INR check location     Preferred lab     Send INR reminders to [facility] ANTICOAG POOL    Comments [facility] assisted living, Fax   done with homecare      Anticoagulation Care Providers     Provider Role Specialty Phone number    GAURAV Grijalva MD [facility] [phone]            See the Encounter Report to view Anticoagulation Flowsheet and Dosing Calendar (Go to Encounters tab in chart review, and find the Anticoagulation Therapy Visit)        Jessa Storey RN

## 2017-10-23 NOTE — MR AVS SNAPSHOT
Adiel Rosa    10/23/2017   Anticoagulation Therapy Visit    Description:  82 year old male   Provider:  GAURAV Grijalva MD   Department:  Hc Anti Coagulation           INR as of 10/23/2017     Today's INR 2.5      Anticoagulation Summary as of 10/23/2017     INR goal 2.0-3.0   Today's INR 2.5   Full instructions 8 mg on Mon, Fri; 6 mg all other days   Next INR check 11/20/2017    Indications   Chronic atrial fibrillation (H) [I48.2]  Long-term (current) use of anticoagulants [Z79.01] [Z79.01]         October 2017 Details    Sun Mon Tue Wed Thu Fri Sat     1               2               3               4               5               6               7                 8               9               10               11               12               13               14                 15               16               17               18               19               20               21                 22               23      8 mg   See details      24      6 mg         25      6 mg         26      6 mg         27      8 mg         28      6 mg           29      6 mg         30      8 mg         31      6 mg              Date Details   10/23 This INR check               How to take your warfarin dose     To take:  6 mg Take 3 of the 2 mg tablets.    To take:  8 mg Take 4 of the 2 mg tablets.           November 2017 Details    Sun Mon Tue Wed Thu Fri Sat        1      6 mg         2      6 mg         3      8 mg         4      6 mg           5      6 mg         6      8 mg         7      6 mg         8      6 mg         9      6 mg         10      8 mg         11      6 mg           12      6 mg         13      8 mg         14      6 mg         15      6 mg         16      6 mg         17      8 mg         18      6 mg           19      6 mg         20            21               22               23               24               25                 26               27               28               29                30                  Date Details   No additional details    Date of next INR:  11/20/2017         How to take your warfarin dose     To take:  6 mg Take 3 of the 2 mg tablets.    To take:  8 mg Take 4 of the 2 mg tablets.

## 2017-11-08 DIAGNOSIS — E11.9 TYPE 2 DIABETES MELLITUS WITHOUT COMPLICATION, WITH LONG-TERM CURRENT USE OF INSULIN (H): ICD-10-CM

## 2017-11-08 DIAGNOSIS — Z79.4 TYPE 2 DIABETES MELLITUS WITHOUT COMPLICATION, WITH LONG-TERM CURRENT USE OF INSULIN (H): ICD-10-CM

## 2017-11-08 NOTE — TELEPHONE ENCOUNTER
Levemir      Last Written Prescription Date: 10/20/2017  Last Fill Quantity: 10mL,  # refills: 0   Last Office Visit with [facility], [facility] or [facility] prescribing provider: 6/15/2017

## 2017-11-13 RX ORDER — INSULIN DETEMIR 100 [IU]/ML
INJECTION, SOLUTION SUBCUTANEOUS
Qty: 10 ML | Refills: 0 | Status: SHIPPED | OUTPATIENT
Start: 2017-11-13 | End: 2017-11-27

## 2017-11-17 DIAGNOSIS — I10 HTN (HYPERTENSION): ICD-10-CM

## 2017-11-17 DIAGNOSIS — I10 ESSENTIAL HYPERTENSION, BENIGN: Primary | ICD-10-CM

## 2017-11-17 DIAGNOSIS — S20.221A BACK CONTUSION, RIGHT, INITIAL ENCOUNTER: ICD-10-CM

## 2017-11-17 DIAGNOSIS — I10 BENIGN ESSENTIAL HYPERTENSION: ICD-10-CM

## 2017-11-17 RX ORDER — FUROSEMIDE 40 MG
TABLET ORAL
Qty: 31 TABLET | Refills: 0 | Status: SHIPPED | OUTPATIENT
Start: 2017-11-17 | End: 2018-01-05

## 2017-11-17 RX ORDER — HYDROCODONE BITARTRATE AND ACETAMINOPHEN 5; 325 MG/1; MG/1
TABLET ORAL
Qty: 30 TABLET | Refills: 0 | Status: SHIPPED | OUTPATIENT
Start: 2017-11-17 | End: 2017-12-13

## 2017-11-17 RX ORDER — DIGOXIN 125 UG/1
TABLET ORAL
Qty: 90 TABLET | Refills: 0 | Status: SHIPPED | OUTPATIENT
Start: 2017-11-17 | End: 2018-02-08

## 2017-11-17 NOTE — TELEPHONE ENCOUNTER
DIGOX 125MCG      Last Written Prescription Date: 8-  Last Fill Quantity: 90,  # refills: 0   [place] [facility] prescribing provider: 6-    NORCO 5-325MG      Last Written Prescription Date: 10-  Last Fill Quantity: 30,  # refills: 0   Last [place] Visit [place] [facility] prescribing provider: 6-

## 2017-11-17 NOTE — TELEPHONE ENCOUNTER
Attempted to call patient regarding Norco script ready to be picked up; however, phone line busy unable to leave message. Two attempts made. Will put in refill call folder for Monday.

## 2017-11-20 ENCOUNTER — TELEPHONE (OUTPATIENT)
Dept: FAMILY MEDICINE | Facility: OTHER | Age: 82
End: 2017-11-20

## 2017-11-20 ENCOUNTER — ANTICOAGULATION THERAPY VISIT (OUTPATIENT)
Dept: ANTICOAGULATION | Facility: OTHER | Age: 82
End: 2017-11-20

## 2017-11-20 DIAGNOSIS — Z79.01 LONG-TERM (CURRENT) USE OF ANTICOAGULANTS: ICD-10-CM

## 2017-11-20 DIAGNOSIS — E56.9 VITAMIN DEFICIENCY: Primary | ICD-10-CM

## 2017-11-20 DIAGNOSIS — I48.20 CHRONIC ATRIAL FIBRILLATION (H): ICD-10-CM

## 2017-11-20 LAB — INR PPP: 3

## 2017-11-20 RX ORDER — FAMOTIDINE 20 MG
1000 TABLET ORAL DAILY
Qty: 90 CAPSULE | Refills: 0 | Status: SHIPPED | OUTPATIENT
Start: 2017-11-20 | End: 2018-02-08

## 2017-11-20 NOTE — TELEPHONE ENCOUNTER
"[facility] [place] requesting Vitamin D 1000 unit, po daily. \"substitued for vitamin D 3\"     Cholecalciferol (vitmain D 3) 1,000 units  Last visit: 6/15/17  Last refill: 1/16/17 end 1/18/17 #90 R-3      3/9/15 11:26 AM     Component Results   Component Value Flag Ref Range Units Status Collected Lab   Vitamin D Deficiency screening 29 (L) 30 - 75 ug/L Final 03/09/2015 11:26 AM 51   Comment:     Dr. Grijalva to clarify thank you  "

## 2017-11-20 NOTE — MR AVS SNAPSHOT
Adiel Rosa    11/20/2017   Anticoagulation Therapy Visit    Description:  82 year old male   Provider:  GAURAV Grijalva MD   Department:  Hc Anti Coagulation           INR as of 11/20/2017     Today's INR 3.0      Anticoagulation Summary as of 11/20/2017     INR goal 2.0-3.0   Today's INR 3.0   Full instructions 8 mg on Mon, Fri; 6 mg all other days   Next INR check 12/18/2017    Indications   Chronic atrial fibrillation (H) [I48.2]  Long-term (current) use of anticoagulants [Z79.01] [Z79.01]         November 2017 Details    Sun Mon Tue Wed Thu Fri Sat        1               2               3               4                 5               6               7               8               9               10               11                 12               13               14               15               16               17               18                 19               20      8 mg   See details      21      6 mg         22      6 mg         23      6 mg         24      8 mg         25      6 mg           26      6 mg         27      8 mg         28      6 mg         29      6 mg         30      6 mg            Date Details   11/20 This INR check               How to take your warfarin dose     To take:  6 mg Take 3 of the 2 mg tablets.    To take:  8 mg Take 4 of the 2 mg tablets.           December 2017 Details    Sun Mon Tue Wed Thu Fri Sat          1      8 mg         2      6 mg           3      6 mg         4      8 mg         5      6 mg         6      6 mg         7      6 mg         8      8 mg         9      6 mg           10      6 mg         11      8 mg         12      6 mg         13      6 mg         14      6 mg         15      8 mg         16      6 mg           17      6 mg         18            19               20               21               22               23                 24               25               26               27               28               29                30                 31                      Date Details   No additional details    Date of next INR:  12/18/2017         How to take your warfarin dose     To take:  6 mg Take 3 of the 2 mg tablets.    To take:  8 mg Take 4 of the 2 mg tablets.

## 2017-11-20 NOTE — TELEPHONE ENCOUNTER
Left message that the written [place] RX is ready at the [facility] [place]  registration to be picked up.

## 2017-11-20 NOTE — PROGRESS NOTES
ANTICOAGULATION FOLLOW-UP CLINIC VISIT    Patient Name:  Adiel Rosa Jr  Date:  11/20/2017  Contact Type:  INR result faxed to warfarin clinic by assisted living facility.    SUBJECTIVE:     Patient Findings     Positives No Problem Findings    Comments INR done by assisted [facility] nurse and result faxed to warfarin clinic. New warfarin dosing and PST recheck date faxed back to assisted living facility. Staff to notify warfarin clinic if patient has any bleeding/bruising, changes in diet/meds/activity or questions.            OBJECTIVE    INR   Date Value Ref Range Status   11/20/2017 3.0  Final       ASSESSMENT / PLAN  INR assessment THER    Recheck INR In: 4 WEEKS    INR Location [facility]      Anticoagulation Summary as of 11/20/2017     INR goal 2.0-3.0   Today's INR 3.0   Maintenance plan 8 mg (2 mg x 4) on Mon, Fri; 6 mg (2 mg x 3) all other days   Full instructions 8 mg on Mon, Fri; 6 mg all other days   Weekly total 46 mg   No change documented Jessa Storey RN   Plan last modified Jessa Storey RN (2/15/2017)   Next INR check 12/18/2017   Priority INR   Target end date Indefinite    Indications   Chronic atrial fibrillation (H) [I48.2]  Long-term (current) use of anticoagulants [Z79.01] [Z79.01]         Anticoagulation Episode Summary     INR check location     Preferred lab     Send INR reminders to [facility] POOL    Comments [facility], Fax   done with homecare      Anticoagulation Care Providers     Provider Role Specialty Phone number    GAURAV Grijalva MD [facility] [phone]            See the Encounter Report to view Anticoagulation Flowsheet and Dosing Calendar (Go to Encounters tab in chart review, and find the Anticoagulation Therapy Visit)        Jessa Storey RN

## 2017-11-27 DIAGNOSIS — E11.9 TYPE 2 DIABETES MELLITUS WITHOUT COMPLICATION, WITH LONG-TERM CURRENT USE OF INSULIN (H): ICD-10-CM

## 2017-11-27 DIAGNOSIS — Z79.4 TYPE 2 DIABETES MELLITUS WITHOUT COMPLICATION, WITH LONG-TERM CURRENT USE OF INSULIN (H): ICD-10-CM

## 2017-11-30 NOTE — TELEPHONE ENCOUNTER
Levemir      Last Written Prescription Date: 11/13/17  Last Fill Quantity: 10mL,  # refills: 0   Last Office Visit with [facility], [facility] or [facility] prescribing provider: 06/15/17

## 2017-12-01 RX ORDER — INSULIN DETEMIR 100 [IU]/ML
INJECTION, SOLUTION SUBCUTANEOUS
Qty: 10 ML | Refills: 1 | Status: SHIPPED | OUTPATIENT
Start: 2017-12-01 | End: 2018-01-23

## 2017-12-13 DIAGNOSIS — S20.221A BACK CONTUSION, RIGHT, INITIAL ENCOUNTER: ICD-10-CM

## 2017-12-13 NOTE — TELEPHONE ENCOUNTER
norco      Last Written Prescription Date: 11/17/17  Last Fill Quantity: 30,  # refills: 0   Last Office Visit with [facility], [facility] or [facility] prescribing provider: 6/15/17

## 2017-12-14 DIAGNOSIS — S20.221A BACK CONTUSION, RIGHT, INITIAL ENCOUNTER: ICD-10-CM

## 2017-12-14 RX ORDER — HYDROCODONE BITARTRATE AND ACETAMINOPHEN 5; 325 MG/1; MG/1
TABLET ORAL
Qty: 30 TABLET | Refills: 0 | Status: SHIPPED | OUTPATIENT
Start: 2017-12-14 | End: 2017-12-21

## 2017-12-14 NOTE — TELEPHONE ENCOUNTER
Left message that the written RX is ready at the [facility] [place]  registration to be picked up.

## 2017-12-15 ENCOUNTER — ANTICOAGULATION THERAPY VISIT (OUTPATIENT)
Dept: ANTICOAGULATION | Facility: OTHER | Age: 82
End: 2017-12-15
Payer: MEDICARE

## 2017-12-15 DIAGNOSIS — I48.20 CHRONIC ATRIAL FIBRILLATION (H): ICD-10-CM

## 2017-12-15 DIAGNOSIS — Z79.01 LONG-TERM (CURRENT) USE OF ANTICOAGULANTS: ICD-10-CM

## 2017-12-15 LAB — INR PPP: 3.1

## 2017-12-15 RX ORDER — HYDROCODONE BITARTRATE AND ACETAMINOPHEN 5; 325 MG/1; MG/1
TABLET ORAL
Qty: 30 TABLET | OUTPATIENT
Start: 2017-12-15

## 2017-12-15 NOTE — MR AVS SNAPSHOT
Adiel Rosa    12/15/2017   Anticoagulation Therapy Visit    Description:  82 year old male   Provider:  GAURAV Grijalva MD   Department:  Hc Anti Coagulation           INR as of 12/15/2017     Today's INR 3.1!      Anticoagulation Summary as of 12/15/2017     INR goal 2.0-3.0   Today's INR 3.1!   Full instructions 12/15: 6 mg; Otherwise 8 mg on Mon, Fri; 6 mg all other days   Next INR check 1/12/2018    Indications   Chronic atrial fibrillation (H) [I48.2]  Long-term (current) use of anticoagulants [Z79.01] [Z79.01]         December 2017 Details    Sun Mon Tue Wed Thu Fri Sat          1               2                 3               4               5               6               7               8               9                 10               11               12               13               14               15      6 mg   See details      16      6 mg           17      6 mg         18      8 mg         19      6 mg         20      6 mg         21      6 mg         22      8 mg         23      6 mg           24      6 mg         25      8 mg         26      6 mg         27      6 mg         28      6 mg         29      8 mg         30      6 mg           31      6 mg                Date Details   12/15 This INR check               How to take your warfarin dose     To take:  6 mg Take 3 of the 2 mg tablets.    To take:  8 mg Take 4 of the 2 mg tablets.           January 2018 Details    Sun Mon Tue Wed Thu Fri Sat      1      8 mg         2      6 mg         3      6 mg         4      6 mg         5      8 mg         6      6 mg           7      6 mg         8      8 mg         9      6 mg         10      6 mg         11      6 mg         12            13                 14               15               16               17               18               19               20                 21               22               23               24               25               26               27                  28               29               30               31                   Date Details   No additional details    Date of next INR:  1/12/2018         How to take your warfarin dose     To take:  6 mg Take 3 of the 2 mg tablets.    To take:  8 mg Take 4 of the 2 mg tablets.

## 2017-12-15 NOTE — PROGRESS NOTES
ANTICOAGULATION FOLLOW-UP CLINIC VISIT    Patient Name:  Adiel Rosa Jr  Date:  12/15/2017  Contact Type:  INR result faxed from assisted living facility to warfarin clinic    SUBJECTIVE:     Patient Findings     Comments INR done by assisted living nurse and result faxed to warfarin clinic. New warfarin dosing and INR recheck date faxed to assisted living facility. Staff to notify warfarin clinic if patient has any bleeding/bruising, changes in diet/meds/activity or questions.           OBJECTIVE    INR   Date Value Ref Range Status   12/15/2017 3.1  Final       ASSESSMENT / PLAN  INR assessment THER    Recheck INR In: 4 WEEKS    INR Location [facility]      Anticoagulation Summary as of 12/15/2017     INR goal 2.0-3.0   Today's INR 3.1!   Maintenance plan 8 mg (2 mg x 4) on Mon, Fri; 6 mg (2 mg x 3) all other days   Full instructions 12/15: 6 mg; Otherwise 8 mg on Mon, Fri; 6 mg all other days   Weekly total 46 mg   Plan last modified Jessa Storey RN (2/15/2017)   Next INR check 1/12/2018   Priority INR   Target end date Indefinite    Indications   Chronic atrial fibrillation (H) [I48.2]  Long-term (current) use of anticoagulants [Z79.01] [Z79.01]         Anticoagulation Episode Summary     INR check location     Preferred lab     Send INR reminders to [facility] POOL    Comments [facility], Fax   done with homecare      Anticoagulation Care Providers     Provider Role Specialty Phone number    GAURAV Grijalva MD [facility] [phone]            See the Encounter Report to view Anticoagulation Flowsheet and Dosing Calendar (Go to Encounters tab in chart review, and find the Anticoagulation Therapy Visit)        Jessa Storey RN

## 2017-12-19 DIAGNOSIS — N40.0 HYPERTROPHY OF PROSTATE WITHOUT URINARY OBSTRUCTION: ICD-10-CM

## 2017-12-19 NOTE — TELEPHONE ENCOUNTER
[place]       Last Written Prescription Date: 1/16/17  Last Fill Quantity: 90,  # refills: 3   Last Office Visit with G, [facility] or [facility] prescribing provider: 6/15/17

## 2017-12-20 RX ORDER — PYRIDOXINE HCL (VITAMIN B6) 100 MG
1 TABLET ORAL DAILY
Qty: 90 CAPSULE | Refills: 1 | Status: ON HOLD | OUTPATIENT
Start: 2017-12-20 | End: 2019-01-01

## 2017-12-21 DIAGNOSIS — S20.221A BACK CONTUSION, RIGHT, INITIAL ENCOUNTER: ICD-10-CM

## 2017-12-21 NOTE — TELEPHONE ENCOUNTER
Norco      Last Written Prescription Date: 12/14/17  Last Fill Quantity: 30tab,  # refills: 0   Last Office Visit with G, [facility] or [facility] prescribing provider: 6/15/17

## 2017-12-22 RX ORDER — HYDROCODONE BITARTRATE AND ACETAMINOPHEN 5; 325 MG/1; MG/1
TABLET ORAL
Qty: 30 TABLET | Refills: 0 | Status: SHIPPED | OUTPATIENT
Start: 2017-12-22 | End: 2018-04-10

## 2017-12-22 NOTE — TELEPHONE ENCOUNTER
Left message that the written RX is ready at the [facility]  registration to be picked up.

## 2017-12-28 NOTE — TELEPHONE ENCOUNTER
Patient Information     Patient Name MRN Adiel Aleln Jr. [id number] Male 1935      Telephone Encounter by Veronica Gomez PA-C at 10/7/2017  7:05 PM     Author:  Veronica Gomez PA-C Service:  (none) Author Type:  PHYS- Physician Assistant     Filed:  10/7/2017  7:08 PM Encounter Date:  10/7/2017 Status:  Signed     :  Veronica Gomez PA-C (PHYS- Physician Assistant)            Pharmacist from [facility] called to alert to drug interaction (reported contraindication) with Zithromax and Digoxin. Reviewed interaction  and there is a moderate interaction listed.  Doxycyline interacts with both coumadin and digoxin.  Ceftin does not show interaction with digoxin or coumadin. Phoned and discussed with the provider who saw the patient, Polly Roland NP and she agrees to have medication changed to Ceftin.  Ceftin 500 mg BID x 10 days to [facility].  Veronica Gomez PA-C ....................  10/7/2017   7:07 PM

## 2017-12-28 NOTE — PROGRESS NOTES
Patient Information     Patient Name MRN Sex Adiel Mayer Jr. [id number] Male 1935      Progress Notes by Polly Roland NP at 10/7/2017  5:00 PM     Author:  Polly Roland NP Service:  (none) Author Type:  PHYS- Nurse Practitioner     Filed:  10/9/2017  8:37 AM Encounter Date:  10/7/2017 Status:  Signed     :  Polly Roland NP (PHYS- Nurse Practitioner)            HPI:    Adiel Rosa is a 82 y.o. male who presents to clinic today for respiratory concerns. Has had sx for about 1 week. Having increased fatigue, crusting eyes, productive cough. No fevers. He is drinking well, eating is less than normal. He denies SOB. No chest pain. He has hx of smoking, continues to smoke very little occasionally. Has COPD, uses Advair. Has A-fib, DM.     No past medical history on file.  No past surgical history on file.  Social History     Substance Use Topics       Smoking status: Never Smoker     Smokeless tobacco: Never Used     Alcohol use No     No current outpatient prescriptions on file.     No current facility-administered medications for this visit.      Medications have been reviewed by me and are current to the best of my knowledge and ability.    Not on File    ROS:  Pertinent positives and negatives are noted in HPI.    EXAM:  General appearance: well appearing elderly male, in no acute distress  Head: normocephalic, atraumatic  Ears: TM's with cone of light, no erythema, canals clear bilaterally  Eyes: conjunctivae normal  Orophayrnx: moist mucous membranes, tonsils without erythema, exudates or petechiae, no post nasal drip seen  Neck: supple without adenopathy  Respiratory: clear to auscultation bilaterally but very diminished bases, frequent cough, no respiratory distress  Cardiac: RRR with no murmurs  Psychological: normal affect, alert and pleasant    ASSESSMENT/PLAN:    ICD-10-CM    1. Lower respiratory infection (e.g., bronchitis, pneumonia, pneumonitis, pulmonitis) J22  predniSONE (DELTASONE) 20 mg tablet      DISCONTINUED: azithromycin (ZITHROMAX) 250 mg tablet      DISCONTINUED: predniSONE (DELTASONE) 20 mg tablet      DISCONTINUED: azithromycin (ZITHROMAX) 250 mg tablet   2. Cough R05 CBC WITH DIFFERENTIAL      XR CHEST 2 VIEWS PA AND LATERAL      CBC WITH DIFFERENTIAL      CBC WITH AUTO DIFFERENTIAL   3. Chronic obstructive pulmonary disease, unspecified COPD type (HC) J44.9 CBC WITH DIFFERENTIAL      XR CHEST 2 VIEWS PA AND LATERAL      CBC WITH DIFFERENTIAL      CBC WITH AUTO DIFFERENTIAL      predniSONE (DELTASONE) 20 mg tablet      DISCONTINUED: predniSONE (DELTASONE) 20 mg tablet   Tx with azithromycin and prednisone for LRI/COPD exacerbation. Reviewed need to complete all antibiotics. Discussed typical course of illness, symptomatic treatment and when to return to clinic. Patient/caregiver in agreement with plan and all questions were answered.     Patient Instructions   Azithromycin daily for 5 days  Prednisone daily for 5 days  Follow up if any concerns  Use albuterol nebulizer or inhaler if he has this  Notify protime provider of antibiotic use

## 2017-12-29 DIAGNOSIS — J41.0 SIMPLE CHRONIC BRONCHITIS (H): ICD-10-CM

## 2017-12-29 NOTE — PATIENT INSTRUCTIONS
Patient Information     Patient Name MRN Adiel Allen Jr. [id number] Male 1935      Patient Instructions by Polly Roland NP at 10/7/2017  5:00 PM     Author:  Polly Roland NP Service:  (none) Author Type:  PHYS- Nurse Practitioner     Filed:  10/7/2017  6:07 PM Encounter Date:  10/7/2017 Status:  Signed     :  Polly Roland NP (PHYS- Nurse Practitioner)            Azithromycin daily for 5 days  Prednisone daily for 5 days  Follow up if any concerns  Use albuterol nebulizer or inhaler if he has this  Notify protime provider of antibiotic use

## 2017-12-29 NOTE — TELEPHONE ENCOUNTER
Advair inhaler       Last Written Prescription Date: 1/16/17  Last Fill Quantity: 3inhaler ,  # refills: 1   Last Office Visit with G, P or [facility] prescribing provider: 6/15/17

## 2017-12-30 NOTE — NURSING NOTE
Patient Information     Patient Name MRN Sex Adiel Allen Jr. [id number] Male 1935      Nursing Note by Bela Loza at 10/7/2017  5:00 PM     Author:  Bela Loza Service:  (none) Author Type:  (none)     Filed:  10/7/2017  5:35 PM Encounter Date:  10/7/2017 Status:  Signed     :  Bela Loza            Patient presents to the clinic for cough and goopy, red eyes that started over a week ago. Patient's PCA reports the patient coughing up blood with his phlegm yesterday and having lack of appetite that started yesterday.  Bela OCHOA, MARKY.......10/7/2017..5:23 PM

## 2018-01-01 ENCOUNTER — TELEPHONE (OUTPATIENT)
Dept: FAMILY MEDICINE | Facility: OTHER | Age: 83
End: 2018-01-01

## 2018-01-01 ENCOUNTER — ALLIED HEALTH/NURSE VISIT (OUTPATIENT)
Dept: EDUCATION SERVICES | Facility: OTHER | Age: 83
End: 2018-01-01
Attending: NURSE PRACTITIONER
Payer: MEDICARE

## 2018-01-01 ENCOUNTER — ANTICOAGULATION THERAPY VISIT (OUTPATIENT)
Dept: ANTICOAGULATION | Facility: OTHER | Age: 83
End: 2018-01-01
Payer: MEDICARE

## 2018-01-01 ENCOUNTER — ALLIED HEALTH/NURSE VISIT (OUTPATIENT)
Dept: EDUCATION SERVICES | Facility: OTHER | Age: 83
End: 2018-01-01
Attending: FAMILY MEDICINE
Payer: MEDICARE

## 2018-01-01 ENCOUNTER — TELEPHONE (OUTPATIENT)
Dept: EDUCATION SERVICES | Facility: HOSPITAL | Age: 83
End: 2018-01-01

## 2018-01-01 VITALS
BODY MASS INDEX: 26.44 KG/M2 | HEART RATE: 56 BPM | DIASTOLIC BLOOD PRESSURE: 52 MMHG | SYSTOLIC BLOOD PRESSURE: 115 MMHG | OXYGEN SATURATION: 95 % | WEIGHT: 195.2 LBS | HEIGHT: 72 IN

## 2018-01-01 VITALS
HEART RATE: 77 BPM | WEIGHT: 197.5 LBS | SYSTOLIC BLOOD PRESSURE: 115 MMHG | BODY MASS INDEX: 26.79 KG/M2 | OXYGEN SATURATION: 98 % | DIASTOLIC BLOOD PRESSURE: 45 MMHG | RESPIRATION RATE: 16 BRPM

## 2018-01-01 DIAGNOSIS — I48.20 CHRONIC ATRIAL FIBRILLATION (H): ICD-10-CM

## 2018-01-01 DIAGNOSIS — Z79.4 TYPE 2 DIABETES MELLITUS WITHOUT COMPLICATION, WITH LONG-TERM CURRENT USE OF INSULIN (H): Primary | ICD-10-CM

## 2018-01-01 DIAGNOSIS — K21.9 GASTROESOPHAGEAL REFLUX DISEASE WITHOUT ESOPHAGITIS: ICD-10-CM

## 2018-01-01 DIAGNOSIS — E11.65 TYPE 2 DIABETES MELLITUS WITH HYPERGLYCEMIA, WITH LONG-TERM CURRENT USE OF INSULIN (H): ICD-10-CM

## 2018-01-01 DIAGNOSIS — Z79.4 TYPE 2 DIABETES MELLITUS WITHOUT COMPLICATION, WITH LONG-TERM CURRENT USE OF INSULIN (H): ICD-10-CM

## 2018-01-01 DIAGNOSIS — Z79.4 TYPE 2 DIABETES MELLITUS WITH HYPERGLYCEMIA, WITH LONG-TERM CURRENT USE OF INSULIN (H): Primary | ICD-10-CM

## 2018-01-01 DIAGNOSIS — E11.9 TYPE 2 DIABETES MELLITUS WITHOUT COMPLICATION, WITH LONG-TERM CURRENT USE OF INSULIN (H): ICD-10-CM

## 2018-01-01 DIAGNOSIS — I10 ESSENTIAL HYPERTENSION: ICD-10-CM

## 2018-01-01 DIAGNOSIS — E11.9 TYPE 2 DIABETES MELLITUS WITHOUT COMPLICATION, WITH LONG-TERM CURRENT USE OF INSULIN (H): Primary | ICD-10-CM

## 2018-01-01 DIAGNOSIS — E78.00 PURE HYPERCHOLESTEROLEMIA: ICD-10-CM

## 2018-01-01 DIAGNOSIS — E11.65 TYPE 2 DIABETES MELLITUS WITH HYPERGLYCEMIA, WITH LONG-TERM CURRENT USE OF INSULIN (H): Primary | ICD-10-CM

## 2018-01-01 DIAGNOSIS — I10 BENIGN ESSENTIAL HYPERTENSION: ICD-10-CM

## 2018-01-01 DIAGNOSIS — Z79.4 TYPE 2 DIABETES MELLITUS WITH HYPERGLYCEMIA, WITH LONG-TERM CURRENT USE OF INSULIN (H): ICD-10-CM

## 2018-01-01 DIAGNOSIS — I10 ESSENTIAL HYPERTENSION, BENIGN: ICD-10-CM

## 2018-01-01 DIAGNOSIS — E11.8 TYPE 2 DIABETES MELLITUS WITH COMPLICATION, UNSPECIFIED LONG TERM INSULIN USE STATUS: ICD-10-CM

## 2018-01-01 DIAGNOSIS — Z53.9 NO SHOW: Primary | ICD-10-CM

## 2018-01-01 LAB
INR PPP: 2
INR PPP: 2.1
INR PPP: 2.3

## 2018-01-01 PROCEDURE — 99213 OFFICE O/P EST LOW 20 MIN: CPT | Performed by: NURSE PRACTITIONER

## 2018-01-01 PROCEDURE — G0463 HOSPITAL OUTPT CLINIC VISIT: HCPCS

## 2018-01-01 PROCEDURE — 99214 OFFICE O/P EST MOD 30 MIN: CPT | Performed by: NURSE PRACTITIONER

## 2018-01-01 RX ORDER — DIGOXIN 125 MCG
TABLET ORAL
Qty: 90 TABLET | Refills: 1 | Status: SHIPPED | OUTPATIENT
Start: 2018-01-01 | End: 2019-01-01

## 2018-01-01 RX ORDER — SIMVASTATIN 80 MG
TABLET ORAL
Qty: 90 TABLET | Refills: 0 | Status: SHIPPED | OUTPATIENT
Start: 2018-01-01 | End: 2019-01-01

## 2018-01-01 RX ORDER — BLOOD-GLUCOSE METER
KIT MISCELLANEOUS
Qty: 300 STRIP | Refills: 1 | Status: SHIPPED | OUTPATIENT
Start: 2018-01-01 | End: 2019-01-01

## 2018-01-01 RX ORDER — CANDESARTAN 16 MG/1
TABLET ORAL
Qty: 90 TABLET | Refills: 1 | Status: ON HOLD | OUTPATIENT
Start: 2018-01-01 | End: 2019-01-01

## 2018-01-01 RX ORDER — FUROSEMIDE 40 MG
TABLET ORAL
Qty: 90 TABLET | Refills: 1 | Status: SHIPPED | OUTPATIENT
Start: 2018-01-01 | End: 2019-01-01

## 2018-01-01 ASSESSMENT — PAIN SCALES - GENERAL
PAINLEVEL: NO PAIN (0)
PAINLEVEL: SEVERE PAIN (7)

## 2018-01-04 DIAGNOSIS — E11.9 TYPE 2 DIABETES MELLITUS WITHOUT COMPLICATION, WITH LONG-TERM CURRENT USE OF INSULIN (H): ICD-10-CM

## 2018-01-04 DIAGNOSIS — Z79.4 TYPE 2 DIABETES MELLITUS WITHOUT COMPLICATION, WITH LONG-TERM CURRENT USE OF INSULIN (H): ICD-10-CM

## 2018-01-04 NOTE — LETTER
January 8, 2018      Adiel Rosa [facility]   [facility] [address]        Dear Adiel,     APPOINTMENT REMINDER:       Our record indicates that it is time for you to be seen for a diabetes follow up with labs.    You may call our office at [phone] to schedule an appointment.    Please disregard this notice if you have already made an appointment.          Sincerely,        GAURAV Grijalva MD

## 2018-01-05 DIAGNOSIS — I48.21 PERMANENT ATRIAL FIBRILLATION (H): ICD-10-CM

## 2018-01-05 DIAGNOSIS — I10 BENIGN ESSENTIAL HYPERTENSION: ICD-10-CM

## 2018-01-08 RX ORDER — INSULIN DETEMIR 100 [IU]/ML
INJECTION, SOLUTION SUBCUTANEOUS
Qty: 10 ML | OUTPATIENT
Start: 2018-01-08

## 2018-01-08 NOTE — TELEPHONE ENCOUNTER
furosemide (LASIX) 40 MG tablet     Last Written Prescription Date:  11/17/17  Last Fill Quantity: 31,   # refills: 0  Last Office Visit: 6/15/17  Future Office visit:

## 2018-01-08 NOTE — TELEPHONE ENCOUNTER
Novolog and Levemir already filled 1.4.18.  Letter sent to notify patient they need diabetic follow up.

## 2018-01-08 NOTE — TELEPHONE ENCOUNTER
Mag oxide      Last Written Prescription Date:  7/31/17  Last Fill Quantity: 30,   # refills: 5  Last Office Visit: 6/15/17  Future Office visit: none

## 2018-01-08 NOTE — TELEPHONE ENCOUNTER
Magnesium Oxide 250 MG TABS    Last Written Prescription Date:  7/31/17  Last Fill Quantity: 30,   # refills: 5  Last Office Visit: 6/15/17  Future Office visit:

## 2018-01-09 DIAGNOSIS — E11.9 TYPE 2 DIABETES MELLITUS WITHOUT COMPLICATION, WITH LONG-TERM CURRENT USE OF INSULIN (H): ICD-10-CM

## 2018-01-09 DIAGNOSIS — Z79.4 TYPE 2 DIABETES MELLITUS WITHOUT COMPLICATION, WITH LONG-TERM CURRENT USE OF INSULIN (H): ICD-10-CM

## 2018-01-09 RX ORDER — FUROSEMIDE 40 MG
TABLET ORAL
Qty: 31 TABLET | Refills: 3 | Status: SHIPPED | OUTPATIENT
Start: 2018-01-09 | End: 2018-04-03

## 2018-01-09 NOTE — TELEPHONE ENCOUNTER
Nursing home called need fill on Novolog vial. Order pending with what patient has been getting at nursing home per Nursing Staff.  BUSHRA MUÑIZ

## 2018-01-09 NOTE — TELEPHONE ENCOUNTER
Lasix  Last office visit note states follow up in 1 month.  No follow up.  Medication pended.  Please advise.  Thank you.

## 2018-01-11 ENCOUNTER — TELEPHONE (OUTPATIENT)
Dept: FAMILY MEDICINE | Facility: OTHER | Age: 83
End: 2018-01-11

## 2018-01-11 ENCOUNTER — ANTICOAGULATION THERAPY VISIT (OUTPATIENT)
Dept: ANTICOAGULATION | Facility: OTHER | Age: 83
End: 2018-01-11
Payer: MEDICARE

## 2018-01-11 DIAGNOSIS — E11.9 TYPE 2 DIABETES MELLITUS WITHOUT COMPLICATION, WITH LONG-TERM CURRENT USE OF INSULIN (H): ICD-10-CM

## 2018-01-11 DIAGNOSIS — Z79.4 TYPE 2 DIABETES MELLITUS WITHOUT COMPLICATION, WITH LONG-TERM CURRENT USE OF INSULIN (H): ICD-10-CM

## 2018-01-11 DIAGNOSIS — Z79.01 LONG-TERM (CURRENT) USE OF ANTICOAGULANTS: ICD-10-CM

## 2018-01-11 DIAGNOSIS — I48.20 CHRONIC ATRIAL FIBRILLATION (H): ICD-10-CM

## 2018-01-11 LAB — INR PPP: 3.8

## 2018-01-11 NOTE — MR AVS SNAPSHOT
Adiel VILLATORO Moe    1/11/2018   Anticoagulation Therapy Visit    Description:  82 year old male   Provider:  GAURAV Grijalva MD   Department:  Hc Anti Coagulation           INR as of 1/11/2018     Today's INR 3.8!      Anticoagulation Summary as of 1/11/2018     INR goal 2.0-3.0   Today's INR 3.8!   Full instructions 1/11: Hold; Otherwise 8 mg on Mon, Fri; 6 mg all other days   Next INR check 1/18/2018    Indications   Chronic atrial fibrillation (H) [I48.2]  Long-term (current) use of anticoagulants [Z79.01] [Z79.01]         January 2018 Details    Sun Mon Tue Wed Thu Fri Sat      1               2               3               4               5               6                 7               8               9               10               11      Hold   See details      12      8 mg         13      6 mg           14      6 mg         15      8 mg         16      6 mg         17      6 mg         18            19               20                 21               22               23               24               25               26               27                 28               29               30               31                   Date Details   01/11 This INR check       Date of next INR:  1/18/2018         How to take your warfarin dose     To take:  6 mg Take 3 of the 2 mg tablets.    To take:  8 mg Take 4 of the 2 mg tablets.    Hold Do not take your warfarin dose. See the Details table to the right for additional instructions.

## 2018-01-11 NOTE — PROGRESS NOTES
ANTICOAGULATION FOLLOW-UP CLINIC VISIT    Patient Name:  Adiel Rosa Jr  Date:  1/11/2018  Contact Type:  INR done by assisted living nurse and result faxed to warfarin clinic    SUBJECTIVE:     Patient Findings     Positives No Problem Findings    Comments INR done by assisted living nurse and result faxed to warfarin clinic. New warfarin dosing and INR recheck date [facility] to [facility]. Staff to notify warfarin clinic if patient has any bleeding/bruising, change in diet/meds/activity or questions.            OBJECTIVE    INR   Date Value Ref Range Status   01/11/2018 3.8  Final       ASSESSMENT / PLAN  INR assessment SUPRA    Recheck INR In: 1 WEEK    INR Location [facility]      Anticoagulation Summary as of 1/11/2018     INR goal 2.0-3.0   Today's INR 3.8!   Maintenance plan 8 mg (2 mg x 4) on Mon, Fri; 6 mg (2 mg x 3) all other days   Full instructions 1/11: Hold; Otherwise 8 mg on Mon, Fri; 6 mg all other days   Weekly total 46 mg   Plan last modified Jessa Storey RN (2/15/2017)   Next INR check 1/18/2018   Priority INR   Target end date Indefinite    Indications   Chronic atrial fibrillation (H) [I48.2]  Long-term (current) use of anticoagulants [Z79.01] [Z79.01]         Anticoagulation Episode Summary     INR check location     Preferred lab     Send IN[facility]ers to [facility] POOL    Comments [facility], Fax   done with homecare      Anticoagulation Care Providers     Provider Role Specialty Phone number    GAURAV Grijalva MD [facility] [phone]            See the Encounter Report to view Anticoagulation Flowsheet and Dosing Calendar (Go to Encounters tab in chart review, and find the Anticoagulation Therapy Visit)        Jessa Storey, RN

## 2018-01-11 NOTE — TELEPHONE ENCOUNTER
10:47 AM    Reason for Call: Phone Call    Description: Swati from [facility] called and states that Dr.Jon Ward nurse was supposed to send over a vial of insulin aspart (NOVOLOG VIAL) 100 UNITS/ML injection  instead of the pen to the pharmacy and the pharmacy still has not gotten anything     Was an appointment offered for this call? No  If yes : Appointment type              Date    Preferred method for responding to this message: Telephone Call  What is your phone number ?    If we cannot reach you directly, may we leave a detailed response at the number you provided? Yes    Can this message wait until your PCP/provider returns, if available today? Not applicable, PCP is in     Evelyn [place]

## 2018-01-18 ENCOUNTER — ANTICOAGULATION THERAPY VISIT (OUTPATIENT)
Dept: ANTICOAGULATION | Facility: OTHER | Age: 83
End: 2018-01-18
Payer: MEDICARE

## 2018-01-18 DIAGNOSIS — Z79.01 LONG-TERM (CURRENT) USE OF ANTICOAGULANTS: ICD-10-CM

## 2018-01-18 DIAGNOSIS — I48.20 CHRONIC ATRIAL FIBRILLATION (H): ICD-10-CM

## 2018-01-18 LAB — INR PPP: 2.3

## 2018-01-18 NOTE — MR AVS SNAPSHOT
Adiel VILLATORO Moe    1/18/2018   Anticoagulation Therapy Visit    Description:  82 year old male   Provider:  GAURAV Grijalva MD   Department:  Hc Anti Coagulation           INR as of 1/18/2018     Today's INR 2.3      Anticoagulation Summary as of 1/18/2018     INR goal 2.0-3.0   Today's INR 2.3   Full instructions 8 mg on Mon, Fri; 6 mg all other days   Next INR check 2/1/2018    Indications   Chronic atrial fibrillation (H) [I48.2]  Long-term (current) use of anticoagulants [Z79.01] [Z79.01]         January 2018 Details    Sun Mon Tue Wed Thu Fri Sat      1               2               3               4               5               6                 7               8               9               10               11               12               13                 14               15               16               17               18      6 mg   See details      19      8 mg         20      6 mg           21      6 mg         22      8 mg         23      6 mg         24      6 mg         25      6 mg         26      8 mg         27      6 mg           28      6 mg         29      8 mg         30      6 mg         31      6 mg             Date Details   01/18 This INR check               How to take your warfarin dose     To take:  6 mg Take 3 of the 2 mg tablets.    To take:  8 mg Take 4 of the 2 mg tablets.           February 2018 Details    Sun Mon Tue Wed Thu Fri Sat         1            2               3                 4               5               6               7               8               9               10                 11               12               13               14               15               16               17                 18               19               20               21               22               23               24                 25               26               27               28                   Date Details   No additional details    Date of next INR:   2/1/2018         How to take your warfarin dose     To take:  6 mg Take 3 of the 2 mg tablets.

## 2018-01-18 NOTE — PROGRESS NOTES
ANTICOAGULATION FOLLOW-UP CLINIC VISIT    Patient Name:  Adiel Rosa Jr  Date:  1/18/2018  Contact Type:  INR result faxed from assisted living to warfarin clinic    SUBJECTIVE:     Patient Findings     Comments INR done by assisted living staff and result faxed to warfarin clinic. New warfarin dosing and iNR recheck date faxed to [facility] facility. Staff to notify warfarin clinic if patient has any bleeding/bruising, changes in diet/meds/activity or questions.            OBJECTIVE    INR   Date Value Ref Range Status   01/18/2018 2.3  Final       ASSESSMENT / PLAN  INR assessment THER    Recheck INR In: 2 WEEKS    INR Location [facility]      Anticoagulation Summary as of 1/18/2018     INR goal 2.0-3.0   Today's INR 2.3   Maintenance plan 8 mg (2 mg x 4) on Mon, Fri; 6 mg (2 mg x 3) all other days   Full instructions 8 mg on Mon, Fri; 6 mg all other days   Weekly total 46 mg   No change documented Jessa Storey RN   Plan last modified Jessa Storey RN (2/15/2017)   Next INR check 2/1/2018   Priority INR   Target end date Indefinite    Indications   Chronic atrial fibrillation (H) [I48.2]  Long-term (current) use of anticoagulants [Z79.01] [Z79.01]         Anticoagulation Episode Summary     INR check location     Preferred lab     Send INR reminders to [facility] POOL    Comments [facility] assisted living, Fax   done with homecare      Anticoagulation Care Providers     Provider Role Specialty Phone number    GAURAV Grijalva MD [facility] [phone]            See the Encounter Report to view Anticoagulation Flowsheet and Dosing Calendar (Go to Encounters tab in chart review, and find the Anticoagulation Therapy Visit)        Jessa Storey RN

## 2018-01-22 ENCOUNTER — TRANSFERRED RECORDS (OUTPATIENT)
Dept: HEALTH INFORMATION MANAGEMENT | Facility: HOSPITAL | Age: 83
End: 2018-01-22

## 2018-01-23 ENCOUNTER — TELEPHONE (OUTPATIENT)
Dept: FAMILY MEDICINE | Facility: OTHER | Age: 83
End: 2018-01-23

## 2018-01-23 DIAGNOSIS — E11.9 TYPE 2 DIABETES MELLITUS WITHOUT COMPLICATION, WITH LONG-TERM CURRENT USE OF INSULIN (H): ICD-10-CM

## 2018-01-23 DIAGNOSIS — Z79.4 TYPE 2 DIABETES MELLITUS WITHOUT COMPLICATION, WITH LONG-TERM CURRENT USE OF INSULIN (H): ICD-10-CM

## 2018-01-23 RX ORDER — INSULIN DETEMIR 100 [IU]/ML
INJECTION, SOLUTION SUBCUTANEOUS
Qty: 10 ML | Refills: 0 | Status: SHIPPED | OUTPATIENT
Start: 2018-01-23 | End: 2018-02-02

## 2018-01-23 NOTE — TELEPHONE ENCOUNTER
Patient was seen at [facility] for right close arm fracture, was going to go down to [facility] for surgery, however they had no beds. They are waiting a call today for surgery date. Patient pain is tolerable. In a sling, they do have norco on hand if pain worsens.   BUSHRA MUÑIZ

## 2018-01-23 NOTE — TELEPHONE ENCOUNTER
9:18 AM    Reason for Call: OVERBOOK    Patient is having the following symptoms: ER follow up from 1-23-18 for closed Fx of R arm for 1 days.    The patient is requesting an appointment for 1-23-18 with Dr Hao Grijalva.    Was an appointment offered for this call? Yes  If yes : Appointment type Next available on 1-25-18, but the nurse did not want to wait that long              Date    Preferred method for responding to this message: Telephone Call  What is your phone number ? [phone]    If we cannot reach you directly, may we leave a detailed response at the number you provided? Yes    Can this message wait until your PCP/provider returns, if unavailable today? YES    Caryn Canela

## 2018-01-24 ENCOUNTER — TELEPHONE (OUTPATIENT)
Dept: FAMILY MEDICINE | Facility: OTHER | Age: 83
End: 2018-01-24

## 2018-01-24 ENCOUNTER — COMMUNICATION - GICH (OUTPATIENT)
Dept: FAMILY MEDICINE | Facility: OTHER | Age: 83
End: 2018-01-24

## 2018-01-24 NOTE — TELEPHONE ENCOUNTER
10:10 AM    Reason for Call: OVERBOOK    Patient is having the following symptoms: needs a preop for Right arm / shoulder Fx for surgery on 1-25-18.    The patient is requesting an appointment for 1-24-18 with any provider that is available. Patient is a Dr Hao Grijalva patient    Was an appointment offered for this call? No  If yes : Appointment type              Date    Preferred method for responding to this message: Telephone Call  What is your phone number ? [phone]    If we cannot reach you directly, may we leave a detailed response at the number you provided? Yes    Can this message wait until your PCP/provider returns, if unavailable today? Covering provider please advise    Caryn Canela

## 2018-01-24 NOTE — TELEPHONE ENCOUNTER
Hayden, no appt's available for this length of time. Dr Liang and Dr Marquez both have 30 minute spots available.

## 2018-01-24 NOTE — TELEPHONE ENCOUNTER
9:11 AM    Reason for Call: OVERBOOK    Patient is having the following symptoms: Pre op shoulder / [facility] / 01/25/2018    The patient is requesting an appointment for Today  with Dr. Olvera  ( [facility] patient )    Was an appointment offered for this call? No    Preferred method for responding to this message: [phone]  [facility]    If we cannot reach you directly, may we leave a detailed response at the number you provided? Yes        Jessa Gifford

## 2018-01-24 NOTE — TELEPHONE ENCOUNTER
Called [facility]/ [facility] and patient got a appointment in  [place] today for pre op   Vivian Granados

## 2018-01-25 ENCOUNTER — TRANSFERRED RECORDS (OUTPATIENT)
Dept: HEALTH INFORMATION MANAGEMENT | Facility: HOSPITAL | Age: 83
End: 2018-01-25

## 2018-01-26 VITALS
WEIGHT: 190.38 LBS | SYSTOLIC BLOOD PRESSURE: 118 MMHG | BODY MASS INDEX: 25.82 KG/M2 | DIASTOLIC BLOOD PRESSURE: 62 MMHG | HEART RATE: 70 BPM | TEMPERATURE: 95.3 F

## 2018-01-29 ENCOUNTER — TELEPHONE (OUTPATIENT)
Dept: FAMILY MEDICINE | Facility: OTHER | Age: 83
End: 2018-01-29

## 2018-01-29 ENCOUNTER — ANTICOAGULATION THERAPY VISIT (OUTPATIENT)
Dept: ANTICOAGULATION | Facility: OTHER | Age: 83
End: 2018-01-29
Payer: MEDICARE

## 2018-01-29 DIAGNOSIS — Z79.01 LONG-TERM (CURRENT) USE OF ANTICOAGULANTS: ICD-10-CM

## 2018-01-29 DIAGNOSIS — I48.20 CHRONIC ATRIAL FIBRILLATION (H): ICD-10-CM

## 2018-01-29 DIAGNOSIS — Z53.9 PERSONS ENCOUNTERING HEALTH SERVICES FOR SPECIFIC PROCEDURES, NOT CARRIED OUT: Primary | ICD-10-CM

## 2018-01-29 LAB — INR PPP: 2.4

## 2018-01-29 NOTE — TELEPHONE ENCOUNTER
[facility] will be sending a fax Dr. Hao Grijalva has to sign- medication list update, required by the [facility]  BUSHRA MUÑIZ

## 2018-01-29 NOTE — PROGRESS NOTES
ANTICOAGULATION FOLLOW-UP CLINIC VISIT    Patient Name:  Adiel Rosa Jr  Date:  1/29/2018  Contact Type:  INR done by assisted living nurse and result faxed to warfarin clinic    SUBJECTIVE:     Patient Findings     Positives Intentional hold of therapy    Comments INR done by assisted living nurse and result faxed to warfarin clinic. He had surgery on 1/26/18, warfarin had been held from 1/22-1/26 and resumed on 1/27/18.  Patient not walking as much post surgery. Call placed to assisted living facility and spoke to Swati who did verify patient has had 3 doses of warfarin after surgery and INR is 2.4.  New warfarin dosing and INR recheck date faxed to assisted living facility. Staff to notify warfarin clinic nurse if patient has any bleeding/bruising, changes in diet/meds/activity or questions.            OBJECTIVE    INR   Date Value Ref Range Status   01/29/2018 2.4  Final       ASSESSMENT / PLAN  INR assessment THER    Recheck INR In: 4 DAYS    INR Location [facility]      Anticoagulation Summary as of 1/29/2018     INR goal 2.0-3.0   Today's INR 2.4   Maintenance plan 8 mg (2 mg x 4) on Mon, Fri; 6 mg (2 mg x 3) all other days   Full instructions 1/29: 6 mg; Otherwise 8 mg on Mon, Fri; 6 mg all other days   Weekly total 46 mg   Plan last modified Jessa Storey RN (2/15/2017)   Next INR check 2/2/2018   Priority INR   Target end date Indefinite    Indications   Chronic atrial fibrillation (H) [I48.2]  Long-term (current) use of anticoagulants [Z79.01] [Z79.01]         Anticoagulation Episode Summary     INR check location     Preferred lab     Send INR reminders to  ANTICOAG POOL    Comments [facility] assisted [facility], Fax   done with homecare      Anticoagulation Care Providers     Provider Role Specialty Phone number    GAURAV Grijalva MD [facility] [phone]            See the Encounter Report to view Anticoagulation Flowsheet and Dosing Calendar (Go to Encounters  tab in chart review, and find the Anticoagulation Therapy Visit)        Jessa Storey RN

## 2018-01-29 NOTE — MR AVS SNAPSHOT
Adiel VILLATORO Moe    1/29/2018   Anticoagulation Therapy Visit    Description:  82 year old male   Provider:  GAURAV Grijalva MD   Department:  [facility] Anti Coagulation           INR as of 1/29/2018     Today's INR 2.4      Anticoagulation Summary as of 1/29/2018     INR goal 2.0-3.0   Today's INR 2.4   Full instructions 1/29: 6 mg; Otherwise 8 mg on Mon, Fri; 6 mg all other days   Next INR check 2/2/2018    Indications   Chronic atrial fibrillation (H) [I48.2]  Long-term (current) use of anticoagulants [Z79.01] [Z79.01]         January 2018 Details    Sun Mon Tue Wed Thu Fri Sat      1               2               3               4               5               6                 7               8               9               10               11               12               13                 14               15               16               17               18               19               20                 21               22               23               24               25               26               27                 28               29      6 mg   See details      30      6 mg         31      6 mg             Date Details   01/29 This INR check               How to take your warfarin dose     To take:  6 mg Take 3 of the 2 mg tablets.           February 2018 Details    Sun Mon Tue Wed Thu Fri Sat         1      6 mg         2            3                 4               5               6               7               8               9               10                 11               12               13               14               15               16               17                 18               19               20               21               22               23               24                 25               26               27               28                   Date Details   No additional details    Date of next INR:  2/2/2018         How to take your warfarin dose     To take:  6  mg Take 3 of the 2 mg tablets.    To take:  8 mg Take 4 of the 2 mg tablets.

## 2018-01-29 NOTE — TELEPHONE ENCOUNTER
10:44 AM    Reason for Call: Phone Call    Description: [facility] from [facility] called and states that she would like a call back regarding some of his medications     Was an appointment offered for this call? NO  If yes : Appointment type              Date    Preferred method for responding to this message: Telephone Call  What is your phone number ?    If we cannot reach you directly, may we leave a detailed response at the number you provided? Yes    Can this message wait until your PCP/provider returns, if available today? Not applicable, PCP is in     Evelyn Amos

## 2018-02-02 ENCOUNTER — ANTICOAGULATION THERAPY VISIT (OUTPATIENT)
Dept: ANTICOAGULATION | Facility: OTHER | Age: 83
End: 2018-02-02
Payer: MEDICARE

## 2018-02-02 DIAGNOSIS — I48.20 CHRONIC ATRIAL FIBRILLATION (H): ICD-10-CM

## 2018-02-02 DIAGNOSIS — Z79.4 TYPE 2 DIABETES MELLITUS WITHOUT COMPLICATION, WITH LONG-TERM CURRENT USE OF INSULIN (H): ICD-10-CM

## 2018-02-02 DIAGNOSIS — E11.9 TYPE 2 DIABETES MELLITUS WITHOUT COMPLICATION, WITH LONG-TERM CURRENT USE OF INSULIN (H): ICD-10-CM

## 2018-02-02 DIAGNOSIS — Z79.01 LONG-TERM (CURRENT) USE OF ANTICOAGULANTS: ICD-10-CM

## 2018-02-02 LAB — INR PPP: 2.7

## 2018-02-02 RX ORDER — INSULIN DETEMIR 100 [IU]/ML
INJECTION, SOLUTION SUBCUTANEOUS
Qty: 10 ML | Refills: 5 | Status: SHIPPED | OUTPATIENT
Start: 2018-02-02 | End: 2018-04-26

## 2018-02-02 NOTE — MR AVS SNAPSHOT
Adiel Rosa Jr   2/2/2018   Anticoagulation Therapy Visit    Description:  82 year old male   Provider:  GAURAV Grijalva MD   Department:  Hc Anti Coagulation           INR as of 2/2/2018     Today's INR 2.7      Anticoagulation Summary as of 2/2/2018     INR goal 2.0-3.0   Today's INR 2.7   Full instructions 4 mg on Mon; 6 mg all other days   Next INR check 2/9/2018    Indications   Chronic atrial fibrillation (H) [I48.2]  Long-term (current) use of anticoagulants [Z79.01] [Z79.01]         February 2018 Details    Sun Mon Tue Wed Thu Fri Sat         1               2      6 mg   See details      3      6 mg           4      6 mg         5      4 mg         6      6 mg         7      6 mg         8      6 mg         9            10                 11               12               13               14               15               16               17                 18               19               20               21               22               23               24                 25               26               27               28                   Date Details   02/02 This INR check       Date of next INR:  2/9/2018         How to take your warfarin dose     To take:  4 mg Take 2 of the 2 mg tablets.    To take:  6 mg Take 3 of the 2 mg tablets.

## 2018-02-02 NOTE — PROGRESS NOTES
ANTICOAGULATION FOLLOW-UP CLINIC VISIT    Patient Name:  Adiel Rosa Jr  Date:  2/2/2018  Contact Type:  INR done by [facility] and result faxed to warfarin clinic    SUBJECTIVE:     Patient Findings     Positives No Problem Findings    Comments INR done by assisted living nurse and result faxed to warfarin clinic. No changes in patient status per communication sheet. New warfarin dosing and INR recheck date faxed to assisted living facility. Staff to notify warfarin clinic if patient has any bleeding/bruising, changes in diet/meds/activity or questions.            OBJECTIVE    INR   Date Value Ref Range Status   02/02/2018 2.7  Final       ASSESSMENT / PLAN  INR assessment THER    Recheck INR In: 1 WEEK    INR Location [facility]      Anticoagulation Summary as of 2/2/2018     INR goal 2.0-3.0   Today's INR 2.7   Maintenance plan 4 mg (2 mg x 2) on Mon; 6 mg (2 mg x 3) all other days   Full instructions 4 mg on Mon; 6 mg all other days   Weekly total 40 mg   Plan last modified Jessa Storey RN (2/2/2018)   Next INR check 2/9/2018   Priority INR   Target end date Indefinite    Indications   Chronic atrial fibrillation (H) [I48.2]  Long-term (current) use of anticoagulants [Z79.01] [Z79.01]         Anticoagulation Episode Summary     INR check location     Preferred lab     Send INR reminders to [facility] POOL    Comments [facility] assisted [facility], Fax   done with homecare      Anticoagulation Care Providers     Provider Role Specialty Phone number    GAURAV Grijalva MD [facility] [phone]            See the Encounter Report to view Anticoagulation Flowsheet and Dosing Calendar (Go to Encounters tab in chart review, and find the Anticoagulation Therapy Visit)        Jessa Storey, RN

## 2018-02-02 NOTE — TELEPHONE ENCOUNTER
Levemir  Last office visit: 6/15/17  Last refill: 1/23/18 #10ml, 0 R  Patient due for labs and office visit.       LDL on file in past 12 months           Recent Labs   Lab Test  04/23/15   0922   LDL  56                  Microalbumin on file in past 12 months           Recent Labs   Lab Test  03/12/14   1000   MICROL  269*   UMALCR  213.49*                  HgbA1C in past 3 or 6 months           Recent Labs   Lab Test  01/15/17   0613   A1C  8.4*            Please advise.  Thank you.

## 2018-02-08 DIAGNOSIS — I10 ESSENTIAL HYPERTENSION: ICD-10-CM

## 2018-02-08 DIAGNOSIS — E56.9 VITAMIN DEFICIENCY: ICD-10-CM

## 2018-02-08 DIAGNOSIS — I10 ESSENTIAL HYPERTENSION, BENIGN: ICD-10-CM

## 2018-02-09 ENCOUNTER — ANTICOAGULATION THERAPY VISIT (OUTPATIENT)
Dept: ANTICOAGULATION | Facility: OTHER | Age: 83
End: 2018-02-09
Payer: MEDICARE

## 2018-02-09 DIAGNOSIS — I48.20 CHRONIC ATRIAL FIBRILLATION (H): ICD-10-CM

## 2018-02-09 DIAGNOSIS — Z79.01 LONG-TERM (CURRENT) USE OF ANTICOAGULANTS: ICD-10-CM

## 2018-02-09 LAB — INR PPP: 3

## 2018-02-09 RX ORDER — CANDESARTAN 16 MG/1
TABLET ORAL
Qty: 90 TABLET | Refills: 0 | Status: SHIPPED | OUTPATIENT
Start: 2018-02-09 | End: 2018-05-01

## 2018-02-09 RX ORDER — DIGOXIN 125 UG/1
TABLET ORAL
Qty: 30 TABLET | Refills: 0 | Status: SHIPPED | OUTPATIENT
Start: 2018-02-09 | End: 2018-03-12

## 2018-02-09 RX ORDER — CHOLECALCIFEROL (VITAMIN D3) 25 MCG
TABLET ORAL
Qty: 90 TABLET | Refills: 0 | Status: SHIPPED | OUTPATIENT
Start: 2018-02-09 | End: 2018-05-01

## 2018-02-09 NOTE — PROGRESS NOTES
ANTICOAGULATION FOLLOW-UP CLINIC VISIT    Patient Name:  Adiel Rosa Jr  Date:  2/9/2018  Contact Type:  INR result faxed to warfarin clinic, new warfarin dosing and recheck date faxed to assisted living facility      SUBJECTIVE:     Patient Findings     Positives No Problem Findings    Comments INR done by assisted living nurse and result faxed to warfarin clinic. Per communication sheet, patient has no bleeding/bruising nor changes in diet/meds/activity. New warfarin dosing and INR recheck date faxed to assisted living facility. Staff to notify warfarin clinic if patient has any bleeding/bruising, changes in diet/meds/activity or questions.            OBJECTIVE    INR   Date Value Ref Range Status   02/09/2018 3.0  Final       ASSESSMENT / PLAN  INR assessment THER    Recheck INR In: 10 DAYS    INR Location [facility]      Anticoagulation Summary as of 2/9/2018     INR goal 2.0-3.0   Today's INR 3.0   Maintenance plan 4 mg (2 mg x 2) on Mon, Fri; 6 mg (2 mg x 3) all other days   Full instructions 4 mg on Mon, Fri; 6 mg all other days   Weekly total 38 mg   Plan last modified Jessa Storey RN (2/9/2018)   Next INR check 2/19/2018   Priority INR   Target end date Indefinite    Indications   Chronic atrial fibrillation (H) [I48.2]  Long-term (current) use of anticoagulants [Z79.01] [Z79.01]         Anticoagulation Episode Summary     INR check location     Preferred lab     Send INR reminders to [facility] ANTICOAG POOL    Comments [facility] assisted [facility], Fax   done with homecare      Anticoagulation Care Providers     Provider Role Specialty Phone number    GAURAV Grijalva MD [facility] [phone]            See the Encounter Report to view Anticoagulation Flowsheet and Dosing Calendar (Go to Encounters tab in chart review, and find the Anticoagulation Therapy Visit)        Jessa Storey, RN

## 2018-02-09 NOTE — TELEPHONE ENCOUNTER
Candesartan      Last Written Prescription Date:  3/16/17  Last Fill Quantity: 90tab,   # refills: 1  Last Office Visit: 6/15/17  Future Office visit:       Digox 125      Last Written Prescription Date:  11/17/17  Last Fill Quantity: 90tab,   # refills: 0  Last Office Visit: 6/15/17  Future Office visit:       Vitamin D3      Last Written Prescription Date:  11/20/17  Last Fill Quantity: 90caps,   # refills: 0  Last Office Visit: 6/15/17  Future Office visit:

## 2018-02-09 NOTE — MR AVS SNAPSHOT
Adiel Rosa Jr   2/9/2018   Anticoagulation Therapy Visit    Description:  82 year old male   Provider:  GAURAV Grijalva MD   Department:  Hc Anti Coagulation           INR as of 2/9/2018     Today's INR 3.0      Anticoagulation Summary as of 2/9/2018     INR goal 2.0-3.0   Today's INR 3.0   Full instructions 4 mg on Mon, Fri; 6 mg all other days   Next INR check 2/19/2018    Indications   Chronic atrial fibrillation (H) [I48.2]  Long-term (current) use of anticoagulants [Z79.01] [Z79.01]         February 2018 Details    Sun Mon Tue Wed Thu Fri Sat         1               2               3                 4               5               6               7               8               9      4 mg   See details      10      6 mg           11      6 mg         12      4 mg         13      6 mg         14      6 mg         15      6 mg         16      4 mg         17      6 mg           18      6 mg         19            20               21               22               23               24                 25               26               27               28                   Date Details   02/09 This INR check       Date of next INR:  2/19/2018         How to take your warfarin dose     To take:  4 mg Take 2 of the 2 mg tablets.    To take:  6 mg Take 3 of the 2 mg tablets.

## 2018-02-10 DIAGNOSIS — Z79.4 TYPE 2 DIABETES MELLITUS WITHOUT COMPLICATION, WITH LONG-TERM CURRENT USE OF INSULIN (H): ICD-10-CM

## 2018-02-10 DIAGNOSIS — E11.9 TYPE 2 DIABETES MELLITUS WITHOUT COMPLICATION, WITH LONG-TERM CURRENT USE OF INSULIN (H): ICD-10-CM

## 2018-02-12 DIAGNOSIS — E78.00 PURE HYPERCHOLESTEROLEMIA: ICD-10-CM

## 2018-02-12 DIAGNOSIS — I10 HTN (HYPERTENSION): ICD-10-CM

## 2018-02-12 DIAGNOSIS — I25.10 CORONARY ARTERY DISEASE WITHOUT ANGINA PECTORIS, UNSPECIFIED VESSEL OR LESION TYPE, UNSPECIFIED WHETHER NATIVE OR TRANSPLANTED HEART: ICD-10-CM

## 2018-02-12 RX ORDER — SYRINGE AND NEEDLE,INSULIN,1ML 31 GX5/16"
SYRINGE, EMPTY DISPOSABLE MISCELLANEOUS
Qty: 400 EACH | Refills: 1 | Status: SHIPPED | OUTPATIENT
Start: 2018-02-12 | End: 2018-04-06

## 2018-02-12 NOTE — TELEPHONE ENCOUNTER
simvastatin (ZOCOR) 80 MG tablet  Last Written Prescription Date:  2/20/17  Last Fill Quantity: 90,   # refills: 0  Last Office Visit: 6/15/17  Future Office visit:         simvastatin (ZOCOR) 80 MG tablet    Last Written Prescription Date:  4/14/17  Last Fill Quantity: 90,   # refills: 2  Last Office Visit: 6/15/17  Future Office visit:       Aspirin       Last Written Prescription Date:  1/16/17  Last Fill Quantity: 120,   # refills: 3  Last Office Visit: 6/15/17  Future Office visit:

## 2018-02-13 DIAGNOSIS — I48.20 CHRONIC ATRIAL FIBRILLATION (H): ICD-10-CM

## 2018-02-13 NOTE — TELEPHONE ENCOUNTER
warfarin (COUMADIN) 2 MG tablet  Last Written Prescription Date:  3/13/17  Last Fill Quantity: 312,   # refills: 0  Last Office Visit:6/15/17   Future Office visit:

## 2018-02-13 NOTE — TELEPHONE ENCOUNTER
Patient Information     Patient Name MRN Sex Adiel Allen Jr. [id number] Male 1935      Telephone Encounter by Chitra Christie at 2018  8:50 AM     Author:  Chitra Christie Service:  (none) Author Type:  (none)     Filed:  2018  8:53 AM Encounter Date:  2018 Status:  Signed     :  Chitra Christie            Please call Wendy regarding a pre-op appointment. She is requesting a work in for patient, surgery is 18.

## 2018-02-14 RX ORDER — ASPIRIN 325 MG
325 TABLET ORAL DAILY
Qty: 120 TABLET | Refills: 3 | Status: ON HOLD | OUTPATIENT
Start: 2018-02-14 | End: 2019-01-01

## 2018-02-14 RX ORDER — METOPROLOL SUCCINATE 50 MG/1
50 TABLET, EXTENDED RELEASE ORAL DAILY
Qty: 90 TABLET | Refills: 2 | Status: SHIPPED | OUTPATIENT
Start: 2018-02-14 | End: 2018-05-15

## 2018-02-14 RX ORDER — SIMVASTATIN 80 MG
TABLET ORAL
Qty: 90 TABLET | Refills: 1 | Status: SHIPPED | OUTPATIENT
Start: 2018-02-14 | End: 2018-05-15

## 2018-02-14 NOTE — TELEPHONE ENCOUNTER
Please see note below on Zocor.  Patient hasn't filled in awhile.  Patient due for lab.   LDL on file in past 12 months           Recent Labs   Lab Test  04/23/15   0922   LDL  56        Aspirin  Last signed by Antonieta 1/16/17  Please advise.  Thank you.

## 2018-02-15 RX ORDER — WARFARIN SODIUM 2 MG/1
TABLET ORAL
Qty: 260 TABLET | Refills: 3 | Status: SHIPPED | OUTPATIENT
Start: 2018-02-15 | End: 2018-05-15

## 2018-02-19 ENCOUNTER — ANTICOAGULATION THERAPY VISIT (OUTPATIENT)
Dept: ANTICOAGULATION | Facility: OTHER | Age: 83
End: 2018-02-19
Payer: MEDICARE

## 2018-02-19 DIAGNOSIS — Z79.01 LONG-TERM (CURRENT) USE OF ANTICOAGULANTS: ICD-10-CM

## 2018-02-19 DIAGNOSIS — I48.20 CHRONIC ATRIAL FIBRILLATION (H): ICD-10-CM

## 2018-02-19 LAB — INR PPP: 2.6

## 2018-02-19 PROCEDURE — G0180 MD CERTIFICATION HHA PATIENT: HCPCS | Performed by: FAMILY MEDICINE

## 2018-02-19 NOTE — PROGRESS NOTES
ANTICOAGULATION FOLLOW-UP CLINIC VISIT    Patient Name:  Adiel Rosa Jr  Date:  2/19/2018  Contact Type:  INR result faxed from assisted living nurse to warfarin clinic. New warfarin dosing and INR recheck date faxed back to assisted living facility    SUBJECTIVE:     Patient Findings     Positives Change in medications, No Problem Findings    Comments INR done by assisted living nurse and result faxed to warfarin clinic. Per communication sheet, patient metoprolol increased to 50mg daily. No other changes in patient meds,diet. New warfarin dosing and INR recheck date faxed back to assisted living facility. Staff to notify warfarin clinic if patient has any bleeding/bruising, changes in diet/meds/activity or questions.            OBJECTIVE    INR   Date Value Ref Range Status   02/19/2018 2.6  Final       ASSESSMENT / PLAN  INR assessment THER    Recheck INR In: 3 WEEKS    INR Location [facility]      Anticoagulation Summary as of 2/19/2018     INR goal 2.0-3.0   Today's INR 2.6   Maintenance plan 4 mg (2 mg x 2) on Mon, Fri; 6 mg (2 mg x 3) all other days   Full instructions 4 mg on Mon, Fri; 6 mg all other days   Weekly total 38 mg   No change documented Jessa Storey, RN   Plan last modified Jessa Storey, RN (2/9/2018)   Next INR check 3/12/2018   Priority INR   Target end date Indefinite    Indications   Chronic atrial fibrillation (H) [I48.2]  Long-term (current) use of anticoagulants [Z79.01] [Z79.01]         Anticoagulation Episode Summary     INR check location     Preferred lab     Send INR reminders to [facility]TICOAG POOL    Comments [facility], Fax   done with homecare      Anticoagulation Care Providers     Provider Role Specialty Phone number    GAURAV Grijalva MD [facility] [phone]            See the Encounter Report to view Anticoagulation Flowsheet and Dosing Calendar (Go to Encounters tab in chart review, and find the Anticoagulation Therapy  Visit)        Jessa Storey RN

## 2018-02-21 ENCOUNTER — TELEPHONE (OUTPATIENT)
Dept: FAMILY MEDICINE | Facility: OTHER | Age: 83
End: 2018-02-21

## 2018-02-21 NOTE — TELEPHONE ENCOUNTER
"Request for Home Care Physical Therapy orders as follows:    PT eval and treat date:  1/29/18    Continuation frequency =  2 x week x __2__ weeks              Effective date = 2/26/18    Interventions include:    Therapeutic Exercise  Gait Training  Gait/Balance/Dysfunction  Home Exercise Program  Home Safety Assessment      Acknowledging this order with an \"OK\" will suffice. Thank you for your time.  Please call if you have any questions or concerns.    Therapist: Rafaela Rosa PT  "

## 2018-03-05 ENCOUNTER — TRANSFERRED RECORDS (OUTPATIENT)
Dept: HEALTH INFORMATION MANAGEMENT | Facility: CLINIC | Age: 83
End: 2018-03-05

## 2018-03-07 ENCOUNTER — DOCUMENTATION ONLY (OUTPATIENT)
Dept: FAMILY MEDICINE | Facility: OTHER | Age: 83
End: 2018-03-07

## 2018-03-07 RX ORDER — MULTIVITAMIN,THER AND MINERALS
TABLET ORAL
COMMUNITY
End: 2018-04-26

## 2018-03-07 RX ORDER — CANDESARTAN 16 MG/1
TABLET ORAL
COMMUNITY
Start: 2017-03-16 | End: 2018-04-26

## 2018-03-07 RX ORDER — SIMVASTATIN 80 MG
TABLET ORAL
COMMUNITY
Start: 2017-02-20 | End: 2018-04-26

## 2018-03-07 RX ORDER — METOPROLOL SUCCINATE 50 MG/1
TABLET, EXTENDED RELEASE ORAL
COMMUNITY
Start: 2017-04-14 | End: 2018-04-26

## 2018-03-07 RX ORDER — PREDNISONE 20 MG/1
40 TABLET ORAL
COMMUNITY
Start: 2017-10-07 | End: 2018-04-26

## 2018-03-07 RX ORDER — DIGOXIN 125 MCG
TABLET ORAL
COMMUNITY
Start: 2017-08-28 | End: 2018-04-26

## 2018-03-07 RX ORDER — PYRIDOXINE HCL (VITAMIN B6) 100 MG
500 TABLET ORAL
COMMUNITY
Start: 2017-01-16 | End: 2018-04-26

## 2018-03-07 RX ORDER — WARFARIN SODIUM 2 MG/1
TABLET ORAL
COMMUNITY
Start: 2017-03-13 | End: 2018-04-26

## 2018-03-07 RX ORDER — HYDROCODONE BITARTRATE AND ACETAMINOPHEN 5; 325 MG/1; MG/1
TABLET ORAL
COMMUNITY
Start: 2017-09-12 | End: 2018-04-26

## 2018-03-07 RX ORDER — FUROSEMIDE 40 MG
TABLET ORAL
COMMUNITY
Start: 2017-08-25 | End: 2018-04-26

## 2018-03-07 RX ORDER — SENNOSIDES 8.6 MG
650 CAPSULE ORAL
COMMUNITY
Start: 2017-01-16 | End: 2018-04-26

## 2018-03-07 RX ORDER — CALCIUM CARB/VITAMIN D3/VIT K1 500-100-40
TABLET,CHEWABLE ORAL
COMMUNITY
Start: 2017-06-13 | End: 2018-04-26

## 2018-03-07 RX ORDER — ASPIRIN 325 MG
325 TABLET ORAL
COMMUNITY
Start: 2017-01-16 | End: 2018-04-26

## 2018-03-12 ENCOUNTER — ANTICOAGULATION THERAPY VISIT (OUTPATIENT)
Dept: ANTICOAGULATION | Facility: OTHER | Age: 83
End: 2018-03-12
Payer: MEDICARE

## 2018-03-12 DIAGNOSIS — Z00.00 HEALTH CARE MAINTENANCE: ICD-10-CM

## 2018-03-12 DIAGNOSIS — Z79.01 LONG-TERM (CURRENT) USE OF ANTICOAGULANTS: ICD-10-CM

## 2018-03-12 DIAGNOSIS — I10 ESSENTIAL HYPERTENSION, BENIGN: ICD-10-CM

## 2018-03-12 DIAGNOSIS — I48.20 CHRONIC ATRIAL FIBRILLATION (H): ICD-10-CM

## 2018-03-12 LAB — INR PPP: 3.1

## 2018-03-12 RX ORDER — MULTIVIT-MIN/FA/LYCOPEN/LUTEIN .4-300-25
TABLET ORAL
Qty: 31 TABLET | Refills: 0 | Status: SHIPPED | OUTPATIENT
Start: 2018-03-12 | End: 2018-05-01

## 2018-03-12 NOTE — MR AVS SNAPSHOT
Adiel IRVING Rosa Jr   3/12/2018   Anticoagulation Therapy Visit    Description:  82 year old male   Provider:  GAURAV Grijalva MD   Department:  Hc Anti Coagulation           INR as of 3/12/2018     Today's INR 3.1!      Anticoagulation Summary as of 3/12/2018     INR goal 2.0-3.0   Today's INR 3.1!   Full instructions 4 mg on Mon, Wed, Fri; 6 mg all other days   Next INR check 3/26/2018    Indications   Chronic atrial fibrillation (H) [I48.2]  Long-term (current) use of anticoagulants [Z79.01] [Z79.01]         March 2018 Details    Sun Mon Tue Wed Thu Fri Sat         1               2               3                 4               5               6               7               8               9               10                 11               12      4 mg   See details      13      6 mg         14      4 mg         15      6 mg         16      4 mg         17      6 mg           18      6 mg         19      4 mg         20      6 mg         21      4 mg         22      6 mg         23      4 mg         24      6 mg           25      6 mg         26            27               28               29               30               31                Date Details   03/12 This INR check       Date of next INR:  3/26/2018         How to take your warfarin dose     To take:  4 mg Take 2 of the 2 mg tablets.    To take:  6 mg Take 3 of the 2 mg tablets.

## 2018-03-12 NOTE — PROGRESS NOTES
ANTICOAGULATION FOLLOW-UP CLINIC VISIT    Patient Name:  Adiel Rosa Jr  Date:  3/12/2018  Contact Type:  INR result faxed from assisted living to warfarin clinic. New warfarin dosing/INR recheck date faxed to assisted living.    SUBJECTIVE:     Patient Findings     Positives No Problem Findings    Comments INR done by assisted living staff and result faxed to warfarin clinic nurse. Per nursing communication sheet, patient has no bleeding/bruising, no medication changes. No new issues. New warfarin dosing and INR recheck date faxed back to assisted living facility. Staff to notify warfarin clinic nurse if patient has any bleeding/bruising, changes in diet/meds/activity or questions           OBJECTIVE    INR   Date Value Ref Range Status   03/12/2018 3.1  Final       ASSESSMENT / PLAN  INR assessment THER    Recheck INR In: 2 WEEKS    INR Location [facility]      Anticoagulation Summary as of 3/12/2018     INR goal 2.0-3.0   Today's INR 3.1!   Maintenance plan 4 mg (2 mg x 2) on Mon, Wed, Fri; 6 mg (2 mg x 3) all other days   Full instructions 4 mg on Mon, Wed, Fri; 6 mg all other days   Weekly total 36 mg   Plan last modified Jessa Storey RN (3/12/2018)   Next INR check 3/26/2018   Priority INR   Target end date Indefinite    Indications   Chronic atrial fibrillation (H) [I48.2]  Long-term (current) use of anticoagulants [Z79.01] [Z79.01]         Anticoagulation Episode Summary     INR check location     Preferred lab     Send INR reminders to [facility] ANTICOAG POOL    Comments [facility] assisted living, Fax   done with homecare      Anticoagulation Care Providers     Provider Role Specialty Phone number    GAURAV Grijalva MD [facility] [phone]            See the Encounter Report to view Anticoagulation Flowsheet and Dosing Calendar (Go to Encounters tab in chart review, and find the Anticoagulation Therapy Visit)        Jessa Storey, RN

## 2018-03-13 RX ORDER — DIGOXIN 125 UG/1
TABLET ORAL
Qty: 30 TABLET | Refills: 0 | Status: SHIPPED | OUTPATIENT
Start: 2018-03-13 | End: 2018-04-03

## 2018-03-13 NOTE — TELEPHONE ENCOUNTER
Digoxin - due for office visit and Digoxin level.   Last visit: 6.15.17  Last refill: 2.9.18 #30

## 2018-03-20 ENCOUNTER — TELEPHONE (OUTPATIENT)
Dept: FAMILY MEDICINE | Facility: OTHER | Age: 83
End: 2018-03-20

## 2018-03-20 NOTE — TELEPHONE ENCOUNTER
"Request for Home Care Occupational Therapy orders as follows:    OT eval and treat date:      Continuation frequency =  2 x week x __2__ weeks               Effective date = 3/19/18    Interventions include:    Therapeutic Exercise  Caregiver training  ADL training  Home safety assessment  Shoulder Protocol                                   Acknowledging this order with an \"OK\" will suffice. Thank you for your time.  Please call if you have any questions or concerns.    For therapist:  "

## 2018-03-22 DIAGNOSIS — Z79.4 TYPE 2 DIABETES MELLITUS WITHOUT COMPLICATION, WITH LONG-TERM CURRENT USE OF INSULIN (H): Primary | ICD-10-CM

## 2018-03-22 DIAGNOSIS — E11.9 TYPE 2 DIABETES MELLITUS WITHOUT COMPLICATION, WITH LONG-TERM CURRENT USE OF INSULIN (H): Primary | ICD-10-CM

## 2018-03-23 NOTE — TELEPHONE ENCOUNTER
Please advise on Novolog.  Patient due for office visit and labs.  Last office visit: 6/15/17  Last signed: 1/11/18 #30 ml, 0 R  Please advise.  Thank you.      LDL on file in past 12 months           Recent Labs   Lab Test  04/23/15   0922   LDL  56                  Microalbumin on file in past 12 months           Recent Labs   Lab Test  03/12/14   1000   MICROL  269*   UMALCR  213.49*                  Serum creatinine on file in past 12 months           Recent Labs   Lab Test  02/27/17   1529   CR  1.44*                  HgbA1C in past 3 or 6 months           Recent Labs   Lab Test  01/15/17   0613   A1C  8.4*

## 2018-03-23 NOTE — TELEPHONE ENCOUNTER
NOVOLOG VIAL 100 UNIT/ML soln    Last Written Prescription Date:  Historical medication   Last Fill Quantity: 0,   # refills: 0  Last Office Visit: 6/15/17  Future Office visit:

## 2018-03-26 ENCOUNTER — ANTICOAGULATION THERAPY VISIT (OUTPATIENT)
Dept: ANTICOAGULATION | Facility: OTHER | Age: 83
End: 2018-03-26
Payer: MEDICARE

## 2018-03-26 DIAGNOSIS — I48.20 CHRONIC ATRIAL FIBRILLATION (H): ICD-10-CM

## 2018-03-26 DIAGNOSIS — Z79.01 LONG-TERM (CURRENT) USE OF ANTICOAGULANTS: ICD-10-CM

## 2018-03-26 LAB — INR PPP: 2.6

## 2018-03-26 NOTE — MR AVS SNAPSHOT
dAiel Rosa    3/26/2018   Anticoagulation Therapy Visit    Description:  82 year old male   Provider:  GAURAV Grijalva MD   Department:  [facility] Anti Coagulation           INR as of 3/26/2018     Today's INR 2.6      Anticoagulation Summary as of 3/26/2018     INR goal 2.0-3.0   Today's INR 2.6   Full instructions 4 mg on Mon, Wed, Fri; 6 mg all other days   Next INR check 4/16/2018    Indications   Chronic atrial fibrillation (H) [I48.2]  Long-term (current) use of anticoagulants [Z79.01] [Z79.01]         March 2018 Details    Sun Mon Tue Wed Thu Fri Sat         1               2               3                 4               5               6               7               8               9               10                 11               12               13               14               15               16               17                 18               19               20               21               22               23               24                 25               26      4 mg   See details      27      6 mg         28      4 mg         29      6 mg         30      4 mg         31      6 mg          Date Details   03/26 This INR check               How to take your warfarin dose     To take:  4 mg Take 2 of the 2 mg tablets.    To take:  6 mg Take 3 of the 2 mg tablets.           April 2018 Details    Sun Mon Tue Wed Thu Fri Sat     1      6 mg         2      4 mg         3      6 mg         4      4 mg         5      6 mg         6      4 mg         7      6 mg           8      6 mg         9      4 mg         10      6 mg         11      4 mg         12      6 mg         13      4 mg         14      6 mg           15      6 mg         16            17               18               19               20               21                 22               23               24               25               26               27               28                 29               30                      Date Details   No additional details    Date of next INR:  4/16/2018         How to take your warfarin dose     To take:  4 mg Take 2 of the 2 mg tablets.    To take:  6 mg Take 3 of the 2 mg tablets.

## 2018-03-26 NOTE — PROGRESS NOTES
ANTICOAGULATION FOLLOW-UP CLINIC VISIT    Patient Name:  Adiel Rosa Jr  Date:  3/26/2018  Contact Type:  INR result faxed from assisted living. New warfarin dosing/INR recheck date faxed back to [facility] facility.    SUBJECTIVE:     Patient Findings     Positives No Problem Findings    Comments INR done by assisted living nurse and result faxed to warfarin clinic. Per nursing communication sheet, patient has no bleeding/bruising and no changes in diet/meds/activity. New warfarin dosing and INR recheck date faxed to assisted living facility. Staff to notify warfarin clinic if he has any bleeding/bruising, changes in diet/meds/activity or questions.            OBJECTIVE    INR   Date Value Ref Range Status   03/26/2018 2.6  Final       ASSESSMENT / PLAN  INR assessment THER    Recheck INR In: 3 WEEKS    INR Location [facility]      Anticoagulation Summary as of 3/26/2018     INR goal 2.0-3.0   Today's INR 2.6   Maintenance plan 4 mg (2 mg x 2) on Mon, Wed, Fri; 6 mg (2 mg x 3) all other days   Full instructions 4 mg on Mon, Wed, Fri; 6 mg all other days   Weekly total 36 mg   No change documented Jessa Storey, RN   Plan last modified Jessa Storey RN (3/12/2018)   Next INR check 4/16/2018   Priority INR   Target end date Indefinite    Indications   Chronic atrial fibrillation (H) [I48.2]  Long-term (current) use of anticoagulants [Z79.01] [Z79.01]         Anticoagulation Episode Summary     INR check location     Preferred lab     Send INR reminders to [facility] ANTICOAG POOL    Comments [facility] assisted [facility], Fax   done with homecare      Anticoagulation Care Providers     Provider Role Specialty Phone number    GAURAV Grijalva MD [facility] [phone]            See the Encounter Report to view Anticoagulation Flowsheet and Dosing Calendar (Go to Encounters tab in chart review, and find the Anticoagulation Therapy Visit)        Jessa Storey RN

## 2018-04-03 DIAGNOSIS — I48.21 PERMANENT ATRIAL FIBRILLATION (H): ICD-10-CM

## 2018-04-03 DIAGNOSIS — I10 BENIGN ESSENTIAL HYPERTENSION: ICD-10-CM

## 2018-04-03 DIAGNOSIS — I10 ESSENTIAL HYPERTENSION, BENIGN: ICD-10-CM

## 2018-04-03 DIAGNOSIS — K21.9 GASTROESOPHAGEAL REFLUX DISEASE WITHOUT ESOPHAGITIS: ICD-10-CM

## 2018-04-04 RX ORDER — DIGOXIN 125 UG/1
TABLET ORAL
Qty: 90 TABLET | Refills: 1 | Status: SHIPPED | OUTPATIENT
Start: 2018-04-04 | End: 2018-05-15

## 2018-04-04 RX ORDER — FUROSEMIDE 40 MG
TABLET ORAL
Qty: 90 TABLET | Refills: 1 | Status: SHIPPED | OUTPATIENT
Start: 2018-04-04 | End: 2018-05-15

## 2018-04-06 DIAGNOSIS — E11.9 TYPE 2 DIABETES MELLITUS WITHOUT COMPLICATION, WITH LONG-TERM CURRENT USE OF INSULIN (H): ICD-10-CM

## 2018-04-06 DIAGNOSIS — Z79.4 TYPE 2 DIABETES MELLITUS WITHOUT COMPLICATION, WITH LONG-TERM CURRENT USE OF INSULIN (H): ICD-10-CM

## 2018-04-06 RX ORDER — SYRINGE-NEEDLE,INSULIN,0.5 ML 27GX1/2"
SYRINGE, EMPTY DISPOSABLE MISCELLANEOUS
Qty: 100 EACH | Refills: 3 | Status: SHIPPED | OUTPATIENT
Start: 2018-04-06 | End: 2018-04-26

## 2018-04-06 NOTE — TELEPHONE ENCOUNTER
10:53 AM    Reason for Call: Phone Call    Description: Wendy from [facility] called and stated they need a new order for syringes sent to [facility]. They have it listed as pt using 4 a day when he uses 5 a day. Pt is out. Please call her back at [phone] if any questions    Was an appointment offered for this call? No  If yes : Appointment type              Date    Preferred method for responding to this message: Telephone Call  What is your phone number ?    If we cannot reach you directly, may we leave a detailed response at the number you provided? Yes    Can this message wait until your PCP/provider returns, if available today? Not applicable    Nannette Lopes

## 2018-04-10 DIAGNOSIS — S20.221A BACK CONTUSION, RIGHT, INITIAL ENCOUNTER: ICD-10-CM

## 2018-04-10 RX ORDER — HYDROCODONE BITARTRATE AND ACETAMINOPHEN 5; 325 MG/1; MG/1
TABLET ORAL
Qty: 30 TABLET | Refills: 0 | Status: SHIPPED | OUTPATIENT
Start: 2018-04-10 | End: 2018-07-09

## 2018-04-10 NOTE — TELEPHONE ENCOUNTER
Pt notified that the written RX is ready at the [facility]  registration to be picked up.

## 2018-04-10 NOTE — TELEPHONE ENCOUNTER
Controlled Substance Refill Request for Norco  Problem List Complete:  No     PROVIDER TO CONSIDER COMPLETION OF PROBLEM LIST AND OVERVIEW/CONTROLLED SUBSTANCE AGREEMENT    Last Written Prescription Date:  12.22.17  Last Fill Quantity: 30,   # refills: 0    Last Office Visit with [facility] primary care provider: 6.15.17    Future Office visit:     Controlled substance agreement on file: No.     Processing:  [facility]

## 2018-04-11 DIAGNOSIS — Z96.611 S/P REVERSE TOTAL SHOULDER ARTHROPLASTY, RIGHT: Primary | ICD-10-CM

## 2018-04-12 ENCOUNTER — HOSPITAL ENCOUNTER (OUTPATIENT)
Dept: OCCUPATIONAL THERAPY | Facility: OTHER | Age: 83
Setting detail: THERAPIES SERIES
End: 2018-04-12
Attending: SPECIALIST
Payer: MEDICARE

## 2018-04-12 PROCEDURE — 97140 MANUAL THERAPY 1/> REGIONS: CPT | Mod: GO | Performed by: OCCUPATIONAL THERAPIST

## 2018-04-12 PROCEDURE — G8988 SELF CARE GOAL STATUS: HCPCS | Mod: GO,CI | Performed by: OCCUPATIONAL THERAPIST

## 2018-04-12 PROCEDURE — G8987 SELF CARE CURRENT STATUS: HCPCS | Mod: GO,CM | Performed by: OCCUPATIONAL THERAPIST

## 2018-04-12 PROCEDURE — 97110 THERAPEUTIC EXERCISES: CPT | Mod: GO | Performed by: OCCUPATIONAL THERAPIST

## 2018-04-12 PROCEDURE — 97165 OT EVAL LOW COMPLEX 30 MIN: CPT | Mod: GO | Performed by: OCCUPATIONAL THERAPIST

## 2018-04-12 NOTE — PROGRESS NOTES
04/12/18 1600   General Information   Start Of Care Date 04/12/18   Referring Physician    Onset of Illness/Injury or Date of Surgery 01/25/18   Special Instructions Phase III of protocol Phase IV on 6/10/2018   Role/Living Environment   Current Community Support Other/Comments  (staff)   Current Living Environment [facility] home   Pain   Patient currently in pain Denies;Unable to assess   Fall Risk Screen   Fall screen completed by OT   Have you fallen 2 or more times in the past year? No   Have you fallen and had an injury in the past year? No   Is patient a fall risk? No   Cognitive Status Examination   Memory Impaired   Memory Comments decreae STM   Right Shoulder Flexion ROM   Right Shoulder Flexion AROM - degrees 128   Right Shoulder ABduction ROM   Right Shoulder ABduction AROM - degrees 112   Right Shoulder Horizontal Abduction ROM   Right Shoulder Horizontal ABduction AROM - degrees 90   Right Shoulder External Rotation ROM   Right Shoulder External Rotation AROM - degrees 32, horizontal external 5   Right Shoulder Internal Rotation ROM   Right Shoulder Internal Rotation AROM - degrees 40, horizontal internal 76   Hand Strength   Hand Dominance Right   OT Goal 1   Goal Identifier ADLs   Goal Description Pt. will reach 120 degrees or more of shoulder flexion to reach into cupboard for glass   Target Date 05/10/18   OT Goal 2   Goal Identifier ADLs   Goal Description Pt. will reach 45+ degrees of active external rotation for increae ease donning shirt and jacket   Target Date 05/24/18   OT Goal 3   Goal Identifier IADLS   Goal Description Pt. will be able to carry 10 # or more in his doinate right UE.    Target Date 06/07/18   Clinical Impression   Criteria for Skilled Therapeutic Interventions Met Yes, treatment indicated   OT Diagnosis decrease ROM, strength, funtional use and decline in ADLs    Assessment of Occupational Performance 1-3 Performance Deficits   Clinical Decision Making (Complexity)  Low complexity   Therapy Frequency 2x   Predicted Duration of Therapy Intervention (days/wks) w+12   Risks and Benefits of Treatment have been explained. Yes   Patient, Family & other staff in agreement with plan of care Yes   Clinical Impression Comments Pt. has difficulty with motor planning and impaired cognition limiting performance of tasks  and follow through at home.    Education Assessment   Barriers To Learning Cognitive   Self Care   Self Care: Current Status , Goal ,  Discharge -Swei Only- Modifier the same for all G-codes CM: 80-99% impairment   Self Care: Current & Discharge Modifier Rationale-Eval Only PFSF of 3.3    Total Evaluation Time   Total Evaluation Time 15

## 2018-04-12 NOTE — PROGRESS NOTES
[facility]          OUTPATIENT OCCUPATIONAL THERAPY  EVALUATION  PLAN OF TREATMENT FOR OUTPATIENT REHABILITATION  (COMPLETE FOR INITIAL CLAIMS ONLY)  Patient's Last Name, First Name, M.I.  YOB: 1935  Adiel Rosa Jr                        Provider's Name  [facility] Medical Record No.  [id number]                               Onset Date:     01/25/18   Start of Care Date:     04/12/18   Type:     ___PT   _X_OT   ___SLP Medical Diagnosis:                                OT Diagnosis:     decrease ROM, strength, funtional use and decline in ADLs  Visits from SOC:  1   _________________________________________________________________________________  Plan of Treatment/Functional Goals:                       Goals  Goal Identifier: ADLs  Goal Description: Pt. will reach 120 degrees or more of shoulder flexion to reach into cupboard for glass  Target Date: 05/10/18     Goal Identifier: ADLs  Goal Description: Pt. will reach 45+ degrees of active external rotation for increae ease donning shirt and jacket  Target Date: 05/24/18     Goal Identifier: IADLS  Goal Description:    Target Date: 06/07/18                                                                      Therapy Frequency: 2x     Predicted Duration of Therapy Intervention (days/wks): w+12  Linh Sol, OT          I CERTIFY THE NEED FOR THESE SERVICES FURNISHED UNDER        THIS PLAN OF TREATMENT AND WHILE UNDER MY CARE .             Physician Signature               Date    X_____________________________________________________       Dr. Ellis         ,     ,                 Referring Physician:      Initial Assessment        See Epic Evaluation      Start Of Care Date: 04/12/18

## 2018-04-16 ENCOUNTER — ANTICOAGULATION THERAPY VISIT (OUTPATIENT)
Dept: ANTICOAGULATION | Facility: OTHER | Age: 83
End: 2018-04-16
Payer: MEDICARE

## 2018-04-16 DIAGNOSIS — I48.20 CHRONIC ATRIAL FIBRILLATION (H): ICD-10-CM

## 2018-04-16 DIAGNOSIS — Z79.01 LONG-TERM (CURRENT) USE OF ANTICOAGULANTS: ICD-10-CM

## 2018-04-16 LAB — INR PPP: 2.1

## 2018-04-16 NOTE — PROGRESS NOTES
ANTICOAGULATION FOLLOW-UP CLINIC VISIT    Patient Name:  Adiel Rosa Jr  Date:  4/16/2018  Contact Type:  INR dosing and next INR recheck date faxed to [facility].    SUBJECTIVE:     Patient Findings     Positives No Problem Findings    Comments INR completed by [facility] and communication sheet faxed to [facility].  Communication sheet reports INR 2.1 and no changes in status.  Coumadin dosing and next INR faxed back to [facility].  Staff reminded to notify [facility] of any diet/medication/activitiy level changes or bleeding/bruising.             OBJECTIVE    INR   Date Value Ref Range Status   04/16/2018 2.1  Final       ASSESSMENT / PLAN  INR assessment THER    Recheck INR In: 3 WEEKS    INR Location [facility]      Anticoagulation Summary as of 4/16/2018     INR goal 2.0-3.0   Today's INR 2.1   Maintenance plan 4 mg (2 mg x 2) on Mon, Wed, Fri; 6 mg (2 mg x 3) all other days   Full instructions 4 mg on Mon, Wed, Fri; 6 mg all other days   Weekly total 36 mg   No change documented Yara Cross, RN   Plan last modified Jessa Storey RN (3/12/2018)   Next INR check 5/7/2018   Priority INR   Target end date Indefinite    Indications   Chronic atrial fibrillation (H) [I48.2]  Long-term (current) use of anticoagulants [Z79.01] [Z79.01]         Anticoagulation Episode Summary     INR check location     Preferred lab     Send INR reminders to [facility] POOL    Comments [facility] assisted [facility], Fax   done with homecare      Anticoagulation Care Providers     Provider Role Specialty Phone number    GAURAV Grijalva MD [facility] [phone]            See the Encounter Report to view Anticoagulation Flowsheet and Dosing Calendar (Go to Encounters tab in chart review, and find the Anticoagulation Therapy Visit)        Yara Cross, STONEY

## 2018-04-16 NOTE — MR AVS SNAPSHOT
Adiel Rosa    4/16/2018   Anticoagulation Therapy Visit    Description:  82 year old male   Provider:  GAURAV Grijalva MD   Department:  [facility] Anti Coagulation           INR as of 4/16/2018     Today's INR 2.1      Anticoagulation Summary as of 4/16/2018     INR goal 2.0-3.0   Today's INR 2.1   Full instructions 4 mg on Mon, Wed, Fri; 6 mg all other days   Next INR check 5/7/2018    Indications   Chronic atrial fibrillation (H) [I48.2]  Long-term (current) use of anticoagulants [Z79.01] [Z79.01]         April 2018 Details    Sun Mon Tue Wed Thu Fri Sat     1               2               3               4               5               6               7                 8               9               10               11               12               13               14                 15               16      4 mg   See details      17      6 mg         18      4 mg         19      6 mg         20      4 mg         21      6 mg           22      6 mg         23      4 mg         24      6 mg         25      4 mg         26      6 mg         27      4 mg         28      6 mg           29      6 mg         30      4 mg               Date Details   04/16 This INR check               How to take your warfarin dose     To take:  4 mg Take 2 of the 2 mg tablets.    To take:  6 mg Take 3 of the 2 mg tablets.           May 2018 Details    Sun Mon Tue Wed Thu Fri Sat       1      6 mg         2      4 mg         3      6 mg         4      4 mg         5      6 mg           6      6 mg         7            8               9               10               11               12                 13               14               15               16               17               18               19                 20               21               22               23               24               25               26                 27               28               29               30               31                   Date Details   No additional details    Date of next INR:  5/7/2018         How to take your warfarin dose     To take:  4 mg Take 2 of the 2 mg tablets.    To take:  6 mg Take 3 of the 2 mg tablets.

## 2018-04-26 ENCOUNTER — OFFICE VISIT (OUTPATIENT)
Dept: FAMILY MEDICINE | Facility: OTHER | Age: 83
End: 2018-04-26
Attending: FAMILY MEDICINE
Payer: MEDICARE

## 2018-04-26 ENCOUNTER — TELEPHONE (OUTPATIENT)
Dept: FAMILY MEDICINE | Facility: OTHER | Age: 83
End: 2018-04-26

## 2018-04-26 VITALS
DIASTOLIC BLOOD PRESSURE: 62 MMHG | HEART RATE: 70 BPM | SYSTOLIC BLOOD PRESSURE: 94 MMHG | TEMPERATURE: 98.2 F | BODY MASS INDEX: 26.36 KG/M2 | OXYGEN SATURATION: 94 % | WEIGHT: 194.38 LBS

## 2018-04-26 DIAGNOSIS — Z79.4 TYPE 2 DIABETES MELLITUS WITHOUT COMPLICATION, WITH LONG-TERM CURRENT USE OF INSULIN (H): Primary | ICD-10-CM

## 2018-04-26 DIAGNOSIS — E11.9 TYPE 2 DIABETES MELLITUS WITHOUT COMPLICATION, WITH LONG-TERM CURRENT USE OF INSULIN (H): Primary | ICD-10-CM

## 2018-04-26 DIAGNOSIS — I48.20 CHRONIC ATRIAL FIBRILLATION (H): ICD-10-CM

## 2018-04-26 DIAGNOSIS — J41.0 SIMPLE CHRONIC BRONCHITIS (H): ICD-10-CM

## 2018-04-26 DIAGNOSIS — E11.9 TYPE 2 DIABETES MELLITUS WITHOUT COMPLICATION, WITH LONG-TERM CURRENT USE OF INSULIN (H): ICD-10-CM

## 2018-04-26 DIAGNOSIS — Z79.4 TYPE 2 DIABETES MELLITUS WITHOUT COMPLICATION, WITH LONG-TERM CURRENT USE OF INSULIN (H): ICD-10-CM

## 2018-04-26 DIAGNOSIS — J44.9 CHRONIC OBSTRUCTIVE PULMONARY DISEASE, UNSPECIFIED COPD TYPE (H): Primary | ICD-10-CM

## 2018-04-26 LAB
EST. AVERAGE GLUCOSE BLD GHB EST-MCNC: 260 MG/DL
HBA1C MFR BLD: 10.7 % (ref 0–6.4)

## 2018-04-26 PROCEDURE — 36415 COLL VENOUS BLD VENIPUNCTURE: CPT | Mod: ZL | Performed by: FAMILY MEDICINE

## 2018-04-26 PROCEDURE — 40000788 ZZHCL STATISTIC ESTIMATED AVERAGE GLUCOSE: Mod: ZL | Performed by: FAMILY MEDICINE

## 2018-04-26 PROCEDURE — G0463 HOSPITAL OUTPT CLINIC VISIT: HCPCS

## 2018-04-26 PROCEDURE — 83036 HEMOGLOBIN GLYCOSYLATED A1C: CPT | Mod: ZL | Performed by: FAMILY MEDICINE

## 2018-04-26 PROCEDURE — 99214 OFFICE O/P EST MOD 30 MIN: CPT | Performed by: FAMILY MEDICINE

## 2018-04-26 RX ORDER — SYRINGE-NEEDLE,INSULIN,0.5 ML 27GX1/2"
SYRINGE, EMPTY DISPOSABLE MISCELLANEOUS
Qty: 300 EACH | Refills: 3 | Status: SHIPPED | OUTPATIENT
Start: 2018-04-26 | End: 2018-01-01

## 2018-04-26 ASSESSMENT — PAIN SCALES - GENERAL: PAINLEVEL: NO PAIN (0)

## 2018-04-26 NOTE — PROGRESS NOTES
"  SUBJECTIVE:                                                    Adiel Rosa Jr is a 82 year old male who presents to clinic today for the following health issues:      Diabetes Follow-up    Patient is checking blood sugars: 5 times daily.    Blood sugar testing frequency justification: Uncontrolled diabetes  Results are as follows:         am - been between 200-500 bouncing all over         lunchtime -          suppertime -          bedtime -     Diabetic concerns: blood sugar frequently over 200     Symptoms of hypoglycemia (low blood sugar): none     Paresthesias (numbness or burning in feet) or sores: Yes sometimes     Date of last diabetic eye exam: \"1492\" \" been a long time\"    Hyperlipidemia Follow-Up      Rate your low fat/cholesterol diet?: fair    Taking statin?  Yes, no muscle aches from statin    Other lipid medications/supplements?:  none    Hypertension Follow-up      Outpatient blood pressures are being checked at home.  Results are pretty good.    Low Salt Diet: no added salt    BP Readings from Last 2 Encounters:   04/26/18 94/62   10/07/17 118/62     Hemoglobin A1C (%)   Date Value   01/15/2017 8.4 (H)   09/14/2016 7.3     LDL Cholesterol Calculated (mg/dL)   Date Value   04/23/2015 56   01/06/2014 53       Amount of exercise or physical activity: None    Problems taking medications regularly: Yes,      Medication side effects: none    Diet: regular (no restrictions)        His facility is requesting an A1c.  Also needs his diabetic meds sent and and his Advair.  COPD has been stable no acute chest pain or shortness of  [facility]    Adiel Rosa Jr, 82 year old, male presents with   Chief Complaint   Patient presents with     Diabetes       PAST MEDICAL HISTORY:  History reviewed. No pertinent past medical history.    PAST SURGICAL HISTORY:  Past Surgical History:   Procedure Laterality Date     reverse total arthoplasty of right shoulder   01/25/2018 "       MEDICATIONS:  Prior to Admission medications    Medication Sig Start Date End Date Taking? Authorizing Provider   acetaminophen (TYLENOL) 650 MG CR tablet Take 1 tablet (650 mg) by mouth every 8 hours as needed 1/16/17  Yes Alejandro Fung MD   aspirin 325 MG tablet Take 1 tablet (325 mg) by mouth daily 2/14/18  Yes Christie Quijano PA   benzocaine-menthol (CEPACOL) 15-3.6 MG lozenge Place 1 lozenge inside cheek every hour as needed for sore throat 1/16/17  Yes Alejandro Fung MD   Cranberry 500 MG CAPS Take 1 capsule (500 mg) by mouth daily 12/20/17  Yes GAURAV Grijalva MD   DIGOX 125 MCG tablet TAKE 1 TABLET BY MOUTH EVERY MORNING 4/4/18  Yes Swapnil Mackay MD   Doxylamine-DM 6.25-15 MG/15ML LIQD Taking as pkg directions at night 1/20/17  Yes GAURAV Grijalva MD   fluticasone-salmeterol (ADVAIR DISKUS) 250-50 MCG/DOSE diskus inhaler USE 1 INHALATION TWO TIMES A DAY IN THE MORNING AND IN THE EVENING APPROXIMATELY 12 HOURS APART 4/26/18  Yes GAURAV Grijalva MD   furosemide (LASIX) 40 MG tablet TAKE ONE TABLET BY MOUTH EVERY DAY (AM) 4/4/18  Yes Swapnil Mackay MD   insulin aspart (NOVOLOG VIAL) 100 UNITS/ML injection 4-10 units 3 times daily with meals. Glucose less than or equal to149 don't give insulin.  150-200=4U, 201-255=5U, 251-300=6U, 301-349=8U, 350-500=10U. If over 500 call the Doctor. 4/26/18  Yes GAURAV Grijalva MD   insulin detemir (LEVEMIR VIAL) 100 UNIT/ML injection Inject 40 units every morning and inject 15 units at bed time 4/26/18  Yes GAURAV Grijalva MD   Magnesium Oxide 250 MG TABS TAKE 1 TABLET BY MOUTH ONCE DAILY (AM) 4/4/18  Yes Swapnil Mackay MD   metoprolol succinate (TOPROL-XL) 50 MG 24 hr tablet Take 1 tablet (50 mg) by mouth daily 2/14/18  Yes GAURAV Grijalva MD   Multiple Vitamins-Minerals (CERTAVITE SENIOR/ANTIOXIDANT) TABS TAKE ONE TABLET BY MOUTH EVERY DAY 3/12/18  Yes GAURAV Grijalva MD   simvastatin (ZOCOR) 80 MG tablet TAKE 1 TABLET DAILY IN THE EVENING  "2/14/18  Yes Christie Quijano PA   VITAMIN D3 1000 UNITS tablet TAKE 1 TABLET BY MOUTH ONCE DAILY (AM) 2/9/18  Yes GAURAV Grijalva MD   warfarin (COUMADIN) 2 MG tablet Take 4 mg Mon/Fri; 6mg all other days or as directed by [facility] 2/15/18  Yes GAURAV Grijalva MD   B-D U/F 31G X 8 MM insulin pen needle USE 5 TO 6 PEN NEEDLES DAILY OR AS DIRECTED 3/17/17   GAURAV Grijalva MD   blood glucose monitoring (NO BRAND SPECIFIED) meter device kit Use to test blood sugar 5 times daily due to patient being on sliding scale. 1/30/17   GAURAV Grijalva MD   blood glucose monitoring (ONE TOUCH ULTRA) test strip Use to test blood sugar 5 times daily due to patient being on sliding scale. 1/30/17   GAURAV Grijalva MD   candesartan (ATACAND) 16 MG tablet TAKE 1 TABLET BY MOUTH EVERY DAY (AM) 2/9/18   GAURAV Grijalva MD   HYDROcodone-acetaminophen (NORCO) 5-325 MG per tablet TAKE 1 TABLET BY MOUTH EVERY 6 HOURS AS NEEDED FOR MODERATE TO SEVRE PAIN  Patient not taking: Reported on 4/26/2018 4/10/18   GAURAV Grijalva MD   insulin detemir (LEVEMIR) 100 UNIT/ML injection INJECT 35 UNITS EVERY MORNING AND INJECT 10 UNITS AT BEDTIME 9/20/17   Reported, Patient   insulin syringe-needle U-100 (ULTICARE INSULIN SYRINGE) 31G X 5/16\" 1 ML Use 1 syringe with each injection five times daily 4/6/18   GAURAV Grijalva MD   NOVOLOG VIAL 100 UNIT/ML soln INJECT 4-10 UNITS SUBQ THREE TIMES DAILY WITH MEALS. GLUCOSE < / =  =0U, 150-200=4U, 201-255=5U, 251-300=6U, 301-349=8U, 350-500=10U,  3/23/18   Swapnil Mackay MD   ranitidine (ZANTAC) 150 MG tablet TAKE ONE TABLET BY MOUTH TWICE DAILY 4/4/18   Swapnil Mackay MD       ALLERGIES:   No Known Allergies    ROS:  Constitutional, neuro, ENT, endocrine, pulmonary, cardiac, gastrointestinal, genitourinary, musculoskeletal, integument and psychiatric systems are negative, except as otherwise noted.      EXAM:  BP 94/62  Pulse 70  Temp 98.2  F (36.8  C) (Tympanic)  Wt 194 lb 6 oz " (88.2 kg)  SpO2 94%  BMI 26.36 kg/m2 Body mass index is 26.36 kg/(m^2).   GENERAL APPEARANCE: healthy, alert and no distress  EYES: Eyes grossly normal to inspection, PERRL and conjunctivae and sclerae normal  RESP: lungs clear to auscultation - no rales, rhonchi or wheezes  CV: Irregularly irregular S1 S2, no S3 or S4, no murmur, click or rub   ABDOMEN: Soft nontender no peritoneal signs  SKIN: Right foot had an open area that where he had his osteomyelitis but that is sealed over and he has no tenderness and  foot feels warm and no sores seen  Lab/ X-ray  Pending    ASSESSMENT/PLAN:    ICD-10-CM    1. Chronic obstructive pulmonary disease, unspecified COPD type (H) J44.9    2. Type 2 diabetes mellitus without complication, with long-term current use of insulin (H) E11.9 insulin aspart (NOVOLOG VIAL) 100 UNITS/ML injection    Z79.4 insulin detemir (LEVEMIR VIAL) 100 UNIT/ML injection     Hemoglobin A1c   3. Simple chronic bronchitis (H) J41.0 fluticasone-salmeterol (ADVAIR DISKUS) 250-50 MCG/DOSE diskus inhaler   4. Chronic atrial fibrillation (H) I48.2    Overall he is doing well.  He was able to avoid having his right foot amputated because that osteomyelitis.  That seems to be stable NovoLog Levemir and Advair were sent into [facility].  Will check an A1c and call him with results.  Has chronic atrial fibrillation that stable and has COPD and that is stable.      WILSON Grijalva MD  April 26, 2018

## 2018-04-26 NOTE — MR AVS SNAPSHOT
After Visit Summary   4/26/2018    Adiel Rosa Jr    MRN: [id number]           Patient Information     Date Of Birth          1935        Visit Information        Provider Department      4/26/2018 2:15 PM GAURAV Grijalva MD [facility]        Today's Diagnoses     Chronic obstructive pulmonary disease, unspecified COPD type (H)    -  1    Type 2 diabetes mellitus without complication, with long-term current use of insulin (H)        Simple chronic bronchitis (H)        Chronic atrial fibrillation (H)          Care Instructions      At your visit today, we discussed your risk for falls and preventive options.    Fall Prevention  Falls often occur due to slipping, tripping or losing your balance. Millions of people fall every year and injure themselves. Here are ways to reduce your risk of falling again.    Think about your fall, was there anything that caused your fall that can be fixed, removed, or replaced?    Make your home safe by keeping walkways clear of objects you may trip over.    Use non-slip pads under rugs. Do not use area rugs or small throw rugs.    Use non-slip mats in bathtubs and showers.    Install handrails and lights on staircases.    Do not walk in poorly lit areas.    Do not stand on chairs or wobbly ladders.    Use caution when reaching overhead or looking upward. This position can cause a loss of balance.    Be sure your shoes fit properly, have non-slip bottoms and are in good condition.     Wear shoes both inside and out. Avoid going barefoot or wearing slippers.    Be cautious when going up and down stairs, curbs, and when walking on uneven sidewalks.    If your balance is poor, consider using a cane or walker.    If your fall was related to alcohol use, stop or limit alcohol intake.     If your fall was related to use of sleeping medicines, talk to your doctor about this. You may need to reduce your dosage at bedtime if you awaken during the night to  go to the bathroom.      To reduce the need for nighttime bathroom trips:  ? Avoid drinking fluids for several hours before going to bed  ? Empty your bladder before going to bed  ? Men can keep a urinal at the bedside    Stay as active as you can. Balance, flexibility, strength, and endurance all come from exercise. They all play a role in preventing falls. Ask your healthcare provider which types of activity are right for you.    Get your vision checked on a regular basis.    If you have pets, know where they are before you stand up or walk so you don't trip over them.    Use night lights.  Date Last Reviewed: 11/5/2015 2000-2017 [employer]. [address] [place] [address], [place] [address]. All rights reserved. This information is not intended as a substitute for professional medical care. Always follow your healthcare professional's instructions.                Follow-ups after your visit        Your next 10 appointments already scheduled     May 02, 2018 10:00 AM CDT   [facility]h Sol OT   [facility]    [address] [address]   [phone]            May 04, 2018  9:30 AM CDT   Treatment with Linh Sol OT   [facility]    [address] [id number] [phone]            May 08, 2018 10:15 AM CDT   Treatment with Linh Sol, OT   [facility]    [address] [address]   [phone]              Who to contact     If you have questions or need follow up information about today's clinic visit or your schedule please contact [facility] directly at [phone].  Normal or non-critical lab and imaging results will be communicated to you by MyChart, letter or phone within 4 business days after the clinic has received  "the results. If you do not hear from us within 7 days, please contact the clinic through [employer] or phone. If you have a critical or abnormal lab result, we will notify you by phone as soon as possible.  Submit refill requests through [employer] or call your pharmacy and they will forward the refill request to us. Please allow 3 business days for your refill to be completed.          Additional Information About Your Visit        [employer] Information     [employer] lets you send messages to your doctor, view your test results, renew your prescriptions, schedule appointments and more. To sign up, go to [email or web address] . Click on \"Log in\" on the left side of the screen, which will take you to the Welcome page. Then click on \"Sign up Now\" on the right side of the page.     You will be asked to enter the access code listed below, as well as some personal information. Please follow the directions to create your username and password.     Your access code is: [email or web address]  Expires: 2018  2:32 PM     Your access code will  in 90 days. If you need help or a new code, please call your [facility] clinic or [phone].        Care EveryWhere ID     This is your Care EveryWhere ID. This could be used by other organizations to access your [facility] medical records  [id number]        Your Vitals Were     Pulse Temperature Pulse Oximetry BMI (Body Mass Index)          70 98.2  F (36.8  C) (Tympanic) 94% 26.36 kg/m2         Blood Pressure from Last 3 Encounters:   18 94/62   10/07/17 118/62   06/15/17 114/62    Weight from Last 3 Encounters:   18 194 lb 6 oz (88.2 kg)   10/07/17 190 lb 6 oz (86.4 kg)   06/15/17 187 lb 3.2 oz (84.9 kg)              We Performed the Following     Hemoglobin A1c          Today's Medication Changes          These changes are accurate as of 18  2:32 PM.  If you have any questions, ask your nurse or doctor.               These medicines have changed or have updated " prescriptions.        Dose/Directions    acetaminophen 650 MG CR tablet   Commonly known as:  TYLENOL   This may have changed:  Another medication with the same name was removed. Continue taking this medication, and follow the directions you see here.   Used for:  Osteoarthritis, unspecified osteoarthritis type, unspecified site   Changed by:  GAURAV Grijalva MD        Dose:  650 mg   Take 1 tablet (650 mg) by mouth every 8 hours as needed   Quantity:  60 tablet   Refills:  0       aspirin 325 MG tablet   This may have changed:  Another medication with the same name was removed. Continue taking this medication, and follow the directions you see here.   Used for:  Coronary artery disease without angina pectoris, unspecified vessel or lesion type, unspecified whether native or transplanted heart   Changed by:  GAURAV Grijalva MD        Dose:  325 mg   Take 1 tablet (325 mg) by mouth daily   Quantity:  120 tablet   Refills:  3       B-D U/F 31G X 8 MM   This may have changed:  Another medication with the same name was removed. Continue taking this medication, and follow the directions you see here.   Used for:  Diabetes mellitus, type 2 (H)   Generic drug:  insulin pen needle   Changed by:  GAURAV Grijalva MD        USE 5 TO 6 PEN NEEDLES DAILY OR AS DIRECTED   Quantity:  600 each   Refills:  2       blood glucose monitoring test strip   Commonly known as:  ONETOUCH ULTRA   This may have changed:  Another medication with the same name was removed. Continue taking this medication, and follow the directions you see here.   Used for:  Diabetes mellitus (H)   Changed by:  GAURAV Grijalva MD        Use to test blood sugar 5 times daily due to patient being on sliding scale.   Quantity:  300 each   Refills:  3       candesartan 16 MG tablet   Commonly known as:  ATACAND   This may have changed:  Another medication with the same name was removed. Continue taking this medication, and follow the directions you see here.   Used  for:  Essential hypertension   Changed by:  GAURAV Grijalva MD        TAKE 1 TABLET BY MOUTH EVERY DAY (AM)   Quantity:  90 tablet   Refills:  0       CERTAVITE SENIOR/ANTIOXIDANT Tabs   This may have changed:  Another medication with the same name was removed. Continue taking this medication, and follow the directions you see here.   Used for:  Health care maintenance   Changed by:  GAURAV Grijalva MD        TAKE ONE TABLET BY MOUTH EVERY DAY   Quantity:  31 tablet   Refills:  0       DIGOX 125 MCG tablet   This may have changed:  Another medication with the same name was removed. Continue taking this medication, and follow the directions you see here.   Used for:  Essential hypertension, benign   Generic drug:  digoxin   Changed by:  GAURAV Grijalva MD        TAKE 1 TABLET BY MOUTH EVERY MORNING   Quantity:  90 tablet   Refills:  1       Doxylamine-DM 6.25-15 MG/15ML Liqd   This may have changed:  Another medication with the same name was removed. Continue taking this medication, and follow the directions you see here.   Used for:  Osteomyelitis of foot, right, acute (H), Other insomnia   Changed by:  GAURAV Grijalva MD        Taking as pkg directions at night   Quantity:  236 mL   Refills:  1       fluticasone-salmeterol 250-50 MCG/DOSE diskus inhaler   Commonly known as:  ADVAIR DISKUS   This may have changed:  Another medication with the same name was removed. Continue taking this medication, and follow the directions you see here.   Used for:  Simple chronic bronchitis (H)   Changed by:  GAURAV Grijalva MD        USE 1 INHALATION TWO TIMES A DAY IN THE MORNING AND IN THE EVENING APPROXIMATELY 12 HOURS APART   Quantity:  3 Inhaler   Refills:  1       furosemide 40 MG tablet   Commonly known as:  LASIX   This may have changed:  Another medication with the same name was removed. Continue taking this medication, and follow the directions you see here.   Used for:  Benign essential hypertension   Changed by:   "GAURAV Grijalva MD        TAKE ONE TABLET BY MOUTH EVERY DAY (AM)   Quantity:  90 tablet   Refills:  1       HYDROcodone-acetaminophen 5-325 MG per tablet   Commonly known as:  NORCO   This may have changed:  Another medication with the same name was removed. Continue taking this medication, and follow the directions you see here.   Used for:  Back contusion, right, initial encounter   Changed by:  GAURAV Grijalva MD        TAKE 1 TABLET BY MOUTH EVERY 6 HOURS AS NEEDED FOR MODERATE TO SEVRE PAIN   Quantity:  30 tablet   Refills:  0       * insulin detemir 100 UNIT/ML injection   Commonly known as:  LEVEMIR   This may have changed:  Another medication with the same name was changed. Make sure you understand how and when to take each.   Changed by:  GAURAV Grijalva MD        INJECT 35 UNITS EVERY MORNING AND INJECT 10 UNITS AT BEDTIME   Refills:  0       * insulin detemir 100 UNIT/ML injection   Commonly known as:  LEVEMIR VIAL   This may have changed:  See the new instructions.   Used for:  Type 2 diabetes mellitus without complication, with long-term current use of insulin (H)   Changed by:  GAURAV Grijalva MD        Inject 40 units every morning and inject 15 units at bed time   Quantity:  30 mL   Refills:  3       insulin syringe-needle U-100 31G X 5/16\" 1 ML   Commonly known as:  ULTICARE INSULIN SYRINGE   This may have changed:  Another medication with the same name was removed. Continue taking this medication, and follow the directions you see here.   Used for:  Type 2 diabetes mellitus without complication, with long-term current use of insulin (H)   Changed by:  GAURAV Grijalva MD        Use 1 syringe with each injection five times daily   Quantity:  300 each   Refills:  3       Magnesium Oxide 250 MG Tabs   This may have changed:  Another medication with the same name was removed. Continue taking this medication, and follow the directions you see here.   Used for:  Permanent atrial fibrillation (H) "   Changed by:  GAURAV Grijalva MD        TAKE 1 TABLET BY MOUTH ONCE DAILY (AM)   Quantity:  30 tablet   Refills:  1       metoprolol succinate 50 MG 24 hr tablet   Commonly known as:  TOPROL-XL   This may have changed:  Another medication with the same name was removed. Continue taking this medication, and follow the directions you see here.   Used for:  HTN (hypertension)   Changed by:  GAURAV Grijalva MD        Dose:  50 mg   Take 1 tablet (50 mg) by mouth daily   Quantity:  90 tablet   Refills:  2       * NovoLOG VIAL 100 UNITS/ML injection   This may have changed:  Another medication with the same name was removed. Continue taking this medication, and follow the directions you see here.   Used for:  Type 2 diabetes mellitus without complication, with long-term current use of insulin (H)   Generic drug:  insulin aspart   Changed by:  GAURAV Grijalva MD        INJECT 4-10 UNITS SUBQ THREE TIMES DAILY WITH MEALS. GLUCOSE < / =  =0U, 150-200=4U, 201-255=5U, 251-300=6U, 301-349=8U, 350-500=10U,   Quantity:  30 mL   Refills:  3       * insulin aspart 100 UNITS/ML injection   Commonly known as:  NovoLOG VIAL   This may have changed:  Another medication with the same name was removed. Continue taking this medication, and follow the directions you see here.   Used for:  Type 2 diabetes mellitus without complication, with long-term current use of insulin (H)   Changed by:  GAURAV Grijalva MD        4-10 units 3 times daily with meals. Glucose less than or equal to149 don't give insulin. 150-200=4U, 201-255=5U, 251-300=6U, 301-349=8U, 350-500=10U. If over 500 call the Doctor.   Quantity:  30 mL   Refills:  3       ranitidine 150 MG tablet   Commonly known as:  ZANTAC   This may have changed:  Another medication with the same name was removed. Continue taking this medication, and follow the directions you see here.   Used for:  Gastroesophageal reflux disease without esophagitis   Changed by:  GAURAV Grijalva MD      "   TAKE ONE TABLET BY MOUTH TWICE DAILY   Quantity:  180 tablet   Refills:  1       simvastatin 80 MG tablet   Commonly known as:  ZOCOR   This may have changed:  Another medication with the same name was removed. Continue taking this medication, and follow the directions you see here.   Used for:  Pure hypercholesterolemia   Changed by:  GAURAV Grijalva MD        TAKE 1 TABLET DAILY IN THE EVENING   Quantity:  90 tablet   Refills:  1       warfarin 2 MG tablet   Commonly known as:  COUMADIN   This may have changed:  Another medication with the same name was removed. Continue taking this medication, and follow the directions you see here.   Used for:  Chronic atrial fibrillation (H)   Changed by:  GAURAV Grijalva MD        Take 4 mg Mon/Fri; 6mg all other days or as directed by [facility]   Quantity:  260 tablet   Refills:  3       * Notice:  This list has 4 medication(s) that are the same as other medications prescribed for you. Read the directions carefully, and ask your doctor or other care provider to review them with you.      Stop taking these medicines if you haven't already. Please contact your care team if you have questions.     LYCOPENE PO   Stopped by:  GAURAV Grijalva MD           order for DME   Stopped by:  GAURAV Grijalva MD           order for DME   Stopped by:  GAURAV Grijalva MD           predniSONE 20 MG tablet   Commonly known as:  DELTASONE   Stopped by:  GAURAV Grijalva MD                Where to get your medicines      These medications were sent to [facility] [address] [address]     Phone:  [phone]     fluticasone-salmeterol 250-50 MCG/DOSE diskus inhaler    insulin detemir 100 UNIT/ML injection    insulin syringe-needle U-100 31G X 5/16\" 1 ML         Some of these will need a paper prescription and others can be bought over the counter.  Ask your nurse if you have questions.     Bring a paper " prescription for each of these medications     insulin aspart 100 UNITS/ML injection                Primary Care Provider Office Phone # Fax #    R Hao Grijalva [phone] [address] [facility] [address]        Equal Access to Services     EVON MOSELEY : Elza rowena scott randy Sojuan cali, waaxda luqadaha, qaybta kaalmada adera, edin junein hayaajohn ayalajuan carlos joe christin melgoza. So [facility] [phone].    ATENCIÓN: Si habla español, tiene a russell disposición servicios gratuitos de asistencia lingüística. Llame al [phone].    We comply with applicable federal civil rights laws and [place] laws. We do not discriminate on the basis of race, color, national origin, age, disability, sex, sexual orientation, or gender identity.            Thank you!     Thank you for choosing [facility]  for your care. Our goal is always to provide you with excellent care. Hearing back from our patients is one way we can continue to improve our services. Please take a few minutes to complete the written survey that you may receive in the mail after your visit with us. Thank you!             Your Updated Medication List - Protect others around you: Learn how to safely use, store and throw away your medicines at www.disposemymeds.org.          This list is accurate as of 4/26/18  2:32 PM.  Always use your most recent med list.                   Brand Name Dispense Instructions for use Diagnosis    acetaminophen 650 MG CR tablet    TYLENOL    60 tablet    Take 1 tablet (650 mg) by mouth every 8 hours as needed    Osteoarthritis, unspecified osteoarthritis type, unspecified site       aspirin 325 MG tablet     120 tablet    Take 1 tablet (325 mg) by mouth daily    Coronary artery disease without angina pectoris, unspecified vessel or lesion type, unspecified whether native or transplanted heart       B-D U/F 31G X 8 MM   Generic drug:  insulin pen needle     600 each    USE 5 TO 6 PEN NEEDLES DAILY OR AS  DIRECTED    Diabetes mellitus, type 2 (H)       benzocaine-menthol 15-3.6 MG lozenge    CEPACOL    30 lozenge    Place 1 lozenge inside cheek every hour as needed for sore throat    Simple chronic bronchitis (H)       blood glucose monitoring meter device kit    no brand specified    1 kit    Use to test blood sugar 5 times daily due to patient being on sliding scale.    Type 2 diabetes mellitus without complication, with long-term current use of insulin (H)       blood glucose monitoring test strip    ONETOUCH ULTRA    300 each    Use to test blood sugar 5 times daily due to patient being on sliding scale.    Diabetes mellitus (H)       candesartan 16 MG tablet    ATACAND    90 tablet    TAKE 1 TABLET BY MOUTH EVERY DAY (AM)    Essential hypertension       CERTAVITE SENIOR/ANTIOXIDANT Tabs     31 tablet    TAKE ONE TABLET BY MOUTH EVERY DAY    Health care maintenance       cholecalciferol 1000 UNIT tablet    vitamin D3    90 tablet    TAKE 1 TABLET BY MOUTH ONCE DAILY (AM)    Vitamin deficiency       Cranberry 500 MG Caps     90 capsule    Take 1 capsule (500 mg) by mouth daily    Hypertrophy of prostate without urinary obstruction       DIGOX 125 MCG tablet   Generic drug:  digoxin     90 tablet    TAKE 1 TABLET BY MOUTH EVERY MORNING    Essential hypertension, benign       Doxylamine-DM 6.25-15 MG/15ML Liqd     236 mL    Taking as pkg directions at night    Osteomyelitis of foot, right, acute (H), Other insomnia       fluticasone-salmeterol 250-50 MCG/DOSE diskus inhaler    ADVAIR DISKUS    3 Inhaler    USE 1 INHALATION TWO TIMES A DAY IN THE MORNING AND IN THE EVENING APPROXIMATELY 12 HOURS APART    Simple chronic bronchitis (H)       furosemide 40 MG tablet    LASIX    90 tablet    TAKE ONE TABLET BY MOUTH EVERY DAY (AM)    Benign essential hypertension       HYDROcodone-acetaminophen 5-325 MG per tablet    NORCO    30 tablet    TAKE 1 TABLET BY MOUTH EVERY 6 HOURS AS NEEDED FOR MODERATE TO SEVRE PAIN    Back  "contusion, right, initial encounter       * insulin detemir 100 UNIT/ML injection    LEVEMIR     INJECT 35 UNITS EVERY MORNING AND INJECT 10 UNITS AT BEDTIME        * insulin detemir 100 UNIT/ML injection    LEVEMIR VIAL    30 mL    Inject 40 units every morning and inject 15 units at bed time    Type 2 diabetes mellitus without complication, with long-term current use of insulin (H)       insulin syringe-needle U-100 31G X 5/16\" 1 ML    ULTICARE INSULIN SYRINGE    300 each    Use 1 syringe with each injection five times daily    Type 2 diabetes mellitus without complication, with long-term current use of insulin (H)       Magnesium Oxide 250 MG Tabs     30 tablet    TAKE 1 TABLET BY MOUTH ONCE DAILY (AM)    Permanent atrial fibrillation (H)       metoprolol succinate 50 MG 24 hr tablet    TOPROL-XL    90 tablet    Take 1 tablet (50 mg) by mouth daily    HTN (hypertension)       * NovoLOG VIAL 100 UNITS/ML injection   Generic drug:  insulin aspart     30 mL    INJECT 4-10 UNITS SUBQ THREE TIMES DAILY WITH MEALS. GLUCOSE < / =  =0U, 150-200=4U, 201-255=5U, 251-300=6U, 301-349=8U, 350-500=10U,    Type 2 diabetes mellitus without complication, with long-term current use of insulin (H)       * insulin aspart 100 UNITS/ML injection    NovoLOG VIAL    30 mL    4-10 units 3 times daily with meals. Glucose less than or equal to149 don't give insulin. 150-200=4U, 201-255=5U, 251-300=6U, 301-349=8U, 350-500=10U. If over 500 call the Doctor.    Type 2 diabetes mellitus without complication, with long-term current use of insulin (H)       ranitidine 150 MG tablet    ZANTAC    180 tablet    TAKE ONE TABLET BY MOUTH TWICE DAILY    Gastroesophageal reflux disease without esophagitis       simvastatin 80 MG tablet    ZOCOR    90 tablet    TAKE 1 TABLET DAILY IN THE EVENING    Pure hypercholesterolemia       warfarin 2 MG tablet    COUMADIN    260 tablet    Take 4 mg Mon/Fri; 6mg all other days or as directed by [facility]    Chronic atrial fibrillation (H)       * Notice:  This list has 4 medication(s) that are the same as other medications prescribed for you. Read the directions carefully, and ask your doctor or other care provider to review them with you.

## 2018-04-26 NOTE — LETTER
April 26, 2018      Adiel Rosa [facility]   [facility] [address]        Dear Adiel,   Results for orders placed or performed in visit on 04/26/18   Hemoglobin A1c   Result Value Ref Range    Hemoglobin A1C 10.7 (H) 0 - 6.4 %   Estimated Average Glucose   Result Value Ref Range    Estimated Average Glucose 260 mg/dL   '    Recheck labs in 3 months, orders have been placed.          Sincerely,        GAURAV Grijalva MD

## 2018-04-26 NOTE — PATIENT INSTRUCTIONS
At your visit today, we discussed your risk for falls and preventive options.    Fall Prevention  Falls often occur due to slipping, tripping or losing your balance. Millions of people fall every year and injure themselves. Here are ways to reduce your risk of falling again.    Think about your fall, was there anything that caused your fall that can be fixed, removed, or replaced?    Make your home safe by keeping walkways clear of objects you may trip over.    Use non-slip pads under rugs. Do not use area rugs or small throw rugs.    Use non-slip mats in bathtubs and showers.    Install handrails and lights on staircases.    Do not walk in poorly lit areas.    Do not stand on chairs or wobbly ladders.    Use caution when reaching overhead or looking upward. This position can cause a loss of balance.    Be sure your shoes fit properly, have non-slip bottoms and are in good condition.     Wear shoes both inside and out. Avoid going barefoot or wearing slippers.    Be cautious when going up and down stairs, curbs, and when walking on uneven sidewalks.    If your balance is poor, consider using a cane or walker.    If your fall was related to alcohol use, stop or limit alcohol intake.     If your fall was related to use of sleeping medicines, talk to your doctor about this. You may need to reduce your dosage at bedtime if you awaken during the night to go to the bathroom.      To reduce the need for nighttime bathroom trips:  ? Avoid drinking fluids for several hours before going to bed  ? Empty your bladder before going to bed  ? Men can keep a urinal at the bedside    Stay as active as you can. Balance, flexibility, strength, and endurance all come from exercise. They all play a role in preventing falls. Ask your healthcare provider which types of activity are right for you.    Get your vision checked on a regular basis.    If you have pets, know where they are before you stand up or walk so you don't trip over  them.    Use night lights.  Date Last Reviewed: 11/5/2015 2000-2017 [employer]. [address] [place] [address], [place] [address]. All rights reserved. This information is not intended as a substitute for professional medical care. Always follow your healthcare professional's instructions.

## 2018-04-26 NOTE — NURSING NOTE
Chief Complaint   Patient presents with     Diabetes       Initial BP 94/62  Pulse 70  Temp 98.2  F (36.8  C) (Tympanic)  Wt 194 lb 6 oz (88.2 kg)  SpO2 94%  BMI 26.36 kg/m2 Estimated body mass index is 26.36 kg/(m^2) as calculated from the following:    Height as of 5/5/17: 6' (1.829 m).    Weight as of this encounter: 194 lb 6 oz (88.2 kg).  Medication Reconciliation: complete     Albertina Lopez

## 2018-05-01 ENCOUNTER — TELEPHONE (OUTPATIENT)
Dept: FAMILY MEDICINE | Facility: OTHER | Age: 83
End: 2018-05-01

## 2018-05-01 DIAGNOSIS — E56.9 VITAMIN DEFICIENCY: ICD-10-CM

## 2018-05-01 DIAGNOSIS — E11.9 TYPE 2 DIABETES MELLITUS WITHOUT COMPLICATION, WITH LONG-TERM CURRENT USE OF INSULIN (H): ICD-10-CM

## 2018-05-01 DIAGNOSIS — I10 ESSENTIAL HYPERTENSION: ICD-10-CM

## 2018-05-01 DIAGNOSIS — Z00.00 HEALTH CARE MAINTENANCE: ICD-10-CM

## 2018-05-01 DIAGNOSIS — Z79.4 TYPE 2 DIABETES MELLITUS WITHOUT COMPLICATION, WITH LONG-TERM CURRENT USE OF INSULIN (H): ICD-10-CM

## 2018-05-01 NOTE — TELEPHONE ENCOUNTER
[facility] with [facility] to [facility] novolog medication. Order pending. Also confirmed with Swati Nurse [facility] about dosing. Orders for historical. Verbal given over the phone today   BUSHRA MUÑIZ

## 2018-05-02 RX ORDER — CANDESARTAN 16 MG/1
TABLET ORAL
Qty: 90 TABLET | Refills: 1 | Status: SHIPPED | OUTPATIENT
Start: 2018-05-02 | End: 2018-05-15

## 2018-05-02 RX ORDER — MULTIVIT-MIN/FA/LYCOPEN/LUTEIN .4-300-25
TABLET ORAL
Qty: 31 TABLET | Refills: 3 | Status: ON HOLD | OUTPATIENT
Start: 2018-05-02 | End: 2019-01-01

## 2018-05-02 RX ORDER — CHOLECALCIFEROL (VITAMIN D3) 25 MCG
TABLET ORAL
Qty: 90 TABLET | Refills: 0 | Status: ON HOLD | OUTPATIENT
Start: 2018-05-02 | End: 2019-01-01

## 2018-05-02 NOTE — TELEPHONE ENCOUNTER
candesartan (ATACAND) 16 MG tablet      Last Written Prescription Date:  02/09/2018  Last Fill Quantity: 90,   # refills: 0  Last Office Visit: 04/26/2018  Future Office visit:       Routing refill request to provider for review/approval because:     VITAMIN D3 1000 UNITS tablet     Last Written Prescription Date:  02/09/18  Last Fill Quantity: 90,   # refills: 0  Last Office Visit: 04/26/2018  Future Office visit:       Routing refill request to provider for review/approval because:    Multiple Vitamins-Minerals (CERTAVITE SENIOR/ANTIOXIDANT) TABS  Last Written Prescription Date:  03/12/2018  Last Fill Quantity: 31,   # refills: 0  Last Office Visit: 04/26/18  Future Office visit:       Routing refill request to provider for review/approval because:

## 2018-05-02 NOTE — TELEPHONE ENCOUNTER
candesartan (ATACAND) 16 MG tablet      Last Written Prescription Date:  02/09/2018  Last Fill Quantity: 90,   # refills: 0  Last Office Visit: 04/26/2018  Future Office visit:       Angiotensin-II Receptors Failed  candesartan (ATACAND) 16 MG tablet [Pharmacy Med Name: CANDESARTAN CILEXETIL 16 MG TABLET]       Rerun Protocol (5/2/2018 11:14 AM)        Normal serum creatinine on file in past 12 months           Recent Labs   Lab Test  02/27/17   1529   CR  1.44*                  Normal serum potassium on file in past 12 months           Recent Labs   Lab Test  02/27/17   1529   POTASSIUM  4.8

## 2018-05-04 NOTE — ADDENDUM NOTE
Encounter addended by: Linh Sol OT on: 5/4/2018  8:34 AM<BR>     Actions taken: Sign clinical note, Flowsheet accepted, Episode resolved

## 2018-05-04 NOTE — PROGRESS NOTES
Outpatient Occupational Therapy Discharge Note     Patient: Adiel Rosa Jr  : 1935    Beginning/End Dates of Reporting Period:  2018 to 2018    Referring Provider: Dr. Ellis    Therapy Diagnosis:Reverse total shoulder    Client Self Report:   Patient had his group home staff call and cancel all appointments stating he did not want any more therapy.     Objective Measurements: None taken due to patient only being seen for initial evaluation only.         Goals:     Goal Identifier ADLs   Goal Description Pt. will reach 120 degrees or more of shoulder flexion to reach into cupboard for glass   Target Date 05/10/18   Date Met      Progress: Not met     Goal Identifier ADLs   Goal Description Pt. will reach 45+ degrees of active external rotation for increae ease donning shirt and jacket   Target Date 18   Date Met      Progress:    Not met     Goal Identifier IADLS   Goal Description   Patient will be able to carry up to 20 #    Target Date 18   Date Met      Progress:    Not met         Progress Toward Goals:   Progress limited due to Patient was seen for initial evaluation only   Not assessed this period.     Plan:  Discharge from therapy.    Discharge:    Reason for Discharge: Patient chooses to discontinue therapy.     Discharge Plan: Patient to continue home program.    No G-Codes need due to unplanned discharge.

## 2018-05-07 ENCOUNTER — ANTICOAGULATION THERAPY VISIT (OUTPATIENT)
Dept: ANTICOAGULATION | Facility: OTHER | Age: 83
End: 2018-05-07
Payer: MEDICARE

## 2018-05-07 DIAGNOSIS — Z79.01 LONG-TERM (CURRENT) USE OF ANTICOAGULANTS: ICD-10-CM

## 2018-05-07 DIAGNOSIS — I48.20 CHRONIC ATRIAL FIBRILLATION (H): ICD-10-CM

## 2018-05-07 LAB — INR PPP: 1.8

## 2018-05-07 NOTE — PROGRESS NOTES
ANTICOAGULATION FOLLOW-UP CLINIC VISIT    Patient Name:  Adiel Rosa Jr  Date:  5/7/2018  Contact Type:  New warfarin dosing/INR recheck date faxed to assisted living facility    SUBJECTIVE:     Patient Findings     Positives No Problem Findings    Comments INR done by assisted living nurse and result faxed to warfarin clinic. Per nursing communication sheet, patient has no bleeding/bruising nor changes in diet/meds/activity. New warfarin dosing and INR recheck date faxed to assisted living facility. Staff to notify warfarin clinic if any bleeding/bruising, changes in diet/meds/activity or questions           OBJECTIVE    INR   Date Value Ref Range Status   05/07/2018 1.8  Final       ASSESSMENT / PLAN  INR assessment SUB    Recheck INR In: 2 WEEKS    INR Location [facility]      Anticoagulation Summary as of 5/7/2018     INR goal 2.0-3.0   Today's INR 1.8!   Maintenance plan 4 mg (2 mg x 2) on Mon, Wed, Fri; 6 mg (2 mg x 3) all other days   Full instructions 5/7: 7 mg; Otherwise 4 mg on Mon, Wed, Fri; 6 mg all other days   Weekly total 36 mg   Plan last modified Jessa Storey RN (3/12/2018)   Next INR check 5/21/2018   Priority INR   Target end date Indefinite    Indications   Chronic atrial fibrillation (H) [I48.2]  Long-term (current) use of anticoagulants [Z79.01] [Z79.01]         Anticoagulation Episode Summary     INR check location     Preferred lab     Send INR reminders to [facility] POOL    Comments [facility] assisted living, Fax   done with homecare      Anticoagulation Care Providers     Provider Role Specialty Phone number    GAURAV Grijalva MD [facility] [phone]            See the Encounter Report to view Anticoagulation Flowsheet and Dosing Calendar (Go to Encounters tab in chart review, and find the Anticoagulation Therapy Visit)        Jessa Storey, RN

## 2018-05-07 NOTE — MR AVS SNAPSHOT
Adiel VILLATORO Moe    5/7/2018   Anticoagulation Therapy Visit    Description:  82 year old male   Provider:  GAUARV Grijalva MD   Department:  Hc Anti Coagulation           INR as of 5/7/2018     Today's INR 1.8!      Anticoagulation Summary as of 5/7/2018     INR goal 2.0-3.0   Today's INR 1.8!   Full instructions 5/7: 7 mg; Otherwise 4 mg on Mon, Wed, Fri; 6 mg all other days   Next INR check 5/21/2018    Indications   Chronic atrial fibrillation (H) [I48.2]  Long-term (current) use of anticoagulants [Z79.01] [Z79.01]         May 2018 Details    Sun Mon Tue Wed Thu Fri Sat       1               2               3               4               5                 6               7      7 mg   See details      8      6 mg         9      4 mg         10      6 mg         11      4 mg         12      6 mg           13      6 mg         14      4 mg         15      6 mg         16      4 mg         17      6 mg         18      4 mg         19      6 mg           20      6 mg         21            22               23               24               25               26                 27               28               29               30               31                  Date Details   05/07 This INR check       Date of next INR:  5/21/2018         How to take your warfarin dose     To take:  4 mg Take 2 of the 2 mg tablets.    To take:  6 mg Take 3 of the 2 mg tablets.    To take:  7 mg Take 3.5 of the 2 mg tablets.

## 2018-05-14 DIAGNOSIS — E11.8 TYPE 2 DIABETES MELLITUS WITH COMPLICATION, UNSPECIFIED LONG TERM INSULIN USE STATUS: ICD-10-CM

## 2018-05-15 ENCOUNTER — TELEPHONE (OUTPATIENT)
Dept: FAMILY MEDICINE | Facility: OTHER | Age: 83
End: 2018-05-15

## 2018-05-15 DIAGNOSIS — I10 ESSENTIAL HYPERTENSION, BENIGN: ICD-10-CM

## 2018-05-15 DIAGNOSIS — J41.0 SIMPLE CHRONIC BRONCHITIS (H): ICD-10-CM

## 2018-05-15 DIAGNOSIS — E78.00 PURE HYPERCHOLESTEROLEMIA: ICD-10-CM

## 2018-05-15 DIAGNOSIS — K21.9 GASTROESOPHAGEAL REFLUX DISEASE WITHOUT ESOPHAGITIS: ICD-10-CM

## 2018-05-15 DIAGNOSIS — Z79.4 TYPE 2 DIABETES MELLITUS WITHOUT COMPLICATION, WITH LONG-TERM CURRENT USE OF INSULIN (H): ICD-10-CM

## 2018-05-15 DIAGNOSIS — I10 ESSENTIAL HYPERTENSION: ICD-10-CM

## 2018-05-15 DIAGNOSIS — I48.20 CHRONIC ATRIAL FIBRILLATION (H): ICD-10-CM

## 2018-05-15 DIAGNOSIS — E11.9 TYPE 2 DIABETES MELLITUS WITHOUT COMPLICATION, WITH LONG-TERM CURRENT USE OF INSULIN (H): ICD-10-CM

## 2018-05-15 DIAGNOSIS — I10 HTN (HYPERTENSION): ICD-10-CM

## 2018-05-15 DIAGNOSIS — I10 BENIGN ESSENTIAL HYPERTENSION: ICD-10-CM

## 2018-05-15 RX ORDER — WARFARIN SODIUM 2 MG/1
TABLET ORAL
Qty: 260 TABLET | Refills: 3 | Status: SHIPPED | OUTPATIENT
Start: 2018-05-15 | End: 2019-01-01

## 2018-05-15 RX ORDER — SIMVASTATIN 80 MG
TABLET ORAL
Qty: 90 TABLET | Refills: 1 | Status: SHIPPED | OUTPATIENT
Start: 2018-05-15 | End: 2018-01-01

## 2018-05-15 RX ORDER — FUROSEMIDE 40 MG
TABLET ORAL
Qty: 90 TABLET | Refills: 1 | Status: SHIPPED | OUTPATIENT
Start: 2018-05-15 | End: 2018-01-01

## 2018-05-15 RX ORDER — DIGOXIN 125 MCG
125 TABLET ORAL EVERY MORNING
Qty: 90 TABLET | Refills: 1 | Status: SHIPPED | OUTPATIENT
Start: 2018-05-15 | End: 2018-01-01

## 2018-05-15 RX ORDER — CANDESARTAN 16 MG/1
TABLET ORAL
Qty: 90 TABLET | Refills: 1 | Status: SHIPPED | OUTPATIENT
Start: 2018-05-15 | End: 2018-01-01

## 2018-05-15 RX ORDER — METOPROLOL SUCCINATE 50 MG/1
50 TABLET, EXTENDED RELEASE ORAL DAILY
Qty: 90 TABLET | Refills: 2 | Status: SHIPPED | OUTPATIENT
Start: 2018-05-15 | End: 2019-01-01

## 2018-05-15 NOTE — TELEPHONE ENCOUNTER
10:21 AM    Reason for Call: Phone Call    Description: Swati from [facility] called and stated they sent a fax on Friday regarding changing pt over to [facility]. She would like to see if you received this fax. Tried to ask her which prescriptions she wanted sent to [facility] so I could sent this encounter to the Refill Line, but she did not want to list them all and asked for nurse to call. Please call her back at [phone]    Was an appointment offered for this call? No  If yes : Appointment type              Date    Preferred method for responding to this message: Telephone Call  What is your phone number ?    If we cannot reach you directly, may we leave a detailed response at the number you provided? Yes    Can this message wait until your PCP/provider returns, if available today? Not applicable    Nannette Lopes

## 2018-05-21 ENCOUNTER — TELEPHONE (OUTPATIENT)
Dept: FAMILY MEDICINE | Facility: OTHER | Age: 83
End: 2018-05-21

## 2018-05-21 ENCOUNTER — ANTICOAGULATION THERAPY VISIT (OUTPATIENT)
Dept: ANTICOAGULATION | Facility: OTHER | Age: 83
End: 2018-05-21
Payer: MEDICARE

## 2018-05-21 DIAGNOSIS — Z79.01 LONG-TERM (CURRENT) USE OF ANTICOAGULANTS: ICD-10-CM

## 2018-05-21 DIAGNOSIS — I48.20 CHRONIC ATRIAL FIBRILLATION (H): ICD-10-CM

## 2018-05-21 LAB — INR PPP: 1.8

## 2018-05-21 NOTE — TELEPHONE ENCOUNTER
8:49 AM    Reason for Call: Phone Call    Description: June from [facility] called and stated they need to verify if the Warfarin was for a 90 day supply or not? Pt usually gets the 90 day supply. Please call them back at [phone] and use reference number [id number]    Was an appointment offered for this call? No  If yes : Appointment type              Date    Preferred method for responding to this message: Telephone Call  What is your phone number ?    If we cannot reach you directly, may we leave a detailed response at the number you provided? Yes    Can this message wait until your PCP/provider returns, if available today? Not applicable    Nannette Lopes

## 2018-05-21 NOTE — PROGRESS NOTES
ANTICOAGULATION FOLLOW-UP CLINIC VISIT    Patient Name:  Adiel Rosa Jr  Date:  5/21/2018  Contact Type:  New warfarin dosing/INR recheck date faxed to assisted living facility    SUBJECTIVE:     Patient Findings     Positives No Problem Findings    Comments INR done by assisted living nurse and result faxed to warfarin clinic. Per nursing communication sheet, patient has no bleeding/bruiisng nor changes in diet/meds/activity. New warfarin dosing and INR recheck date faxed to assisted living facility. Staff to notify warfarin clinic if patient has any bleeding/bruising, changes in diet/meds/activity or questions           OBJECTIVE    INR   Date Value Ref Range Status   05/21/2018 1.8  Final       ASSESSMENT / PLAN  INR assessment SUB    Recheck INR In: 2 WEEKS    INR Location [facility]      Anticoagulation Summary as of 5/21/2018     INR goal 2.0-3.0   Today's INR 1.8!   Maintenance plan 4 mg (2 mg x 2) on Mon, Wed, Fri; 6 mg (2 mg x 3) all other days   Full instructions 5/21: 10 mg; Otherwise 4 mg on Mon, Wed, Fri; 6 mg all other days   Weekly total 36 mg   Plan last modified Jessa Storey RN (3/12/2018)   Next INR check 6/4/2018   Priority INR   Target end date Indefinite    Indications   Chronic atrial fibrillation (H) [I48.2]  Long-term (current) use of anticoagulants [Z79.01] [Z79.01]         Anticoagulation Episode Summary     INR check location     Preferred lab     Send INR reminders to [facility] POOL    Comments [facility] assisted [facility], Fax   done with homecare      Anticoagulation Care Providers     Provider Role Specialty Phone number    GAURAV Grijalva MD [facility] [phone]            See the Encounter Report to view Anticoagulation Flowsheet and Dosing Calendar (Go to Encounters tab in chart review, and find the Anticoagulation Therapy Visit)        Jessa Storey, RN

## 2018-05-21 NOTE — MR AVS SNAPSHOT
Adiel Rosa    5/21/2018   Anticoagulation Therapy Visit    Description:  82 year old male   Provider:  GAURAV Grijalva MD   Department:  Hc Anti Coagulation           INR as of 5/21/2018     Today's INR 1.8!      Anticoagulation Summary as of 5/21/2018     INR goal 2.0-3.0   Today's INR 1.8!   Full instructions 5/21: 10 mg; Otherwise 4 mg on Mon, Wed, Fri; 6 mg all other days   Next INR check 6/4/2018    Indications   Chronic atrial fibrillation (H) [I48.2]  Long-term (current) use of anticoagulants [Z79.01] [Z79.01]         May 2018 Details    Sun Mon Tue Wed Thu Fri Sat       1               2               3               4               5                 6               7               8               9               10               11               12                 13               14               15               16               17               18               19                 20               21      10 mg   See details      22      6 mg         23      4 mg         24      6 mg         25      4 mg         26      6 mg           27      6 mg         28      4 mg         29      6 mg         30      4 mg         31      6 mg            Date Details   05/21 This INR check               How to take your warfarin dose     To take:  4 mg Take 2 of the 2 mg tablets.    To take:  6 mg Take 3 of the 2 mg tablets.    To take:  10 mg Take 5 of the 2 mg tablets.           June 2018 Details    Sun Mon Tue Wed Thu Fri Sat          1      4 mg         2      6 mg           3      6 mg         4            5               6               7               8               9                 10               11               12               13               14               15               16                 17               18               19               20               21               22               23                 24               25               26               27               28                29 30                Date Details   No additional details    Date of next INR:  6/4/2018         How to take your warfarin dose     To take:  4 mg Take 2 of the 2 mg tablets.    To take:  6 mg Take 3 of the 2 mg tablets.

## 2018-06-04 ENCOUNTER — ANTICOAGULATION THERAPY VISIT (OUTPATIENT)
Dept: ANTICOAGULATION | Facility: OTHER | Age: 83
End: 2018-06-04
Payer: MEDICARE

## 2018-06-04 DIAGNOSIS — Z79.01 LONG-TERM (CURRENT) USE OF ANTICOAGULANTS: ICD-10-CM

## 2018-06-04 DIAGNOSIS — I48.20 CHRONIC ATRIAL FIBRILLATION (H): ICD-10-CM

## 2018-06-04 LAB — INR PPP: 2.1

## 2018-06-04 NOTE — MR AVS SNAPSHOT
Adiel VILLATORO Moe Reis   6/4/2018   Anticoagulation Therapy Visit    Description:  82 year old male   Provider:  GAURAV Grijalva MD   Department:  Hc Anti Coagulation           INR as of 6/4/2018     Today's INR 2.1      Anticoagulation Summary as of 6/4/2018     INR goal 2.0-3.0   Today's INR 2.1   Full warfarin instructions 4 mg on Mon, Wed, Fri; 6 mg all other days   Next INR check 6/18/2018    Indications   Chronic atrial fibrillation (H) [I48.2]  Long-term (current) use of anticoagulants [Z79.01] [Z79.01]         June 2018 Details    Sun Mon Tue Wed Thu Fri Sat          1               2                 3               4      4 mg   See details      5      6 mg         6      4 mg         7      6 mg         8      4 mg         9      6 mg           10      6 mg         11      4 mg         12      6 mg         13      4 mg         14      6 mg         15      4 mg         16      6 mg           17      6 mg         18            19               20               21               22               23                 24               25               26               27               28               29               30                Date Details   06/04 This INR check       Date of next INR:  6/18/2018         How to take your warfarin dose     To take:  4 mg Take 2 of the 2 mg tablets.    To take:  6 mg Take 3 of the 2 mg tablets.

## 2018-06-04 NOTE — PROGRESS NOTES
ANTICOAGULATION FOLLOW-UP CLINIC VISIT    Patient Name:  Adiel Rosa Jr  Date:  6/4/2018  Contact Type:  New warfarin dosing/INR recheck date faxed to assisted living facility.    SUBJECTIVE:     Patient Findings     Positives No Problem Findings    Comments INR done by assisted living staff and result faxed to warfarin clinic. Per nursing communication sheet, patient has no bleeding/brusing and no changes in diet/meds/activity. New warfarin dosing and INR recheck date faxed back to [facility] facility. Staff to notify warfarin clinic nurse if patient has any new changes in diet/meds/activity, bleeding or bruising.           OBJECTIVE    INR   Date Value Ref Range Status   06/04/2018 2.1  Final       ASSESSMENT / PLAN  INR assessment THER    Recheck INR In: 2 WEEKS    INR Location [facility]      Anticoagulation Summary as of 6/4/2018     INR goal 2.0-3.0   Today's INR 2.1   Warfarin maintenance plan 4 mg (2 mg x 2) on Mon, Wed, Fri; 6 mg (2 mg x 3) all other days   Full warfarin instructions 4 mg on Mon, Wed, Fri; 6 mg all other days   Weekly warfarin total 36 mg   No change documented Jessa Storey RN   Plan last modified Jessa Storey RN (3/12/2018)   Next INR check 6/18/2018   Priority INR   Target end date Indefinite    Indications   Chronic atrial fibrillation (H) [I48.2]  Long-term (current) use of anticoagulants [Z79.01] [Z79.01]         Anticoagulation Episode Summary     INR check location     Preferred lab     Send INR reminders to [facility] POOL    Comments [facility] assisted [facility], Fax   done with homecare      Anticoagulation Care Providers     Provider Role Specialty Phone number    GAURAV Grijalva MD [facility] [phone]            See the Encounter Report to view Anticoagulation Flowsheet and Dosing Calendar (Go to Encounters tab in chart review, and find the Anticoagulation Therapy Visit)        Jessa Storey RN

## 2018-06-11 ENCOUNTER — ANTICOAGULATION THERAPY VISIT (OUTPATIENT)
Dept: ANTICOAGULATION | Facility: OTHER | Age: 83
End: 2018-06-11
Payer: MEDICARE

## 2018-06-11 DIAGNOSIS — Z79.01 LONG-TERM (CURRENT) USE OF ANTICOAGULANTS: ICD-10-CM

## 2018-06-11 DIAGNOSIS — I48.20 CHRONIC ATRIAL FIBRILLATION (H): ICD-10-CM

## 2018-06-11 NOTE — MR AVS SNAPSHOT
Adiel VILLATORO Moe Reis   6/11/2018   Anticoagulation Therapy Visit    Description:  82 year old male   Provider:  GAURAV Grijalva MD   Department:  Hc Anti Coagulation           INR as of 6/11/2018     Today's INR No new INR was available at the time of this encounter.      Anticoagulation Summary as of 6/11/2018     INR goal 2.0-3.0   Today's INR No new INR was available at the time of this encounter.   Full warfarin instructions 6/11: 6 mg; Otherwise 4 mg on Mon, Wed, Fri; 6 mg all other days   Next INR check 6/18/2018    Indications   Chronic atrial fibrillation (H) [I48.2]  Long-term (current) use of anticoagulants [Z79.01] [Z79.01]         June 2018 Details    Sun Mon Tue Wed Thu Fri Sat          1               2                 3               4               5               6               7               8               9                 10               11      6 mg   See details      12      6 mg         13      4 mg         14      6 mg         15      4 mg         16      6 mg           17      6 mg         18            19               20               21               22               23                 24               25               26               27               28               29               30                Date Details   06/11 This INR check       Date of next INR:  6/18/2018         How to take your warfarin dose     To take:  4 mg Take 2 of the 2 mg tablets.    To take:  6 mg Take 3 of the 2 mg tablets.

## 2018-06-11 NOTE — PROGRESS NOTES
ANTICOAGULATION FOLLOW-UP CLINIC VISIT    Patient Name:  Adiel Rosa Jr  Date:  6/11/2018  Contact Type:  New warfarin dosing faxed to assisted living    SUBJECTIVE:     Patient Findings     Positives Missed doses    Comments Call and message received from Swati at [facility]. She states that patient missed warfarin dose 6 days ago. Call returned and we discussed new warfarin dosing. New warfarin dosing sheet faxed to [facility].            OBJECTIVE    INR   Date Value Ref Range Status   06/04/2018 2.1  Final       ASSESSMENT / PLAN  No question data found.  Anticoagulation Summary as of 6/11/2018     INR goal 2.0-3.0   Today's INR No new INR was available at the time of this encounter.   Warfarin maintenance plan 4 mg (2 mg x 2) on Mon, Wed, Fri; 6 mg (2 mg x 3) all other days   Full warfarin instructions 6/11: 6 mg; Otherwise 4 mg on Mon, Wed, Fri; 6 mg all other days   Weekly warfarin total 36 mg   Plan last modified Jessa Storey RN (3/12/2018)   Next INR check 6/18/2018   Priority INR   Target end date Indefinite    Indications   Chronic atrial fibrillation (H) [I48.2]  Long-term (current) use of anticoagulants [Z79.01] [Z79.01]         Anticoagulation Episode Summary     INR check location     Preferred lab     Send INR reminders to [facility] POOL    Comments [facility] assisted [facility], Fax   done with homecare      Anticoagulation Care Providers     Provider Role Specialty Phone number    GAURAV Grijalva MD [facility] [phone]            See the Encounter Report to view Anticoagulation Flowsheet and Dosing Calendar (Go to Encounters tab in chart review, and find the Anticoagulation Therapy Visit)        Jessa Storey RN

## 2018-06-18 ENCOUNTER — ANTICOAGULATION THERAPY VISIT (OUTPATIENT)
Dept: ANTICOAGULATION | Facility: OTHER | Age: 83
End: 2018-06-18
Payer: MEDICARE

## 2018-06-18 DIAGNOSIS — Z79.01 LONG-TERM (CURRENT) USE OF ANTICOAGULANTS: ICD-10-CM

## 2018-06-18 DIAGNOSIS — I48.20 CHRONIC ATRIAL FIBRILLATION (H): ICD-10-CM

## 2018-06-18 LAB — INR PPP: 1.5

## 2018-06-18 NOTE — MR AVS SNAPSHOT
Adiel VILLATORO Moe    6/18/2018   Anticoagulation Therapy Visit    Description:  83 year old male   Provider:  GAURAV Grijalva MD   Department:  Hc Anti Coagulation           INR as of 6/18/2018     Today's INR 1.5!      Anticoagulation Summary as of 6/18/2018     INR goal 2.0-3.0   Today's INR 1.5!   Full warfarin instructions 6/18: 8 mg; Otherwise 4 mg on Mon, Wed, Fri; 6 mg all other days   Next INR check 6/25/2018    Indications   Chronic atrial fibrillation (H) [I48.2]  Long-term (current) use of anticoagulants [Z79.01] [Z79.01]         June 2018 Details    Sun Mon Tue Wed Thu Fri Sat          1               2                 3               4               5               6               7               8               9                 10               11               12               13               14               15               16                 17               18      8 mg   See details      19      6 mg         20      4 mg         21      6 mg         22      4 mg         23      6 mg           24      6 mg         25            26               27               28               29               30                Date Details   06/18 This INR check       Date of next INR:  6/25/2018         How to take your warfarin dose     To take:  4 mg Take 2 of the 2 mg tablets.    To take:  6 mg Take 3 of the 2 mg tablets.    To take:  8 mg Take 4 of the 2 mg tablets.

## 2018-06-18 NOTE — PROGRESS NOTES
ANTICOAGULATION FOLLOW-UP CLINIC VISIT    Patient Name:  Adiel Rosa Jr  Date:  6/18/2018  Contact Type:  New warfarin dosing/INR recheck date faxed to assisted living facility    SUBJECTIVE:     Patient Findings     Positives Missed doses    Comments INR done by assisted living staff and and result faxed to warfarin clinic. Per nursing communication sheet, patient missed one 6mg dose of warfarin and got 6mg instead of 4mg on 6/11/18. No bleeding/bruising and no changes in diet/meds/activity. New warfarin dosing/INR recheck date faxed to assisted living facility. Staff to notify warfarin clinic if patient has any bleeding/bruising, changes in diet/meds/activity or questions.            OBJECTIVE    INR   Date Value Ref Range Status   06/18/2018 1.5  Final       ASSESSMENT / PLAN  INR assessment SUB missed dose   Recheck INR In: 1 WEEK    INR Location [facility]      Anticoagulation Summary as of 6/18/2018     INR goal 2.0-3.0   Today's INR 1.5!   Warfarin maintenance plan 4 mg (2 mg x 2) on Mon, Wed, Fri; 6 mg (2 mg x 3) all other days   Full warfarin instructions 6/18: 8 mg; Otherwise 4 mg on Mon, Wed, Fri; 6 mg all other days   Weekly warfarin total 36 mg   Plan last modified Jessa Storey RN (3/12/2018)   Next INR check 6/25/2018   Priority INR   Target end date Indefinite    Indications   Chronic atrial fibrillation (H) [I48.2]  Long-term (current) use of anticoagulants [Z79.01] [Z79.01]         Anticoagulation Episode Summary     INR check location     Preferred lab     Send INR reminders to  ANTICOAG POOL    Comments [facility], Fax   done with homecare      Anticoagulation Care Providers     Provider Role Specialty Phone number    GAURAV Grijalva MD [facility] [phone]            See the Encounter Report to view Anticoagulation Flowsheet and Dosing Calendar (Go to Encounters tab in chart review, and find the Anticoagulation Therapy Visit)        Jessa  SASKIA Storey, RN

## 2018-06-25 ENCOUNTER — ANTICOAGULATION THERAPY VISIT (OUTPATIENT)
Dept: ANTICOAGULATION | Facility: OTHER | Age: 83
End: 2018-06-25
Payer: MEDICARE

## 2018-06-25 DIAGNOSIS — Z79.01 LONG-TERM (CURRENT) USE OF ANTICOAGULANTS: ICD-10-CM

## 2018-06-25 DIAGNOSIS — I48.20 CHRONIC ATRIAL FIBRILLATION (H): ICD-10-CM

## 2018-06-25 LAB — INR PPP: 1.6

## 2018-06-25 NOTE — PROGRESS NOTES
ANTICOAGULATION FOLLOW-UP CLINIC VISIT    Patient Name:  Adiel Rosa Jr  Date:  6/25/2018  Contact Type:  Telephone/ Fax from [facility]    SUBJECTIVE:     Patient Findings     Positives No Problem Findings    Comments Per fax from [facility].           OBJECTIVE    INR   Date Value Ref Range Status   06/25/2018 1.6  Final       ASSESSMENT / PLAN  INR assessment SUB    Recheck INR In: 1 WEEK    INR Location [facility]      Anticoagulation Summary as of 6/25/2018     INR goal 2.0-3.0   Today's INR 1.6!   Warfarin maintenance plan 4 mg (2 mg x 2) on Mon, Wed, Fri; 6 mg (2 mg x 3) all other days   Full warfarin instructions 6/25: 8 mg; 6/26: 8 mg; Otherwise 4 mg on Mon, Wed, Fri; 6 mg all other days   Weekly warfarin total 36 mg   Plan last modified Jessa Storey RN (3/12/2018)   Next INR check 7/2/2018   Priority INR   Target end date Indefinite    Indications   Chronic atrial fibrillation (H) [I48.2]  Long-term (current) use of anticoagulants [Z79.01] [Z79.01]         Anticoagulation Episode Summary     INR check location     Preferred lab     Send INR reminders to [facility] ANTICOAG POOL    Comments [facility], Fax   done with homecare      Anticoagulation Care Providers     Provider Role Specialty Phone number    GAURAV Grijalva MD [facility] [phone]            See the Encounter Report to view Anticoagulation Flowsheet and Dosing Calendar (Go to Encounters tab in chart review, and find the Anticoagulation Therapy Visit)    INR result, Warfarin dosing, and INR recheck date faxed back to [facility].    FRANDY ALANIZ RN

## 2018-06-25 NOTE — MR AVS SNAPSHOT
Adiel Rosa    6/25/2018   Anticoagulation Therapy Visit    Description:  83 year old male   Provider:  GAURAV Grijalva MD   Department:  Hc Anti Coagulation           INR as of 6/25/2018     Today's INR 1.6!      Anticoagulation Summary as of 6/25/2018     INR goal 2.0-3.0   Today's INR 1.6!   Full warfarin instructions 6/25: 8 mg; 6/26: 8 mg; Otherwise 4 mg on Mon, Wed, Fri; 6 mg all other days   Next INR check 7/2/2018    Indications   Chronic atrial fibrillation (H) [I48.2]  Long-term (current) use of anticoagulants [Z79.01] [Z79.01]         June 2018 Details    Sun Mon Tue Wed Thu Fri Sat          1               2                 3               4               5               6               7               8               9                 10               11               12               13               14               15               16                 17               18               19               20               21               22               23                 24               25      8 mg   See details      26      8 mg         27      4 mg         28      6 mg         29      4 mg         30      6 mg          Date Details   06/25 This INR check               How to take your warfarin dose     To take:  4 mg Take 2 of the 2 mg tablets.    To take:  6 mg Take 3 of the 2 mg tablets.    To take:  8 mg Take 4 of the 2 mg tablets.           July 2018 Details    Sun Mon Tue Wed Thu Fri Sat     1      6 mg         2            3               4               5               6               7                 8               9               10               11               12               13               14                 15               16               17               18               19               20               21                 22               23               24               25               26               27               28                 29               30                31                    Date Details   No additional details    Date of next INR:  7/2/2018         How to take your warfarin dose     To take:  4 mg Take 2 of the 2 mg tablets.    To take:  6 mg Take 3 of the 2 mg tablets.

## 2018-07-02 ENCOUNTER — ANTICOAGULATION THERAPY VISIT (OUTPATIENT)
Dept: ANTICOAGULATION | Facility: OTHER | Age: 83
End: 2018-07-02
Payer: MEDICARE

## 2018-07-02 DIAGNOSIS — Z79.01 LONG-TERM (CURRENT) USE OF ANTICOAGULANTS: ICD-10-CM

## 2018-07-02 DIAGNOSIS — I48.20 CHRONIC ATRIAL FIBRILLATION (H): ICD-10-CM

## 2018-07-02 LAB — INR PPP: 2

## 2018-07-02 NOTE — PROGRESS NOTES
ANTICOAGULATION FOLLOW-UP CLINIC VISIT    Patient Name:  Adiel Roas Jr  Date:  7/2/2018  Contact Type:  FAX - [facility]    SUBJECTIVE:     Patient Findings     Positives No Problem Findings    Comments POCT INR drawn today by [facility] staff.  Prior Coumadin dosing information as well as other pertinent information received by fax from [facility], namely the AC Communication form today.  Patient's last 7 days of warfarin dosing reviewed and although the patient is therapeutic; an adjustment in the weekly dosage of warfarin will be changed due to fluctuations.  INR results/Coumadin dosing and INR recheck information written out and faxed to the Assisted Living Facility.  Staff informed to contact the AC clinic with any question/concerns or changes to the pt's overall plan of care until the next INR recheck.             OBJECTIVE    INR   Date Value Ref Range Status   07/02/2018 2.0  Final       ASSESSMENT / PLAN  INR assessment THER    Recheck INR In: 3 WEEKS    [facility]      Anticoagulation Summary as of 7/2/2018     INR goal 2.0-3.0   Today's INR 2.0   Warfarin maintenance plan 4 mg (2 mg x 2) on Mon, Fri; 6 mg (2 mg x 3) all other days   Full warfarin instructions 4 mg on Mon, Fri; 6 mg all other days   Weekly warfarin total 38 mg   Plan last modified Jessa Gibson, RN (7/2/2018)   Next INR check 7/23/2018   Priority INR   Target end date Indefinite    Indications   Chronic atrial fibrillation (H) [I48.2]  Long-term (current) use of anticoagulants [Z79.01] [Z79.01]         Anticoagulation Episode Summary     INR check location     Preferred lab     Send INR reminders to  ANTICOAG POOL    Comments [facility], Fax   done with homecare      Anticoagulation Care Providers     Provider Role Specialty Phone number    GAURAV Grijalva MD [facility] [phone]             See the Encounter Report to view Anticoagulation Flowsheet and Dosing Calendar (Go to Encounters tab in chart review, and find the Anticoagulation Therapy Visit)        Jessa Gibson RN

## 2018-07-09 ENCOUNTER — TELEPHONE (OUTPATIENT)
Dept: FAMILY MEDICINE | Facility: OTHER | Age: 83
End: 2018-07-09

## 2018-07-09 DIAGNOSIS — S20.221A BACK CONTUSION, RIGHT, INITIAL ENCOUNTER: ICD-10-CM

## 2018-07-09 RX ORDER — HYDROCODONE BITARTRATE AND ACETAMINOPHEN 5; 325 MG/1; MG/1
TABLET ORAL
Qty: 30 TABLET | Refills: 0 | Status: SHIPPED | OUTPATIENT
Start: 2018-07-09 | End: 2018-09-13

## 2018-07-09 NOTE — TELEPHONE ENCOUNTER
Swati was contacted and stated that when [facility] was [facility] and he never got a new rx. [facility] is [facility], patient is completely out of medication and [facility] is aware PCP is out for the week and is wondering if there is anyway covering provider will fill medication this time. please advise. Medication pended.   Debo Soliz, CMA

## 2018-07-09 NOTE — TELEPHONE ENCOUNTER
11:59 AM    Reason for Call: Phone Call    Description: Swati from [facility] called and would like to see if she could get a call back regarding his HYDROcodone-acetaminophen (NORCO) 5-325 MG per tablet    Was an appointment offered for this call? No  If yes : Appointment type              Date    Preferred method for responding to this message: Telephone Call  What is your phone number ?[phone]    If we cannot reach you directly, may we leave a detailed response at the number you provided? Yes    Can this message wait until your PCP/provider returns, if available today? No, PCP is out     Evelyn Amos

## 2018-07-11 NOTE — TELEPHONE ENCOUNTER
Pt notified that the written RX is ready at the [facility]  registration to be picked up.   [facility] notified

## 2018-07-23 ENCOUNTER — ANTICOAGULATION THERAPY VISIT (OUTPATIENT)
Dept: ANTICOAGULATION | Facility: OTHER | Age: 83
End: 2018-07-23
Payer: MEDICARE

## 2018-07-23 DIAGNOSIS — Z79.01 LONG-TERM (CURRENT) USE OF ANTICOAGULANTS: ICD-10-CM

## 2018-07-23 DIAGNOSIS — I48.20 CHRONIC ATRIAL FIBRILLATION (H): ICD-10-CM

## 2018-07-23 LAB — INR PPP: 1.9

## 2018-07-23 NOTE — MR AVS SNAPSHOT
Adiel VILLATORO Moe    7/23/2018   Anticoagulation Therapy Visit    Description:  83 year old male   Provider:  GAURAV Grijalva MD   Department:  Hc Anti Coagulation           INR as of 7/23/2018     Today's INR 1.9!      Anticoagulation Summary as of 7/23/2018     INR goal 2.0-3.0   Today's INR 1.9!   Full warfarin instructions 7/23: 5 mg; Otherwise 4 mg on Mon, Fri; 6 mg all other days   Next INR check 8/20/2018    Indications   Chronic atrial fibrillation (H) [I48.2]  Long-term (current) use of anticoagulants [Z79.01] [Z79.01]         July 2018 Details    Sun Mon Tue Wed Thu Fri Sat     1               2               3               4               5               6               7                 8               9               10               11               12               13               14                 15               16               17               18               19               20               21                 22               23      5 mg   See details      24      6 mg         25      6 mg         26      6 mg         27      4 mg         28      6 mg           29      6 mg         30      4 mg         31      6 mg              Date Details   07/23 This INR check               How to take your warfarin dose     To take:  4 mg Take 2 of the 2 mg tablets.    To take:  5 mg Take 2.5 of the 2 mg tablets.    To take:  6 mg Take 3 of the 2 mg tablets.           August 2018 Details    Sun Mon Tue Wed Thu Fri Sat        1      6 mg         2      6 mg         3      4 mg         4      6 mg           5      6 mg         6      4 mg         7      6 mg         8      6 mg         9      6 mg         10      4 mg         11      6 mg           12      6 mg         13      4 mg         14      6 mg         15      6 mg         16      6 mg         17      4 mg         18      6 mg           19      6 mg         20            21               22               23               24                25                 26               27               28               29               30               31                 Date Details   No additional details    Date of next INR:  8/20/2018         How to take your warfarin dose     To take:  4 mg Take 2 of the 2 mg tablets.    To take:  6 mg Take 3 of the 2 mg tablets.

## 2018-07-23 NOTE — PROGRESS NOTES
ANTICOAGULATION FOLLOW-UP CLINIC VISIT    Patient Name:  Adiel Rosa Jr  Date:  7/23/2018  Contact Type:  New warfarin dosing/INR recheck date faxed to [facility].     SUBJECTIVE:     Patient Findings     Positives No Problem Findings    Comments INR done by assisted living nurse and result faxed to warfarin clinic. Per nursing communication sheet, patient has no bleeding/bruising, and no changes in diet/med/activity. New warfarin dosing and INR recheck date faxed to assisted living facility. Staff to notify warfarin clinic if patient has any bleeding/bruising, changes in diet/meds/activity or questions.            OBJECTIVE    INR   Date Value Ref Range Status   07/23/2018 1.9  Final       ASSESSMENT / PLAN  INR assessment THER    Recheck INR In: 4 WEEKS    INR Location [facility]      Anticoagulation Summary as of 7/23/2018     INR goal 2.0-3.0   Today's INR 1.9!   Warfarin maintenance plan 4 mg (2 mg x 2) on Mon, Fri; 6 mg (2 mg x 3) all other days   Full warfarin instructions 4 mg on Mon, Fri; 6 mg all other days   Weekly warfarin total 38 mg   Plan last modified Jessa Gibson RN (7/2/2018)   Next INR check 8/20/2018   Priority INR   Target end date Indefinite    Indications   Chronic atrial fibrillation (H) [I48.2]  Long-term (current) use of anticoagulants [Z79.01] [Z79.01]         Anticoagulation Episode Summary     INR check location     Preferred lab     Send INR reminders to [facility] ANTICOAG POOL    Comments [facility], Fax   done with homecare      Anticoagulation Care Providers     Provider Role Specialty Phone number    GAURAV Grijalva MD [facility] [phone]            See the Encounter Report to view Anticoagulation Flowsheet and Dosing Calendar (Go to Encounters tab in chart review, and find the Anticoagulation Therapy Visit)        Jessa Storey, RN

## 2018-08-01 ENCOUNTER — TELEPHONE (OUTPATIENT)
Dept: FAMILY MEDICINE | Facility: OTHER | Age: 83
End: 2018-08-01

## 2018-08-01 NOTE — TELEPHONE ENCOUNTER
2:45 PM    Reason for Call: Phone Call     Description: Pts blood sugar is over 500.     Was an appointment offered for this call? No   If yes : Appointment type              Date    Preferred method for responding to this message: Telephone Call  What is your phone number ?  [phone]  If we cannot reach you directly, may we leave a detailed response at the number you provided? Yes    Can this message wait until your PCP/provider returns, if available today? Not applicable,     Sana Kramer

## 2018-08-01 NOTE — TELEPHONE ENCOUNTER
Please see note below, staff to notify provider when Blood sugar is over 500   please advise   BUSHRA MUÑIZ

## 2018-08-01 NOTE — TELEPHONE ENCOUNTER
Called nursing back. BG running 200-500 over the last weeks. Rarely getting values in high 100s. Pt snacks most of the day in his room as well as eats provided meals. For now, increase levemir from 40mg q am and 15 q pm to 40 q am and 20 q pm. Has follow up appt with Dr. Grijalva. Will get A1C as well .  Gretel Marquez MD

## 2018-08-20 ENCOUNTER — ANTICOAGULATION THERAPY VISIT (OUTPATIENT)
Dept: ANTICOAGULATION | Facility: OTHER | Age: 83
End: 2018-08-20
Payer: MEDICARE

## 2018-08-20 DIAGNOSIS — I48.20 CHRONIC ATRIAL FIBRILLATION (H): ICD-10-CM

## 2018-08-20 DIAGNOSIS — Z79.01 LONG-TERM (CURRENT) USE OF ANTICOAGULANTS: ICD-10-CM

## 2018-08-20 LAB — INR PPP: 1.9

## 2018-08-20 NOTE — MR AVS SNAPSHOT
Adiel Rosa    8/20/2018   Anticoagulation Therapy Visit    Description:  83 year old male   Provider:  GAURAV Grijalva MD   Department:  Hc Anti Coagulation           INR as of 8/20/2018     Today's INR 1.9!      Anticoagulation Summary as of 8/20/2018     INR goal 2.0-3.0   Today's INR 1.9!   Full warfarin instructions 8/20: 6 mg; Otherwise 4 mg on Mon, Fri; 6 mg all other days   Next INR check 9/10/2018    Indications   Chronic atrial fibrillation (H) [I48.2]  Long-term (current) use of anticoagulants [Z79.01] [Z79.01]         August 2018 Details    Sun Mon Tue Wed Thu Fri Sat        1               2               3               4                 5               6               7               8               9               10               11                 12               13               14               15               16               17               18                 19               20      6 mg   See details      21      6 mg         22      6 mg         23      6 mg         24      4 mg         25      6 mg           26      6 mg         27      4 mg         28      6 mg         29      6 mg         30      6 mg         31      4 mg           Date Details   08/20 This INR check               How to take your warfarin dose     To take:  4 mg Take 2 of the 2 mg tablets.    To take:  6 mg Take 3 of the 2 mg tablets.           September 2018 Details    Sun Mon Tue Wed Thu Fri Sat           1      6 mg           2      6 mg         3      4 mg         4      6 mg         5      6 mg         6      6 mg         7      4 mg         8      6 mg           9      6 mg         10            11               12               13               14               15                 16               17               18               19               20               21               22                 23               24               25               26               27               28                29 30                      Date Details   No additional details    Date of next INR:  9/10/2018         How to take your warfarin dose     To take:  4 mg Take 2 of the 2 mg tablets.    To take:  6 mg Take 3 of the 2 mg tablets.

## 2018-08-20 NOTE — PROGRESS NOTES
ANTICOAGULATION FOLLOW-UP CLINIC VISIT    Patient Name:  Adiel Rosa Jr  Date:  8/20/2018  Contact Type:  New warfarin dosing/INR recheck date [facility] to [facility]    SUBJECTIVE:     Patient Findings     Positives No Problem Findings    Comments INR done by assisted living nurse and result faxed to warfarin clinic. Per nursing communication sheet, patient has no bleeding/bruising and no new changes in diet/meds/activity. New warfarin dosing and INR recheck date faxed to assisted living. Staff to notify warfarin clinic if patient has any bleeding/bruising, changes in diet/meds/activity or questions.            OBJECTIVE    INR   Date Value Ref Range Status   08/20/2018 1.9  Final       ASSESSMENT / PLAN  INR assessment THER    Recheck INR In: 3 WEEKS    INR Location [facility]      Anticoagulation Summary as of 8/20/2018     INR goal 2.0-3.0   Today's INR 1.9!   Warfarin maintenance plan 4 mg (2 mg x 2) on Mon, Fri; 6 mg (2 mg x 3) all other days   Full warfarin instructions 8/20: 6 mg; Otherwise 4 mg on Mon, Fri; 6 mg all other days   Weekly warfarin total 38 mg   Plan last modified Jessa Gibson RN (7/2/2018)   Next INR check 9/10/2018   Priority INR   Target end date Indefinite    Indications   Chronic atrial fibrillation (H) [I48.2]  Long-term (current) use of anticoagulants [Z79.01] [Z79.01]         Anticoagulation Episode Summary     INR check location     Preferred lab     Send INR reminders to [facility] ANTICOAG POOL    Comments [facility], Fax   done with homecare      Anticoagulation Care Providers     Provider Role Specialty Phone number    GAURAV Grijalva MD [facility] [phone]            See the Encounter Report to view Anticoagulation Flowsheet and Dosing Calendar (Go to Encounters tab in chart review, and find the Anticoagulation Therapy Visit)        Jessa Storey RN

## 2018-08-27 ENCOUNTER — TELEPHONE (OUTPATIENT)
Dept: FAMILY MEDICINE | Facility: OTHER | Age: 83
End: 2018-08-27

## 2018-08-27 DIAGNOSIS — E11.9 TYPE 2 DIABETES MELLITUS WITHOUT COMPLICATION, WITH LONG-TERM CURRENT USE OF INSULIN (H): Primary | ICD-10-CM

## 2018-08-27 DIAGNOSIS — Z79.4 TYPE 2 DIABETES MELLITUS WITHOUT COMPLICATION, WITH LONG-TERM CURRENT USE OF INSULIN (H): Primary | ICD-10-CM

## 2018-08-27 NOTE — TELEPHONE ENCOUNTER
Reason for call:  Medication    1. Medication Name? Lancets   2. Is this request for a refill? Yes  3. What Pharmacy do you use? [facility]  4. Have you contacted your pharmacy? No    5. If yes, when?  (Please note that the turn-around-time for prescriptions is 72 business hours; I am sending your request at this time. SEND TO  [facility]  )  Description: Pt has not had this filled at [facility] yet. Please call Wendy from [facility] back if you have any questions. Her number is [phone]  Was an appointment offered for this a call? No   Preferred method for responding to this messageTelephone Call  If we cannot reach you directly, may we leave a detailed response at the number you provided? Yes  Can this message wait until your PCP/Provider returns if not available today? No, PCP out

## 2018-08-29 DIAGNOSIS — E11.8 TYPE 2 DIABETES MELLITUS WITH COMPLICATION, UNSPECIFIED LONG TERM INSULIN USE STATUS: ICD-10-CM

## 2018-08-29 NOTE — TELEPHONE ENCOUNTER
Freestyle test strip  Last Office Visit: 4/26/18  Last Refill Date:5/14/18  # 300          Refills  1      Thank you!

## 2018-08-30 NOTE — TELEPHONE ENCOUNTER
Script not picked up dated 04/10/18 took out of file cabinet at  and destroyed   norco 5-325mg mg #30

## 2018-08-30 NOTE — PROGRESS NOTES
SUBJECTIVE:   Adiel Rosa Jr is a 83 year old male who presents to clinic today for the following health issues:      Diabetes Follow-up    Patient is checking blood sugars: four times daily.    Blood sugar testing frequency justification: Uncontrolled diabetes  Results are as follows:         am - varies, see chart patient brought         lunchtime -          suppertime -          bedtime -     Diabetic concerns: blood sugar frequently over 200     Symptoms of hypoglycemia (low blood sugar): shaky, dizzy, blurred vision     Paresthesias (numbness or burning in feet) or sores: No, balance is off      Date of last diabetic eye exam: over due  He follows his optometrist for his vision.  Foot has healed up.  Denies any chest pain shortness of breath  Diabetes Management Resources    Hyperlipidemia Follow-Up      Rate your low fat/cholesterol diet?: good    Taking statin?  Yes, no muscle aches from statin    Other lipid medications/supplements?:  none    Hypertension Follow-up      Outpatient blood pressures are being checked at home.  Results are been running between 140's/80-90.    Low Salt Diet: 1 gram sodium  His facility states he needs Tylenol sent into [facility]  BP Readings from Last 2 Encounters:   04/26/18 94/62   10/07/17 118/62     Hemoglobin A1C (%)   Date Value   04/26/2018 10.7 (H)   01/15/2017 8.4 (H)     LDL Cholesterol Calculated (mg/dL)   Date Value   04/23/2015 56   01/06/2014 53       Amount of exercise or physical activity: None    Problems taking medications regularly: No    Medication side effects: none    Diet: regular (no restrictions)            Problem list and histories reviewed & adjusted, as indicated.  Additional history: as documented    Patient Active Problem List   Diagnosis     Chronic atrial fibrillation (H)     Advanced care planning/counseling discussion     Hypercholesterolemia     Chronic renal disease, stage III     Benign hypertensive heart disease with heart  failure (H)     Hypercalcemia     Chronic obstructive pulmonary disease, unspecified COPD type (H)     Erectile Dysfunction     Hypertrophy of prostate without urinary obstruction     Esophageal reflux     Acute myocardial infarction of other specified sites, episode of care unspecified     Type 2 diabetes mellitus without complication, with long-term current use of insulin (H)     Cough     Hypertension, benign     Long-term (current) use of anticoagulants [Z79.01]     Hypoglycemia due to insulin     Primary prostate cancer identified by needle biopsy (T1c) (H)     Osteomyelitis of foot, right, acute (H)     Health Care Home     Chronic osteomyelitis of right foot (H)     Past Surgical History:   Procedure Laterality Date     reverse total arthoplasty of right shoulder   01/25/2018       Social History   Substance Use Topics     Smoking status: Never Smoker     Smokeless tobacco: Never Used     Alcohol use No     History reviewed. No pertinent family history.      Current Outpatient Prescriptions   Medication Sig Dispense Refill     acetaminophen (TYLENOL) 650 MG CR tablet Take 1 tablet (650 mg) by mouth every 8 hours as needed 100 tablet 5     aspirin 325 MG tablet Take 1 tablet (325 mg) by mouth daily 120 tablet 3     B-D U/F 31G X 8 MM insulin pen needle USE 5 TO 6 PEN NEEDLES DAILY OR AS DIRECTED 600 each 2     benzocaine-menthol (CEPACOL) 15-3.6 MG lozenge Place 1 lozenge inside cheek every hour as needed for sore throat 30 lozenge 3     blood glucose (NO BRAND SPECIFIED) lancets standard Use to test blood sugar three times daily or as directed. 100 each 11     blood glucose monitoring (FREESTYLE LITE) test strip Use to test blood sugars 5 times daily or as directed. 300 strip 1     blood glucose monitoring (NO BRAND SPECIFIED) meter device kit Use to test blood sugar 5 times daily due to patient being on sliding scale. 1 kit 0     candesartan (ATACAND) 16 MG tablet TAKE 1 TABLET BY MOUTH ONCE DAILY IN THE  "MORNING 90 tablet 1     Cranberry 500 MG CAPS Take 1 capsule (500 mg) by mouth daily 90 capsule 1     digoxin (DIGOX) 125 MCG tablet Take 1 tablet (125 mcg) by mouth every morning 90 tablet 1     Doxylamine-DM 6.25-15 MG/15ML LIQD Taking as pkg directions at night 236 mL 1     fluticasone-salmeterol (ADVAIR DISKUS) 250-50 MCG/DOSE diskus inhaler USE 1 INHALATION TWO TIMES A DAY IN THE MORNING AND IN THE EVENING APPROXIMATELY 12 HOURS APART 3 Inhaler 1     furosemide (LASIX) 40 MG tablet TAKE ONE TABLET BY MOUTH EVERY DAY (AM) 90 tablet 1     HYDROcodone-acetaminophen (NORCO) 5-325 MG per tablet TAKE 1 TABLET BY MOUTH EVERY 6 HOURS AS NEEDED FOR MODERATE TO SEVRE PAIN 30 tablet 0     insulin aspart (NOVOLOG VIAL) 100 UNITS/ML injection 4-10 units 3 times daily with meals. Glucose less than or equal to149 don't give insulin.  150-199=4U, 200-249=5U, 250-299=6U, 300-349=8U, 350 or greater=10U. If over 500 call the Doctor. 40 mL 3     insulin detemir (LEVEMIR VIAL) 100 UNIT/ML injection Inject 40 units every morning and inject 15 units at bed time 30 mL 3     insulin detemir (LEVEMIR) 100 UNIT/ML injection INJECT 35 UNITS EVERY MORNING AND INJECT 10 UNITS AT BEDTIME       insulin syringe-needle U-100 (ULTICARE INSULIN SYRINGE) 31G X 5/16\" 1 ML Use 1 syringe with each injection five times daily 300 each 3     Magnesium Oxide 250 MG TABS TAKE 1 TABLET BY MOUTH ONCE DAILY (AM) 30 tablet 1     metoprolol succinate (TOPROL-XL) 50 MG 24 hr tablet Take 1 tablet (50 mg) by mouth daily 90 tablet 2     Multiple Vitamins-Minerals (CERTAVITE SENIOR/ANTIOXIDANT) TABS TAKE 1 TABLET BY MOUTH ONCE DAILY (AM) 31 tablet 3     NOVOLOG VIAL 100 UNIT/ML soln INJECT 4-10 UNITS SUBQ THREE TIMES DAILY WITH MEALS. GLUCOSE < / =  =0U, 150-200=4U, 201-255=5U, 251-300=6U, 301-349=8U, 350-500=10U,  30 mL 3     ranitidine (ZANTAC) 150 MG tablet Take 1 tablet (150 mg) by mouth 2 times daily 180 tablet 1     simvastatin (ZOCOR) 80 MG tablet TAKE " 1 TABLET DAILY IN THE EVENING 90 tablet 1     VITAMIN D3 1000 units tablet TAKE 1 TABLET BY MOUTH EVERY MORNING 90 tablet 0     warfarin (COUMADIN) 2 MG tablet Take 4 mg Mon/Fri; 6mg all other days or as directed by [facility] 260 tablet 3     No Known Allergies  Recent Labs   Lab Test  04/26/18   1430  02/27/17   1529  01/15/17   0613  01/14/17   0611  09/14/16   04/23/15   0922   01/06/14   0806   A1C  10.7*   --   8.4*   --    --   7.3   < >  10.8*   < >  9.0*   LDL   --    --    --    --    --    --    --   56   --   53   HDL   --    --    --    --    --    --    --   36*   --   33*   TRIG   --    --    --    --    --    --    --   248*   --   237*   ALT   --   470*  11  11   < >   --    --    --    < >   --    CR   --   1.44*  1.31*  1.24   < >   --    < >  1.30*   < >  1.55*   GFRESTIMATED   --   47*  53*  56*   < >   --    < >  53*   < >  44*   GFRESTBLACK   --   57*  64  68   < >   --    < >  65   < >  53*   POTASSIUM   --   4.8  4.4  4.3   < >   --    < >  4.6   < >  4.3    < > = values in this interval not displayed.      BP Readings from Last 3 Encounters:   08/31/18 118/66   04/26/18 94/62   10/07/17 118/62    Wt Readings from Last 3 Encounters:   08/31/18 201 lb (91.2 kg)   04/26/18 194 lb 6 oz (88.2 kg)   10/07/17 190 lb 6 oz (86.4 kg)                    Reviewed and updated as needed this visit by clinical staff       Reviewed and updated as needed this visit by Provider         ROS:  Constitutional, HEENT, cardiovascular, pulmonary, GI, , musculoskeletal, neuro, skin, endocrine and psych systems are negative, except as otherwise noted.    OBJECTIVE:     /66  Pulse 66  Temp 97  F (36.1  C) (Tympanic)  Wt 201 lb (91.2 kg)  SpO2 96%  BMI 27.26 kg/m2  Body mass index is 27.26 kg/(m^2).  GENERAL: healthy, alert and no distress  EYES: Eyes grossly normal to inspection, PERRL and conjunctivae and sclerae normal  HENT: nose and mouth without ulcers or lesions  NECK: no adenopathy, no  asymmetry, masses, or scars and thyroid normal to palpation  RESP: lungs clear to auscultation - no rales, rhonchi or wheezes  CV: regular rate and rhythm, normal S1 S2, no S3 or S4, no murmur, click or rub, no peripheral edema and peripheral pulses strong  ABDOMEN: soft, nontender, no hepatosplenomegaly, no masses and bowel sounds normal  MS: no gross musculoskeletal defects noted, no edema  SKIN: no suspicious lesions or rashes, right foot sore has healed up  NEURO: Normal strength and tone, mentation intact and speech normal  PSYCH: mentation appears normal, affect normal    Diagnostic Test Results:  Pending    ASSESSMENT/PLAN:               ICD-10-CM    1. Type 2 diabetes mellitus without complication, with long-term current use of insulin (H) E11.9 TSH with free T4 reflex    Z79.4 Albumin Random Urine Quantitative with Creat Ratio     Hemoglobin A1c   2. Hypercholesterolemia E78.00    3. Hypertension, benign I10 Basic metabolic panel   4. Osteoarthritis, unspecified osteoarthritis type, unspecified site M19.90 acetaminophen (TYLENOL) 650 MG CR tablet   For the diabetes will check a TSH urine albumin and A1c.  Cannot do a lipid today since he was fasting.  The hypertension check a BMP for his osteoarthritis of Tylenol was refilled.        GAURAV Grijalva MD  [facility]

## 2018-08-31 ENCOUNTER — OFFICE VISIT (OUTPATIENT)
Dept: FAMILY MEDICINE | Facility: OTHER | Age: 83
End: 2018-08-31
Attending: FAMILY MEDICINE
Payer: MEDICARE

## 2018-08-31 VITALS
BODY MASS INDEX: 27.26 KG/M2 | SYSTOLIC BLOOD PRESSURE: 118 MMHG | WEIGHT: 201 LBS | DIASTOLIC BLOOD PRESSURE: 66 MMHG | HEART RATE: 66 BPM | OXYGEN SATURATION: 96 % | TEMPERATURE: 97 F

## 2018-08-31 DIAGNOSIS — E78.00 PURE HYPERCHOLESTEROLEMIA: ICD-10-CM

## 2018-08-31 DIAGNOSIS — Z79.4 TYPE 2 DIABETES MELLITUS WITHOUT COMPLICATION, WITH LONG-TERM CURRENT USE OF INSULIN (H): Primary | ICD-10-CM

## 2018-08-31 DIAGNOSIS — E11.9 TYPE 2 DIABETES MELLITUS WITHOUT COMPLICATION, WITH LONG-TERM CURRENT USE OF INSULIN (H): Primary | ICD-10-CM

## 2018-08-31 DIAGNOSIS — M19.90 OSTEOARTHRITIS, UNSPECIFIED OSTEOARTHRITIS TYPE, UNSPECIFIED SITE: ICD-10-CM

## 2018-08-31 DIAGNOSIS — I10 ESSENTIAL HYPERTENSION, BENIGN: ICD-10-CM

## 2018-08-31 LAB
ANION GAP SERPL CALCULATED.3IONS-SCNC: 7 MMOL/L (ref 3–14)
BUN SERPL-MCNC: 24 MG/DL (ref 7–30)
CALCIUM SERPL-MCNC: 8 MG/DL (ref 8.5–10.1)
CHLORIDE SERPL-SCNC: 100 MMOL/L (ref 94–109)
CO2 SERPL-SCNC: 30 MMOL/L (ref 20–32)
CREAT SERPL-MCNC: 1.42 MG/DL (ref 0.66–1.25)
CREAT UR-MCNC: 57 MG/DL
EST. AVERAGE GLUCOSE BLD GHB EST-MCNC: 269 MG/DL
GFR SERPL CREATININE-BSD FRML MDRD: 48 ML/MIN/1.7M2
GLUCOSE SERPL-MCNC: 385 MG/DL (ref 70–99)
HBA1C MFR BLD: 11 % (ref 0–5.6)
MICROALBUMIN UR-MCNC: 43 MG/L
MICROALBUMIN/CREAT UR: 75.04 MG/G CR (ref 0–17)
POTASSIUM SERPL-SCNC: 4.2 MMOL/L (ref 3.4–5.3)
SODIUM SERPL-SCNC: 137 MMOL/L (ref 133–144)
TSH SERPL DL<=0.005 MIU/L-ACNC: 2.03 MU/L (ref 0.4–4)

## 2018-08-31 PROCEDURE — 82043 UR ALBUMIN QUANTITATIVE: CPT | Mod: ZL | Performed by: FAMILY MEDICINE

## 2018-08-31 PROCEDURE — 40000788 ZZHCL STATISTIC ESTIMATED AVERAGE GLUCOSE: Mod: ZL | Performed by: FAMILY MEDICINE

## 2018-08-31 PROCEDURE — 80048 BASIC METABOLIC PNL TOTAL CA: CPT | Mod: ZL | Performed by: FAMILY MEDICINE

## 2018-08-31 PROCEDURE — 84443 ASSAY THYROID STIM HORMONE: CPT | Mod: ZL | Performed by: FAMILY MEDICINE

## 2018-08-31 PROCEDURE — 99214 OFFICE O/P EST MOD 30 MIN: CPT | Performed by: FAMILY MEDICINE

## 2018-08-31 PROCEDURE — 36415 COLL VENOUS BLD VENIPUNCTURE: CPT | Mod: ZL | Performed by: FAMILY MEDICINE

## 2018-08-31 PROCEDURE — 83036 HEMOGLOBIN GLYCOSYLATED A1C: CPT | Mod: ZL | Performed by: FAMILY MEDICINE

## 2018-08-31 PROCEDURE — G0463 HOSPITAL OUTPT CLINIC VISIT: HCPCS

## 2018-08-31 RX ORDER — SENNOSIDES 8.6 MG
650 CAPSULE ORAL EVERY 8 HOURS PRN
Qty: 100 TABLET | Refills: 5 | Status: ON HOLD | OUTPATIENT
Start: 2018-08-31 | End: 2019-01-01

## 2018-08-31 ASSESSMENT — PAIN SCALES - GENERAL: PAINLEVEL: SEVERE PAIN (7)

## 2018-08-31 NOTE — NURSING NOTE
Chief Complaint   Patient presents with     Diabetes     Lipids     Hypertension       Initial /66  Pulse 66  Temp 97  F (36.1  C) (Tympanic)  Wt 201 lb (91.2 kg)  SpO2 96%  BMI 27.26 kg/m2 Estimated body mass index is 27.26 kg/(m^2) as calculated from the following:    Height as of 5/5/17: 6' (1.829 m).    Weight as of this encounter: 201 lb (91.2 kg).  Medication Reconciliation: complete    Albertina Lopez MA

## 2018-08-31 NOTE — MR AVS SNAPSHOT
"              After Visit Summary   8/31/2018    Adiel Rosa Jr    MRN: [id number]           Patient Information     Date Of Birth          1935        Visit Information        Provider Department      8/31/2018 2:15 PM GAURAV Grijalva MD [facility]        Today's Diagnoses     Type 2 diabetes mellitus without complication, with long-term current use of insulin (H)    -  1    Hypercholesterolemia        Hypertension, benign        Osteoarthritis, unspecified osteoarthritis type, unspecified site          Care Instructions    We will call with the labs.            Follow-ups after your visit        Who to contact     If you have questions or need follow up information about today's clinic visit or your schedule please contact [facility] directly at [phone].  Normal or non-critical lab and imaging results will be communicated to you by MyChart, letter or phone within 4 business days after the clinic has received the results. If you do not hear from us within 7 days, please contact the clinic through MyChart or phone. If you have a critical or abnormal lab result, we will notify you by phone as soon as possible.  Submit refill requests through [employer] or call your pharmacy and they will forward the refill request to us. Please allow 3 business days for your refill to be completed.          Additional Information About Your Visit        MyChart Information     [employer] lets you send messages to your doctor, view your test results, renew your prescriptions, schedule appointments and more. To sign up, go to [email or web address] . Click on \"Log in\" on the left side of the screen, which will take you to the Welcome page. Then click on \"Sign up Now\" on the right side of the page.     You will be asked to enter the access code listed below, as well as some personal information. Please follow the directions to create your username and password.     Your access code is: " [email or web address]  Expires: 2018  2:25 PM     Your access code will  in 90 days. If you need help or a new code, please call your [place] clinic or [phone].        Care EveryWhere ID     This is your Care EveryWhere ID. This could be used by other organizations to access your [place] medical records  [id number]        Your Vitals Were     Pulse Temperature Pulse Oximetry BMI (Body Mass Index)          66 97  F (36.1  C) (Tympanic) 96% 27.26 kg/m2         Blood Pressure from Last 3 Encounters:   18 118/66   18 94/62   10/07/17 118/62    Weight from Last 3 Encounters:   18 201 lb (91.2 kg)   18 194 lb 6 oz (88.2 kg)   10/07/17 190 lb 6 oz (86.4 kg)              We Performed the Following     Albumin Random Urine Quantitative with Creat Ratio     Basic metabolic panel     Hemoglobin A1c     TSH with free T4 reflex          Where to get your medicines      These medications were sent to [facility] [address] [address]     Phone:  [phone]     acetaminophen 650 MG CR tablet          Primary Care Provider Office Phone # Fax #    R Hao Grijalva [phone]       [facility] [id number]        Equal Access to Services     EVON MOSELEY : Hadii rowena maganao Sobrady, waaxda luqadaha, qaybta kaaledin kingsley . So [facility] [phone].    ATENCIÓN: Si habla español, tiene a russell disposición servicios gratuitos de asistencia lingüística. Sheree al [phone].    We comply with applicable federal civil rights laws and [place] laws. We do not discriminate on the basis of race, color, national origin, age, disability, sex, sexual orientation, or gender identity.            Thank you!     Thank you for choosing [facility]  for your care. Our goal is always to provide you with excellent care. Hearing back from our  patients is one way we can continue to improve our services. Please take a few minutes to complete the written survey that you may receive in the mail after your visit with us. Thank you!             Your Updated Medication List - Protect others around you: Learn how to safely use, store and throw away your medicines at www.disposemymeds.org.          This list is accurate as of 8/31/18  2:25 PM.  Always use your most recent med list.                   Brand Name Dispense Instructions for use Diagnosis    acetaminophen 650 MG CR tablet    TYLENOL    100 tablet    Take 1 tablet (650 mg) by mouth every 8 hours as needed    Osteoarthritis, unspecified osteoarthritis type, unspecified site       aspirin 325 MG tablet     120 tablet    Take 1 tablet (325 mg) by mouth daily    Coronary artery disease without angina pectoris, unspecified vessel or lesion type, unspecified whether native or transplanted heart       B-D U/F 31G X 8 MM   Generic drug:  insulin pen needle     600 each    USE 5 TO 6 PEN NEEDLES DAILY OR AS DIRECTED    Diabetes mellitus, type 2 (H)       benzocaine-menthol 15-3.6 MG lozenge    CEPACOL    30 lozenge    Place 1 lozenge inside cheek every hour as needed for sore throat    Simple chronic bronchitis (H)       blood glucose lancets standard    no brand specified    100 each    Use to test blood sugar three times daily or as directed.    Type 2 diabetes mellitus without complication, with long-term current use of insulin (H)       blood glucose monitoring meter device kit    no brand specified    1 kit    Use to test blood sugar 5 times daily due to patient being on sliding scale.    Type 2 diabetes mellitus without complication, with long-term current use of insulin (H)       blood glucose monitoring test strip    FREESTYLE LITE    300 strip    Use to test blood sugars 5 times daily or as directed.    Type 2 diabetes mellitus with complication, unspecified long term insulin use status (H)        "candesartan 16 MG tablet    ATACAND    90 tablet    TAKE 1 TABLET BY MOUTH ONCE DAILY IN THE MORNING    Essential hypertension       CERTAVITE SENIOR/ANTIOXIDANT Tabs     31 tablet    TAKE 1 TABLET BY MOUTH ONCE DAILY (AM)    Health care maintenance       cholecalciferol 1000 UNIT tablet    vitamin D3    90 tablet    TAKE 1 TABLET BY MOUTH EVERY MORNING    Vitamin deficiency       Cranberry 500 MG Caps     90 capsule    Take 1 capsule (500 mg) by mouth daily    Hypertrophy of prostate without urinary obstruction       digoxin 125 MCG tablet    DIGOX    90 tablet    Take 1 tablet (125 mcg) by mouth every morning    Essential hypertension, benign       Doxylamine-DM 6.25-15 MG/15ML Liqd     236 mL    Taking as pkg directions at night    Osteomyelitis of foot, right, acute (H), Other insomnia       fluticasone-salmeterol 250-50 MCG/DOSE diskus inhaler    ADVAIR DISKUS    3 Inhaler    USE 1 INHALATION TWO TIMES A DAY IN THE MORNING AND IN THE EVENING APPROXIMATELY 12 HOURS APART    Simple chronic bronchitis (H)       furosemide 40 MG tablet    LASIX    90 tablet    TAKE ONE TABLET BY MOUTH EVERY DAY (AM)    Benign essential hypertension       HYDROcodone-acetaminophen 5-325 MG per tablet    NORCO    30 tablet    TAKE 1 TABLET BY MOUTH EVERY 6 HOURS AS NEEDED FOR MODERATE TO SEVRE PAIN    Back contusion, right, initial encounter       * insulin detemir 100 UNIT/ML injection    LEVEMIR     INJECT 35 UNITS EVERY MORNING AND INJECT 10 UNITS AT BEDTIME        * insulin detemir 100 UNIT/ML injection    LEVEMIR VIAL    30 mL    Inject 40 units every morning and inject 15 units at bed time    Type 2 diabetes mellitus without complication, with long-term current use of insulin (H)       insulin syringe-needle U-100 31G X 5/16\" 1 ML    ULTICARE INSULIN SYRINGE    300 each    Use 1 syringe with each injection five times daily    Type 2 diabetes mellitus without complication, with long-term current use of insulin (H)       " Magnesium Oxide 250 MG Tabs     30 tablet    TAKE 1 TABLET BY MOUTH ONCE DAILY (AM)    Permanent atrial fibrillation (H)       metoprolol succinate 50 MG 24 hr tablet    TOPROL-XL    90 tablet    Take 1 tablet (50 mg) by mouth daily    Essential hypertension       * NovoLOG VIAL 100 UNITS/ML injection   Generic drug:  insulin aspart     30 mL    INJECT 4-10 UNITS SUBQ THREE TIMES DAILY WITH MEALS. GLUCOSE < / =  =0U, 150-200=4U, 201-255=5U, 251-300=6U, 301-349=8U, 350-500=10U,    Type 2 diabetes mellitus without complication, with long-term current use of insulin (H)       * insulin aspart 100 UNITS/ML injection    NovoLOG VIAL    40 mL    4-10 units 3 times daily with meals. Glucose less than or equal to149 don't give insulin. 150-199=4U, 200-249=5U, 250-299=6U, 300-349=8U, 350 or greater=10U. If over 500 call the Doctor.    Type 2 diabetes mellitus without complication, with long-term current use of insulin (H)       ranitidine 150 MG tablet    ZANTAC    180 tablet    Take 1 tablet (150 mg) by mouth 2 times daily    Gastroesophageal reflux disease without esophagitis       simvastatin 80 MG tablet    ZOCOR    90 tablet    TAKE 1 TABLET DAILY IN THE EVENING    Pure hypercholesterolemia       warfarin 2 MG tablet    COUMADIN    260 tablet    Take 4 mg Mon/Fri; 6mg all other days or as directed by [facility]    Chronic atrial fibrillation (H)       * Notice:  This list has 4 medication(s) that are the same as other medications prescribed for you. Read the directions carefully, and ask your doctor or other care provider to review them with you.

## 2018-09-04 ENCOUNTER — TELEPHONE (OUTPATIENT)
Dept: FAMILY MEDICINE | Facility: OTHER | Age: 83
End: 2018-09-04

## 2018-09-04 DIAGNOSIS — Z79.4 TYPE 2 DIABETES MELLITUS WITHOUT COMPLICATION, WITH LONG-TERM CURRENT USE OF INSULIN (H): Primary | ICD-10-CM

## 2018-09-04 DIAGNOSIS — E11.9 TYPE 2 DIABETES MELLITUS WITHOUT COMPLICATION, WITH LONG-TERM CURRENT USE OF INSULIN (H): Primary | ICD-10-CM

## 2018-09-10 ENCOUNTER — ANTICOAGULATION THERAPY VISIT (OUTPATIENT)
Dept: ANTICOAGULATION | Facility: OTHER | Age: 83
End: 2018-09-10
Payer: MEDICARE

## 2018-09-10 DIAGNOSIS — Z79.01 LONG-TERM (CURRENT) USE OF ANTICOAGULANTS: ICD-10-CM

## 2018-09-10 DIAGNOSIS — I48.20 CHRONIC ATRIAL FIBRILLATION (H): ICD-10-CM

## 2018-09-10 LAB — INR PPP: 1.8

## 2018-09-10 NOTE — PROGRESS NOTES
ANTICOAGULATION FOLLOW-UP CLINIC VISIT    Patient Name:  Adiel Rosa Jr  Date:  9/10/2018  Contact Type:  New warfarin dosing/INR recheck date faxed to [facility]    SUBJECTIVE:     Patient Findings     Positives No Problem Findings    Comments INR done by assisted living nurse and result, faxed to warfarin clinic. Per nursing communication sheet, patient has no bleeding/bruising and no new changes in diet/meds/activity. New warfarin dosing/INR recheck date faxed to assisted living facility. Staff to notify warfarin clinic if patient has any bleeding/bruising, changes in diet/meds/activity or questions.            OBJECTIVE    INR   Date Value Ref Range Status   09/10/2018 1.8  Final       ASSESSMENT / PLAN  INR assessment SUB    Recheck INR In: 2 WEEKS    INR Location [facility]      Anticoagulation Summary as of 9/10/2018     INR goal 2.0-3.0   Today's INR 1.8!   Warfarin maintenance plan 4 mg (2 mg x 2) on Mon, Fri; 6 mg (2 mg x 3) all other days   Full warfarin instructions 9/10: 7 mg; Otherwise 4 mg on Mon, Fri; 6 mg all other days   Weekly warfarin total 38 mg   Plan last modified Jessa Gibson RN (7/2/2018)   Next INR check 9/24/2018   Priority INR   Target end date Indefinite    Indications   Chronic atrial fibrillation (H) [I48.2]  Long-term (current) use of anticoagulants [Z79.01] [Z79.01]         Anticoagulation Episode Summary     INR check location     Preferred lab     Send INR reminders to [facility] ANTICOAG POOL    Comments [facility], Fax   done with homecare      Anticoagulation Care Providers     Provider Role Specialty Phone number    GAURAV Grijalva MD [facility] [phone]            See the Encounter Report to view Anticoagulation Flowsheet and Dosing Calendar (Go to Encounters tab in chart review, and find the Anticoagulation Therapy Visit)        Jessa Storey RN

## 2018-09-10 NOTE — MR AVS SNAPSHOT
Adiel VILLATORO Moe    9/10/2018   Anticoagulation Therapy Visit    Description:  83 year old male   Provider:  GAURAV Grijalva MD   Department:  [facility] Anti Coagulation           INR as of 9/10/2018     Today's INR 1.8!      Anticoagulation Summary as of 9/10/2018     INR goal 2.0-3.0   Today's INR 1.8!   Full warfarin instructions 9/10: 7 mg; Otherwise 4 mg on Mon, Fri; 6 mg all other days   Next INR check 9/24/2018    Indications   Chronic atrial fibrillation (H) [I48.2]  Long-term (current) use of anticoagulants [Z79.01] [Z79.01]         September 2018 Details    Sun Mon Tue Wed Thu Fri Sat           1                 2               3               4               5               6               7               8                 9               10      7 mg   See details      11      6 mg         12      6 mg         13      6 mg         14      4 mg         15      6 mg           16      6 mg         17      4 mg         18      6 mg         19      6 mg         20      6 mg         21      4 mg         22      6 mg           23      6 mg         24            25               26               27               28               29                 30                      Date Details   09/10 This INR check       Date of next INR:  9/24/2018         How to take your warfarin dose     To take:  4 mg Take 2 of the 2 mg tablets.    To take:  6 mg Take 3 of the 2 mg tablets.    To take:  7 mg Take 3.5 of the 2 mg tablets.

## 2018-09-13 DIAGNOSIS — S20.221A BACK CONTUSION, RIGHT, INITIAL ENCOUNTER: ICD-10-CM

## 2018-09-13 RX ORDER — HYDROCODONE BITARTRATE AND ACETAMINOPHEN 5; 325 MG/1; MG/1
TABLET ORAL
Qty: 30 TABLET | Refills: 0 | Status: ON HOLD | OUTPATIENT
Start: 2018-09-13 | End: 2019-01-01

## 2018-09-13 NOTE — TELEPHONE ENCOUNTER
[facility]      Last Written Prescription Date:  7/9/18  Last Fill Quantity: 30,   # refills: 0  Last Office Visit: 8/31/18  Future Office visit:       Routing refill request to provider for review/approval because:  Drug not on the [facility], [facility] or [facility] refill protocol or controlled substance

## 2018-09-25 ENCOUNTER — ANTICOAGULATION THERAPY VISIT (OUTPATIENT)
Dept: ANTICOAGULATION | Facility: OTHER | Age: 83
End: 2018-09-25
Payer: MEDICARE

## 2018-09-25 DIAGNOSIS — I48.20 CHRONIC ATRIAL FIBRILLATION (H): ICD-10-CM

## 2018-09-25 DIAGNOSIS — Z79.01 LONG-TERM (CURRENT) USE OF ANTICOAGULANTS: ICD-10-CM

## 2018-09-25 LAB — INR PPP: 2.4

## 2018-09-25 NOTE — PROGRESS NOTES
ANTICOAGULATION FOLLOW-UP CLINIC VISIT    Patient Name:  Adiel Rosa Jr  Date:  9/25/2018  Contact Type:  New warfarin dosing/INR recheck date [facility]ed to [facility]    SUBJECTIVE:     Patient Findings     Positives No Problem Findings    Comments INR done by assisted living nurse and result faxed to warfarin clinic. Per nursing communication sheet, patient has no bleeding/bruising and no new changes in diet/meds/activity. New warfarin dosing/INR recheck date faxed to [facility]. Staff to notify warfarin clinic if patient has any bleeding/bruising, changes in diet/meds/activity or questions.            OBJECTIVE    INR   Date Value Ref Range Status   09/25/2018 2.4  Final       ASSESSMENT / PLAN  INR assessment THER    Recheck INR In: 3 WEEKS    INR Location [facility]      Anticoagulation Summary as of 9/25/2018     INR goal 2.0-3.0   Today's INR 2.4   Warfarin maintenance plan 4 mg (2 mg x 2) on Mon, Fri; 6 mg (2 mg x 3) all other days   Full warfarin instructions 4 mg on Mon, Fri; 6 mg all other days   Weekly warfarin total 38 mg   No change documented Jessa Storey RN   Plan last modified Jessa Gibson RN (7/2/2018)   Next INR check 10/16/2018   Priority INR   Target end date Indefinite    Indications   Chronic atrial fibrillation (H) [I48.2]  Long-term (current) use of anticoagulants [Z79.01] [Z79.01]         Anticoagulation Episode Summary     INR check location     Preferred lab     Send INR reminders to [facility] ANTICOAG POOL    Comments [facility], Fax   done with homecare      Anticoagulation Care Providers     Provider Role Specialty Phone number    GAURAV Grijalva MD [facility] [phone]            See the Encounter Report to view Anticoagulation Flowsheet and Dosing Calendar (Go to Encounters tab in chart review, and find the Anticoagulation Therapy Visit)        Jessa Storey RN

## 2018-09-25 NOTE — MR AVS SNAPSHOT
Adiel VILLATORO Moe    9/25/2018   Anticoagulation Therapy Visit    Description:  83 year old male   Provider:  GAURAV Grijalva MD   Department:  [facility] Anti Coagulation           INR as of 9/25/2018     Today's INR 2.4      Anticoagulation Summary as of 9/25/2018     INR goal 2.0-3.0   Today's INR 2.4   Full warfarin instructions 4 mg on Mon, Fri; 6 mg all other days   Next INR check 10/16/2018    Indications   Chronic atrial fibrillation (H) [I48.2]  Long-term (current) use of anticoagulants [Z79.01] [Z79.01]         September 2018 Details    Sun Mon Tue Wed Thu Fri Sat           1                 2               3               4               5               6               7               8                 9               10               11               12               13               14               15                 16               17               18               19               20               21               22                 23               24               25      6 mg   See details      26      6 mg         27      6 mg         28      4 mg         29      6 mg           30      6 mg                Date Details   09/25 This INR check               How to take your warfarin dose     To take:  4 mg Take 2 of the 2 mg tablets.    To take:  6 mg Take 3 of the 2 mg tablets.           October 2018 Details    Sun Mon Tue Wed Thu Fri Sat      1      4 mg         2      6 mg         3      6 mg         4      6 mg         5      4 mg         6      6 mg           7      6 mg         8      4 mg         9      6 mg         10      6 mg         11      6 mg         12      4 mg         13      6 mg           14      6 mg         15      4 mg         16            17               18               19               20                 21               22               23               24               25               26               27                 28               29               30                31                   Date Details   No additional details    Date of next INR:  10/16/2018         How to take your warfarin dose     To take:  4 mg Take 2 of the 2 mg tablets.    To take:  6 mg Take 3 of the 2 mg tablets.

## 2018-09-28 NOTE — TELEPHONE ENCOUNTER
FREESTYLE LITE STRIPS 50'S        Last Written Prescription Date:  8/29/18  Last Fill Quantity: 300,   # refills: 1  Last Office Visit: 8/31/18  Future Office visit:

## 2018-10-03 NOTE — PROGRESS NOTES
Chief Complaint   Patient presents with     Diabetes       Initial /52 (BP Location: Left arm, Patient Position: Sitting, Cuff Size: Adult Large)  Pulse 56  Ht 6' (1.829 m)  Wt 195 lb 3.2 oz (88.5 kg)  SpO2 95%  BMI 26.47 kg/m2 Estimated body mass index is 26.47 kg/(m^2) as calculated from the following:    Height as of this encounter: 6' (1.829 m).    Weight as of this encounter: 195 lb 3.2 oz (88.5 kg).  Medication Reconciliation: complete    Yasmin Luo LPN

## 2018-10-03 NOTE — MR AVS SNAPSHOT
After Visit Summary   10/3/2018    Adiel Rosa Jr    MRN: [id number]           Patient Information     Date Of Birth          1935        Visit Information        Provider Department      10/3/2018 2:30 PM Ada Freeman APRN CNP [facility] [place] [facility]        Today's Diagnoses     Type 2 diabetes mellitus without complication, with long-term current use of insulin (H)    -  1      Care Instructions    Continue working on healthy eating and moving (start low and slow, work up to 30 min, 5x/week)    BG goals:  Fasting and before meals <130, >80  2 hour after eating <180    We only need 1/2 of these numbers to be within target then your A1c will be within target    Medication changes   -increase Levemir 42 units AM and 22 units PM  -please give novolog (meal time insulin) 5-15 minutes before meals.     Follow up   -1 month    Call me sooner if any problems/concerns and/or questions develop including consistent low BGs <70 or consistent high BGs >200  [phone] (Unit Coordinator)    [phone] (Nurse)          Follow-ups after your visit        Follow-up notes from your care team     Return in about 4 weeks (around 10/31/2018).      Your next 10 appointments already scheduled     Nov 02, 2018 11:00 AM CDT   (Arrive by 10:45 AM)   Diabetes Education with NATHALY Radford CNP   [facility] ([facility] )    [address]  [place] [address] [phone]              Who to contact     If you have questions or need follow up information about today's clinic visit or your schedule please contact [facility] directly at [phone].  Normal or non-critical lab and imaging results will be communicated to you by MyChart, letter or phone within 4 business days after the clinic has received the results. If you do not hear from us within 7 days, please contact the clinic through MyChart or  "phone. If you have a critical or abnormal lab result, we will notify you by phone as soon as possible.  Submit refill requests through [employer] or call your pharmacy and they will forward the refill request to us. Please allow 3 business days for your refill to be completed.          Additional Information About Your Visit        [employer] Information     [employer] lets you send messages to your doctor, view your test results, renew your prescriptions, schedule appointments and more. To sign up, go to [email or web address] . Click on \"Log in\" on the left side of the screen, which will take you to the Welcome page. Then click on \"Sign up Now\" on the right side of the page.     You will be asked to enter the access code listed below, as well as some personal information. Please follow the directions to create your username and password.     Your access code is: [email or web address]  Expires: 2018  2:25 PM     Your access code will  in 90 days. If you need help or a new code, please call your [facility] clinic or [phone].        Care EveryWhere ID     This is your Care EveryWhere ID. This could be used by other organizations to access your [facility] medical records  [id number]        Your Vitals Were     Pulse Height Pulse Oximetry BMI (Body Mass Index)          56 1.829 m (6') 95% 26.47 kg/m2         Blood Pressure from Last 3 Encounters:   10/03/18 115/52   18 118/66   18 94/62    Weight from Last 3 Encounters:   10/03/18 88.5 kg (195 lb 3.2 oz)   18 91.2 kg (201 lb)   18 88.2 kg (194 lb 6 oz)              Today, you had the following     No orders found for display         Today's Medication Changes          These changes are accurate as of 10/3/18  3:55 PM.  If you have any questions, ask your nurse or doctor.               These medicines have changed or have updated prescriptions.        Dose/Directions    insulin detemir 100 UNIT/ML injection   Commonly known as:  LEVEMIR VIAL   This " may have changed:  additional instructions   Used for:  Type 2 diabetes mellitus without complication, with long-term current use of insulin (H)   Changed by:  Ada Freeman APRN CNP        Inject 42 units every morning and inject 22 units at bed time   Quantity:  30 mL   Refills:  3            Where to get your medicines      These medications were sent to [id number] ([facility]) - [place], [place] - [address]  [address], [facility] [address]     Phone:  [phone]     insulin detemir 100 UNIT/ML injection                Primary Care Provider Office Phone # Fax #    R Hao Grijalva [phone] [address] [facility] [address]        Goals        General    Taking Medication     Notes - Note created  10/3/2018  3:37 PM by Ada Freeman APRN CNP    My Goal: I will take Novolog before meals    What I need to meet my goal: check BGs and take short acting insulin 5-15 minutes before meals    I plan to meet my goal by this date: next visit        Equal Access to Services     PAIGE MOSELEY : Hadii rowena padilla Sobrady, waaxda luqadaha, qaybta kaalmameseret pryor, edin waller . So [facility] [phone].    ATENCIÓN: Si habla español, tiene a russell disposición servicios gratuitos de asistencia lingüística. [facility] [phone].    We comply with applicable federal civil rights laws and [place] laws. We do not discriminate on the basis of race, color, national origin, age, disability, sex, sexual orientation, or gender identity.            Thank you!     Thank you for choosing [facility]  for your care. Our goal is always to provide you with excellent care. Hearing back from our patients is one way we can continue to improve our services. Please take a few minutes to complete the written survey that you may receive in the mail after your visit with us. Thank you!             Your Updated  Medication List - Protect others around you: Learn how to safely use, store and throw away your medicines at www.disposemymeds.org.          This list is accurate as of 10/3/18  3:55 PM.  Always use your most recent med list.                   Brand Name Dispense Instructions for use Diagnosis    acetaminophen 650 MG CR tablet    TYLENOL    100 tablet    Take 1 tablet (650 mg) by mouth every 8 hours as needed    Osteoarthritis, unspecified osteoarthritis type, unspecified site       aspirin 325 MG tablet     120 tablet    Take 1 tablet (325 mg) by mouth daily    Coronary artery disease without angina pectoris, unspecified vessel or lesion type, unspecified whether native or transplanted heart       B-D U/F 31G X 8 MM   Generic drug:  insulin pen needle     600 each    USE 5 TO 6 PEN NEEDLES DAILY OR AS DIRECTED    Diabetes mellitus, type 2 (H)       benzocaine-menthol 15-3.6 MG lozenge    CEPACOL    30 lozenge    Place 1 lozenge inside cheek every hour as needed for sore throat    Simple chronic bronchitis (H)       blood glucose lancets standard    no brand specified    100 each    Use to test blood sugar three times daily or as directed.    Type 2 diabetes mellitus without complication, with long-term current use of insulin (H)       blood glucose monitoring meter device kit    no brand specified    1 kit    Use to test blood sugar 5 times daily due to patient being on sliding scale.    Type 2 diabetes mellitus without complication, with long-term current use of insulin (H)       * blood glucose monitoring test strip    FREESTYLE LITE    300 strip    Use to test blood sugars 5 times daily or as directed.    Type 2 diabetes mellitus with complication, unspecified long term insulin use status       * FREESTYLE LITE test strip   Generic drug:  blood glucose monitoring     300 strip    USE TO TEST BLOOD SUGARS 5 TIMES DAILY OR AS DIRECTED    Type 2 diabetes mellitus with complication, unspecified long term insulin use  "status       candesartan 16 MG tablet    ATACAND    90 tablet    TAKE 1 TABLET BY MOUTH ONCE DAILY IN THE MORNING    Essential hypertension       CERTAVITE SENIOR/ANTIOXIDANT Tabs     31 tablet    TAKE 1 TABLET BY MOUTH ONCE DAILY (AM)    Health care maintenance       cholecalciferol 1000 UNIT tablet    vitamin D3    90 tablet    TAKE 1 TABLET BY MOUTH EVERY MORNING    Vitamin deficiency       Cranberry 500 MG Caps     90 capsule    Take 1 capsule (500 mg) by mouth daily    Hypertrophy of prostate without urinary obstruction       digoxin 125 MCG tablet    DIGOX    90 tablet    Take 1 tablet (125 mcg) by mouth every morning    Essential hypertension, benign       Doxylamine-DM 6.25-15 MG/15ML Liqd     236 mL    Taking as pkg directions at night    Osteomyelitis of foot, right, acute (H), Other insomnia       fluticasone-salmeterol 250-50 MCG/DOSE diskus inhaler    ADVAIR DISKUS    3 Inhaler    USE 1 INHALATION TWO TIMES A DAY IN THE MORNING AND IN THE EVENING APPROXIMATELY 12 HOURS APART    Simple chronic bronchitis (H)       furosemide 40 MG tablet    LASIX    90 tablet    TAKE ONE TABLET BY MOUTH EVERY DAY (AM)    Benign essential hypertension       HYDROcodone-acetaminophen 5-325 MG per tablet    NORCO    30 tablet    TAKE 1 TABLET BY MOUTH EVERY 6 HOURS AS NEEDED FOR MODERATE TO SEVRE PAIN    Back contusion, right, initial encounter       insulin detemir 100 UNIT/ML injection    LEVEMIR VIAL    30 mL    Inject 42 units every morning and inject 22 units at bed time    Type 2 diabetes mellitus without complication, with long-term current use of insulin (H)       insulin syringe-needle U-100 31G X 5/16\" 1 ML    ULTICARE INSULIN SYRINGE    300 each    Use 1 syringe with each injection five times daily    Type 2 diabetes mellitus without complication, with long-term current use of insulin (H)       Magnesium Oxide 250 MG Tabs     30 tablet    TAKE 1 TABLET BY MOUTH ONCE DAILY (AM)    Permanent atrial fibrillation (H) "       metoprolol succinate 50 MG 24 hr tablet    TOPROL-XL    90 tablet    Take 1 tablet (50 mg) by mouth daily    Essential hypertension       * NovoLOG VIAL 100 UNITS/ML injection   Generic drug:  insulin aspart     30 mL    INJECT 4-10 UNITS SUBQ THREE TIMES DAILY WITH MEALS. GLUCOSE < / =  =0U, 150-200=4U, 201-255=5U, 251-300=6U, 301-349=8U, 350-500=10U,    Type 2 diabetes mellitus without complication, with long-term current use of insulin (H)       * insulin aspart 100 UNITS/ML injection    NovoLOG VIAL    40 mL    4-10 units 3 times daily with meals. Glucose less than or equal to149 don't give insulin. 150-199=4U, 200-249=5U, 250-299=6U, 300-349=8U, 350 or greater=10U. If over 500 call the Doctor.    Type 2 diabetes mellitus without complication, with long-term current use of insulin (H)       ranitidine 150 MG tablet    ZANTAC    180 tablet    Take 1 tablet (150 mg) by mouth 2 times daily    Gastroesophageal reflux disease without esophagitis       simvastatin 80 MG tablet    ZOCOR    90 tablet    TAKE 1 TABLET DAILY IN THE EVENING    Pure hypercholesterolemia       warfarin 2 MG tablet    COUMADIN    260 tablet    Take 4 mg Mon/Fri; 6mg all other days or as directed by [facility]    Chronic atrial fibrillation (H)       * Notice:  This list has 4 medication(s) that are the same as other medications prescribed for you. Read the directions carefully, and ask your doctor or other care provider to review them with you.

## 2018-10-03 NOTE — PROGRESS NOTES
Diabetes Education Self Management & Training    SUBJECTIVE/OBJECTIVE:  Presents for: Follow-up  Accompanied by: Self, Other (staff )  Focus of Visit: Healthy Eating  Diabetes type: Type 2  Date of diagnosis: 10/03/[birth year]  Disease course: Getting harder to manage  How confident are you filling out medical forms by yourself:: Somewhat  Diabetes management related comments/concerns: eats whatever he wants  Transportation concerns: No  Other concerns:: Glasses, Physical impairment, Legally blind  Cultural Influences/Ethnic Background:  American      Diabetes Follow-up    Patient is checking blood sugars: 5 times daily.    Blood sugar testing frequency justification: Uncontrolled diabetes, Adjustment of medication(s), Risk of hypoglycemia with medication(s) and Patient modifying lifestyle changes (diet, exercise) with blood sugars  Results are as follows:         am - [phone]         lunchtime - [id number]         suppertime -          bedtime - 255-503         2 AM - [phone]    Diabetic concerns: blood sugar frequently over 200     Symptoms of hypoglycemia (low blood sugar): none     Paresthesias (numbness or burning in feet) or sores: Yes occasionally     Date of last diabetic eye exam: due    BP Readings from Last 2 Encounters:   10/03/18 115/52   08/31/18 118/66     Hemoglobin A1C (%)   Date Value   08/31/2018 11.0 (H)   04/26/2018 10.7 (H)     LDL Cholesterol Calculated (mg/dL)   Date Value   04/23/2015 56   01/06/2014 53       Diabetes Management Resources        Problem list and histories reviewed & adjusted, as indicated.  Additional history: as documented    Patient Active Problem List   Diagnosis     Chronic atrial fibrillation (H)     Advanced care planning/counseling discussion     Hypercholesterolemia     Chronic renal disease, stage III     Benign hypertensive heart disease with heart failure (H)     Hypercalcemia     Chronic obstructive pulmonary disease, unspecified COPD type (H)     Erectile Dysfunction      Hypertrophy of prostate without urinary obstruction     Esophageal reflux     Acute myocardial infarction of other specified sites, episode of care unspecified     Type 2 diabetes mellitus without complication, with long-term current use of insulin (H)     Cough     Hypertension, benign     Long-term (current) use of anticoagulants [Z79.01]     Hypoglycemia due to insulin     Primary prostate cancer identified by needle biopsy (T1c) (H)     Osteomyelitis of foot, right, acute (H)     Health Care Home     Chronic osteomyelitis of right foot (H)     Past Surgical History:   Procedure Laterality Date     reverse total arthoplasty of right shoulder   01/25/2018       Social History   Substance Use Topics     Smoking status: Never Smoker     Smokeless tobacco: Never Used     Alcohol use No     No family history on file.      Current Outpatient Prescriptions   Medication Sig Dispense Refill     acetaminophen (TYLENOL) 650 MG CR tablet Take 1 tablet (650 mg) by mouth every 8 hours as needed 100 tablet 5     aspirin 325 MG tablet Take 1 tablet (325 mg) by mouth daily 120 tablet 3     B-D U/F 31G X 8 MM insulin pen needle USE 5 TO 6 PEN NEEDLES DAILY OR AS DIRECTED 600 each 2     benzocaine-menthol (CEPACOL) 15-3.6 MG lozenge Place 1 lozenge inside cheek every hour as needed for sore throat 30 lozenge 3     blood glucose (NO BRAND SPECIFIED) lancets standard Use to test blood sugar three times daily or as directed. 100 each 11     blood glucose monitoring (FREESTYLE LITE) test strip Use to test blood sugars 5 times daily or as directed. 300 strip 1     blood glucose monitoring (NO BRAND SPECIFIED) meter device kit Use to test blood sugar 5 times daily due to patient being on sliding scale. 1 kit 0     candesartan (ATACAND) 16 MG tablet TAKE 1 TABLET BY MOUTH ONCE DAILY IN THE MORNING 90 tablet 1     Cranberry 500 MG CAPS Take 1 capsule (500 mg) by mouth daily 90 capsule 1     digoxin (DIGOX) 125 MCG tablet Take 1 tablet  "(125 mcg) by mouth every morning 90 tablet 1     Doxylamine-DM 6.25-15 MG/15ML LIQD Taking as pkg directions at night 236 mL 1     fluticasone-salmeterol (ADVAIR DISKUS) 250-50 MCG/DOSE diskus inhaler USE 1 INHALATION TWO TIMES A DAY IN THE MORNING AND IN THE EVENING APPROXIMATELY 12 HOURS APART 3 Inhaler 1     FREESTYLE LITE test strip USE TO TEST BLOOD SUGARS 5 TIMES DAILY OR AS DIRECTED 300 strip 1     furosemide (LASIX) 40 MG tablet TAKE ONE TABLET BY MOUTH EVERY DAY (AM) 90 tablet 1     HYDROcodone-acetaminophen (NORCO) 5-325 MG per tablet TAKE 1 TABLET BY MOUTH EVERY 6 HOURS AS NEEDED FOR MODERATE TO SEVRE PAIN 30 tablet 0     insulin aspart (NOVOLOG VIAL) 100 UNITS/ML injection 4-10 units 3 times daily with meals. Glucose less than or equal to149 don't give insulin.  150-199=4U, 200-249=5U, 250-299=6U, 300-349=8U, 350 or greater=10U. If over 500 call the Doctor. 40 mL 3     insulin detemir (LEVEMIR VIAL) 100 UNIT/ML injection Inject 40 units every morning and inject 15 units at bed time (Patient taking differently: Inject 40 units every morning and inject 20 units at bed time) 30 mL 3     insulin syringe-needle U-100 (ULTICARE INSULIN SYRINGE) 31G X 5/16\" 1 ML Use 1 syringe with each injection five times daily 300 each 3     Magnesium Oxide 250 MG TABS TAKE 1 TABLET BY MOUTH ONCE DAILY (AM) 30 tablet 1     metoprolol succinate (TOPROL-XL) 50 MG 24 hr tablet Take 1 tablet (50 mg) by mouth daily 90 tablet 2     Multiple Vitamins-Minerals (CERTAVITE SENIOR/ANTIOXIDANT) TABS TAKE 1 TABLET BY MOUTH ONCE DAILY (AM) 31 tablet 3     NOVOLOG VIAL 100 UNIT/ML soln INJECT 4-10 UNITS SUBQ THREE TIMES DAILY WITH MEALS. GLUCOSE < / =  =0U, 150-200=4U, 201-255=5U, 251-300=6U, 301-349=8U, 350-500=10U,  30 mL 3     ranitidine (ZANTAC) 150 MG tablet Take 1 tablet (150 mg) by mouth 2 times daily 180 tablet 1     simvastatin (ZOCOR) 80 MG tablet TAKE 1 TABLET DAILY IN THE EVENING 90 tablet 1     VITAMIN D3 1000 units " tablet TAKE 1 TABLET BY MOUTH EVERY MORNING 90 tablet 0     warfarin (COUMADIN) 2 MG tablet Take 4 mg Mon/Fri; 6mg all other days or as directed by [facility] 260 tablet 3     [DISCONTINUED] insulin detemir (LEVEMIR) 100 UNIT/ML injection INJECT 35 UNITS EVERY MORNING AND INJECT 10 UNITS AT BEDTIME       No Known Allergies    Reviewed and updated as needed this visit by clinical staff  Tobacco  Allergies  Meds       Reviewed and updated as needed this visit by Provider  Allergies  Meds         Diabetes Symptoms & Complications  Blurred vision: Yes  Fatigue: No  Neuropathy:  (sometimes)  Foot ulcerations: No  Polydipsia: No  Polyphagia:  (eat lots of cars and sweets)  Polyuria: No  Visual change:  (legally blind)  Weakness: No  Weight loss: No  Slow healing wounds: No  Recent Infection(s): No  Symptom course: Stable  Weight trend: Stable  Autonomic neuropathy: No  CVA: No  Heart disease: Yes  Nephropathy: No  Peripheral neuropathy: Yes  Peripheral Vascular Disease: Yes    Patient Problem List and Family Medical History reviewed for relevant medical history, current medical status, and diabetes risk factors.    Vitals:  /52 (BP Location: Left arm, Patient Position: Sitting, Cuff Size: Adult Large)  Pulse 56  Ht 1.829 m (6')  Wt 88.5 kg (195 lb 3.2 oz)  SpO2 95%  BMI 26.47 kg/m2  Estimated body mass index is 26.47 kg/(m^2) as calculated from the following:    Height as of this encounter: 1.829 m (6').    Weight as of this encounter: 88.5 kg (195 lb 3.2 oz).   Last 3 BP:   BP Readings from Last 3 Encounters:   10/03/18 115/52   08/31/18 118/66   04/26/18 94/62       History   Smoking Status     Never Smoker   Smokeless Tobacco     Never Used       Labs:  Lab Results   Component Value Date    A1C 11.0 08/31/2018     Lab Results   Component Value Date     08/31/2018     Lab Results   Component Value Date    LDL 56 04/23/2015     HDL Cholesterol   Date Value Ref Range Status   04/23/2015 36  (L) >40 mg/dL Final   ]  GFR Estimate   Date Value Ref Range Status   2018 48 (L) >60 mL/min/1.7m2 Final     Comment:     Non  GFR Calc     GFR Estimate If Black   Date Value Ref Range Status   2018 58 (L) >60 mL/min/1.7m2 Final     Comment:      GFR Calc     Lab Results   Component Value Date    CR 1.42 2018     No results found for: MICROALBUMIN    Healthy Eating  Healthy Eating Assessed Today: Yes  Cultural/Buddhist diet restrictions?: Yes  Patient on a regular basis: Has a high intake of carbohydrates, Snacks frequently throughout the day, Snacks frequently throughout the night, Eats 3 meals a day  Beverages: Soda, Diet soda, Water, Coffee, Milk, Juice  Has patient met with a dietitian in the past?: No    Being Active  Being Active Assessed Today: Yes  Exercise:: Yes  Days per week of moderate to strenuous exercise (like a brisk walk): 7  On average, minutes per day of exercise at this level: 60  How intense was your typical exercise? : Light (like stretching or slow walking)  Exercise Minutes per Week: 420  Barrier to exercise: Physical limitation    Monitoring  Monitoring Assessed Today: Yes  Did patient bring glucose meter to appointment? : No  Home Glucose (Sugar) Monitorin+ times per day  Blood glucose trend: Increasing steadily  Low Glucose Range (mg/dL): 70-90  High Glucose Range (mg/dL): >200  Overall Range (mg/dL): >200    Taking Medications  Diabetes Medication(s)     Insulin Sig    insulin aspart (NOVOLOG VIAL) 100 UNITS/ML injection 4-10 units 3 times daily with meals. Glucose less than or equal to149 don't give insulin.  150-199=4U, 200-249=5U, 250-299=6U, 300-349=8U, 350 or greater=10U. If over 500 call the Doctor.    insulin detemir (LEVEMIR VIAL) 100 UNIT/ML injection Inject 40 units every morning and inject 15 units at bed time     Patient taking differently:  Inject 40 units every morning and inject 20 units at bed time    NOVOLOG VIAL 100  UNIT/ML soln INJECT 4-10 UNITS SUBQ THREE TIMES DAILY WITH MEALS. GLUCOSE < / =  =0U, 150-200=4U, 201-255=5U, 251-300=6U, 301-349=8U, 350-500=10U,           Taking Medication Assessed Today: Yes  Current Treatments: Insulin Injections  Dose schedule:  (given after eating)  Given by: Adult caretaker  Injection/Infusion sites: Abdomen  Problems taking diabetes medications regularly?: Yes  Diabetes medication side effects?: No  Treatment Compliance: Some of the time    Problem Solving  Problem Solving Assessed Today: No  Hypoglycemia Frequency: Never              Reducing Risks  Reducing Risks Assessed Today: Yes  Diabetes Risks: Age over 45 years, Sedentary Lifestyle  CAD Risks: Diabetes Mellitus, Male sex, Sedentary lifestyle  Has dilated eye exam at least once a year?: No  Sees dentist every 6 months?: No (no teeth)  Sees podiatrist (foot doctor)?: Yes    Healthy Coping  Healthy Coping Assessed Today: Yes  Emotional response to diabetes:  (does not worry about what he eats)  Informal Support system:: Other (caregivers)  Difficulty affording diabetes management supplies?: No  Support resources: None  Patient Activation Measure Survey Score:  No flowsheet data found.    ROS:  CONSTITUTIONAL:NEGATIVE for fever, chills, change in weight  INTEGUMENTARY/SKIN: NEGATIVE for worrisome rashes, moles or lesions  EYES: due for eye exam  ENT/MOUTH: NEGATIVE for ear, mouth and throat problems  RESP:recently stopped smoking 15-16 months ago has a occasional cough  CV: NEGATIVE for chest pain, palpitations or peripheral edema  GI: NEGATIVE for nausea, abdominal pain, heartburn, or change in bowel habits  : negative for dysuria, hematuria, decreased urinary stream, erectile dysfunction  MUSCULOSKELETAL: NEGATIVE for significant arthralgias or myalgia  NEURO: dizziness/lightheadedness  ENDOCRINE: Hx diabetes  PSYCHIATRIC: NEGATIVE for changes in mood or affect    GENERAL: healthy, alert and no distress  NECK: no adenopathy,  no asymmetry, masses, or scars and thyroid normal to palpation  RESP: lungs clear to auscultation - no rales, rhonchi or wheezes  CV: regular rate and rhythm, normal S1 S2, no S3 or S4, no murmur, click or rub, no peripheral edema and peripheral pulses strong  ABDOMEN: soft, nontender, no hepatosplenomegaly, no masses and bowel sounds normal  PSYCH: mentation appears normal, affect normal/bright    ASSESSMENT:  Poor diet likes to eat sweets and snacks on carbs day and night    Goals        General    Taking Medication     Notes - Note created  10/3/2018  3:37 PM by Ada Freeman APRN CNP    My Goal: I will take Novolog before meals    What I need to meet my goal: check BGs and take short acting insulin 5-15 minutes before meals    I plan to meet my goal by this date: next visit            Patient's most recent   Lab Results   Component Value Date    A1C 11.0 08/31/2018    is not meeting goal of <8.0    INTERVENTION:   Diabetes knowledge and skills assessment:     Patient is knowledgeable in diabetes management concepts related to: Monitoring    Patient needs further education on the following diabetes management concepts: Taking Medication, Problem Solving and Reducing Risks    Based on learning assessment above, most appropriate setting for further diabetes education would be: Individual setting.    Education provided today on:  AADE Self-Care Behaviors:  Taking Medication: action of prescribed medication and when to take medications  Insulin administration technique taught today. Patient verbalized understanding and was able to perform an accurate return demonstration of administration technique.    Opportunities for ongoing education and support in diabetes-self management were discussed.    Pt verbalized understanding of concepts discussed and recommendations provided today.       Education Materials Provided:  none    PLAN:  1. Type 2 diabetes mellitus without complication, with long-term current use  of insulin (H)  Discussed eating healthy and when to take Novolog. Adiel should substitute sweets and beverages for sugar free snack an beverages  - insulin detemir (LEVEMIR VIAL) 100 UNIT/ML injection; Inject 42 units every morning and inject 22 units at bed time  Dispense: 30 mL; Refill: 3        Plan for follow up in 1 month. Staff should call with any questions or concerns  Patient Instructions   Continue working on healthy eating and moving (start low and slow, work up to 30 min, 5x/week)    BG goals:  Fasting and before meals <130, >80  2 hour after eating <180    We only need 1/2 of these numbers to be within target then your A1c will be within target    Medication changes   -increase Levemir 42 units AM and 22 units PM  -please give novolog (meal time insulin) 5-15 minutes before meals.     Follow up   -1 month    Call me sooner if any problems/concerns and/or questions develop including consistent low BGs <70 or consistent high BGs >200  [phone] (Unit Coordinator)    [phone] (Nurse)        Time Spent: 60 minutes with 30 minutes spent on counseling.   Encounter Type: Individual

## 2018-10-03 NOTE — PATIENT INSTRUCTIONS
Continue working on healthy eating and moving (start low and slow, work up to 30 min, 5x/week)    BG goals:  Fasting and before meals <130, >80  2 hour after eating <180    We only need 1/2 of these numbers to be within target then your A1c will be within target    Medication changes   -increase Levemir 42 units AM and 22 units PM  -please give novolog (meal time insulin) 5-15 minutes before meals.     Follow up   -1 month    Call me sooner if any problems/concerns and/or questions develop including consistent low BGs <70 or consistent high BGs >200  [phone] (Unit Coordinator)    [phone] (Nurse)

## 2018-10-16 NOTE — MR AVS SNAPSHOT
Adiel Rosa    10/16/2018   Anticoagulation Therapy Visit    Description:  83 year old male   Provider:  GAURAV Grijalva MD   Department:  [facility] Anti Coagulation           INR as of 10/16/2018     Today's INR 2.1      Anticoagulation Summary as of 10/16/2018     INR goal 2.0-3.0   Today's INR 2.1   Full warfarin instructions 4 mg on Mon, Fri; 6 mg all other days   Next INR check 11/13/2018    Indications   Chronic atrial fibrillation (H) [I48.2]  Long-term (current) use of anticoagulants [Z79.01] [Z79.01]         October 2018 Details    Sun Mon Tue Wed Thu Fri Sat      1               2               3               4               5               6                 7               8               9               10               11               12               13                 14               15               16      6 mg   See details      17      6 mg         18      6 mg         19      4 mg         20      6 mg           21      6 mg         22      4 mg         23      6 mg         24      6 mg         25      6 mg         26      4 mg         27      6 mg           28      6 mg         29      4 mg         30      6 mg         31      6 mg             Date Details   10/16 This INR check               How to take your warfarin dose     To take:  4 mg Take 2 of the 2 mg tablets.    To take:  6 mg Take 3 of the 2 mg tablets.           November 2018 Details    Sun Mon Tue Wed Thu Fri Sat         1      6 mg         2      4 mg         3      6 mg           4      6 mg         5      4 mg         6      6 mg         7      6 mg         8      6 mg         9      4 mg         10      6 mg           11      6 mg         12      4 mg         13            14               15               16               17                 18               19               20               21               22               23               24                 25               26               27               28                29 30                 Date Details   No additional details    Date of next INR:  11/13/2018         How to take your warfarin dose     To take:  4 mg Take 2 of the 2 mg tablets.    To take:  6 mg Take 3 of the 2 mg tablets.

## 2018-10-16 NOTE — PROGRESS NOTES
ANTICOAGULATION FOLLOW-UP CLINIC VISIT    Patient Name:  Adiel Rosa Jr  Date:  10/16/2018  Contact Type:  New warfarin dosing/INR recheck date faxed to [facility]    SUBJECTIVE:     Patient Findings     Positives No Problem Findings    Comments INR done by assisted living nurse and result faxed to warfarin clinic. Per nursing communication sheet, patient has no bleeding/bruising and no new changes in diet/meds/activity. New warfarin dosing/INR recheck date faxed back to assisted living facility. Staff to notify warfarin clinic if patient has any bleeding/bruising, changes in diet/meds/activity or questions           OBJECTIVE    INR   Date Value Ref Range Status   10/16/2018 2.1  Final       ASSESSMENT / PLAN  No question data found.  Anticoagulation Summary as of 10/16/2018     INR goal 2.0-3.0   Today's INR 2.1   Warfarin maintenance plan 4 mg (2 mg x 2) on Mon, Fri; 6 mg (2 mg x 3) all other days   Full warfarin instructions 4 mg on Mon, Fri; 6 mg all other days   Weekly warfarin total 38 mg   No change documented Jessa Storey RN   Plan last modified Jessa Gibson RN (7/2/2018)   Next INR check 11/13/2018   Priority INR   Target end date Indefinite    Indications   Chronic atrial fibrillation (H) [I48.2]  Long-term (current) use of anticoagulants [Z79.01] [Z79.01]         Anticoagulation Episode Summary     INR check location     Preferred lab     Send INR reminders to [facility] ANTICOAG POOL    Comments [facility] assisted living, Fax   done with homecare      Anticoagulation Care Providers     Provider Role Specialty Phone number    GAURAV Grijalva MD [facility] [phone]            See the Encounter Report to view Anticoagulation Flowsheet and Dosing Calendar (Go to Encounters tab in chart review, and find the Anticoagulation Therapy Visit)        Jessa Storey RN

## 2018-10-24 NOTE — TELEPHONE ENCOUNTER
Simvastatin  Last office visit: 8/31/18  Last refill: 5/15/18 #90, 1 R  Patient due for labs.   LDL on file in past 12 months           Recent Labs   Lab Test  04/23/15   0922   LDL  56        Please advise.  Thank you.

## 2018-11-02 NOTE — MR AVS SNAPSHOT
After Visit Summary   11/2/2018    Adiel Rosa Jr    MRN: [id number]           Patient Information     Date Of Birth          1935        Visit Information        Provider Department      11/2/2018 11:00 AM Ada Freeman APRN CNP [facility]        Today's Diagnoses     NO SHOW    -  1       Follow-ups after your visit        Who to contact     If you have questions or need follow up information about today's clinic visit or your schedule please contact [facility] directly at [phone].  Normal or non-critical lab and imaging results will be communicated to you by MyChart, letter or phone within 4 business days after the clinic has received the results. If you do not hear from us within 7 days, please contact the clinic through MyChart or phone. If you have a critical or abnormal lab result, we will notify you by phone as soon as possible.  Submit refill requests through [facility] or call your pharmacy and they will forward the refill request to us. Please allow 3 business days for your refill to be completed.          Additional Information About Your Visit        Care EveryWhere ID     This is your Care EveryWhere ID. This could be used by other organizations to access your [facility] records  [id number]         Blood Pressure from Last 3 Encounters:   10/03/18 115/52   08/31/18 118/66   04/26/18 94/62    Weight from Last 3 Encounters:   10/03/18 195 lb 3.2 oz (88.5 kg)   08/31/18 201 lb (91.2 kg)   04/26/18 194 lb 6 oz (88.2 kg)              Today, you had the following     No orders found for display       Primary Care Provider Office Phone # Fax #    R Hao Grijalva [phone]       [facility] [facility] [address]        Goals        General    Taking Medication     Notes - Note created  10/3/2018  3:37 PM by Ada Freeman APRN CNP    My Goal: I will take Novolog before meals    What I  need to meet my goal: check BGs and take short acting insulin 5-15 minutes before meals    I plan to meet my goal by this date: next visit        Equal Access to Services     EVON MOSELEY : Elza Mata, dion guzman, kristinbarrington jaureguimedina jamieheidymeseret, waxmarcelo junein hayaajohn ayalajuan carlos joe christin melgoza. So [facility] [phone].    ATENCIÓN: Si habla español, tiene a russell disposición servicios gratuitos de asistencia lingüística. Llame al [phone].    We comply with applicable federal civil rights laws and [place] laws. We do not discriminate on the basis of race, color, national origin, age, disability, sex, sexual orientation, or gender identity.            Thank you!     Thank you for choosing [facility]  for your care. Our goal is always to provide you with excellent care. Hearing back from our patients is one way we can continue to improve our services. Please take a few minutes to complete the written survey that you may receive in the mail after your visit with us. Thank you!             Your Updated Medication List - Protect others around you: Learn how to safely use, store and throw away your medicines at www.disposemymeds.org.          This list is accurate as of 11/2/18  1:48 PM.  Always use your most recent med list.                   Brand Name Dispense Instructions for use Diagnosis    acetaminophen 650 MG CR tablet    TYLENOL    100 tablet    Take 1 tablet (650 mg) by mouth every 8 hours as needed    Osteoarthritis, unspecified osteoarthritis type, unspecified site       aspirin 325 MG tablet     120 tablet    Take 1 tablet (325 mg) by mouth daily    Coronary artery disease without angina pectoris, unspecified vessel or lesion type, unspecified whether native or transplanted heart       B-D U/F 31G X 8 MM   Generic drug:  insulin pen needle     600 each    USE 5 TO 6 PEN NEEDLES DAILY OR AS DIRECTED    Diabetes mellitus, type 2 (H)       benzocaine-menthol 15-3.6 MG lozenge     CEPACOL    30 lozenge    Place 1 lozenge inside cheek every hour as needed for sore throat    Simple chronic bronchitis (H)       blood glucose lancets standard    no brand specified    100 each    Use to test blood sugar three times daily or as directed.    Type 2 diabetes mellitus without complication, with long-term current use of insulin (H)       blood glucose monitoring meter device kit    no brand specified    1 kit    Use to test blood sugar 5 times daily due to patient being on sliding scale.    Type 2 diabetes mellitus without complication, with long-term current use of insulin (H)       * blood glucose monitoring test strip    FREESTYLE LITE    300 strip    Use to test blood sugars 5 times daily or as directed.    Type 2 diabetes mellitus with complication, unspecified long term insulin use status       * FREESTYLE LITE test strip   Generic drug:  blood glucose monitoring     300 strip    USE TO TEST BLOOD SUGARS 5 TIMES DAILY OR AS DIRECTED    Type 2 diabetes mellitus with complication, unspecified long term insulin use status       candesartan 16 MG tablet    ATACAND    90 tablet    TAKE 1 TABLET DAILY IN THE MORNING    Essential hypertension       CERTAVITE SENIOR/ANTIOXIDANT Tabs     31 tablet    TAKE 1 TABLET BY MOUTH ONCE DAILY (AM)    Health care maintenance       cholecalciferol 1000 UNIT tablet    vitamin D3    90 tablet    TAKE 1 TABLET BY MOUTH EVERY MORNING    Vitamin deficiency       Cranberry 500 MG Caps     90 capsule    Take 1 capsule (500 mg) by mouth daily    Hypertrophy of prostate without urinary obstruction       digoxin 125 MCG tablet    LANOXIN    90 tablet    TAKE 1 TABLET EVERY MORNING    Essential hypertension, benign       Doxylamine-DM 6.25-15 MG/15ML Liqd     236 mL    Taking as pkg directions at night    Osteomyelitis of foot, right, acute (H), Other insomnia       fluticasone-salmeterol 250-50 MCG/DOSE diskus inhaler    ADVAIR DISKUS    3 Inhaler    USE 1 INHALATION TWO  "TIMES A DAY IN THE MORNING AND IN THE EVENING APPROXIMATELY 12 HOURS APART    Simple chronic bronchitis (H)       furosemide 40 MG tablet    LASIX    90 tablet    TAKE 1 TABLET DAILY IN THE MORNING    Benign essential hypertension       HYDROcodone-acetaminophen 5-325 MG per tablet    NORCO    30 tablet    TAKE 1 TABLET BY MOUTH EVERY 6 HOURS AS NEEDED FOR MODERATE TO SEVRE PAIN    Back contusion, right, initial encounter       insulin detemir 100 UNIT/ML injection    LEVEMIR VIAL    30 mL    Inject 42 units every morning and inject 22 units at bed time    Type 2 diabetes mellitus without complication, with long-term current use of insulin (H)       insulin syringe-needle U-100 31G X 5/16\" 1 ML    ULTICARE INSULIN SYRINGE    300 each    Use 1 syringe with each injection five times daily    Type 2 diabetes mellitus without complication, with long-term current use of insulin (H)       Magnesium Oxide 250 MG Tabs     30 tablet    TAKE 1 TABLET BY MOUTH ONCE DAILY (AM)    Permanent atrial fibrillation (H)       metoprolol succinate 50 MG 24 hr tablet    TOPROL-XL    90 tablet    Take 1 tablet (50 mg) by mouth daily    Essential hypertension       * NovoLOG VIAL 100 UNITS/ML injection   Generic drug:  insulin aspart     30 mL    INJECT 4-10 UNITS SUBQ THREE TIMES DAILY WITH MEALS. GLUCOSE < / =  =0U, 150-200=4U, 201-255=5U, 251-300=6U, 301-349=8U, 350-500=10U,    Type 2 diabetes mellitus without complication, with long-term current use of insulin (H)       * insulin aspart 100 UNITS/ML injection    NovoLOG VIAL    40 mL    4-10 units 3 times daily with meals. Glucose less than or equal to149 don't give insulin. 150-199=4U, 200-249=5U, 250-299=6U, 300-349=8U, 350 or greater=10U. If over 500 call the Doctor.    Type 2 diabetes mellitus without complication, with long-term current use of insulin (H)       ranitidine 150 MG tablet    ZANTAC    180 tablet    TAKE 1 TABLET TWICE A DAY    Gastroesophageal reflux disease " without esophagitis       simvastatin 80 MG tablet    ZOCOR    90 tablet    TAKE 1 TABLET DAILY IN THE EVENING    Pure hypercholesterolemia       warfarin 2 MG tablet    COUMADIN    260 tablet    Take 4 mg Mon/Fri; 6mg all other days or as directed by [facility]    Chronic atrial fibrillation (H)       * Notice:  This list has 4 medication(s) that are the same as other medications prescribed for you. Read the directions carefully, and ask your doctor or other care provider to review them with you.

## 2018-11-13 NOTE — PROGRESS NOTES
ANTICOAGULATION FOLLOW-UP CLINIC VISIT    Patient Name:  Adiel Rosa Jr  Date:  11/13/2018  Contact Type:  FAX - [facility]    SUBJECTIVE:     Patient Findings     Positives No Problem Findings    Comments POCT INR drawn today by [facility] staff; results received by fax using the  Communication form today.  Patient's last 7 days of warfarin dosing reviewed and patient is therapeutic; therefore the weekly dosage of warfarin will remain the same.  INR results/Coumadin dosing and INR recheck information faxed to the staff at the Assisted Living Facility.  Staff informed to contact the AC clinic with any question/concerns or changes to the pt's overall plan of care until the next INR recheck.             OBJECTIVE    INR   Date Value Ref Range Status   11/13/2018 2.3  Final       ASSESSMENT / PLAN  No question data found.  Anticoagulation Summary as of 11/13/2018     INR goal 2.0-3.0   Today's INR 2.3   Warfarin maintenance plan 4 mg (2 mg x 2) on Mon, Fri; 6 mg (2 mg x 3) all other days   Full warfarin instructions 4 mg on Mon, Fri; 6 mg all other days   Weekly warfarin total 38 mg   No change documented Jessa Gibson RN   Plan last modified Jessa Gibson RN (7/2/2018)   Next INR check 12/11/2018   Priority INR   Target end date Indefinite    Indications   Chronic atrial fibrillation (H) [I48.2]  Long-term (current) use of anticoagulants [Z79.01] [Z79.01]         Anticoagulation Episode Summary     INR check location     Preferred lab     Send INR reminders to [facility] POOL    Comments [facility], Fax   done with homecare      Anticoagulation Care Providers     Provider Role Specialty Phone number    GAURAV Grijalva MD [facility] [phone]            See the Encounter Report to view Anticoagulation Flowsheet and Dosing Calendar (Go to Encounters tab in chart review, and find the Anticoagulation  Therapy Visit)        Jessa Gibson RN

## 2018-11-13 NOTE — MR AVS SNAPSHOT
Adiel VILLATORO Moe Reis   11/13/2018   Anticoagulation Therapy Visit    Description:  83 year old male   Provider:  Jessa Gibson, RN   Department:  [facility] Anti Coagulation           INR as of 11/13/2018     Today's INR 2.3      Anticoagulation Summary as of 11/13/2018     INR goal 2.0-3.0   Today's INR 2.3   Full warfarin instructions 4 mg on Mon, Fri; 6 mg all other days   Next INR check 12/11/2018    Indications   Chronic atrial fibrillation (H) [I48.2]  Long-term (current) use of anticoagulants [Z79.01] [Z79.01]         November 2018 Details    Sun Mon Tue Wed Thu Fri Sat         1               2               3                 4               5               6               7               8               9               10                 11               12               13      6 mg   See details      14      6 mg         15      6 mg         16      4 mg         17      6 mg           18      6 mg         19      4 mg         20      6 mg         21      6 mg         22      6 mg         23      4 mg         24      6 mg           25      6 mg         26      4 mg         27      6 mg         28      6 mg         29      6 mg         30      4 mg           Date Details   11/13 This INR check               How to take your warfarin dose     To take:  4 mg Take 2 of the 2 mg tablets.    To take:  6 mg Take 3 of the 2 mg tablets.           December 2018 Details    Sun Mon Tue Wed Thu Fri Sat           1      6 mg           2      6 mg         3      4 mg         4      6 mg         5      6 mg         6      6 mg         7      4 mg         8      6 mg           9      6 mg         10      4 mg         11            12               13               14               15                 16               17               18               19               20               21               22                 23               24               25               26               27               28               29                  30               31                     Date Details   No additional details    Date of next INR:  12/11/2018         How to take your warfarin dose     To take:  4 mg Take 2 of the 2 mg tablets.    To take:  6 mg Take 3 of the 2 mg tablets.

## 2018-12-11 NOTE — PROGRESS NOTES
ANTICOAGULATION FOLLOW-UP CLINIC VISIT    Patient Name:  Adiel Rosa Jr  Date:  2018  Contact Type:  new warfarin dosing/INR recheck date [facility]ed to [facility]    SUBJECTIVE:     Patient Findings     Comments:   INR done by assisted living  Nurse and result faxed to warfarin clinic. Per nursing communication sheet, patient has no bleeding/bruising and no new changes in diet/meds/activity. New warfarin dosing/INR recheck date faxed to assisted living. Staff to notify warfarin clinic if patient has any bleeding/bruising, changes in diet/meds/activity or questions.            OBJECTIVE    INR   Date Value Ref Range Status   2018 2.0  Final       ASSESSMENT / PLAN  INR assessment THER    Recheck INR In: 4 WEEKS    INR Location [facility]      Anticoagulation Summary  As of 2018    INR goal:   2.0-3.0   TTR:   60.0 % (2.3 y)   INR used for dosin.0 (2018)   Warfarin maintenance plan:   4 mg (2 mg x 2) every Mon, Fri; 6 mg (2 mg x 3) all other days   Full warfarin instructions:   4 mg every Mon, Fri; 6 mg all other days   Weekly warfarin total:   38 mg   No change documented:   Jessa Storey RN   Plan last modified:   Jessa Gibson, RN (2018)   Next INR check:   2019   Priority:   INR   Target end date:   Indefinite    Indications    Chronic atrial fibrillation (H) [I48.2]  Long-term (current) use of anticoagulants [Z79.01] [Z79.01]             Anticoagulation Episode Summary     INR check location:       Preferred lab:       Send INR reminders to:   [facility] ANTICOAG POOL    Comments:   [facility], Fax   done with homecare      Anticoagulation Care Providers     Provider Role Specialty Phone number    GAURAV Grijalva MD [facility] [phone]            See the Encounter Report to view Anticoagulation Flowsheet and Dosing Calendar (Go to Encounters tab in chart review, and find the Anticoagulation Therapy Visit)        Jessa KRUGER  Cordelia RN

## 2018-12-17 NOTE — TELEPHONE ENCOUNTER
Received a voicemail for nurse Muñoz, requesting levemir to be sent into [facility] due to [facility] being out of stock. Order pending       BUSHRA MUÑIZ

## 2018-12-21 NOTE — PROGRESS NOTES
Diabetes Education Self Management & Training    SUBJECTIVE/OBJECTIVE:  Presents for: Follow-up  Accompanied by: Self, Other(staff )  Focus of Visit: Healthy Eating  Diabetes type: Type 2  Disease course: Getting harder to manage  How confident are you filling out medical forms by yourself:: Somewhat  Diabetes management related comments/concerns: eats whatever he wants  Transportation concerns: No  Other concerns:: Glasses, Physical impairment, Legally blind  Cultural Influences/Ethnic Background:  American      Diabetes Follow-up    Patient is checking blood sugars: three times daily.   Blood sugar testing frequency justification: Uncontrolled diabetes, Adjustment of medication(s) and Patient modifying lifestyle changes (diet, exercise) with blood sugars  Results are as follows:         am - 209, 246, 178, 285, 265, 212, 182, 190, 431, 221, 254, 212, 295, 331         lunchtime - 254, 215, 291, 298, 102, 313, 124, 253, 150, 277, 330, 260, 324         suppertime - 202,294, 307, 309, 298, 292, 455, 373, 279, 229, 298,     Diabetic concerns: blood sugar frequently over 200     Symptoms of hypoglycemia (low blood sugar): shaky, Symptoms occur if sugars < 100.     Paresthesias (numbness or burning in feet) or sores: No     Date of last diabetic eye exam: did have an eye exam about 8 months ago    BP Readings from Last 2 Encounters:   12/21/18 115/45   10/03/18 115/52     Hemoglobin A1C (%)   Date Value   08/31/2018 11.0 (H)   04/26/2018 10.7 (H)     LDL Cholesterol Calculated (mg/dL)   Date Value   04/23/2015 56   01/06/2014 53       Diabetes Management Resources    Amount of exercise or physical activity: limited due to physical limitations    Problems taking medications regularly: No    Medication side effects: none    Diet: regular (no restrictions)        Problem list and histories reviewed & adjusted, as indicated.  Additional history: as documented    Patient Active Problem List   Diagnosis     Chronic atrial  fibrillation (H)     Advanced care planning/counseling discussion     Hypercholesterolemia     Chronic renal disease, stage III     Benign hypertensive heart disease with heart failure (H)     Hypercalcemia     Chronic obstructive pulmonary disease, unspecified COPD type (H)     Erectile Dysfunction     Hypertrophy of prostate without urinary obstruction     Esophageal reflux     Acute myocardial infarction of other specified sites, episode of care unspecified     Type 2 diabetes mellitus without complication, with long-term current use of insulin (H)     Cough     Hypertension, benign     Long-term (current) use of anticoagulants [Z79.01]     Hypoglycemia due to insulin     Primary prostate cancer identified by needle biopsy (T1c) (H)     Osteomyelitis of foot, right, acute (H)     Health Care Home     Chronic osteomyelitis of right foot (H)     Past Surgical History:   Procedure Laterality Date     reverse total arthoplasty of right shoulder   01/25/2018       Social History     Tobacco Use     Smoking status: Never Smoker     Smokeless tobacco: Never Used   Substance Use Topics     Alcohol use: No     No family history on file.      Current Outpatient Medications   Medication Sig Dispense Refill     acetaminophen (TYLENOL) 650 MG CR tablet Take 1 tablet (650 mg) by mouth every 8 hours as needed 100 tablet 5     aspirin 325 MG tablet Take 1 tablet (325 mg) by mouth daily 120 tablet 3     B-D U/F 31G X 8 MM insulin pen needle USE 5 TO 6 PEN NEEDLES DAILY OR AS DIRECTED 600 each 2     benzocaine-menthol (CEPACOL) 15-3.6 MG lozenge Place 1 lozenge inside cheek every hour as needed for sore throat 30 lozenge 3     blood glucose (NO BRAND SPECIFIED) lancets standard Use to test blood sugar three times daily or as directed.  Brand-Freestyle Lite 100 each 11     blood glucose monitoring (FREESTYLE LITE) test strip Use to test blood sugars 5 times daily or as directed. 300 strip 1     blood glucose monitoring (NO BRAND  "SPECIFIED) meter device kit Use to test blood sugar 5 times daily due to patient being on sliding scale. 1 kit 0     candesartan (ATACAND) 16 MG tablet TAKE 1 TABLET DAILY IN THE MORNING 90 tablet 1     Cranberry 500 MG CAPS Take 1 capsule (500 mg) by mouth daily 90 capsule 1     fluticasone-salmeterol (ADVAIR DISKUS) 250-50 MCG/DOSE diskus inhaler USE 1 INHALATION TWO TIMES A DAY IN THE MORNING AND IN THE EVENING APPROXIMATELY 12 HOURS APART 3 Inhaler 1     FREESTYLE LITE test strip USE TO TEST BLOOD SUGARS 5 TIMES DAILY OR AS DIRECTED 300 strip 1     furosemide (LASIX) 40 MG tablet TAKE 1 TABLET DAILY IN THE MORNING 90 tablet 1     HYDROcodone-acetaminophen (NORCO) 5-325 MG per tablet TAKE 1 TABLET BY MOUTH EVERY 6 HOURS AS NEEDED FOR MODERATE TO SEVRE PAIN 30 tablet 0     insulin detemir (LEVEMIR VIAL) 100 UNIT/ML vial Inject 42 units every morning and inject 22 units at bed time 30 mL 3     insulin syringe-needle U-100 (ULTICARE INSULIN SYRINGE) 31G X 5/16\" 1 ML miscellaneous USE 1 SYRINGE WITH EACH INJECTION FIVE TIMES DAILY 300 each 1     Magnesium Oxide 250 MG TABS TAKE 1 TABLET BY MOUTH ONCE DAILY (AM) 30 tablet 1     metoprolol succinate (TOPROL-XL) 50 MG 24 hr tablet Take 1 tablet (50 mg) by mouth daily 90 tablet 2     Multiple Vitamins-Minerals (CERTAVITE SENIOR/ANTIOXIDANT) TABS TAKE 1 TABLET BY MOUTH ONCE DAILY (AM) 31 tablet 3     ranitidine (ZANTAC) 150 MG tablet TAKE 1 TABLET TWICE A  tablet 2     simvastatin (ZOCOR) 80 MG tablet TAKE 1 TABLET DAILY IN THE EVENING 90 tablet 0     warfarin (COUMADIN) 2 MG tablet Take 4 mg Mon/Fri; 6mg all other days or as directed by [facility] 260 tablet 3     digoxin (LANOXIN) 125 MCG tablet TAKE 1 TABLET EVERY MORNING 90 tablet 1     Doxylamine-DM 6.25-15 MG/15ML LIQD Taking as pkg directions at night 236 mL 1     insulin aspart (NOVOLOG VIAL) 100 UNITS/ML injection 4-10 units 3 times daily with meals. Glucose less than or equal to149 don't give " insulin.  150-199=4U, 200-249=5U, 250-299=6U, 300-349=8U, 350 or greater=10U. If over 500 call the Doctor. 40 mL 3     NOVOLOG VIAL 100 UNIT/ML soln INJECT 4-10 UNITS SUBQ THREE TIMES DAILY WITH MEALS. GLUCOSE < / =  =0U, 150-200=4U, 201-255=5U, 251-300=6U, 301-349=8U, 350-500=10U,  30 mL 3     VITAMIN D3 1000 units tablet TAKE 1 TABLET BY MOUTH EVERY MORNING 90 tablet 0     No Known Allergies    Reviewed and updated as needed this visit by clinical staff  Tobacco  Allergies  Meds       Reviewed and updated as needed this visit by Provider  Allergies  Meds         Diabetes Symptoms & Complications  Blurred vision: Yes  Fatigue: No  Neuropathy: (sometimes)  Foot ulcerations: No  Polydipsia: No  Polyphagia: (eat lots of cars and sweets)  Polyuria: No  Visual change: (legally blind)  Weakness: No  Weight loss: No  Slow healing wounds: No  Recent Infection(s): No  Symptom course: Stable  Weight trend: Stable  Autonomic neuropathy: No  CVA: No  Heart disease: Yes  Nephropathy: No  Peripheral neuropathy: Yes  Peripheral Vascular Disease: Yes    Patient Problem List and Family Medical History reviewed for relevant medical history, current medical status, and diabetes risk factors.    Vitals:  /45   Pulse 77   Resp 16   Wt 89.6 kg (197 lb 8 oz)   SpO2 98%   BMI 26.79 kg/m    Estimated body mass index is 26.79 kg/m  as calculated from the following:    Height as of 10/3/18: 1.829 m (6').    Weight as of this encounter: 89.6 kg (197 lb 8 oz).   Last 3 BP:   BP Readings from Last 3 Encounters:   12/21/18 115/45   10/03/18 115/52   08/31/18 118/66       History   Smoking Status     Never Smoker   Smokeless Tobacco     Never Used       Labs:  Lab Results   Component Value Date    A1C 11.0 08/31/2018     Lab Results   Component Value Date     08/31/2018     Lab Results   Component Value Date    LDL 56 04/23/2015     HDL Cholesterol   Date Value Ref Range Status   04/23/2015 36 (L) >40 mg/dL Final    ]  GFR Estimate   Date Value Ref Range Status   2018 48 (L) >60 mL/min/1.7m2 Final     Comment:     Non  GFR Calc     GFR Estimate If Black   Date Value Ref Range Status   2018 58 (L) >60 mL/min/1.7m2 Final     Comment:      GFR Calc     Lab Results   Component Value Date    CR 1.42 2018     No results found for: MICROALBUMIN    Healthy Eating  Healthy Eating Assessed Today: Yes  Cultural/Episcopal diet restrictions?: Yes  Snacks: eating more sweets   Beverages: Soda, Diet soda, Water, Coffee, Milk, Juice  Has patient met with a dietitian in the past?: No    Being Active  Being Active Assessed Today: Yes  Exercise:: Yes  Barrier to exercise: Physical limitation    Monitoring  Monitoring Assessed Today: Yes  Did patient bring glucose meter to appointment? : No  Home Glucose (Sugar) Monitorin+ times per day  Blood glucose trend: Increasing steadily  Low Glucose Range (mg/dL): 70-90  High Glucose Range (mg/dL): >200  Overall Range (mg/dL): >200    Taking Medications  Diabetes Medication(s)     Insulin Sig    insulin detemir (LEVEMIR VIAL) 100 UNIT/ML vial Inject 42 units every morning and inject 22 units at bed time    insulin aspart (NOVOLOG VIAL) 100 UNITS/ML injection 4-10 units 3 times daily with meals. Glucose less than or equal to149 don't give insulin.  150-199=4U, 200-249=5U, 250-299=6U, 300-349=8U, 350 or greater=10U. If over 500 call the Doctor.    NOVOLOG VIAL 100 UNIT/ML soln INJECT 4-10 UNITS SUBQ THREE TIMES DAILY WITH MEALS. GLUCOSE < / =  =0U, 150-200=4U, 201-255=5U, 251-300=6U, 301-349=8U, 350-500=10U,           Taking Medication Assessed Today: Yes  Current Treatments: Insulin Injections  Dose schedule: (given after eating)  Given by: Adult caretaker  Injection/Infusion sites: Abdomen  Problems taking diabetes medications regularly?: Yes  Diabetes medication side effects?: No  Treatment Compliance: Some of the time    Problem  Solving  Problem Solving Assessed Today: No  Hypoglycemia Frequency: Never              Reducing Risks  Reducing Risks Assessed Today: Yes  Diabetes Risks: Age over 45 years, Sedentary Lifestyle  CAD Risks: Diabetes Mellitus, Male sex, Sedentary lifestyle  Has dilated eye exam at least once a year?: No  Sees dentist every 6 months?: No(no teeth)  Sees podiatrist (foot doctor)?: Yes    Healthy Coping  Healthy Coping Assessed Today: Yes  Emotional response to diabetes: (does not worry about what he eats)  Informal Support system:: Other(caregivers)  Difficulty affording diabetes management supplies?: No  Support resources: None  Patient Activation Measure Survey Score:  No flowsheet data found.    ROS:  CONSTITUTIONAL: NEGATIVE for fever, chills, change in weight  INTEGUMENTARY/SKIN: NEGATIVE for worrisome rashes, moles or lesions  EYES: should have cataract surgery  ENT/MOUTH: NEGATIVE for ear, mouth and throat problems  RESP: NEGATIVE for significant cough or SOB  CV: NEGATIVE for chest pain, palpitations or peripheral edema  GI: Gi upset with some food, constipation  : occasional dribbling and occasional pain with urination  MUSCULOSKELETAL: NEGATIVE for significant arthralgias or myalgia  NEURO: occasional numbness to his arm when waking up, but clears when awake  ENDOCRINE: Hx diabetes  PSYCHIATRIC: wife recently passed away    GENERAL: healthy, alert and no distress  NECK: no adenopathy, no asymmetry, masses, or scars and thyroid normal to palpation  RESP: lungs clear to auscultation - no rales, rhonchi or wheezes  CV: regular rate and rhythm, normal S1 S2, no S3 or S4, no murmur, click or rub, no peripheral edema and peripheral pulses strong  ABDOMEN: soft, nontender, no hepatosplenomegaly, no masses and bowel sounds normal  PSYCH: mentation appears normal, affect normal/bright  Diabetic foot exam: normal DP and PT pulses, no trophic changes or ulcerative lesions, reduced sensation at to both feet and toes,  monofilament exam unable to notices touch and nail exam paronychia of all toe nails     ASSESSMENT:  Adiel should limit his sweets. He did eat more non-sweet snacks for awhile, but decided he liked the sweet snacks better.         Goals        General    Taking Medication     Notes - Note created  10/3/2018  3:37 PM by Ada Freeman APRN CNP    My Goal: I will take Novolog before meals    What I need to meet my goal: check BGs and take short acting insulin 5-15 minutes before meals    I plan to meet my goal by this date: next visit            Patient's most recent   Lab Results   Component Value Date    A1C 11.0 08/31/2018    is not meeting goal of <8.0    INTERVENTION:   Diabetes knowledge and skills assessment:     Patient is knowledgeable in diabetes management concepts related to: Monitoring    Patient needs further education on the following diabetes management concepts: Healthy Eating, Problem Solving, Reducing Risks and Healthy Coping    Based on learning assessment above, most appropriate setting for further diabetes education would be: Individual setting.    Education provided today on:  AADE Self-Care Behaviors:  Reducing Risks: major complications of diabetes, annual eye exam and A1C - goals, relating to blood glucose levels, how often to check    Opportunities for ongoing education and support in diabetes-self management were discussed.    Pt verbalized understanding of concepts discussed and recommendations provided today.       Education Materials Provided:  none    PLAN:  1. Type 2 diabetes mellitus without complication, with long-term current use of insulin (H)  Due to continued elevated BGs plan to increase Levemir.  Discussed reducing sweets. Referral placed for podiatry due to loss of senstion in bilateral feet and poor nail care.   - insulin detemir (LEVEMIR PEN) 100 UNIT/ML pen; Inject 45 Units Subcutaneous every morning AND 25 Units At Bedtime.  Dispense: 63 mL; Refill: 3    2.  Type 2 diabetes mellitus with hyperglycemia, with long-term current use of insulin (H)    - PODIATRY/FOOT & ANKLE SURGERY REFERRAL        Plan for follow up in 3 month or as needed.  Patient Instructions   Continue working on healthy eating and moving (start low and slow, work up to 30 min, 5x/week)    BG goals:  Fasting and before meals <130, >80  2 hour after eating <180    We only need 1/2 of these numbers to be within target then your A1c will be within target    Medication changes   -increase Levemir 45 units AM and 225 units PM  -please give novolog (meal time insulin) 5-15 minutes before meals.     Follow up   -1 month    Call me sooner if any problems/concerns and/or questions develop including consistent low BGs <70 or consistent high BGs >200  [phone] (Unit Coordinator)    [phone] (Nurse)        Time Spent: 30 minutes  Encounter Type: Individual

## 2018-12-21 NOTE — PROGRESS NOTES
Chief Complaint   Patient presents with     Diabetes     Pt is in for a Dm Fu.       Initial Pulse 77   Resp 16   Wt 89.6 kg (197 lb 8 oz)   SpO2 98%   BMI 26.79 kg/m   Estimated body mass index is 26.79 kg/m  as calculated from the following:    Height as of 10/3/18: 1.829 m (6').    Weight as of this encounter: 89.6 kg (197 lb 8 oz).  Medication Reconciliation: complete    Eliana Tyson LPN

## 2018-12-21 NOTE — PATIENT INSTRUCTIONS
Continue working on healthy eating and moving (start low and slow, work up to 30 min, 5x/week)    BG goals:  Fasting and before meals <130, >80  2 hour after eating <180    We only need 1/2 of these numbers to be within target then your A1c will be within target    Medication changes   -increase Levemir 45 units AM and 225 units PM  -please give novolog (meal time insulin) 5-15 minutes before meals.     Follow up   -3 month    Call me sooner if any problems/concerns and/or questions develop including consistent low BGs <70 or consistent high BGs >200  [phone] (Unit Coordinator)    [phone] (Nurse)

## 2018-12-26 NOTE — TELEPHONE ENCOUNTER
Swati from [facility] calls, the pt was seen on Friday and was started on a Levemir pen. The pt is unable to see the number on the pen and cannot self admin. The start law saws if he cannot self admin, he must be Rx'd a syringe and vial system. Please send this into the pharmacy.

## 2018-12-26 NOTE — TELEPHONE ENCOUNTER
insulin detemir (LEVEMIR PEN) 100 UNIT/ML pen  Last Written Prescription Date:  12/21/18  Last Fill Quantity: 63 ml,   # refills: 3  Last Office Visit: 12/21/18  Future Office visit:       Last note:  - insulin detemir (LEVEMIR PEN) 100 UNIT/ML pen; Inject 45 Units Subcutaneous every morning AND 25 Units At Bedtime.  Dispense: 63 mL; Refill: 3    This is change from a vial to a pen.

## 2018-12-28 NOTE — TELEPHONE ENCOUNTER
11:09 AM    Reason for Call: Phone Call    Description: Wendy from [facility] called to state that patient gets his lancets from [facility] but that they are all out and will need them sent to [facility] pharmacy in [place].    Was an appointment offered for this call? No  If yes : Appointment type              Date    Preferred method for responding to this message: Telephone Call  [phone]  What is your phone number ?    If we cannot reach you directly, may we leave a detailed response at the number you provided? Yes    Can this message wait until your PCP/provider returns, if available today? YES, Please advise.  Thank you.    Ana Dave LPN

## 2018-12-28 NOTE — TELEPHONE ENCOUNTER
Orders pending for refill of lancets to [facility].     Mi Laws LPN

## 2019-01-01 ENCOUNTER — TELEPHONE (OUTPATIENT)
Dept: FAMILY MEDICINE | Facility: OTHER | Age: 84
End: 2019-01-01

## 2019-01-01 ENCOUNTER — OFFICE VISIT (OUTPATIENT)
Dept: FAMILY MEDICINE | Facility: OTHER | Age: 84
End: 2019-01-01
Attending: FAMILY MEDICINE
Payer: MEDICARE

## 2019-01-01 ENCOUNTER — ANTICOAGULATION THERAPY VISIT (OUTPATIENT)
Dept: ANTICOAGULATION | Facility: OTHER | Age: 84
End: 2019-01-01
Payer: MEDICARE

## 2019-01-01 ENCOUNTER — ANTICOAGULATION THERAPY VISIT (OUTPATIENT)
Dept: ANTICOAGULATION | Facility: OTHER | Age: 84
End: 2019-01-01

## 2019-01-01 ENCOUNTER — HOSPITAL ENCOUNTER (INPATIENT)
Facility: OTHER | Age: 84
LOS: 4 days | Discharge: HOME | DRG: 194 | End: 2019-09-10
Attending: FAMILY MEDICINE | Admitting: INTERNAL MEDICINE
Payer: MEDICARE

## 2019-01-01 ENCOUNTER — APPOINTMENT (OUTPATIENT)
Dept: PHYSICAL THERAPY | Facility: OTHER | Age: 84
DRG: 194 | End: 2019-01-01
Payer: MEDICARE

## 2019-01-01 ENCOUNTER — HOSPITAL ENCOUNTER (OUTPATIENT)
Dept: GENERAL RADIOLOGY | Facility: OTHER | Age: 84
Discharge: HOME | End: 2019-06-11
Attending: PHYSICIAN ASSISTANT | Admitting: PHYSICIAN ASSISTANT
Payer: MEDICARE

## 2019-01-01 ENCOUNTER — HOSPITAL ENCOUNTER (INPATIENT)
Facility: OTHER | Age: 84
LOS: 5 days | DRG: 193 | End: 2019-09-27
Attending: EMERGENCY MEDICINE | Admitting: FAMILY MEDICINE
Payer: MEDICARE

## 2019-01-01 ENCOUNTER — APPOINTMENT (OUTPATIENT)
Dept: OCCUPATIONAL THERAPY | Facility: OTHER | Age: 84
DRG: 194 | End: 2019-01-01
Attending: INTERNAL MEDICINE
Payer: MEDICARE

## 2019-01-01 ENCOUNTER — APPOINTMENT (OUTPATIENT)
Dept: GENERAL RADIOLOGY | Facility: OTHER | Age: 84
DRG: 193 | End: 2019-01-01
Attending: EMERGENCY MEDICINE
Payer: MEDICARE

## 2019-01-01 ENCOUNTER — HOSPITAL ENCOUNTER (EMERGENCY)
Facility: OTHER | Age: 84
Discharge: HOME | End: 2019-09-18
Attending: EMERGENCY MEDICINE | Admitting: EMERGENCY MEDICINE
Payer: MEDICARE

## 2019-01-01 ENCOUNTER — MEDICAL CORRESPONDENCE (OUTPATIENT)
Dept: HEALTH INFORMATION MANAGEMENT | Facility: CLINIC | Age: 84
End: 2019-01-01

## 2019-01-01 ENCOUNTER — APPOINTMENT (OUTPATIENT)
Dept: OCCUPATIONAL THERAPY | Facility: OTHER | Age: 84
DRG: 194 | End: 2019-01-01
Payer: MEDICARE

## 2019-01-01 ENCOUNTER — TRANSFERRED RECORDS (OUTPATIENT)
Dept: HEALTH INFORMATION MANAGEMENT | Facility: CLINIC | Age: 84
End: 2019-01-01

## 2019-01-01 ENCOUNTER — APPOINTMENT (OUTPATIENT)
Dept: SPEECH THERAPY | Facility: OTHER | Age: 84
DRG: 194 | End: 2019-01-01
Payer: MEDICARE

## 2019-01-01 ENCOUNTER — HOSPITAL ENCOUNTER (EMERGENCY)
Facility: OTHER | Age: 84
Discharge: HOME | End: 2019-01-12
Attending: PHYSICIAN ASSISTANT | Admitting: PHYSICIAN ASSISTANT
Payer: MEDICARE

## 2019-01-01 ENCOUNTER — OFFICE VISIT (OUTPATIENT)
Dept: FAMILY MEDICINE | Facility: OTHER | Age: 84
End: 2019-01-01
Attending: NURSE PRACTITIONER
Payer: MEDICARE

## 2019-01-01 ENCOUNTER — APPOINTMENT (OUTPATIENT)
Dept: GENERAL RADIOLOGY | Facility: OTHER | Age: 84
DRG: 194 | End: 2019-01-01
Attending: FAMILY MEDICINE
Payer: MEDICARE

## 2019-01-01 ENCOUNTER — APPOINTMENT (OUTPATIENT)
Dept: GENERAL RADIOLOGY | Facility: OTHER | Age: 84
DRG: 193 | End: 2019-01-01
Attending: FAMILY MEDICINE
Payer: MEDICARE

## 2019-01-01 ENCOUNTER — HOSPITAL ENCOUNTER (OUTPATIENT)
Dept: ULTRASOUND IMAGING | Facility: HOSPITAL | Age: 84
Discharge: HOME | End: 2019-03-21
Attending: FAMILY MEDICINE | Admitting: FAMILY MEDICINE
Payer: MEDICARE

## 2019-01-01 ENCOUNTER — OFFICE VISIT (OUTPATIENT)
Dept: UROLOGY | Facility: OTHER | Age: 84
End: 2019-01-01
Attending: FAMILY MEDICINE
Payer: MEDICARE

## 2019-01-01 ENCOUNTER — APPOINTMENT (OUTPATIENT)
Dept: GENERAL RADIOLOGY | Facility: OTHER | Age: 84
End: 2019-01-01
Attending: EMERGENCY MEDICINE
Payer: MEDICARE

## 2019-01-01 ENCOUNTER — OFFICE VISIT (OUTPATIENT)
Dept: FAMILY MEDICINE | Facility: OTHER | Age: 84
End: 2019-01-01
Payer: MEDICARE

## 2019-01-01 VITALS
TEMPERATURE: 97.1 F | WEIGHT: 179 LBS | OXYGEN SATURATION: 95 % | BODY MASS INDEX: 24.28 KG/M2 | HEART RATE: 95 BPM | SYSTOLIC BLOOD PRESSURE: 129 MMHG | DIASTOLIC BLOOD PRESSURE: 79 MMHG | RESPIRATION RATE: 18 BRPM

## 2019-01-01 VITALS
WEIGHT: 191 LBS | TEMPERATURE: 97.4 F | HEART RATE: 64 BPM | OXYGEN SATURATION: 96 % | SYSTOLIC BLOOD PRESSURE: 124 MMHG | BODY MASS INDEX: 25.9 KG/M2 | DIASTOLIC BLOOD PRESSURE: 62 MMHG

## 2019-01-01 VITALS
OXYGEN SATURATION: 95 % | TEMPERATURE: 98.9 F | BODY MASS INDEX: 24.28 KG/M2 | HEART RATE: 56 BPM | WEIGHT: 179 LBS | RESPIRATION RATE: 19 BRPM

## 2019-01-01 VITALS
BODY MASS INDEX: 25.08 KG/M2 | WEIGHT: 185.19 LBS | RESPIRATION RATE: 48 BRPM | HEART RATE: 101 BPM | DIASTOLIC BLOOD PRESSURE: 68 MMHG | OXYGEN SATURATION: 90 % | HEIGHT: 72 IN | TEMPERATURE: 101 F | SYSTOLIC BLOOD PRESSURE: 126 MMHG

## 2019-01-01 VITALS
HEART RATE: 70 BPM | DIASTOLIC BLOOD PRESSURE: 52 MMHG | WEIGHT: 202 LBS | TEMPERATURE: 97.7 F | SYSTOLIC BLOOD PRESSURE: 100 MMHG | BODY MASS INDEX: 27.4 KG/M2 | OXYGEN SATURATION: 97 %

## 2019-01-01 VITALS
RESPIRATION RATE: 20 BRPM | TEMPERATURE: 100.9 F | BODY MASS INDEX: 24.95 KG/M2 | SYSTOLIC BLOOD PRESSURE: 120 MMHG | DIASTOLIC BLOOD PRESSURE: 58 MMHG | WEIGHT: 184 LBS | OXYGEN SATURATION: 96 % | HEART RATE: 82 BPM

## 2019-01-01 VITALS
HEART RATE: 61 BPM | RESPIRATION RATE: 22 BRPM | BODY MASS INDEX: 25.2 KG/M2 | DIASTOLIC BLOOD PRESSURE: 60 MMHG | OXYGEN SATURATION: 96 % | WEIGHT: 185.8 LBS | SYSTOLIC BLOOD PRESSURE: 132 MMHG | TEMPERATURE: 97.5 F

## 2019-01-01 VITALS
WEIGHT: 197 LBS | OXYGEN SATURATION: 97 % | HEART RATE: 57 BPM | DIASTOLIC BLOOD PRESSURE: 68 MMHG | TEMPERATURE: 98.6 F | SYSTOLIC BLOOD PRESSURE: 118 MMHG | RESPIRATION RATE: 16 BRPM | BODY MASS INDEX: 26.68 KG/M2 | HEIGHT: 72 IN

## 2019-01-01 VITALS
DIASTOLIC BLOOD PRESSURE: 58 MMHG | WEIGHT: 184 LBS | BODY MASS INDEX: 24.95 KG/M2 | OXYGEN SATURATION: 95 % | RESPIRATION RATE: 20 BRPM | TEMPERATURE: 98.1 F | SYSTOLIC BLOOD PRESSURE: 122 MMHG | HEART RATE: 68 BPM

## 2019-01-01 VITALS
OXYGEN SATURATION: 98 % | TEMPERATURE: 99.9 F | SYSTOLIC BLOOD PRESSURE: 96 MMHG | DIASTOLIC BLOOD PRESSURE: 52 MMHG | HEART RATE: 75 BPM

## 2019-01-01 VITALS
OXYGEN SATURATION: 96 % | HEART RATE: 65 BPM | SYSTOLIC BLOOD PRESSURE: 100 MMHG | WEIGHT: 186 LBS | DIASTOLIC BLOOD PRESSURE: 62 MMHG | BODY MASS INDEX: 25.23 KG/M2 | TEMPERATURE: 98.1 F

## 2019-01-01 VITALS
SYSTOLIC BLOOD PRESSURE: 138 MMHG | HEART RATE: 54 BPM | TEMPERATURE: 96.9 F | OXYGEN SATURATION: 96 % | DIASTOLIC BLOOD PRESSURE: 68 MMHG

## 2019-01-01 DIAGNOSIS — E11.8 TYPE 2 DIABETES MELLITUS WITH COMPLICATION (H): ICD-10-CM

## 2019-01-01 DIAGNOSIS — S51.811D LACERATION OF RIGHT FOREARM, SUBSEQUENT ENCOUNTER: ICD-10-CM

## 2019-01-01 DIAGNOSIS — E11.9 TYPE 2 DIABETES MELLITUS WITHOUT COMPLICATION, WITHOUT LONG-TERM CURRENT USE OF INSULIN (H): ICD-10-CM

## 2019-01-01 DIAGNOSIS — R31.0 GROSS HEMATURIA: Primary | ICD-10-CM

## 2019-01-01 DIAGNOSIS — Z99.3 WHEELCHAIR DEPENDENCE: ICD-10-CM

## 2019-01-01 DIAGNOSIS — Z79.4 TYPE 2 DIABETES MELLITUS WITH HYPERGLYCEMIA, WITH LONG-TERM CURRENT USE OF INSULIN (H): ICD-10-CM

## 2019-01-01 DIAGNOSIS — E78.00 PURE HYPERCHOLESTEROLEMIA: ICD-10-CM

## 2019-01-01 DIAGNOSIS — I48.20 CHRONIC ATRIAL FIBRILLATION (H): ICD-10-CM

## 2019-01-01 DIAGNOSIS — N40.0 HYPERTROPHY OF PROSTATE WITHOUT URINARY OBSTRUCTION: ICD-10-CM

## 2019-01-01 DIAGNOSIS — F03.90 DEMENTIA WITHOUT BEHAVIORAL DISTURBANCE, UNSPECIFIED DEMENTIA TYPE: Primary | ICD-10-CM

## 2019-01-01 DIAGNOSIS — N18.4 STAGE 4 CHRONIC KIDNEY DISEASE (H): Primary | ICD-10-CM

## 2019-01-01 DIAGNOSIS — N18.6 TYPE 2 DIABETES MELLITUS WITH ESRD (END-STAGE RENAL DISEASE) (H): ICD-10-CM

## 2019-01-01 DIAGNOSIS — S41.111A ARM LACERATION, RIGHT, INITIAL ENCOUNTER: ICD-10-CM

## 2019-01-01 DIAGNOSIS — Z79.4 TYPE 2 DIABETES MELLITUS WITHOUT COMPLICATION, WITH LONG-TERM CURRENT USE OF INSULIN (H): ICD-10-CM

## 2019-01-01 DIAGNOSIS — Z79.4 ENCOUNTER FOR LONG-TERM (CURRENT) USE OF INSULIN (H): ICD-10-CM

## 2019-01-01 DIAGNOSIS — Z79.01 LONG TERM CURRENT USE OF ANTICOAGULANT THERAPY: ICD-10-CM

## 2019-01-01 DIAGNOSIS — R30.0 DYSURIA: Primary | ICD-10-CM

## 2019-01-01 DIAGNOSIS — R31.9 HEMATURIA, UNSPECIFIED TYPE: ICD-10-CM

## 2019-01-01 DIAGNOSIS — R05.9 COUGH: ICD-10-CM

## 2019-01-01 DIAGNOSIS — E11.9 TYPE 2 DIABETES MELLITUS WITHOUT COMPLICATION, WITH LONG-TERM CURRENT USE OF INSULIN (H): ICD-10-CM

## 2019-01-01 DIAGNOSIS — F03.90 DEMENTIA WITHOUT BEHAVIORAL DISTURBANCE, UNSPECIFIED DEMENTIA TYPE: ICD-10-CM

## 2019-01-01 DIAGNOSIS — E11.65 TYPE 2 DIABETES MELLITUS WITH HYPERGLYCEMIA, WITH LONG-TERM CURRENT USE OF INSULIN (H): ICD-10-CM

## 2019-01-01 DIAGNOSIS — E55.9 VITAMIN D DEFICIENCY: ICD-10-CM

## 2019-01-01 DIAGNOSIS — I10 ESSENTIAL HYPERTENSION: ICD-10-CM

## 2019-01-01 DIAGNOSIS — I50.9 CONGESTIVE HEART FAILURE (H): ICD-10-CM

## 2019-01-01 DIAGNOSIS — N17.9 ACUTE KIDNEY FAILURE, UNSPECIFIED (H): Primary | ICD-10-CM

## 2019-01-01 DIAGNOSIS — R50.9 FEVER OF UNDETERMINED ORIGIN: ICD-10-CM

## 2019-01-01 DIAGNOSIS — T38.3X5A HYPOGLYCEMIA DUE TO INSULIN: ICD-10-CM

## 2019-01-01 DIAGNOSIS — N18.30 CKD (CHRONIC KIDNEY DISEASE) STAGE 3, GFR 30-59 ML/MIN (H): ICD-10-CM

## 2019-01-01 DIAGNOSIS — Z79.01 LONG TERM (CURRENT) USE OF ANTICOAGULANTS: ICD-10-CM

## 2019-01-01 DIAGNOSIS — N18.4 STAGE 4 CHRONIC KIDNEY DISEASE (H): ICD-10-CM

## 2019-01-01 DIAGNOSIS — Z87.891 PERSONAL HISTORY OF TOBACCO USE, PRESENTING HAZARDS TO HEALTH: ICD-10-CM

## 2019-01-01 DIAGNOSIS — R39.89 URINARY PROBLEM: ICD-10-CM

## 2019-01-01 DIAGNOSIS — R41.0 CONFUSION: Primary | ICD-10-CM

## 2019-01-01 DIAGNOSIS — N18.4 CHRONIC KIDNEY DISEASE, STAGE IV (SEVERE) (H): ICD-10-CM

## 2019-01-01 DIAGNOSIS — K21.9 GASTROESOPHAGEAL REFLUX DISEASE WITHOUT ESOPHAGITIS: ICD-10-CM

## 2019-01-01 DIAGNOSIS — R32 URINARY INCONTINENCE, UNSPECIFIED TYPE: Primary | ICD-10-CM

## 2019-01-01 DIAGNOSIS — I50.9 HEART FAILURE, UNSPECIFIED HF CHRONICITY, UNSPECIFIED HEART FAILURE TYPE (H): ICD-10-CM

## 2019-01-01 DIAGNOSIS — I10 ESSENTIAL HYPERTENSION, BENIGN: ICD-10-CM

## 2019-01-01 DIAGNOSIS — I10 BENIGN ESSENTIAL HYPERTENSION: ICD-10-CM

## 2019-01-01 DIAGNOSIS — E11.65 TYPE II OR UNSPECIFIED TYPE DIABETES MELLITUS WITH RENAL MANIFESTATIONS, UNCONTROLLED(250.42) (H): ICD-10-CM

## 2019-01-01 DIAGNOSIS — E16.0 HYPOGLYCEMIA DUE TO INSULIN: ICD-10-CM

## 2019-01-01 DIAGNOSIS — R31.0 GROSS HEMATURIA: ICD-10-CM

## 2019-01-01 DIAGNOSIS — J18.9 PNEUMONIA OF LEFT LOWER LOBE DUE TO INFECTIOUS ORGANISM: ICD-10-CM

## 2019-01-01 DIAGNOSIS — I50.22 CHRONIC SYSTOLIC HEART FAILURE (H): ICD-10-CM

## 2019-01-01 DIAGNOSIS — J44.9 CHRONIC OBSTRUCTIVE PULMONARY DISEASE, UNSPECIFIED COPD TYPE (H): Primary | ICD-10-CM

## 2019-01-01 DIAGNOSIS — J41.0 SIMPLE CHRONIC BRONCHITIS (H): ICD-10-CM

## 2019-01-01 DIAGNOSIS — I25.2 OLD MYOCARDIAL INFARCTION: ICD-10-CM

## 2019-01-01 DIAGNOSIS — J44.1 COPD EXACERBATION (H): Primary | ICD-10-CM

## 2019-01-01 DIAGNOSIS — J22 LOWER RESPIRATORY INFECTION: ICD-10-CM

## 2019-01-01 DIAGNOSIS — E11.22 TYPE 2 DIABETES MELLITUS WITH ESRD (END-STAGE RENAL DISEASE) (H): ICD-10-CM

## 2019-01-01 DIAGNOSIS — I50.9 CONGESTIVE HEART FAILURE, UNSPECIFIED HF CHRONICITY, UNSPECIFIED HEART FAILURE TYPE (H): ICD-10-CM

## 2019-01-01 DIAGNOSIS — J18.9 COMMUNITY ACQUIRED PNEUMONIA, UNSPECIFIED LATERALITY: ICD-10-CM

## 2019-01-01 DIAGNOSIS — I13.0 MALIGNANT HYPERTENSIVE HEART AND RENAL DISEASE WITH RENAL FAILURE, STAGE 1 THROUGH STAGE 4 OR UNSPECIFIED CHRONIC KIDNEY DISEASE, WITH HEART FAILURE (H): ICD-10-CM

## 2019-01-01 DIAGNOSIS — N18.30 CHRONIC KIDNEY DISEASE, STAGE III (MODERATE) (H): Primary | ICD-10-CM

## 2019-01-01 DIAGNOSIS — E11.29 TYPE II OR UNSPECIFIED TYPE DIABETES MELLITUS WITH RENAL MANIFESTATIONS, UNCONTROLLED(250.42) (H): ICD-10-CM

## 2019-01-01 DIAGNOSIS — E11.9 DIABETES MELLITUS, TYPE 2 (H): ICD-10-CM

## 2019-01-01 DIAGNOSIS — R41.0 CONFUSION: ICD-10-CM

## 2019-01-01 DIAGNOSIS — M62.81 GENERALIZED MUSCLE WEAKNESS: Primary | ICD-10-CM

## 2019-01-01 LAB
ALBUMIN SERPL-MCNC: 3.4 G/DL (ref 3.5–5.7)
ALBUMIN UR-MCNC: 100 MG/DL
ALBUMIN UR-MCNC: 30 MG/DL
ALBUMIN UR-MCNC: ABNORMAL MG/DL
ALP SERPL-CCNC: 105 U/L (ref 34–104)
ALT SERPL W P-5'-P-CCNC: 11 U/L (ref 7–52)
AMORPH CRY #/AREA URNS HPF: ABNORMAL /HPF
ANION GAP SERPL CALCULATED.3IONS-SCNC: 10 MMOL/L (ref 3–14)
ANION GAP SERPL CALCULATED.3IONS-SCNC: 10 MMOL/L (ref 3–14)
ANION GAP SERPL CALCULATED.3IONS-SCNC: 6 MMOL/L (ref 3–14)
ANION GAP SERPL CALCULATED.3IONS-SCNC: 7 MMOL/L (ref 3–14)
ANION GAP SERPL CALCULATED.3IONS-SCNC: 8 MMOL/L (ref 3–14)
ANION GAP SERPL CALCULATED.3IONS-SCNC: 8 MMOL/L (ref 3–14)
ANION GAP SERPL CALCULATED.3IONS-SCNC: 9 MMOL/L (ref 3–14)
APPEARANCE UR: ABNORMAL
APPEARANCE UR: CLEAR
AST SERPL W P-5'-P-CCNC: 17 U/L (ref 13–39)
BACTERIA #/AREA URNS HPF: ABNORMAL /HPF
BACTERIA SPEC CULT: ABNORMAL
BACTERIA SPEC CULT: NO GROWTH
BACTERIA SPEC CULT: NO GROWTH
BACTERIA SPEC CULT: NORMAL
BASOPHILS # BLD AUTO: 0 10E9/L (ref 0–0.2)
BASOPHILS NFR BLD AUTO: 0.1 %
BASOPHILS NFR BLD AUTO: 0.2 %
BASOPHILS NFR BLD AUTO: 0.2 %
BASOPHILS NFR BLD AUTO: 0.3 %
BASOPHILS NFR BLD AUTO: 0.3 %
BASOPHILS NFR BLD AUTO: 0.4 %
BASOPHILS NFR BLD AUTO: 0.4 %
BASOPHILS NFR BLD AUTO: 0.5 %
BILIRUB SERPL-MCNC: 0.7 MG/DL (ref 0.3–1)
BILIRUB UR QL STRIP: ABNORMAL
BILIRUB UR QL STRIP: ABNORMAL
BILIRUB UR QL STRIP: NEGATIVE
BUN SERPL-MCNC: 17 MG/DL (ref 7–25)
BUN SERPL-MCNC: 23 MG/DL (ref 7–25)
BUN SERPL-MCNC: 24 MG/DL (ref 7–25)
BUN SERPL-MCNC: 25 MG/DL (ref 7–25)
BUN SERPL-MCNC: 28 MG/DL (ref 7–25)
BUN SERPL-MCNC: 29 MG/DL (ref 7–25)
BUN SERPL-MCNC: 33 MG/DL (ref 7–30)
BUN SERPL-MCNC: 39 MG/DL (ref 7–25)
BUN SERPL-MCNC: 41 MG/DL (ref 7–25)
CALCIUM SERPL-MCNC: 8.5 MG/DL (ref 8.5–10.1)
CALCIUM SERPL-MCNC: 8.5 MG/DL (ref 8.6–10.3)
CALCIUM SERPL-MCNC: 8.8 MG/DL (ref 8.6–10.3)
CALCIUM SERPL-MCNC: 8.9 MG/DL (ref 8.6–10.3)
CALCIUM SERPL-MCNC: 9 MG/DL (ref 8.6–10.3)
CALCIUM SERPL-MCNC: 9.1 MG/DL (ref 8.6–10.3)
CALCIUM SERPL-MCNC: 9.2 MG/DL (ref 8.6–10.3)
CHLORIDE SERPL-SCNC: 100 MMOL/L (ref 98–107)
CHLORIDE SERPL-SCNC: 95 MMOL/L (ref 98–107)
CHLORIDE SERPL-SCNC: 95 MMOL/L (ref 98–107)
CHLORIDE SERPL-SCNC: 97 MMOL/L (ref 94–109)
CHLORIDE SERPL-SCNC: 97 MMOL/L (ref 98–107)
CHLORIDE SERPL-SCNC: 97 MMOL/L (ref 98–107)
CHLORIDE SERPL-SCNC: 98 MMOL/L (ref 98–107)
CO2 SERPL-SCNC: 28 MMOL/L (ref 20–32)
CO2 SERPL-SCNC: 29 MMOL/L (ref 21–31)
CO2 SERPL-SCNC: 30 MMOL/L (ref 21–31)
CO2 SERPL-SCNC: 32 MMOL/L (ref 21–31)
CO2 SERPL-SCNC: 34 MMOL/L (ref 21–31)
COLOR UR AUTO: ABNORMAL
COLOR UR AUTO: ABNORMAL
COLOR UR AUTO: YELLOW
COPATH REPORT: NORMAL
CREAT SERPL-MCNC: 1.22 MG/DL (ref 0.7–1.3)
CREAT SERPL-MCNC: 1.26 MG/DL (ref 0.7–1.3)
CREAT SERPL-MCNC: 1.26 MG/DL (ref 0.7–1.3)
CREAT SERPL-MCNC: 1.4 MG/DL (ref 0.7–1.3)
CREAT SERPL-MCNC: 1.46 MG/DL (ref 0.7–1.3)
CREAT SERPL-MCNC: 1.48 MG/DL (ref 0.7–1.3)
CREAT SERPL-MCNC: 1.49 MG/DL (ref 0.7–1.3)
CREAT SERPL-MCNC: 1.5 MG/DL (ref 0.7–1.3)
CREAT SERPL-MCNC: 2.53 MG/DL (ref 0.66–1.25)
DIFFERENTIAL METHOD BLD: ABNORMAL
EOSINOPHIL # BLD AUTO: 0 10E9/L (ref 0–0.7)
EOSINOPHIL # BLD AUTO: 0 10E9/L (ref 0–0.7)
EOSINOPHIL # BLD AUTO: 0.1 10E9/L (ref 0–0.7)
EOSINOPHIL # BLD AUTO: 0.1 10E9/L (ref 0–0.7)
EOSINOPHIL # BLD AUTO: 0.2 10E9/L (ref 0–0.7)
EOSINOPHIL # BLD AUTO: 0.4 10E9/L (ref 0–0.7)
EOSINOPHIL NFR BLD AUTO: 0 %
EOSINOPHIL NFR BLD AUTO: 0.1 %
EOSINOPHIL NFR BLD AUTO: 0.5 %
EOSINOPHIL NFR BLD AUTO: 0.7 %
EOSINOPHIL NFR BLD AUTO: 2.5 %
EOSINOPHIL NFR BLD AUTO: 5.6 %
EOSINOPHIL NFR BLD AUTO: 6 %
EOSINOPHIL NFR BLD AUTO: 6.6 %
ERYTHROCYTE [DISTWIDTH] IN BLOOD BY AUTOMATED COUNT: 12.9 % (ref 10–15)
ERYTHROCYTE [DISTWIDTH] IN BLOOD BY AUTOMATED COUNT: 15.5 % (ref 10–15)
ERYTHROCYTE [DISTWIDTH] IN BLOOD BY AUTOMATED COUNT: 16 % (ref 10–15)
ERYTHROCYTE [DISTWIDTH] IN BLOOD BY AUTOMATED COUNT: 16.1 % (ref 10–15)
ERYTHROCYTE [DISTWIDTH] IN BLOOD BY AUTOMATED COUNT: 16.3 % (ref 10–15)
ERYTHROCYTE [DISTWIDTH] IN BLOOD BY AUTOMATED COUNT: 16.7 % (ref 10–15)
ERYTHROCYTE [DISTWIDTH] IN BLOOD BY AUTOMATED COUNT: 17.1 % (ref 10–15)
EST. AVERAGE GLUCOSE BLD GHB EST-MCNC: 266 MG/DL
GFR SERPL CREATININE-BSD FRML MDRD: 23 ML/MIN/{1.73_M2}
GFR SERPL CREATININE-BSD FRML MDRD: 45 ML/MIN/{1.73_M2}
GFR SERPL CREATININE-BSD FRML MDRD: 45 ML/MIN/{1.73_M2}
GFR SERPL CREATININE-BSD FRML MDRD: 46 ML/MIN/{1.73_M2}
GFR SERPL CREATININE-BSD FRML MDRD: 46 ML/MIN/{1.73_M2}
GFR SERPL CREATININE-BSD FRML MDRD: 49 ML/MIN/{1.73_M2}
GFR SERPL CREATININE-BSD FRML MDRD: 55 ML/MIN/{1.73_M2}
GFR SERPL CREATININE-BSD FRML MDRD: 55 ML/MIN/{1.73_M2}
GFR SERPL CREATININE-BSD FRML MDRD: 57 ML/MIN/{1.73_M2}
GLUCOSE SERPL-MCNC: 166 MG/DL (ref 70–105)
GLUCOSE SERPL-MCNC: 182 MG/DL (ref 70–105)
GLUCOSE SERPL-MCNC: 192 MG/DL (ref 70–105)
GLUCOSE SERPL-MCNC: 210 MG/DL (ref 70–105)
GLUCOSE SERPL-MCNC: 251 MG/DL (ref 70–105)
GLUCOSE SERPL-MCNC: 275 MG/DL (ref 70–105)
GLUCOSE SERPL-MCNC: 298 MG/DL (ref 70–105)
GLUCOSE SERPL-MCNC: 491 MG/DL (ref 70–99)
GLUCOSE SERPL-MCNC: 51 MG/DL (ref 70–105)
GLUCOSE UR STRIP-MCNC: 100 MG/DL
GLUCOSE UR STRIP-MCNC: 30 MG/DL
GLUCOSE UR STRIP-MCNC: >1000 MG/DL
GLUCOSE UR STRIP-MCNC: ABNORMAL MG/DL
GLUCOSE UR STRIP-MCNC: NEGATIVE MG/DL
HBA1C MFR BLD: 10.9 % (ref 0–5.6)
HCO3 BLD-SCNC: 24 MMOL/L (ref 22–28)
HCO3 BLD-SCNC: 30 MMOL/L (ref 22–28)
HCO3 BLDV-SCNC: 24 MMOL/L (ref 21–28)
HCO3 BLDV-SCNC: 26 MMOL/L (ref 21–28)
HCO3 BLDV-SCNC: 27 MMOL/L (ref 21–28)
HCT VFR BLD AUTO: 33.8 % (ref 40–53)
HCT VFR BLD AUTO: 34.1 % (ref 40–53)
HCT VFR BLD AUTO: 35.5 % (ref 40–53)
HCT VFR BLD AUTO: 35.7 % (ref 40–53)
HCT VFR BLD AUTO: 36.1 % (ref 40–53)
HCT VFR BLD AUTO: 37.7 % (ref 40–53)
HCT VFR BLD AUTO: 37.9 % (ref 40–53)
HCT VFR BLD AUTO: 38.1 % (ref 40–53)
HCT VFR BLD AUTO: 38.9 % (ref 40–53)
HGB BLD-MCNC: 10.9 G/DL (ref 13.3–17.7)
HGB BLD-MCNC: 11 G/DL (ref 13.3–17.7)
HGB BLD-MCNC: 11.2 G/DL (ref 13.3–17.7)
HGB BLD-MCNC: 11.2 G/DL (ref 13.3–17.7)
HGB BLD-MCNC: 11.8 G/DL (ref 13.3–17.7)
HGB BLD-MCNC: 12 G/DL (ref 13.3–17.7)
HGB BLD-MCNC: 12.3 G/DL (ref 13.3–17.7)
HGB BLD-MCNC: 12.3 G/DL (ref 13.3–17.7)
HGB BLD-MCNC: 13.5 G/DL (ref 13.3–17.7)
HGB UR QL STRIP: ABNORMAL
HYALINE CASTS #/AREA URNS LPF: 17 /LPF
HYALINE CASTS #/AREA URNS LPF: ABNORMAL /LPF
IMM GRANULOCYTES # BLD: 0 10E9/L (ref 0–0.4)
IMM GRANULOCYTES # BLD: 0.1 10E9/L (ref 0–0.4)
IMM GRANULOCYTES NFR BLD: 0.3 %
IMM GRANULOCYTES NFR BLD: 0.4 %
IMM GRANULOCYTES NFR BLD: 0.4 %
IMM GRANULOCYTES NFR BLD: 0.5 %
IMM GRANULOCYTES NFR BLD: 0.5 %
IMM GRANULOCYTES NFR BLD: 0.6 %
IMM GRANULOCYTES NFR BLD: 0.6 %
IMM GRANULOCYTES NFR BLD: 0.9 %
INR PPP: 1.5 (ref 0.8–1.14)
INR PPP: 2
INR PPP: 2
INR PPP: 2.1
INR PPP: 2.1 (ref 0.8–1.1)
INR PPP: 2.2
INR PPP: 2.21 (ref 0–1.3)
INR PPP: 2.3
INR PPP: 2.3
INR PPP: 2.39 (ref 0–1.3)
INR PPP: 2.4
INR PPP: 2.4
INR PPP: 2.4 (ref 0.8–1.14)
INR PPP: 2.5 (ref 0–1.3)
INR PPP: 2.6 (ref 0.8–1.14)
INR PPP: 2.64 (ref 0–1.3)
INR PPP: 3.09 (ref 0–1.3)
INR PPP: 3.67 (ref 0–1.3)
INR PPP: 4.71 (ref 0–1.3)
KETONES UR STRIP-MCNC: ABNORMAL MG/DL
KETONES UR STRIP-MCNC: ABNORMAL MG/DL
KETONES UR STRIP-MCNC: NEGATIVE MG/DL
LACTATE SERPL-SCNC: 0.8 MMOL/L (ref 0.5–2.2)
LACTATE SERPL-SCNC: 1.2 MMOL/L (ref 0.5–2.2)
LACTATE SERPL-SCNC: 1.3 MMOL/L (ref 0.5–2.2)
LEUKOCYTE ESTERASE UR QL STRIP: ABNORMAL
LEUKOCYTE ESTERASE UR QL STRIP: NEGATIVE
LYMPHOCYTES # BLD AUTO: 0.4 10E9/L (ref 0.8–5.3)
LYMPHOCYTES # BLD AUTO: 0.4 10E9/L (ref 0.8–5.3)
LYMPHOCYTES # BLD AUTO: 0.6 10E9/L (ref 0.8–5.3)
LYMPHOCYTES # BLD AUTO: 0.7 10E9/L (ref 0.8–5.3)
LYMPHOCYTES # BLD AUTO: 1.2 10E9/L (ref 0.8–5.3)
LYMPHOCYTES NFR BLD AUTO: 10.1 %
LYMPHOCYTES NFR BLD AUTO: 3.4 %
LYMPHOCYTES NFR BLD AUTO: 4.7 %
LYMPHOCYTES NFR BLD AUTO: 5 %
LYMPHOCYTES NFR BLD AUTO: 6.6 %
LYMPHOCYTES NFR BLD AUTO: 8.2 %
LYMPHOCYTES NFR BLD AUTO: 8.8 %
LYMPHOCYTES NFR BLD AUTO: 9.9 %
MCH RBC QN AUTO: 25.2 PG (ref 26.5–33)
MCH RBC QN AUTO: 25.6 PG (ref 26.5–33)
MCH RBC QN AUTO: 25.8 PG (ref 26.5–33)
MCH RBC QN AUTO: 25.8 PG (ref 26.5–33)
MCH RBC QN AUTO: 26.2 PG (ref 26.5–33)
MCH RBC QN AUTO: 26.3 PG (ref 26.5–33)
MCH RBC QN AUTO: 26.3 PG (ref 26.5–33)
MCH RBC QN AUTO: 26.5 PG (ref 26.5–33)
MCH RBC QN AUTO: 30.1 PG (ref 26.5–33)
MCHC RBC AUTO-ENTMCNC: 30.8 G/DL (ref 31.5–36.5)
MCHC RBC AUTO-ENTMCNC: 31.5 G/DL (ref 31.5–36.5)
MCHC RBC AUTO-ENTMCNC: 31.7 G/DL (ref 31.5–36.5)
MCHC RBC AUTO-ENTMCNC: 32.2 G/DL (ref 31.5–36.5)
MCHC RBC AUTO-ENTMCNC: 32.3 G/DL (ref 31.5–36.5)
MCHC RBC AUTO-ENTMCNC: 32.6 G/DL (ref 31.5–36.5)
MCHC RBC AUTO-ENTMCNC: 32.7 G/DL (ref 31.5–36.5)
MCHC RBC AUTO-ENTMCNC: 32.8 G/DL (ref 31.5–36.5)
MCHC RBC AUTO-ENTMCNC: 34.7 G/DL (ref 31.5–36.5)
MCV RBC AUTO: 80 FL (ref 78–100)
MCV RBC AUTO: 81 FL (ref 78–100)
MCV RBC AUTO: 81 FL (ref 78–100)
MCV RBC AUTO: 82 FL (ref 78–100)
MCV RBC AUTO: 87 FL (ref 78–100)
MONOCYTES # BLD AUTO: 0.6 10E9/L (ref 0–1.3)
MONOCYTES # BLD AUTO: 0.7 10E9/L (ref 0–1.3)
MONOCYTES # BLD AUTO: 0.8 10E9/L (ref 0–1.3)
MONOCYTES # BLD AUTO: 0.9 10E9/L (ref 0–1.3)
MONOCYTES # BLD AUTO: 1 10E9/L (ref 0–1.3)
MONOCYTES # BLD AUTO: 1.1 10E9/L (ref 0–1.3)
MONOCYTES NFR BLD AUTO: 10.5 %
MONOCYTES NFR BLD AUTO: 10.7 %
MONOCYTES NFR BLD AUTO: 11.1 %
MONOCYTES NFR BLD AUTO: 8.4 %
MONOCYTES NFR BLD AUTO: 8.5 %
MONOCYTES NFR BLD AUTO: 9.3 %
MONOCYTES NFR BLD AUTO: 9.5 %
MONOCYTES NFR BLD AUTO: 9.7 %
MUCOUS THREADS #/AREA URNS LPF: PRESENT /LPF
MUCOUS THREADS #/AREA URNS LPF: PRESENT /LPF
NEUTROPHILS # BLD AUTO: 4.1 10E9/L (ref 1.6–8.3)
NEUTROPHILS # BLD AUTO: 5.4 10E9/L (ref 1.6–8.3)
NEUTROPHILS # BLD AUTO: 5.7 10E9/L (ref 1.6–8.3)
NEUTROPHILS # BLD AUTO: 7.3 10E9/L (ref 1.6–8.3)
NEUTROPHILS # BLD AUTO: 7.7 10E9/L (ref 1.6–8.3)
NEUTROPHILS # BLD AUTO: 9.1 10E9/L (ref 1.6–8.3)
NEUTROPHILS # BLD AUTO: 9.1 10E9/L (ref 1.6–8.3)
NEUTROPHILS # BLD AUTO: 9.5 10E9/L (ref 1.6–8.3)
NEUTROPHILS NFR BLD AUTO: 72 %
NEUTROPHILS NFR BLD AUTO: 73.7 %
NEUTROPHILS NFR BLD AUTO: 76 %
NEUTROPHILS NFR BLD AUTO: 79.4 %
NEUTROPHILS NFR BLD AUTO: 80.4 %
NEUTROPHILS NFR BLD AUTO: 82.8 %
NEUTROPHILS NFR BLD AUTO: 85.2 %
NEUTROPHILS NFR BLD AUTO: 87.5 %
NITRATE UR QL: ABNORMAL
NITRATE UR QL: NEGATIVE
NON-SQ EPI CELLS #/AREA URNS LPF: ABNORMAL /LPF
NON-SQ EPI CELLS #/AREA URNS LPF: ABNORMAL /LPF
NRBC # BLD AUTO: 0 10*3/UL
NRBC BLD AUTO-RTO: 0 /100
NT-PROBNP SERPL-MCNC: 1040 PG/ML (ref 0–100)
NT-PROBNP SERPL-MCNC: 825 PG/ML (ref 0–100)
NT-PROBNP SERPL-MCNC: 929 PG/ML (ref 0–100)
O2/TOTAL GAS SETTING VFR VENT: 28 %
O2/TOTAL GAS SETTING VFR VENT: 5 %
O2/TOTAL GAS SETTING VFR VENT: ABNORMAL %
O2/TOTAL GAS SETTING VFR VENT: ABNORMAL %
O2/TOTAL GAS SETTING VFR VENT: NORMAL %
OXYHGB MFR BLD: 90 %
OXYHGB MFR BLD: 95 %
OXYHGB MFR BLDV: 67 %
OXYHGB MFR BLDV: 70 %
OXYHGB MFR BLDV: 99 %
PCO2 BLD: 41 MM HG (ref 35–45)
PCO2 BLD: 55 MM HG (ref 35–45)
PCO2 BLDV: 32 MM HG (ref 40–50)
PCO2 BLDV: 40 MM HG (ref 40–50)
PCO2 BLDV: 48 MM HG (ref 40–50)
PH BLD: 7.36 PH (ref 7.35–7.45)
PH BLD: 7.38 PH (ref 7.35–7.45)
PH BLDV: 7.34 PH (ref 7.32–7.43)
PH BLDV: 7.45 PH (ref 7.32–7.43)
PH BLDV: 7.49 PH (ref 7.32–7.43)
PH UR STRIP: 5 PH (ref 5–9)
PH UR STRIP: 5 PH (ref 5–9)
PH UR STRIP: 5.5 PH (ref 4.7–8)
PH UR STRIP: 5.5 PH (ref 5–9)
PH UR STRIP: 6 PH (ref 4.7–8)
PH UR STRIP: 6 PH (ref 5–9)
PH UR STRIP: 6 PH (ref 5–9)
PH UR STRIP: ABNORMAL PH (ref 5–7)
PLATELET # BLD AUTO: 291 10E9/L (ref 150–450)
PLATELET # BLD AUTO: 301 10E9/L (ref 150–450)
PLATELET # BLD AUTO: 312 10E9/L (ref 150–450)
PLATELET # BLD AUTO: 314 10E9/L (ref 150–450)
PLATELET # BLD AUTO: 314 10E9/L (ref 150–450)
PLATELET # BLD AUTO: 327 10E9/L (ref 150–450)
PLATELET # BLD AUTO: 341 10E9/L (ref 150–450)
PLATELET # BLD AUTO: 352 10E9/L (ref 150–450)
PLATELET # BLD AUTO: 355 10E9/L (ref 150–450)
PO2 BLD: 63 MM HG (ref 83–108)
PO2 BLD: 84 MM HG (ref 83–108)
PO2 BLDV: 191 MM HG (ref 25–47)
PO2 BLDV: 37 MM HG (ref 25–47)
PO2 BLDV: 38 MM HG (ref 25–47)
POTASSIUM SERPL-SCNC: 3.1 MMOL/L (ref 3.5–5.1)
POTASSIUM SERPL-SCNC: 3.7 MMOL/L (ref 3.5–5.1)
POTASSIUM SERPL-SCNC: 4 MMOL/L (ref 3.5–5.1)
POTASSIUM SERPL-SCNC: 4.1 MMOL/L (ref 3.5–5.1)
POTASSIUM SERPL-SCNC: 4.2 MMOL/L (ref 3.5–5.1)
POTASSIUM SERPL-SCNC: 4.3 MMOL/L (ref 3.5–5.1)
POTASSIUM SERPL-SCNC: 4.7 MMOL/L (ref 3.5–5.1)
POTASSIUM SERPL-SCNC: 4.9 MMOL/L (ref 3.4–5.3)
POTASSIUM SERPL-SCNC: 5 MMOL/L (ref 3.5–5.1)
PROCALCITONIN SERPL-MCNC: 1.2 NG/ML
PROCALCITONIN SERPL-MCNC: 1.4 NG/ML
PROT SERPL-MCNC: 7.1 G/DL (ref 6.4–8.9)
RBC # BLD AUTO: 4.23 10E12/L (ref 4.4–5.9)
RBC # BLD AUTO: 4.27 10E12/L (ref 4.4–5.9)
RBC # BLD AUTO: 4.34 10E12/L (ref 4.4–5.9)
RBC # BLD AUTO: 4.37 10E12/L (ref 4.4–5.9)
RBC # BLD AUTO: 4.49 10E12/L (ref 4.4–5.9)
RBC # BLD AUTO: 4.49 10E12/L (ref 4.4–5.9)
RBC # BLD AUTO: 4.65 10E12/L (ref 4.4–5.9)
RBC # BLD AUTO: 4.68 10E12/L (ref 4.4–5.9)
RBC # BLD AUTO: 4.68 10E12/L (ref 4.4–5.9)
RBC #/AREA URNS AUTO: >100 /HPF
RBC #/AREA URNS AUTO: >100 /HPF
RBC #/AREA URNS AUTO: >182 /HPF (ref 0–2)
RBC #/AREA URNS AUTO: >182 /HPF (ref 0–2)
RBC #/AREA URNS AUTO: ABNORMAL /HPF
SODIUM SERPL-SCNC: 131 MMOL/L (ref 133–144)
SODIUM SERPL-SCNC: 134 MMOL/L (ref 134–144)
SODIUM SERPL-SCNC: 135 MMOL/L (ref 134–144)
SODIUM SERPL-SCNC: 135 MMOL/L (ref 134–144)
SODIUM SERPL-SCNC: 136 MMOL/L (ref 134–144)
SODIUM SERPL-SCNC: 138 MMOL/L (ref 134–144)
SODIUM SERPL-SCNC: 139 MMOL/L (ref 134–144)
SOURCE: ABNORMAL
SP GR UR STRIP: 1.01 (ref 1–1.03)
SP GR UR STRIP: 1.01 (ref 1–1.03)
SP GR UR STRIP: 1.02 (ref 1–1.03)
SP GR UR STRIP: >1.03 (ref 1–1.03)
SPECIMEN SOURCE: ABNORMAL
SPECIMEN SOURCE: NORMAL
STREP GROUP A PCR: NOT DETECTED
TROPONIN I SERPL-MCNC: 0.04 UG/L (ref 0–0.03)
TROPONIN I SERPL-MCNC: 0.08 UG/L (ref 0–0.03)
TROPONIN I SERPL-MCNC: 0.08 UG/L (ref 0–0.03)
TROPONIN I SERPL-MCNC: 0.09 UG/L (ref 0–0.03)
TROPONIN I SERPL-MCNC: 0.1 UG/L (ref 0–0.03)
TROPONIN I SERPL-MCNC: 0.11 UG/L (ref 0–0.03)
UROBILINOGEN UR STRIP-ACNC: 0.2 EU/DL (ref 0.2–1)
UROBILINOGEN UR STRIP-ACNC: 1 EU/DL (ref 0.2–1)
UROBILINOGEN UR STRIP-ACNC: ABNORMAL EU/DL (ref 0.2–1)
UROBILINOGEN UR STRIP-MCNC: NORMAL MG/DL (ref 0–2)
UROBILINOGEN UR STRIP-MCNC: NORMAL MG/DL (ref 0–2)
WBC # BLD AUTO: 10.3 10E9/L (ref 4–11)
WBC # BLD AUTO: 10.8 10E9/L (ref 4–11)
WBC # BLD AUTO: 11.1 10E9/L (ref 4–11)
WBC # BLD AUTO: 11.5 10E9/L (ref 4–11)
WBC # BLD AUTO: 5.7 10E9/L (ref 4–11)
WBC # BLD AUTO: 7.4 10E9/L (ref 4–11)
WBC # BLD AUTO: 7.4 10E9/L (ref 4–11)
WBC # BLD AUTO: 8.8 10E9/L (ref 4–11)
WBC # BLD AUTO: 9.6 10E9/L (ref 4–11)
WBC #/AREA URNS AUTO: 18 /HPF (ref 0–5)
WBC #/AREA URNS AUTO: 30 /HPF (ref 0–5)
WBC #/AREA URNS AUTO: ABNORMAL /HPF
WBC CLUMPS #/AREA URNS HPF: PRESENT /HPF

## 2019-01-01 PROCEDURE — 97530 THERAPEUTIC ACTIVITIES: CPT | Mod: GO | Performed by: OCCUPATIONAL THERAPIST

## 2019-01-01 PROCEDURE — 96365 THER/PROPH/DIAG IV INF INIT: CPT | Performed by: EMERGENCY MEDICINE

## 2019-01-01 PROCEDURE — 81001 URINALYSIS AUTO W/SCOPE: CPT | Mod: ZL | Performed by: NURSE PRACTITIONER

## 2019-01-01 PROCEDURE — 85610 PROTHROMBIN TIME: CPT | Performed by: FAMILY MEDICINE

## 2019-01-01 PROCEDURE — 85025 COMPLETE CBC W/AUTO DIFF WBC: CPT | Performed by: INTERNAL MEDICINE

## 2019-01-01 PROCEDURE — G0463 HOSPITAL OUTPT CLINIC VISIT: HCPCS | Mod: 25

## 2019-01-01 PROCEDURE — G0463 HOSPITAL OUTPT CLINIC VISIT: HCPCS

## 2019-01-01 PROCEDURE — 25000132 ZZH RX MED GY IP 250 OP 250 PS 637: Mod: GY | Performed by: INTERNAL MEDICINE

## 2019-01-01 PROCEDURE — 99285 EMERGENCY DEPT VISIT HI MDM: CPT | Mod: 25 | Performed by: EMERGENCY MEDICINE

## 2019-01-01 PROCEDURE — 81001 URINALYSIS AUTO W/SCOPE: CPT | Performed by: INTERNAL MEDICINE

## 2019-01-01 PROCEDURE — 99285 EMERGENCY DEPT VISIT HI MDM: CPT | Mod: 25 | Performed by: FAMILY MEDICINE

## 2019-01-01 PROCEDURE — 82805 BLOOD GASES W/O2 SATURATION: CPT | Performed by: FAMILY MEDICINE

## 2019-01-01 PROCEDURE — 99214 OFFICE O/P EST MOD 30 MIN: CPT | Performed by: NURSE PRACTITIONER

## 2019-01-01 PROCEDURE — 99214 OFFICE O/P EST MOD 30 MIN: CPT | Performed by: PHYSICIAN ASSISTANT

## 2019-01-01 PROCEDURE — 93010 ELECTROCARDIOGRAM REPORT: CPT | Performed by: INTERNAL MEDICINE

## 2019-01-01 PROCEDURE — 87086 URINE CULTURE/COLONY COUNT: CPT | Mod: ZL | Performed by: FAMILY MEDICINE

## 2019-01-01 PROCEDURE — 25000132 ZZH RX MED GY IP 250 OP 250 PS 637: Mod: GY | Performed by: FAMILY MEDICINE

## 2019-01-01 PROCEDURE — 99223 1ST HOSP IP/OBS HIGH 75: CPT | Mod: AI | Performed by: FAMILY MEDICINE

## 2019-01-01 PROCEDURE — 87086 URINE CULTURE/COLONY COUNT: CPT | Mod: ZL | Performed by: NURSE PRACTITIONER

## 2019-01-01 PROCEDURE — 25800030 ZZH RX IP 258 OP 636: Performed by: FAMILY MEDICINE

## 2019-01-01 PROCEDURE — 83605 ASSAY OF LACTIC ACID: CPT | Performed by: FAMILY MEDICINE

## 2019-01-01 PROCEDURE — 12000000 ZZH R&B MED SURG/OB

## 2019-01-01 PROCEDURE — 99231 SBSQ HOSP IP/OBS SF/LOW 25: CPT | Performed by: FAMILY MEDICINE

## 2019-01-01 PROCEDURE — 71046 X-RAY EXAM CHEST 2 VIEWS: CPT

## 2019-01-01 PROCEDURE — 99213 OFFICE O/P EST LOW 20 MIN: CPT | Performed by: FAMILY MEDICINE

## 2019-01-01 PROCEDURE — 36415 COLL VENOUS BLD VENIPUNCTURE: CPT | Performed by: FAMILY MEDICINE

## 2019-01-01 PROCEDURE — 81001 URINALYSIS AUTO W/SCOPE: CPT | Mod: ZL | Performed by: FAMILY MEDICINE

## 2019-01-01 PROCEDURE — 92610 EVALUATE SWALLOWING FUNCTION: CPT | Mod: GN

## 2019-01-01 PROCEDURE — 80048 BASIC METABOLIC PNL TOTAL CA: CPT | Performed by: INTERNAL MEDICINE

## 2019-01-01 PROCEDURE — 85025 COMPLETE CBC W/AUTO DIFF WBC: CPT | Performed by: EMERGENCY MEDICINE

## 2019-01-01 PROCEDURE — 99232 SBSQ HOSP IP/OBS MODERATE 35: CPT | Performed by: INTERNAL MEDICINE

## 2019-01-01 PROCEDURE — 82805 BLOOD GASES W/O2 SATURATION: CPT | Performed by: EMERGENCY MEDICINE

## 2019-01-01 PROCEDURE — 81001 URINALYSIS AUTO W/SCOPE: CPT | Performed by: EMERGENCY MEDICINE

## 2019-01-01 PROCEDURE — 94640 AIRWAY INHALATION TREATMENT: CPT

## 2019-01-01 PROCEDURE — 25000128 H RX IP 250 OP 636: Performed by: FAMILY MEDICINE

## 2019-01-01 PROCEDURE — 25000131 ZZH RX MED GY IP 250 OP 636 PS 637: Mod: GY | Performed by: FAMILY MEDICINE

## 2019-01-01 PROCEDURE — 87651 STREP A DNA AMP PROBE: CPT | Performed by: FAMILY MEDICINE

## 2019-01-01 PROCEDURE — 40000281 ZZH STATISTIC TRANSPORT TIME EA 15 MIN

## 2019-01-01 PROCEDURE — 96375 TX/PRO/DX INJ NEW DRUG ADDON: CPT | Mod: XU | Performed by: FAMILY MEDICINE

## 2019-01-01 PROCEDURE — 99283 EMERGENCY DEPT VISIT LOW MDM: CPT | Mod: Z6 | Performed by: EMERGENCY MEDICINE

## 2019-01-01 PROCEDURE — 80048 BASIC METABOLIC PNL TOTAL CA: CPT | Performed by: FAMILY MEDICINE

## 2019-01-01 PROCEDURE — 96365 THER/PROPH/DIAG IV INF INIT: CPT | Mod: XU | Performed by: FAMILY MEDICINE

## 2019-01-01 PROCEDURE — 85610 PROTHROMBIN TIME: CPT | Performed by: INTERNAL MEDICINE

## 2019-01-01 PROCEDURE — 25000131 ZZH RX MED GY IP 250 OP 636 PS 637: Mod: GY | Performed by: INTERNAL MEDICINE

## 2019-01-01 PROCEDURE — 99214 OFFICE O/P EST MOD 30 MIN: CPT | Performed by: FAMILY MEDICINE

## 2019-01-01 PROCEDURE — 25000125 ZZHC RX 250: Performed by: FAMILY MEDICINE

## 2019-01-01 PROCEDURE — 97530 THERAPEUTIC ACTIVITIES: CPT | Mod: GP

## 2019-01-01 PROCEDURE — 87086 URINE CULTURE/COLONY COUNT: CPT | Performed by: INTERNAL MEDICINE

## 2019-01-01 PROCEDURE — 94640 AIRWAY INHALATION TREATMENT: CPT | Mod: 76

## 2019-01-01 PROCEDURE — 80053 COMPREHEN METABOLIC PANEL: CPT | Performed by: FAMILY MEDICINE

## 2019-01-01 PROCEDURE — 85025 COMPLETE CBC W/AUTO DIFF WBC: CPT | Performed by: FAMILY MEDICINE

## 2019-01-01 PROCEDURE — 25000128 H RX IP 250 OP 636: Performed by: INTERNAL MEDICINE

## 2019-01-01 PROCEDURE — 36415 COLL VENOUS BLD VENIPUNCTURE: CPT | Performed by: INTERNAL MEDICINE

## 2019-01-01 PROCEDURE — 99284 EMERGENCY DEPT VISIT MOD MDM: CPT | Mod: 25 | Performed by: EMERGENCY MEDICINE

## 2019-01-01 PROCEDURE — 85027 COMPLETE CBC AUTOMATED: CPT | Performed by: FAMILY MEDICINE

## 2019-01-01 PROCEDURE — 97535 SELF CARE MNGMENT TRAINING: CPT | Mod: GO | Performed by: OCCUPATIONAL THERAPIST

## 2019-01-01 PROCEDURE — 36415 COLL VENOUS BLD VENIPUNCTURE: CPT | Performed by: EMERGENCY MEDICINE

## 2019-01-01 PROCEDURE — 40001055 ZZH STATISTIC CPAP SET UP ONLY, GICH

## 2019-01-01 PROCEDURE — 97116 GAIT TRAINING THERAPY: CPT | Mod: GP

## 2019-01-01 PROCEDURE — 40000274 ZZH STATISTIC RCP CONSULT EA 30 MIN

## 2019-01-01 PROCEDURE — 84484 ASSAY OF TROPONIN QUANT: CPT | Performed by: EMERGENCY MEDICINE

## 2019-01-01 PROCEDURE — 12005 RPR S/N/A/GEN/TRK12.6-20.0CM: CPT | Mod: Z6 | Performed by: PHYSICIAN ASSISTANT

## 2019-01-01 PROCEDURE — 80048 BASIC METABOLIC PNL TOTAL CA: CPT | Performed by: EMERGENCY MEDICINE

## 2019-01-01 PROCEDURE — 93005 ELECTROCARDIOGRAM TRACING: CPT | Performed by: FAMILY MEDICINE

## 2019-01-01 PROCEDURE — 36415 COLL VENOUS BLD VENIPUNCTURE: CPT | Mod: ZL | Performed by: FAMILY MEDICINE

## 2019-01-01 PROCEDURE — 97161 PT EVAL LOW COMPLEX 20 MIN: CPT | Mod: GP

## 2019-01-01 PROCEDURE — 52000 CYSTOURETHROSCOPY: CPT | Performed by: UROLOGY

## 2019-01-01 PROCEDURE — 85025 COMPLETE CBC W/AUTO DIFF WBC: CPT | Mod: ZL | Performed by: NURSE PRACTITIONER

## 2019-01-01 PROCEDURE — 83605 ASSAY OF LACTIC ACID: CPT | Performed by: EMERGENCY MEDICINE

## 2019-01-01 PROCEDURE — 93005 ELECTROCARDIOGRAM TRACING: CPT | Performed by: EMERGENCY MEDICINE

## 2019-01-01 PROCEDURE — 25800030 ZZH RX IP 258 OP 636: Performed by: EMERGENCY MEDICINE

## 2019-01-01 PROCEDURE — 40000275 ZZH STATISTIC RCP TIME EA 10 MIN

## 2019-01-01 PROCEDURE — 93308 TTE F-UP OR LMTD: CPT | Mod: TC | Performed by: EMERGENCY MEDICINE

## 2019-01-01 PROCEDURE — 84484 ASSAY OF TROPONIN QUANT: CPT | Performed by: FAMILY MEDICINE

## 2019-01-01 PROCEDURE — 99285 EMERGENCY DEPT VISIT HI MDM: CPT | Mod: Z6 | Performed by: EMERGENCY MEDICINE

## 2019-01-01 PROCEDURE — 87040 BLOOD CULTURE FOR BACTERIA: CPT | Performed by: FAMILY MEDICINE

## 2019-01-01 PROCEDURE — 99223 1ST HOSP IP/OBS HIGH 75: CPT | Mod: AI | Performed by: INTERNAL MEDICINE

## 2019-01-01 PROCEDURE — 83880 ASSAY OF NATRIURETIC PEPTIDE: CPT | Performed by: FAMILY MEDICINE

## 2019-01-01 PROCEDURE — 80048 BASIC METABOLIC PNL TOTAL CA: CPT | Mod: ZL | Performed by: NURSE PRACTITIONER

## 2019-01-01 PROCEDURE — 87040 BLOOD CULTURE FOR BACTERIA: CPT | Performed by: EMERGENCY MEDICINE

## 2019-01-01 PROCEDURE — 88108 CYTOPATH CONCENTRATE TECH: CPT | Mod: TC | Performed by: UROLOGY

## 2019-01-01 PROCEDURE — 93308 TTE F-UP OR LMTD: CPT | Mod: Z6 | Performed by: EMERGENCY MEDICINE

## 2019-01-01 PROCEDURE — 81001 URINALYSIS AUTO W/SCOPE: CPT | Performed by: FAMILY MEDICINE

## 2019-01-01 PROCEDURE — 40000788 ZZHCL STATISTIC ESTIMATED AVERAGE GLUCOSE: Mod: ZL | Performed by: FAMILY MEDICINE

## 2019-01-01 PROCEDURE — 84145 PROCALCITONIN (PCT): CPT | Performed by: FAMILY MEDICINE

## 2019-01-01 PROCEDURE — 84145 PROCALCITONIN (PCT): CPT | Performed by: EMERGENCY MEDICINE

## 2019-01-01 PROCEDURE — 94660 CPAP INITIATION&MGMT: CPT

## 2019-01-01 PROCEDURE — 12005 RPR S/N/A/GEN/TRK12.6-20.0CM: CPT | Performed by: PHYSICIAN ASSISTANT

## 2019-01-01 PROCEDURE — 25800030 ZZH RX IP 258 OP 636: Performed by: INTERNAL MEDICINE

## 2019-01-01 PROCEDURE — 71045 X-RAY EXAM CHEST 1 VIEW: CPT | Mod: TC

## 2019-01-01 PROCEDURE — 99239 HOSP IP/OBS DSCHRG MGMT >30: CPT | Performed by: INTERNAL MEDICINE

## 2019-01-01 PROCEDURE — 99284 EMERGENCY DEPT VISIT MOD MDM: CPT | Mod: Z6 | Performed by: FAMILY MEDICINE

## 2019-01-01 PROCEDURE — 71045 X-RAY EXAM CHEST 1 VIEW: CPT

## 2019-01-01 PROCEDURE — 97165 OT EVAL LOW COMPLEX 30 MIN: CPT | Mod: GO | Performed by: OCCUPATIONAL THERAPIST

## 2019-01-01 PROCEDURE — 87086 URINE CULTURE/COLONY COUNT: CPT | Mod: ZL | Performed by: PHYSICIAN ASSISTANT

## 2019-01-01 PROCEDURE — 83036 HEMOGLOBIN GLYCOSYLATED A1C: CPT | Mod: ZL | Performed by: FAMILY MEDICINE

## 2019-01-01 PROCEDURE — 99282 EMERGENCY DEPT VISIT SF MDM: CPT | Mod: 25 | Performed by: PHYSICIAN ASSISTANT

## 2019-01-01 PROCEDURE — 83880 ASSAY OF NATRIURETIC PEPTIDE: CPT | Mod: GZ | Performed by: EMERGENCY MEDICINE

## 2019-01-01 PROCEDURE — 5A09357 ASSISTANCE WITH RESPIRATORY VENTILATION, LESS THAN 24 CONSECUTIVE HOURS, CONTINUOUS POSITIVE AIRWAY PRESSURE: ICD-10-PCS | Performed by: FAMILY MEDICINE

## 2019-01-01 PROCEDURE — 25000128 H RX IP 250 OP 636: Performed by: EMERGENCY MEDICINE

## 2019-01-01 PROCEDURE — 85025 COMPLETE CBC W/AUTO DIFF WBC: CPT | Mod: ZL | Performed by: FAMILY MEDICINE

## 2019-01-01 PROCEDURE — 25000125 ZZHC RX 250: Performed by: EMERGENCY MEDICINE

## 2019-01-01 PROCEDURE — 36415 COLL VENOUS BLD VENIPUNCTURE: CPT | Mod: ZL | Performed by: NURSE PRACTITIONER

## 2019-01-01 PROCEDURE — 99203 OFFICE O/P NEW LOW 30 MIN: CPT | Mod: 25 | Performed by: UROLOGY

## 2019-01-01 PROCEDURE — 99233 SBSQ HOSP IP/OBS HIGH 50: CPT | Performed by: FAMILY MEDICINE

## 2019-01-01 PROCEDURE — 96375 TX/PRO/DX INJ NEW DRUG ADDON: CPT | Performed by: EMERGENCY MEDICINE

## 2019-01-01 PROCEDURE — 36600 WITHDRAWAL OF ARTERIAL BLOOD: CPT | Performed by: FAMILY MEDICINE

## 2019-01-01 PROCEDURE — 80048 BASIC METABOLIC PNL TOTAL CA: CPT | Mod: ZL | Performed by: FAMILY MEDICINE

## 2019-01-01 PROCEDURE — 51798 US URINE CAPACITY MEASURE: CPT | Performed by: UROLOGY

## 2019-01-01 PROCEDURE — 76770 US EXAM ABDO BACK WALL COMP: CPT | Mod: TC

## 2019-01-01 RX ORDER — CEPHALEXIN 500 MG/1
500 CAPSULE ORAL 4 TIMES DAILY
Qty: 28 CAPSULE | Refills: 0 | Status: SHIPPED | OUTPATIENT
Start: 2019-01-01 | End: 2019-01-01

## 2019-01-01 RX ORDER — DEXTROSE MONOHYDRATE, SODIUM CHLORIDE, AND POTASSIUM CHLORIDE 50; 1.49; 4.5 G/1000ML; G/1000ML; G/1000ML
INJECTION, SOLUTION INTRAVENOUS CONTINUOUS
Status: DISCONTINUED | OUTPATIENT
Start: 2019-01-01 | End: 2019-01-01

## 2019-01-01 RX ORDER — LORAZEPAM 2 MG/ML
0.5 INJECTION INTRAMUSCULAR EVERY 6 HOURS
Status: DISCONTINUED | OUTPATIENT
Start: 2019-01-01 | End: 2019-01-01

## 2019-01-01 RX ORDER — ASPIRIN 325 MG
325 TABLET ORAL DAILY
Status: DISCONTINUED | OUTPATIENT
Start: 2019-01-01 | End: 2019-01-01

## 2019-01-01 RX ORDER — AZITHROMYCIN 250 MG/1
500 TABLET, FILM COATED ORAL DAILY
Status: DISCONTINUED | OUTPATIENT
Start: 2019-01-01 | End: 2019-01-01 | Stop reason: HOSPADM

## 2019-01-01 RX ORDER — METOPROLOL SUCCINATE 50 MG/1
50 TABLET, EXTENDED RELEASE ORAL EVERY MORNING
Status: DISCONTINUED | OUTPATIENT
Start: 2019-01-01 | End: 2019-01-01 | Stop reason: HOSPADM

## 2019-01-01 RX ORDER — PROCHLORPERAZINE 25 MG
12.5 SUPPOSITORY, RECTAL RECTAL EVERY 12 HOURS PRN
Status: DISCONTINUED | OUTPATIENT
Start: 2019-01-01 | End: 2019-01-01 | Stop reason: HOSPADM

## 2019-01-01 RX ORDER — WARFARIN SODIUM 5 MG/1
5 TABLET ORAL
Status: COMPLETED | OUTPATIENT
Start: 2019-01-01 | End: 2019-01-01

## 2019-01-01 RX ORDER — LORAZEPAM 0.5 MG/1
.5-1 TABLET ORAL
Status: DISCONTINUED | OUTPATIENT
Start: 2019-01-01 | End: 2019-01-01 | Stop reason: HOSPADM

## 2019-01-01 RX ORDER — BLOOD-GLUCOSE METER
KIT MISCELLANEOUS
Qty: 300 STRIP | Refills: 1 | Status: SHIPPED | OUTPATIENT
Start: 2019-01-01

## 2019-01-01 RX ORDER — SODIUM CHLORIDE 9 MG/ML
INJECTION, SOLUTION INTRAVENOUS CONTINUOUS
Status: DISCONTINUED | OUTPATIENT
Start: 2019-01-01 | End: 2019-01-01

## 2019-01-01 RX ORDER — PROCHLORPERAZINE MALEATE 5 MG
5 TABLET ORAL EVERY 6 HOURS PRN
Status: DISCONTINUED | OUTPATIENT
Start: 2019-01-01 | End: 2019-01-01 | Stop reason: HOSPADM

## 2019-01-01 RX ORDER — SIMVASTATIN 80 MG
TABLET ORAL
Qty: 90 TABLET | Refills: 0 | Status: ON HOLD | OUTPATIENT
Start: 2019-01-01 | End: 2019-01-01

## 2019-01-01 RX ORDER — ATORVASTATIN CALCIUM 40 MG/1
40 TABLET, FILM COATED ORAL AT BEDTIME
Status: DISCONTINUED | OUTPATIENT
Start: 2019-01-01 | End: 2019-01-01 | Stop reason: HOSPADM

## 2019-01-01 RX ORDER — ONDANSETRON 2 MG/ML
4 INJECTION INTRAMUSCULAR; INTRAVENOUS EVERY 6 HOURS PRN
Status: DISCONTINUED | OUTPATIENT
Start: 2019-01-01 | End: 2019-01-01 | Stop reason: HOSPADM

## 2019-01-01 RX ORDER — METOPROLOL TARTRATE 1 MG/ML
5 INJECTION, SOLUTION INTRAVENOUS EVERY 8 HOURS
Status: DISCONTINUED | OUTPATIENT
Start: 2019-01-01 | End: 2019-01-01

## 2019-01-01 RX ORDER — NICOTINE POLACRILEX 4 MG
15-30 LOZENGE BUCCAL
Status: DISCONTINUED | OUTPATIENT
Start: 2019-01-01 | End: 2019-01-01 | Stop reason: HOSPADM

## 2019-01-01 RX ORDER — MULTIVIT-MIN/FA/LYCOPEN/LUTEIN .4-300-25
1 TABLET ORAL DAILY
Status: ON HOLD | COMMUNITY
Start: 2017-08-25 | End: 2019-01-01

## 2019-01-01 RX ORDER — ACETAMINOPHEN 325 MG/1
650 TABLET ORAL EVERY 8 HOURS PRN
Status: DISCONTINUED | OUTPATIENT
Start: 2019-01-01 | End: 2019-01-01

## 2019-01-01 RX ORDER — CEFTRIAXONE SODIUM 2 G
2 VIAL (EA) INJECTION ONCE
Status: COMPLETED | OUTPATIENT
Start: 2019-01-01 | End: 2019-01-01

## 2019-01-01 RX ORDER — FUROSEMIDE 10 MG/ML
20 INJECTION INTRAMUSCULAR; INTRAVENOUS EVERY 6 HOURS
Status: COMPLETED | OUTPATIENT
Start: 2019-01-01 | End: 2019-01-01

## 2019-01-01 RX ORDER — SIMVASTATIN 80 MG
80 TABLET ORAL AT BEDTIME
Status: DISCONTINUED | OUTPATIENT
Start: 2019-01-01 | End: 2019-01-01 | Stop reason: CLARIF

## 2019-01-01 RX ORDER — WARFARIN SODIUM 2 MG/1
2 TABLET ORAL
Status: COMPLETED | OUTPATIENT
Start: 2019-01-01 | End: 2019-01-01

## 2019-01-01 RX ORDER — ACETAMINOPHEN 325 MG/1
650 TABLET ORAL EVERY 4 HOURS PRN
Status: DISCONTINUED | OUTPATIENT
Start: 2019-01-01 | End: 2019-01-01 | Stop reason: HOSPADM

## 2019-01-01 RX ORDER — MINERAL OIL/HYDROPHIL PETROLAT
OINTMENT (GRAM) TOPICAL EVERY 8 HOURS PRN
Status: DISCONTINUED | OUTPATIENT
Start: 2019-01-01 | End: 2019-01-01 | Stop reason: HOSPADM

## 2019-01-01 RX ORDER — IPRATROPIUM BROMIDE AND ALBUTEROL SULFATE 2.5; .5 MG/3ML; MG/3ML
3 SOLUTION RESPIRATORY (INHALATION) ONCE
Status: COMPLETED | OUTPATIENT
Start: 2019-01-01 | End: 2019-01-01

## 2019-01-01 RX ORDER — IPRATROPIUM BROMIDE AND ALBUTEROL SULFATE 2.5; .5 MG/3ML; MG/3ML
1 SOLUTION RESPIRATORY (INHALATION) EVERY 6 HOURS PRN
Qty: 30 VIAL | Refills: 0 | Status: ON HOLD | OUTPATIENT
Start: 2019-01-01 | End: 2019-01-01

## 2019-01-01 RX ORDER — MULTIVITAMIN WITH IRON
TABLET ORAL
Status: ON HOLD | COMMUNITY
Start: 2013-03-13 | End: 2019-01-01

## 2019-01-01 RX ORDER — MORPHINE SULFATE 100 MG/5ML
5-10 SOLUTION ORAL
Status: DISCONTINUED | OUTPATIENT
Start: 2019-01-01 | End: 2019-01-01

## 2019-01-01 RX ORDER — FAMOTIDINE 20 MG/1
20 TABLET, FILM COATED ORAL 2 TIMES DAILY
Status: DISCONTINUED | OUTPATIENT
Start: 2019-01-01 | End: 2019-01-01 | Stop reason: HOSPADM

## 2019-01-01 RX ORDER — DEXTROSE MONOHYDRATE 25 G/50ML
25-50 INJECTION, SOLUTION INTRAVENOUS
Status: DISCONTINUED | OUTPATIENT
Start: 2019-01-01 | End: 2019-01-01 | Stop reason: HOSPADM

## 2019-01-01 RX ORDER — SYRINGE-NEEDLE,INSULIN,0.5 ML 27GX1/2"
SYRINGE, EMPTY DISPOSABLE MISCELLANEOUS
Qty: 300 EACH | Refills: 1 | OUTPATIENT
Start: 2019-01-01

## 2019-01-01 RX ORDER — ACETAMINOPHEN 650 MG/1
650 SUPPOSITORY RECTAL EVERY 4 HOURS PRN
Status: DISCONTINUED | OUTPATIENT
Start: 2019-01-01 | End: 2019-01-01 | Stop reason: HOSPADM

## 2019-01-01 RX ORDER — HYDROCODONE BITARTRATE AND ACETAMINOPHEN 5; 325 MG/1; MG/1
1-2 TABLET ORAL EVERY 4 HOURS PRN
Status: ON HOLD | COMMUNITY
End: 2019-01-01

## 2019-01-01 RX ORDER — QUETIAPINE FUMARATE 25 MG/1
25 TABLET, FILM COATED ORAL DAILY PRN
Status: DISCONTINUED | OUTPATIENT
Start: 2019-01-01 | End: 2019-01-01 | Stop reason: HOSPADM

## 2019-01-01 RX ORDER — DIGOXIN 125 MCG
TABLET ORAL
Qty: 90 TABLET | Refills: 0 | Status: SHIPPED | OUTPATIENT
Start: 2019-01-01 | End: 2019-01-01

## 2019-01-01 RX ORDER — FUROSEMIDE 10 MG/ML
40 INJECTION INTRAMUSCULAR; INTRAVENOUS ONCE
Status: COMPLETED | OUTPATIENT
Start: 2019-01-01 | End: 2019-01-01

## 2019-01-01 RX ORDER — ONDANSETRON 4 MG/1
4 TABLET, ORALLY DISINTEGRATING ORAL EVERY 6 HOURS PRN
Status: DISCONTINUED | OUTPATIENT
Start: 2019-01-01 | End: 2019-01-01 | Stop reason: HOSPADM

## 2019-01-01 RX ORDER — CIPROFLOXACIN 250 MG/1
250 TABLET, FILM COATED ORAL 2 TIMES DAILY
Qty: 14 TABLET | Refills: 0 | Status: SHIPPED | OUTPATIENT
Start: 2019-01-01 | End: 2019-01-01

## 2019-01-01 RX ORDER — MORPHINE SULFATE 100 MG/5ML
5-10 SOLUTION ORAL
Status: DISCONTINUED | OUTPATIENT
Start: 2019-01-01 | End: 2019-01-01 | Stop reason: HOSPADM

## 2019-01-01 RX ORDER — FUROSEMIDE 40 MG
TABLET ORAL
Qty: 90 TABLET | Refills: 1 | Status: ON HOLD | OUTPATIENT
Start: 2019-01-01 | End: 2019-01-01

## 2019-01-01 RX ORDER — HYDROCODONE BITARTRATE AND ACETAMINOPHEN 5; 325 MG/1; MG/1
1-2 TABLET ORAL EVERY 4 HOURS PRN
Status: DISCONTINUED | OUTPATIENT
Start: 2019-01-01 | End: 2019-01-01 | Stop reason: HOSPADM

## 2019-01-01 RX ORDER — WARFARIN SODIUM 4 MG/1
4 TABLET ORAL
Status: DISCONTINUED | OUTPATIENT
Start: 2019-01-01 | End: 2019-01-01 | Stop reason: HOSPADM

## 2019-01-01 RX ORDER — CIPROFLOXACIN 250 MG/1
250 TABLET, FILM COATED ORAL DAILY
Qty: 7 TABLET | Refills: 0 | Status: ON HOLD | OUTPATIENT
Start: 2019-01-01 | End: 2019-01-01

## 2019-01-01 RX ORDER — FUROSEMIDE 40 MG
20 TABLET ORAL DAILY
Status: ON HOLD | COMMUNITY
End: 2019-01-01

## 2019-01-01 RX ORDER — DONEPEZIL HYDROCHLORIDE 5 MG/1
5 TABLET, FILM COATED ORAL DAILY
Status: DISCONTINUED | OUTPATIENT
Start: 2019-01-01 | End: 2019-01-01

## 2019-01-01 RX ORDER — IPRATROPIUM BROMIDE AND ALBUTEROL SULFATE 2.5; .5 MG/3ML; MG/3ML
1 SOLUTION RESPIRATORY (INHALATION) EVERY 6 HOURS PRN
Status: DISCONTINUED | OUTPATIENT
Start: 2019-01-01 | End: 2019-01-01 | Stop reason: HOSPADM

## 2019-01-01 RX ORDER — METOPROLOL SUCCINATE 50 MG/1
50 TABLET, EXTENDED RELEASE ORAL DAILY
Status: DISCONTINUED | OUTPATIENT
Start: 2019-01-01 | End: 2019-01-01

## 2019-01-01 RX ORDER — FUROSEMIDE 10 MG/ML
20 INJECTION INTRAMUSCULAR; INTRAVENOUS ONCE
Status: COMPLETED | OUTPATIENT
Start: 2019-01-01 | End: 2019-01-01

## 2019-01-01 RX ORDER — ATROPINE SULFATE 10 MG/ML
1-2 SOLUTION/ DROPS OPHTHALMIC
Status: DISCONTINUED | OUTPATIENT
Start: 2019-01-01 | End: 2019-01-01 | Stop reason: HOSPADM

## 2019-01-01 RX ORDER — MORPHINE SULFATE 2 MG/ML
1-2 INJECTION, SOLUTION INTRAMUSCULAR; INTRAVENOUS
Status: DISCONTINUED | OUTPATIENT
Start: 2019-01-01 | End: 2019-01-01 | Stop reason: HOSPADM

## 2019-01-01 RX ORDER — METOCLOPRAMIDE 5 MG/1
5 TABLET ORAL EVERY 6 HOURS PRN
Status: DISCONTINUED | OUTPATIENT
Start: 2019-01-01 | End: 2019-01-01 | Stop reason: HOSPADM

## 2019-01-01 RX ORDER — METHYLPREDNISOLONE SODIUM SUCCINATE 125 MG/2ML
60 INJECTION, POWDER, LYOPHILIZED, FOR SOLUTION INTRAMUSCULAR; INTRAVENOUS ONCE
Status: COMPLETED | OUTPATIENT
Start: 2019-01-01 | End: 2019-01-01

## 2019-01-01 RX ORDER — WARFARIN SODIUM 2 MG/1
TABLET ORAL
Qty: 260 TABLET | Refills: 0 | Status: ON HOLD | OUTPATIENT
Start: 2019-01-01 | End: 2019-01-01

## 2019-01-01 RX ORDER — DONEPEZIL HYDROCHLORIDE 5 MG/1
5 TABLET, FILM COATED ORAL AT BEDTIME
Qty: 30 TABLET | Refills: 3 | Status: ON HOLD | OUTPATIENT
Start: 2019-01-01 | End: 2019-01-01

## 2019-01-01 RX ORDER — FUROSEMIDE 40 MG
TABLET ORAL
Qty: 90 TABLET | Refills: 1 | Status: SHIPPED | OUTPATIENT
Start: 2019-01-01 | End: 2019-01-01

## 2019-01-01 RX ORDER — LIDOCAINE 40 MG/G
CREAM TOPICAL
Status: DISCONTINUED | OUTPATIENT
Start: 2019-01-01 | End: 2019-01-01 | Stop reason: HOSPADM

## 2019-01-01 RX ORDER — ACETAMINOPHEN 325 MG/1
650 TABLET ORAL ONCE
Status: COMPLETED | OUTPATIENT
Start: 2019-01-01 | End: 2019-01-01

## 2019-01-01 RX ORDER — ATORVASTATIN CALCIUM 40 MG/1
40 TABLET, FILM COATED ORAL EVERY EVENING
Status: DISCONTINUED | OUTPATIENT
Start: 2019-01-01 | End: 2019-01-01

## 2019-01-01 RX ORDER — SYRINGE-NEEDLE,INSULIN,0.5 ML 27GX1/2"
SYRINGE, EMPTY DISPOSABLE MISCELLANEOUS
Qty: 300 EACH | Refills: 1 | Status: SHIPPED | OUTPATIENT
Start: 2019-01-01 | End: 2019-01-01

## 2019-01-01 RX ORDER — SYRINGE-NEEDLE,INSULIN,0.5 ML 27GX1/2"
SYRINGE, EMPTY DISPOSABLE MISCELLANEOUS
Qty: 300 EACH | Refills: 1 | Status: SHIPPED | OUTPATIENT
Start: 2019-01-01

## 2019-01-01 RX ORDER — DONEPEZIL HYDROCHLORIDE 5 MG/1
5 TABLET, FILM COATED ORAL DAILY
Status: ON HOLD | COMMUNITY
End: 2019-01-01

## 2019-01-01 RX ORDER — DIGOXIN 125 MCG
125 TABLET ORAL DAILY
Status: DISCONTINUED | OUTPATIENT
Start: 2019-01-01 | End: 2019-01-01

## 2019-01-01 RX ORDER — GUAIFENESIN/DEXTROMETHORPHAN 100-10MG/5
10 SYRUP ORAL EVERY 4 HOURS PRN
Status: DISCONTINUED | OUTPATIENT
Start: 2019-01-01 | End: 2019-01-01 | Stop reason: HOSPADM

## 2019-01-01 RX ORDER — LIDOCAINE HYDROCHLORIDE AND EPINEPHRINE 10; 10 MG/ML; UG/ML
10 INJECTION, SOLUTION INFILTRATION; PERINEURAL ONCE
Status: DISCONTINUED | OUTPATIENT
Start: 2019-01-01 | End: 2019-01-01 | Stop reason: HOSPADM

## 2019-01-01 RX ORDER — LORAZEPAM 0.5 MG/1
0.5 TABLET ORAL EVERY 4 HOURS PRN
Status: DISCONTINUED | OUTPATIENT
Start: 2019-01-01 | End: 2019-01-01 | Stop reason: HOSPADM

## 2019-01-01 RX ORDER — SIMVASTATIN 80 MG
TABLET ORAL
Qty: 90 TABLET | Refills: 0 | Status: SHIPPED | OUTPATIENT
Start: 2019-01-01 | End: 2019-01-01

## 2019-01-01 RX ORDER — FUROSEMIDE 20 MG
20 TABLET ORAL DAILY
Status: DISCONTINUED | OUTPATIENT
Start: 2019-01-01 | End: 2019-01-01

## 2019-01-01 RX ORDER — LORAZEPAM 2 MG/ML
.5-1 INJECTION INTRAMUSCULAR
Status: DISCONTINUED | OUTPATIENT
Start: 2019-01-01 | End: 2019-01-01 | Stop reason: HOSPADM

## 2019-01-01 RX ORDER — BISACODYL 10 MG
10 SUPPOSITORY, RECTAL RECTAL
Status: DISCONTINUED | OUTPATIENT
Start: 2019-01-01 | End: 2019-01-01 | Stop reason: HOSPADM

## 2019-01-01 RX ORDER — FAMOTIDINE 20 MG/1
20 TABLET, FILM COATED ORAL DAILY
Status: DISCONTINUED | OUTPATIENT
Start: 2019-01-01 | End: 2019-01-01

## 2019-01-01 RX ORDER — DONEPEZIL HYDROCHLORIDE 5 MG/1
5 TABLET, FILM COATED ORAL DAILY
Status: DISCONTINUED | OUTPATIENT
Start: 2019-01-01 | End: 2019-01-01 | Stop reason: HOSPADM

## 2019-01-01 RX ORDER — MORPHINE SULFATE 100 MG/5ML
5-10 SOLUTION ORAL EVERY 30 MIN PRN
Status: DISCONTINUED | OUTPATIENT
Start: 2019-01-01 | End: 2019-01-01 | Stop reason: HOSPADM

## 2019-01-01 RX ORDER — WARFARIN SODIUM 4 MG/1
4 TABLET ORAL
Status: COMPLETED | OUTPATIENT
Start: 2019-01-01 | End: 2019-01-01

## 2019-01-01 RX ORDER — SENNOSIDES 8.6 MG
650 CAPSULE ORAL EVERY 8 HOURS PRN
Status: DISCONTINUED | OUTPATIENT
Start: 2019-01-01 | End: 2019-01-01 | Stop reason: CLARIF

## 2019-01-01 RX ORDER — METOPROLOL SUCCINATE 50 MG/1
50 TABLET, EXTENDED RELEASE ORAL DAILY
Qty: 90 TABLET | Refills: 3 | Status: SHIPPED | OUTPATIENT
Start: 2019-01-01 | End: 2019-01-01

## 2019-01-01 RX ORDER — METOPROLOL SUCCINATE 50 MG/1
50 TABLET, EXTENDED RELEASE ORAL DAILY
Qty: 15 TABLET | Refills: 0 | Status: ON HOLD | OUTPATIENT
Start: 2019-01-01 | End: 2019-01-01

## 2019-01-01 RX ORDER — WARFARIN SODIUM 3 MG/1
3 TABLET ORAL
Status: COMPLETED | OUTPATIENT
Start: 2019-01-01 | End: 2019-01-01

## 2019-01-01 RX ORDER — METOCLOPRAMIDE HYDROCHLORIDE 5 MG/ML
5 INJECTION INTRAMUSCULAR; INTRAVENOUS EVERY 6 HOURS PRN
Status: DISCONTINUED | OUTPATIENT
Start: 2019-01-01 | End: 2019-01-01 | Stop reason: HOSPADM

## 2019-01-01 RX ORDER — WARFARIN SODIUM 2 MG/1
TABLET ORAL
Status: ON HOLD | COMMUNITY
End: 2019-01-01

## 2019-01-01 RX ORDER — DIGOXIN 125 MCG
TABLET ORAL
Qty: 90 TABLET | Refills: 0 | Status: ON HOLD | OUTPATIENT
Start: 2019-01-01 | End: 2019-01-01

## 2019-01-01 RX ORDER — NALOXONE HYDROCHLORIDE 0.4 MG/ML
.1-.4 INJECTION, SOLUTION INTRAMUSCULAR; INTRAVENOUS; SUBCUTANEOUS
Status: DISCONTINUED | OUTPATIENT
Start: 2019-01-01 | End: 2019-01-01 | Stop reason: HOSPADM

## 2019-01-01 RX ORDER — SODIUM CHLORIDE 9 MG/ML
INJECTION, SOLUTION INTRAVENOUS CONTINUOUS
Status: DISCONTINUED | OUTPATIENT
Start: 2019-01-01 | End: 2019-01-01 | Stop reason: CLARIF

## 2019-01-01 RX ORDER — POTASSIUM CHLORIDE 1500 MG/1
40 TABLET, EXTENDED RELEASE ORAL ONCE
Status: COMPLETED | OUTPATIENT
Start: 2019-01-01 | End: 2019-01-01

## 2019-01-01 RX ORDER — AZITHROMYCIN 500 MG/5ML
500 INJECTION, POWDER, LYOPHILIZED, FOR SOLUTION INTRAVENOUS ONCE
Status: COMPLETED | OUTPATIENT
Start: 2019-01-01 | End: 2019-01-01

## 2019-01-01 RX ORDER — IPRATROPIUM BROMIDE AND ALBUTEROL SULFATE 2.5; .5 MG/3ML; MG/3ML
3 SOLUTION RESPIRATORY (INHALATION)
Status: DISCONTINUED | OUTPATIENT
Start: 2019-01-01 | End: 2019-01-01

## 2019-01-01 RX ORDER — GLYCOPYRROLATE 0.2 MG/ML
.2-.4 INJECTION, SOLUTION INTRAMUSCULAR; INTRAVENOUS EVERY 4 HOURS PRN
Status: DISCONTINUED | OUTPATIENT
Start: 2019-01-01 | End: 2019-01-01 | Stop reason: HOSPADM

## 2019-01-01 RX ORDER — DIGOXIN 125 MCG
125 TABLET ORAL DAILY
Status: DISCONTINUED | OUTPATIENT
Start: 2019-01-01 | End: 2019-01-01 | Stop reason: HOSPADM

## 2019-01-01 RX ORDER — FUROSEMIDE 10 MG/ML
40 INJECTION INTRAMUSCULAR; INTRAVENOUS
Status: DISCONTINUED | OUTPATIENT
Start: 2019-01-01 | End: 2019-01-01

## 2019-01-01 RX ORDER — ASPIRIN 325 MG
325 TABLET ORAL DAILY
Status: DISCONTINUED | OUTPATIENT
Start: 2019-01-01 | End: 2019-01-01 | Stop reason: HOSPADM

## 2019-01-01 RX ORDER — DIGOXIN 0.25 MG/ML
125 INJECTION INTRAMUSCULAR; INTRAVENOUS DAILY
Status: DISCONTINUED | OUTPATIENT
Start: 2019-01-01 | End: 2019-01-01

## 2019-01-01 RX ORDER — ACETAMINOPHEN 325 MG/1
650 TABLET ORAL EVERY 8 HOURS PRN
Status: DISCONTINUED | OUTPATIENT
Start: 2019-01-01 | End: 2019-01-01 | Stop reason: HOSPADM

## 2019-01-01 RX ORDER — WARFARIN SODIUM 2 MG/1
TABLET ORAL
Qty: 260 TABLET | Refills: 0 | Status: ON HOLD | OUTPATIENT
Start: 2019-01-01 | End: 2019-01-01 | Stop reason: DRUGHIGH

## 2019-01-01 RX ADMIN — MORPHINE SULFATE 10 MG: 20 SOLUTION ORAL at 08:29

## 2019-01-01 RX ADMIN — MORPHINE SULFATE 10 MG: 20 SOLUTION ORAL at 10:23

## 2019-01-01 RX ADMIN — AMOXICILLIN AND CLAVULANATE POTASSIUM 1 TABLET: 875; 125 TABLET, FILM COATED ORAL at 22:01

## 2019-01-01 RX ADMIN — AMOXICILLIN AND CLAVULANATE POTASSIUM 1 TABLET: 875; 125 TABLET, FILM COATED ORAL at 11:02

## 2019-01-01 RX ADMIN — CEFEPIME 2 G: 2 INJECTION, POWDER, FOR SOLUTION INTRAVENOUS at 14:57

## 2019-01-01 RX ADMIN — WARFARIN SODIUM 5 MG: 5 TABLET ORAL at 18:30

## 2019-01-01 RX ADMIN — ATORVASTATIN CALCIUM 40 MG: 40 TABLET, FILM COATED ORAL at 21:45

## 2019-01-01 RX ADMIN — MORPHINE SULFATE 10 MG: 20 SOLUTION ORAL at 20:46

## 2019-01-01 RX ADMIN — FAMOTIDINE 20 MG: 20 TABLET ORAL at 22:01

## 2019-01-01 RX ADMIN — MORPHINE SULFATE 10 MG: 20 SOLUTION ORAL at 04:20

## 2019-01-01 RX ADMIN — CEFEPIME 2 G: 2 INJECTION, POWDER, FOR SOLUTION INTRAVENOUS at 07:20

## 2019-01-01 RX ADMIN — WARFARIN SODIUM 3 MG: 3 TABLET ORAL at 17:32

## 2019-01-01 RX ADMIN — INSULIN ASPART 4 UNITS: 100 INJECTION, SOLUTION INTRAVENOUS; SUBCUTANEOUS at 17:37

## 2019-01-01 RX ADMIN — FAMOTIDINE 20 MG: 20 TABLET ORAL at 13:44

## 2019-01-01 RX ADMIN — FAMOTIDINE 20 MG: 20 TABLET ORAL at 21:43

## 2019-01-01 RX ADMIN — INSULIN DETEMIR 30 UNITS: 100 INJECTION, SOLUTION SUBCUTANEOUS at 08:27

## 2019-01-01 RX ADMIN — ASPIRIN 325 MG ORAL TABLET 325 MG: 325 PILL ORAL at 11:02

## 2019-01-01 RX ADMIN — ATROPINE SULFATE 1 DROP: 10 SOLUTION OPHTHALMIC at 01:34

## 2019-01-01 RX ADMIN — MORPHINE SULFATE 10 MG: 20 SOLUTION ORAL at 23:08

## 2019-01-01 RX ADMIN — FLUTICASONE PROPIONATE AND SALMETEROL 1 PUFF: 50; 250 POWDER RESPIRATORY (INHALATION) at 19:56

## 2019-01-01 RX ADMIN — MORPHINE SULFATE 5 MG: 20 SOLUTION ORAL at 03:27

## 2019-01-01 RX ADMIN — POTASSIUM CHLORIDE 40 MEQ: 1500 TABLET, EXTENDED RELEASE ORAL at 18:30

## 2019-01-01 RX ADMIN — INSULIN ASPART 1 UNITS: 100 INJECTION, SOLUTION INTRAVENOUS; SUBCUTANEOUS at 12:40

## 2019-01-01 RX ADMIN — ACETAMINOPHEN 650 MG: 325 TABLET, FILM COATED ORAL at 22:46

## 2019-01-01 RX ADMIN — INSULIN DETEMIR 30 UNITS: 100 INJECTION, SOLUTION SUBCUTANEOUS at 09:00

## 2019-01-01 RX ADMIN — METOPROLOL SUCCINATE 50 MG: 50 TABLET, EXTENDED RELEASE ORAL at 13:44

## 2019-01-01 RX ADMIN — METOPROLOL SUCCINATE 50 MG: 50 TABLET, EXTENDED RELEASE ORAL at 10:37

## 2019-01-01 RX ADMIN — DIGOXIN 125 MCG: 0.12 TABLET ORAL at 10:37

## 2019-01-01 RX ADMIN — FLUTICASONE PROPIONATE AND SALMETEROL 1 PUFF: 50; 250 POWDER RESPIRATORY (INHALATION) at 06:37

## 2019-01-01 RX ADMIN — INSULIN ASPART 2 UNITS: 100 INJECTION, SOLUTION INTRAVENOUS; SUBCUTANEOUS at 07:43

## 2019-01-01 RX ADMIN — Medication 15 G: at 08:11

## 2019-01-01 RX ADMIN — MORPHINE SULFATE 10 MG: 20 SOLUTION ORAL at 15:49

## 2019-01-01 RX ADMIN — ASPIRIN 325 MG ORAL TABLET 325 MG: 325 PILL ORAL at 10:37

## 2019-01-01 RX ADMIN — DIGOXIN 125 MCG: 0.12 TABLET ORAL at 13:44

## 2019-01-01 RX ADMIN — INSULIN ASPART 3 UNITS: 100 INJECTION, SOLUTION INTRAVENOUS; SUBCUTANEOUS at 17:57

## 2019-01-01 RX ADMIN — TAZOBACTAM SODIUM AND PIPERACILLIN SODIUM 4.5 G: 500; 4 INJECTION, SOLUTION INTRAVENOUS at 05:29

## 2019-01-01 RX ADMIN — FUROSEMIDE 20 MG: 20 TABLET ORAL at 16:13

## 2019-01-01 RX ADMIN — FLUTICASONE PROPIONATE AND SALMETEROL 1 PUFF: 50; 250 POWDER RESPIRATORY (INHALATION) at 07:29

## 2019-01-01 RX ADMIN — AZITHROMYCIN 500 MG: 250 TABLET, FILM COATED ORAL at 11:02

## 2019-01-01 RX ADMIN — METRONIDAZOLE 500 MG: 500 INJECTION, SOLUTION INTRAVENOUS at 16:21

## 2019-01-01 RX ADMIN — MORPHINE SULFATE 10 MG: 20 SOLUTION ORAL at 23:50

## 2019-01-01 RX ADMIN — ASPIRIN 325 MG ORAL TABLET 325 MG: 325 PILL ORAL at 10:34

## 2019-01-01 RX ADMIN — ACETAMINOPHEN 650 MG: 325 TABLET, FILM COATED ORAL at 04:23

## 2019-01-01 RX ADMIN — INSULIN ASPART 1 UNITS: 100 INJECTION, SOLUTION INTRAVENOUS; SUBCUTANEOUS at 08:26

## 2019-01-01 RX ADMIN — LORAZEPAM 0.5 MG: 2 INJECTION, SOLUTION INTRAMUSCULAR; INTRAVENOUS at 16:09

## 2019-01-01 RX ADMIN — INSULIN DETEMIR 40 UNITS: 100 INJECTION, SOLUTION SUBCUTANEOUS at 07:44

## 2019-01-01 RX ADMIN — MORPHINE SULFATE 10 MG: 20 SOLUTION ORAL at 01:34

## 2019-01-01 RX ADMIN — METOPROLOL SUCCINATE 50 MG: 50 TABLET, EXTENDED RELEASE ORAL at 11:01

## 2019-01-01 RX ADMIN — MORPHINE SULFATE 10 MG: 20 SOLUTION ORAL at 06:20

## 2019-01-01 RX ADMIN — AMOXICILLIN AND CLAVULANATE POTASSIUM 1 TABLET: 875; 125 TABLET, FILM COATED ORAL at 10:34

## 2019-01-01 RX ADMIN — FLUTICASONE PROPIONATE AND SALMETEROL 1 PUFF: 50; 250 POWDER RESPIRATORY (INHALATION) at 07:44

## 2019-01-01 RX ADMIN — METOPROLOL SUCCINATE 50 MG: 50 TABLET, EXTENDED RELEASE ORAL at 09:19

## 2019-01-01 RX ADMIN — INSULIN ASPART 5 UNITS: 100 INJECTION, SOLUTION INTRAVENOUS; SUBCUTANEOUS at 11:54

## 2019-01-01 RX ADMIN — INSULIN DETEMIR 25 UNITS: 100 INJECTION, SOLUTION SUBCUTANEOUS at 22:50

## 2019-01-01 RX ADMIN — AMOXICILLIN AND CLAVULANATE POTASSIUM 1 TABLET: 875; 125 TABLET, FILM COATED ORAL at 21:43

## 2019-01-01 RX ADMIN — DIGOXIN 125 MCG: 0.12 TABLET ORAL at 11:02

## 2019-01-01 RX ADMIN — SODIUM CHLORIDE: 9 INJECTION, SOLUTION INTRAVENOUS at 03:14

## 2019-01-01 RX ADMIN — FLUTICASONE PROPIONATE AND SALMETEROL 1 PUFF: 50; 250 POWDER RESPIRATORY (INHALATION) at 08:11

## 2019-01-01 RX ADMIN — MORPHINE SULFATE 10 MG: 20 SOLUTION ORAL at 05:41

## 2019-01-01 RX ADMIN — FAMOTIDINE 20 MG: 20 TABLET ORAL at 10:34

## 2019-01-01 RX ADMIN — ATORVASTATIN CALCIUM 40 MG: 40 TABLET, FILM COATED ORAL at 22:01

## 2019-01-01 RX ADMIN — ACETAMINOPHEN 650 MG: 650 SUPPOSITORY RECTAL at 03:19

## 2019-01-01 RX ADMIN — POTASSIUM CHLORIDE, DEXTROSE MONOHYDRATE AND SODIUM CHLORIDE: 150; 5; 450 INJECTION, SOLUTION INTRAVENOUS at 14:57

## 2019-01-01 RX ADMIN — DEXTROMETHORPHAN HBR, GUAIFENESIN 10 ML: 20; 200 SOLUTION ORAL at 21:17

## 2019-01-01 RX ADMIN — METHYLPREDNISOLONE SODIUM SUCCINATE 62.5 MG: 125 INJECTION, POWDER, FOR SOLUTION INTRAMUSCULAR; INTRAVENOUS at 07:16

## 2019-01-01 RX ADMIN — MORPHINE SULFATE 2 MG: 2 INJECTION, SOLUTION INTRAMUSCULAR; INTRAVENOUS at 12:25

## 2019-01-01 RX ADMIN — SODIUM CHLORIDE: 9 INJECTION, SOLUTION INTRAVENOUS at 07:34

## 2019-01-01 RX ADMIN — METRONIDAZOLE 500 MG: 500 INJECTION, SOLUTION INTRAVENOUS at 00:27

## 2019-01-01 RX ADMIN — CEFTRIAXONE SODIUM 2 G: 2 INJECTION, POWDER, FOR SOLUTION INTRAMUSCULAR; INTRAVENOUS at 11:21

## 2019-01-01 RX ADMIN — AMOXICILLIN AND CLAVULANATE POTASSIUM 1 TABLET: 875; 125 TABLET, FILM COATED ORAL at 13:13

## 2019-01-01 RX ADMIN — POTASSIUM CHLORIDE, DEXTROSE MONOHYDRATE AND SODIUM CHLORIDE: 150; 5; 450 INJECTION, SOLUTION INTRAVENOUS at 04:25

## 2019-01-01 RX ADMIN — TAZOBACTAM SODIUM AND PIPERACILLIN SODIUM 3.38 G: 375; 3 INJECTION, SOLUTION INTRAVENOUS at 17:57

## 2019-01-01 RX ADMIN — DIGOXIN 125 MCG: 0.12 TABLET ORAL at 10:34

## 2019-01-01 RX ADMIN — ATORVASTATIN CALCIUM 40 MG: 40 TABLET, FILM COATED ORAL at 21:14

## 2019-01-01 RX ADMIN — FLUTICASONE PROPIONATE AND SALMETEROL 1 PUFF: 50; 250 POWDER RESPIRATORY (INHALATION) at 18:26

## 2019-01-01 RX ADMIN — FLUTICASONE PROPIONATE AND SALMETEROL 1 PUFF: 50; 250 POWDER RESPIRATORY (INHALATION) at 18:25

## 2019-01-01 RX ADMIN — IPRATROPIUM BROMIDE AND ALBUTEROL SULFATE 3 ML: .5; 3 SOLUTION RESPIRATORY (INHALATION) at 12:03

## 2019-01-01 RX ADMIN — TAZOBACTAM SODIUM AND PIPERACILLIN SODIUM 3.38 G: 375; 3 INJECTION, SOLUTION INTRAVENOUS at 12:47

## 2019-01-01 RX ADMIN — Medication 15 G: at 01:40

## 2019-01-01 RX ADMIN — AZITHROMYCIN 500 MG: 250 TABLET, FILM COATED ORAL at 10:34

## 2019-01-01 RX ADMIN — FUROSEMIDE 40 MG: 10 INJECTION, SOLUTION INTRAVENOUS at 05:29

## 2019-01-01 RX ADMIN — DONEPEZIL HYDROCHLORIDE 5 MG: 5 TABLET ORAL at 10:37

## 2019-01-01 RX ADMIN — FAMOTIDINE 20 MG: 20 TABLET ORAL at 10:37

## 2019-01-01 RX ADMIN — MORPHINE SULFATE 10 MG: 20 SOLUTION ORAL at 04:31

## 2019-01-01 RX ADMIN — AMOXICILLIN AND CLAVULANATE POTASSIUM 1 TABLET: 875; 125 TABLET, FILM COATED ORAL at 21:14

## 2019-01-01 RX ADMIN — MORPHINE SULFATE 10 MG: 20 SOLUTION ORAL at 14:57

## 2019-01-01 RX ADMIN — FAMOTIDINE 20 MG: 20 TABLET ORAL at 11:02

## 2019-01-01 RX ADMIN — ACETAMINOPHEN 650 MG: 650 SUPPOSITORY RECTAL at 20:46

## 2019-01-01 RX ADMIN — LORAZEPAM 0.5 MG: 2 INJECTION, SOLUTION INTRAMUSCULAR; INTRAVENOUS at 22:02

## 2019-01-01 RX ADMIN — TAZOBACTAM SODIUM AND PIPERACILLIN SODIUM 3.38 G: 375; 3 INJECTION, SOLUTION INTRAVENOUS at 05:40

## 2019-01-01 RX ADMIN — FUROSEMIDE 20 MG: 10 INJECTION, SOLUTION INTRAVENOUS at 12:41

## 2019-01-01 RX ADMIN — MORPHINE SULFATE 10 MG: 20 SOLUTION ORAL at 16:17

## 2019-01-01 RX ADMIN — MORPHINE SULFATE 10 MG: 20 SOLUTION ORAL at 18:21

## 2019-01-01 RX ADMIN — MORPHINE SULFATE 10 MG: 20 SOLUTION ORAL at 17:41

## 2019-01-01 RX ADMIN — AZITHROMYCIN 500 MG: 250 TABLET, FILM COATED ORAL at 11:01

## 2019-01-01 RX ADMIN — WARFARIN SODIUM 2 MG: 2 TABLET ORAL at 18:15

## 2019-01-01 RX ADMIN — MORPHINE SULFATE 5 MG: 20 SOLUTION ORAL at 18:53

## 2019-01-01 RX ADMIN — INSULIN ASPART 4 UNITS: 100 INJECTION, SOLUTION INTRAVENOUS; SUBCUTANEOUS at 13:05

## 2019-01-01 RX ADMIN — DONEPEZIL HYDROCHLORIDE 5 MG: 5 TABLET ORAL at 10:34

## 2019-01-01 RX ADMIN — MORPHINE SULFATE 10 MG: 20 SOLUTION ORAL at 01:46

## 2019-01-01 RX ADMIN — LORAZEPAM 0.5 MG: 2 INJECTION, SOLUTION INTRAMUSCULAR; INTRAVENOUS at 11:12

## 2019-01-01 RX ADMIN — DONEPEZIL HYDROCHLORIDE 5 MG: 5 TABLET ORAL at 11:02

## 2019-01-01 RX ADMIN — MORPHINE SULFATE 10 MG: 20 SOLUTION ORAL at 22:36

## 2019-01-01 RX ADMIN — RANITIDINE 150 MG: 150 TABLET ORAL at 22:06

## 2019-01-01 RX ADMIN — IPRATROPIUM BROMIDE AND ALBUTEROL SULFATE 3 ML: .5; 3 SOLUTION RESPIRATORY (INHALATION) at 05:53

## 2019-01-01 RX ADMIN — MORPHINE SULFATE 10 MG: 20 SOLUTION ORAL at 21:15

## 2019-01-01 RX ADMIN — FUROSEMIDE 20 MG: 10 INJECTION, SOLUTION INTRAVENOUS at 17:57

## 2019-01-01 RX ADMIN — DIGOXIN 125 MCG: 0.12 TABLET ORAL at 16:11

## 2019-01-01 RX ADMIN — FUROSEMIDE 20 MG: 10 INJECTION, SOLUTION INTRAVENOUS at 06:05

## 2019-01-01 RX ADMIN — MORPHINE SULFATE 10 MG: 20 SOLUTION ORAL at 01:07

## 2019-01-01 RX ADMIN — AZITHROMYCIN MONOHYDRATE 500 MG: 500 INJECTION, POWDER, LYOPHILIZED, FOR SOLUTION INTRAVENOUS at 11:55

## 2019-01-01 RX ADMIN — ASPIRIN 325 MG ORAL TABLET 325 MG: 325 PILL ORAL at 16:11

## 2019-01-01 RX ADMIN — ACETAMINOPHEN 650 MG: 650 SUPPOSITORY RECTAL at 09:26

## 2019-01-01 RX ADMIN — METRONIDAZOLE 500 MG: 500 INJECTION, SOLUTION INTRAVENOUS at 08:11

## 2019-01-01 RX ADMIN — QUETIAPINE FUMARATE 25 MG: 25 TABLET ORAL at 21:43

## 2019-01-01 RX ADMIN — MORPHINE SULFATE 10 MG: 20 SOLUTION ORAL at 01:42

## 2019-01-01 RX ADMIN — WARFARIN SODIUM 4 MG: 4 TABLET ORAL at 17:57

## 2019-01-01 RX ADMIN — SODIUM CHLORIDE: 9 INJECTION, SOLUTION INTRAVENOUS at 18:31

## 2019-01-01 RX ADMIN — ATORVASTATIN CALCIUM 40 MG: 40 TABLET, FILM COATED ORAL at 21:43

## 2019-01-01 RX ADMIN — METOPROLOL SUCCINATE 50 MG: 50 TABLET, EXTENDED RELEASE ORAL at 16:13

## 2019-01-01 RX ADMIN — AZITHROMYCIN 500 MG: 250 TABLET, FILM COATED ORAL at 10:37

## 2019-01-01 RX ADMIN — DONEPEZIL HYDROCHLORIDE 5 MG: 5 TABLET ORAL at 16:13

## 2019-01-01 RX ADMIN — METOPROLOL SUCCINATE 50 MG: 50 TABLET, EXTENDED RELEASE ORAL at 10:34

## 2019-01-01 RX ADMIN — ASPIRIN 325 MG ORAL TABLET 325 MG: 325 PILL ORAL at 13:44

## 2019-01-01 RX ADMIN — LORAZEPAM 1 MG: 2 INJECTION, SOLUTION INTRAMUSCULAR; INTRAVENOUS at 02:22

## 2019-01-01 RX ADMIN — DONEPEZIL HYDROCHLORIDE 5 MG: 5 TABLET ORAL at 13:44

## 2019-01-01 RX ADMIN — MORPHINE SULFATE 10 MG: 20 SOLUTION ORAL at 03:03

## 2019-01-01 RX ADMIN — MORPHINE SULFATE 10 MG: 20 SOLUTION ORAL at 11:24

## 2019-01-01 RX ADMIN — ACETAMINOPHEN 650 MG: 650 SUPPOSITORY RECTAL at 14:58

## 2019-01-01 RX ADMIN — LORAZEPAM 0.5 MG: 2 INJECTION, SOLUTION INTRAMUSCULAR; INTRAVENOUS at 03:57

## 2019-01-01 RX ADMIN — SODIUM CHLORIDE: 9 INJECTION, SOLUTION INTRAVENOUS at 04:22

## 2019-01-01 RX ADMIN — ATORVASTATIN CALCIUM 40 MG: 40 TABLET, FILM COATED ORAL at 22:06

## 2019-01-01 RX ADMIN — FLUTICASONE PROPIONATE AND SALMETEROL 1 PUFF: 50; 250 POWDER RESPIRATORY (INHALATION) at 18:03

## 2019-01-01 RX ADMIN — TAZOBACTAM SODIUM AND PIPERACILLIN SODIUM 3.38 G: 375; 3 INJECTION, SOLUTION INTRAVENOUS at 23:57

## 2019-01-01 ASSESSMENT — PAIN SCALES - GENERAL
PAINLEVEL: MILD PAIN (2)
PAINLEVEL: NO PAIN (0)

## 2019-01-01 ASSESSMENT — ACTIVITIES OF DAILY LIVING (ADL)
ADLS_ACUITY_SCORE: 39
ADLS_ACUITY_SCORE: 27
ADLS_ACUITY_SCORE: 34
ADLS_ACUITY_SCORE: 34
ADLS_ACUITY_SCORE: 39
ADLS_ACUITY_SCORE: 39
ADLS_ACUITY_SCORE: 27
ADLS_ACUITY_SCORE: 39
ADLS_ACUITY_SCORE: 39
ADLS_ACUITY_SCORE: 35
ADLS_ACUITY_SCORE: 39
ADLS_ACUITY_SCORE: 35
ADLS_ACUITY_SCORE: 39
ADLS_ACUITY_SCORE: 27
ADLS_ACUITY_SCORE: 34
ADLS_ACUITY_SCORE: 34
ADLS_ACUITY_SCORE: 35
ADLS_ACUITY_SCORE: 39
ADLS_ACUITY_SCORE: 31
ADLS_ACUITY_SCORE: 39
ADLS_ACUITY_SCORE: 35
ADLS_ACUITY_SCORE: 39
ADLS_ACUITY_SCORE: 38
ADLS_ACUITY_SCORE: 39
ADLS_ACUITY_SCORE: 34
ADLS_ACUITY_SCORE: 34
ADLS_ACUITY_SCORE: 33
ADLS_ACUITY_SCORE: 29
ADLS_ACUITY_SCORE: 39
ADLS_ACUITY_SCORE: 27
FALL_HISTORY_WITHIN_LAST_SIX_MONTHS: YES
ADLS_ACUITY_SCORE: 33
ADLS_ACUITY_SCORE: 39
ADLS_ACUITY_SCORE: 34
ADLS_ACUITY_SCORE: 39
ADLS_ACUITY_SCORE: 27
ADLS_ACUITY_SCORE: 39
ADLS_ACUITY_SCORE: 35
ADLS_ACUITY_SCORE: 39
ADLS_ACUITY_SCORE: 35
ADLS_ACUITY_SCORE: 27
ADLS_ACUITY_SCORE: 39

## 2019-01-01 ASSESSMENT — MIFFLIN-ST. JEOR
SCORE: 1531.79
SCORE: 1586
SCORE: 1576
SCORE: 1578
SCORE: 1636.59

## 2019-01-01 ASSESSMENT — ENCOUNTER SYMPTOMS: WOUND: 1

## 2019-01-08 NOTE — PROGRESS NOTES
ANTICOAGULATION FOLLOW-UP CLINIC VISIT    Patient Name:  Adiel Rosa Jr  Date:  2019  Contact Type:  New warfarin dosing/INR recheck date faxed to [facility]    SUBJECTIVE:     Patient Findings     Positives:   No Problem Findings    Comments:   INR done by [facility] nurse and result faxed to warfarin clinic. Per nursing communication sheet, patient has no bleeding/bruising, and no new changes in diet/meds/activity. New warfarin dosing/INR recheck date faxed back to [facility]. Staff to notify warfarin clinic if patient has any bleeding/bruising, changes in diet/meds/activity or questions.            OBJECTIVE    INR   Date Value Ref Range Status   2019 2.3  Final       ASSESSMENT / PLAN  INR assessment THER    Recheck INR In: 4 WEEKS    INR Location [facility]      Anticoagulation Summary  As of 2019    INR goal:   2.0-3.0   TTR:   61.3 % (2.4 y)   INR used for dosin.3 (2019)   Warfarin maintenance plan:   4 mg (2 mg x 2) every Mon, Fri; 6 mg (2 mg x 3) all other days   Full warfarin instructions:   4 mg every Mon, Fri; 6 mg all other days   Weekly warfarin total:   38 mg   No change documented:   Jessa Storey RN   Plan last modified:   Jessa Gibson RN (2018)   Next INR check:   2019   Priority:   INR   Target end date:   Indefinite    Indications    Chronic atrial fibrillation (H) [I48.2]  Long-term (current) use of anticoagulants [Z79.01] [Z79.01]             Anticoagulation Episode Summary     INR check location:       Preferred lab:       Send INR reminders to:   [facility] ANTICOAG POOL    Comments:   [facility] assisted [facility], Fax   done with homecare      Anticoagulation Care Providers     Provider Role Specialty Phone number    GAURAV Grijalva MD [facility] [phone]            See the Encounter Report to view Anticoagulation Flowsheet and Dosing Calendar (Go to Encounters tab in chart review, and find  the Anticoagulation Therapy Visit)        Jessa Storey RN

## 2019-01-11 NOTE — PROGRESS NOTES
SUBJECTIVE:   Adiel Rosa Jr is a 83 year old male who presents to clinic today for the following health issues:      Diabetes Follow-up    Patient is checking blood sugars: four times daily.    Results are as follows:         am - 180-300         lunchtime - 250-400         suppertime - 185-300         2am-200-300    Diabetic concerns: sugar frequently over 200     Symptoms of hypoglycemia (low blood sugar): none     Paresthesias (numbness or burning in feet) or sores: No     Date of last diabetic eye exam: unknown- DUE    Diabetes Management Resources    Hyperlipidemia Follow-Up      Rate your low fat/cholesterol diet?: fair    Taking statin?  Yes, no muscle aches from statin    Other lipid medications/supplements?:  none    Hypertension Follow-up      Outpatient blood pressures are being checked at home.  Results are see log.    Low Salt Diet: low salt    BP Readings from Last 2 Encounters:   12/21/18 115/45   10/03/18 115/52     Hemoglobin A1C (%)   Date Value   08/31/2018 11.0 (H)   04/26/2018 10.7 (H)     LDL Cholesterol Calculated (mg/dL)   Date Value   04/23/2015 56   01/06/2014 53       [facility]    Adiel Rosa Jr, 83 year old, male presents with   Chief Complaint   Patient presents with     Diabetes       PAST MEDICAL HISTORY:  History reviewed. No pertinent past medical history.    PAST SURGICAL HISTORY:  Past Surgical History:   Procedure Laterality Date     reverse total arthoplasty of right shoulder   01/25/2018       MEDICATIONS:  Prior to Admission medications    Medication Sig Start Date End Date Taking? Authorizing Provider   acetaminophen (TYLENOL) 650 MG CR tablet Take 1 tablet (650 mg) by mouth every 8 hours as needed 8/31/18  Yes GAURAV Grijalva MD   aspirin 325 MG tablet Take 1 tablet (325 mg) by mouth daily 2/14/18  Yes Christie Quijano PA   B-D U/F 31G X 8 MM insulin pen needle USE 5 TO 6 PEN NEEDLES DAILY OR AS DIRECTED 3/17/17  Yes GAURAV Grijalva MD    benzocaine-menthol (CEPACOL) 15-3.6 MG lozenge Place 1 lozenge inside cheek every hour as needed for sore throat 1/16/17  Yes Alejandro Fung MD   blood glucose (NO BRAND SPECIFIED) lancets standard Use to test blood sugar three times daily or as directed.  Brand-Freestyle Lite 12/28/18  Yes Michelle Liang MD   blood glucose monitoring (FREESTYLE LITE) test strip Use to test blood sugars 5 times daily or as directed. 8/29/18  Yes GAURAV Grijalva MD   blood glucose monitoring (NO BRAND SPECIFIED) meter device kit Use to test blood sugar 5 times daily due to patient being on sliding scale. 1/30/17  Yes GAURAV Grijalva MD   candesartan (ATACAND) 16 MG tablet TAKE 1 TABLET DAILY IN THE MORNING 10/24/18  Yes GAURAV Grijalva MD   cephALEXin (KEFLEX) 500 MG capsule Take 1 capsule (500 mg) by mouth 4 times daily for 7 days 1/12/19 1/19/19 Yes Tiago Linares PA   Cranberry 500 MG CAPS Take 1 capsule (500 mg) by mouth daily 12/20/17  Yes GAURAV Grijalva MD   digoxin (LANOXIN) 125 MCG tablet TAKE 1 TABLET EVERY MORNING 10/24/18  Yes GAURAV Grijalva MD   Doxylamine-DM 6.25-15 MG/15ML LIQD Taking as pkg directions at night 1/20/17  Yes GAURAV Grijalva MD   fluticasone-salmeterol (ADVAIR DISKUS) 250-50 MCG/DOSE diskus inhaler USE 1 INHALATION TWO TIMES A DAY IN THE MORNING AND IN THE EVENING APPROXIMATELY 12 HOURS APART 5/15/18  Yes GAURAV Grijalva MD   FREESTYLE LITE test strip USE TO TEST BLOOD SUGARS 5 TIMES DAILY OR AS DIRECTED 9/28/18  Yes GAURAV Grijalva MD   furosemide (LASIX) 40 MG tablet TAKE 1 TABLET DAILY IN THE MORNING 10/24/18  Yes GAURAV Grijalva MD   HYDROcodone-acetaminophen (NORCO) 5-325 MG per tablet TAKE 1 TABLET BY MOUTH EVERY 6 HOURS AS NEEDED FOR MODERATE TO SEVRE PAIN 9/13/18  Yes GAURAV Grijalva MD   insulin aspart (NOVOLOG VIAL) 100 UNITS/ML injection 4-10 units 3 times daily with meals. Glucose less than or equal to149 don't give insulin.  150-199=4U, 200-249=5U, 250-299=6U, 300-349=8U,  "350 or greater=10U. If over 500 call the Doctor. 5/15/18  Yes GAURAV Grijalva MD   insulin detemir (LEVEMIR VIAL) 100 UNIT/ML vial Inject 45 Units Subcutaneous every morning AND 25 Units At Bedtime. 12/26/18 12/26/19 Yes Ada Freeman APRN CNP   insulin syringe-needle U-100 (ULTICARE INSULIN SYRINGE) 31G X 5/16\" 1 ML miscellaneous USE 1 SYRINGE WITH EACH INJECTION FIVE TIMES DAILY 12/14/18  Yes GAURAV Grijalva MD   Magnesium Oxide 250 MG TABS TAKE 1 TABLET BY MOUTH ONCE DAILY (AM) 4/4/18  Yes Swapnil Mackay MD   metoprolol succinate ER (TOPROL-XL) 50 MG 24 hr tablet TAKE 1 TABLET DAILY 1/15/19  Yes GAURAV Grijalva MD   Multiple Vitamins-Minerals (CERTAVITE SENIOR/ANTIOXIDANT) TABS TAKE 1 TABLET BY MOUTH ONCE DAILY (AM) 5/2/18  Yes GAURAV Grijalva MD   NOVOLOG VIAL 100 UNIT/ML soln INJECT 4-10 UNITS SUBQ THREE TIMES DAILY WITH MEALS. GLUCOSE < / =  =0U, 150-200=4U, 201-255=5U, 251-300=6U, 301-349=8U, 350-500=10U,  3/23/18  Yes Swapnil Mackay MD   ranitidine (ZANTAC) 150 MG tablet TAKE 1 TABLET TWICE A DAY 10/24/18  Yes GAURAV Grijalva MD   simvastatin (ZOCOR) 80 MG tablet TAKE 1 TABLET DAILY IN THE EVENING 1/15/19  Yes GAURAV Grijalva MD   VITAMIN D3 1000 units tablet TAKE 1 TABLET BY MOUTH EVERY MORNING 5/2/18  Yes GAURAV Grijalva MD   warfarin (COUMADIN) 2 MG tablet Take 4 mg Mon/Fri; 6mg all other days or as directed by [facility] 5/15/18  Yes GAURAV Grijalva MD       ALLERGIES:   No Known Allergies    ROS:  Constitutional, neuro, ENT, endocrine, pulmonary, cardiac, gastrointestinal, genitourinary, musculoskeletal, integument and psychiatric systems are negative, except as otherwise noted.      EXAM:  /52 (BP Location: Left arm, Patient Position: Chair, Cuff Size: Adult Large)   Pulse 70   Temp 97.7  F (36.5  C) (Tympanic)   Wt 91.6 kg (202 lb)   SpO2 97%   BMI 27.40 kg/m   Body mass index is 27.4 kg/m .   GENERAL APPEARANCE: healthy, alert and no distress  EYES: Eyes " grossly normal to inspection, PERRL and conjunctivae and sclerae normal  NECK: no adenopathy, no asymmetry, masses, or scars and thyroid normal to palpation  RESP: lungs clear to auscultation - no rales, rhonchi or wheezes  CV: regular rates and rhythm, normal S1 S2, no S3 or S4 and no murmur, click or rub   (male): testicles normal without atrophy or masses, no hernias and penis normal without urethral discharge, no penile sores and no redness  NEURO: Normal strength and tone, mentation intact and speech normal  PSYCH: mentation appears to be at his baseline.  His affect is pleasant  SKIN: Right forearm has approximately 8 cm long laceration closed with sutures no sign of infection  Lab/ X-ray  Pending    ASSESSMENT/PLAN:    ICD-10-CM    1. Dysuria R30.0 UA reflex to Microscopic and Culture   2. Type 2 diabetes mellitus with hyperglycemia, with long-term current use of insulin (H) E11.65 Hemoglobin A1c    Z79.4    3. Laceration of right forearm, subsequent encounter S51.811D    Sugars are not tightly controlled but at his age and with his memory issues I think he is stable.  We will see him in 6 months.  Today we will check an A1c.  His caregiver states he had eaten earlier today so we cannot do lipids and they were not interested in coming back in the next few days for a fasting lab.  In 5 more days will have the sutures removed from the right forearm.  They had him placed in [place] and they might be going back there to get them removed or the nurse at the facility night removal.  His diabetes is stable.  He describes periodic hematuria but then he states it has been looking fine.  He will void without the nurses looking at the urine.  We will check a urine today I will see him in 6 months follow-up.      WILSON Grijalva MD  January 17, 2019

## 2019-01-12 NOTE — ED NOTES
Arm wrapped with adaptic and kerlix, pt home with [facility] staff via w[facility]c, they both verbalize understanding of discharge instructions

## 2019-01-12 NOTE — DISCHARGE INSTRUCTIONS
Get plenty of fluids and rest.  Take medication as prescribed.  Leave bandage on today.  Tomorrow you can remove and gently clean with soap and water, re-bandage as needed.  Your sutures should be removed in 10-14 days.  Be aware for signs of infection such as increased redness with streaking, pain, purulent drainage, fevers.  Return to the ED if the signs of infection occur.

## 2019-01-12 NOTE — ED PROVIDER NOTES
History     Chief Complaint   Patient presents with     Fall     HPI  Adiel Rosa Jr is a 83 year old male who presents to the ED today coming by a staff member from his assisted living facility with a chief complaint of a fall and laceration.  Patient reports that he was in his bathroom when he slipped getting into his wheelchair, falling backwards into the wheelchair and scraping his right arm.  Patient is on Coumadin.  Patient denies hitting his head, having loss of consciousness, chest pain, shortness of breath or any other new injuries.  Patient had a moderate amount of bleeding that appears to be controlled with pressure.    Problem List:    Patient Active Problem List    Diagnosis Date Noted     Chronic osteomyelitis of right foot (H) 06/15/2017     Priority: Medium     Health Care Home 01/25/2017     Priority: Medium     Osteomyelitis of foot, right, acute (H) 01/12/2017     Priority: Medium     Hypoglycemia due to insulin 01/11/2017     Priority: Medium     Primary prostate cancer identified by needle biopsy (T1c) (H) 01/11/2017     Priority: Medium     Long-term (current) use of anticoagulants [Z79.01] 08/02/2016     Priority: Medium     Chronic atrial fibrillation (H) 04/01/2013     Priority: Medium     Advanced care planning/counseling discussion 03/16/2012     Priority: Medium     Chronic renal disease, stage III 09/22/2011     Priority: Medium     Hypercholesterolemia 01/01/2011     Priority: Medium     Benign hypertensive heart disease with heart failure (H) 01/01/2011     Priority: Medium     Problem list name updated by automated process. Provider to review       Hypercalcemia 01/01/2011     Priority: Medium     Chronic obstructive pulmonary disease, unspecified COPD type (H) 01/01/2011     Priority: Medium     Problem list name updated by automated process. Provider to review       Erectile Dysfunction 01/01/2011     Priority: Medium     Hypertrophy of prostate without urinary obstruction  01/01/2011     Priority: Medium     Problem list name updated by automated process. Provider to review       Esophageal reflux 01/01/2011     Priority: Medium     Hypertension, benign 01/01/2011     Priority: Medium     Cough 09/11/2007     Priority: Medium     Type 2 diabetes mellitus with hyperglycemia, with long-term current use of insulin (H) 11/29/1999     Priority: Medium     Problem list name updated by automated process. Provider to review       Acute myocardial infarction of other specified sites, episode of care unspecified 01/01/1999     Priority: Medium     Non Q wave  treated with Retavase          Past Medical History:    No past medical history on file.    Past Surgical History:    Past Surgical History:   Procedure Laterality Date     reverse total arthoplasty of right shoulder   01/25/2018       Family History:    No family history on file.    Social History:  Marital Status:   [2]  Social History     Tobacco Use     Smoking status: Never Smoker     Smokeless tobacco: Never Used   Substance Use Topics     Alcohol use: No     Drug use: Unknown     Types: Other     Comment: Drug use: No        Medications:      cephALEXin (KEFLEX) 500 MG capsule   acetaminophen (TYLENOL) 650 MG CR tablet   aspirin 325 MG tablet   B-D U/F 31G X 8 MM insulin pen needle   benzocaine-menthol (CEPACOL) 15-3.6 MG lozenge   blood glucose (NO BRAND SPECIFIED) lancets standard   blood glucose monitoring (FREESTYLE LITE) test strip   blood glucose monitoring (NO BRAND SPECIFIED) meter device kit   candesartan (ATACAND) 16 MG tablet   Cranberry 500 MG CAPS   digoxin (LANOXIN) 125 MCG tablet   Doxylamine-DM 6.25-15 MG/15ML LIQD   fluticasone-salmeterol (ADVAIR DISKUS) 250-50 MCG/DOSE diskus inhaler   FREESTYLE LITE test strip   furosemide (LASIX) 40 MG tablet   HYDROcodone-acetaminophen (NORCO) 5-325 MG per tablet   insulin aspart (NOVOLOG VIAL) 100 UNITS/ML injection   insulin detemir (LEVEMIR VIAL) 100 UNIT/ML vial  "  insulin syringe-needle U-100 (ULTICARE INSULIN SYRINGE) 31G X 5/16\" 1 ML miscellaneous   Magnesium Oxide 250 MG TABS   metoprolol succinate (TOPROL-XL) 50 MG 24 hr tablet   Multiple Vitamins-Minerals (CERTAVITE SENIOR/ANTIOXIDANT) TABS   NOVOLOG VIAL 100 UNIT/ML soln   ranitidine (ZANTAC) 150 MG tablet   simvastatin (ZOCOR) 80 MG tablet   VITAMIN D3 1000 units tablet   warfarin (COUMADIN) 2 MG tablet         Review of Systems   Skin: Positive for wound.   All other systems reviewed and are negative.      Physical Exam   BP: 118/68  Pulse: 57  Temp: 98.6  F (37  C)  Resp: 16  Height: 182.9 cm (6')  Weight: 89.4 kg (197 lb)  SpO2: 97 %      Physical Exam   Constitutional: He appears well-developed and well-nourished. No distress.   HENT:   Head: Normocephalic and atraumatic.   Eyes: Conjunctivae and EOM are normal. Pupils are equal, round, and reactive to light. No scleral icterus.   Neck: Normal range of motion. Neck supple.   Cardiovascular: Normal rate, regular rhythm and normal heart sounds.   Pulmonary/Chest: Effort normal and breath sounds normal. No respiratory distress.   Abdominal: Soft. Bowel sounds are normal. There is no tenderness.   Musculoskeletal: Normal range of motion. He exhibits no deformity.   Lymphadenopathy:     He has no cervical adenopathy.   Neurological: He is alert. Coordination normal.   Skin: Skin is warm and dry. No rash noted. He is not diaphoretic.   Approximate 14 cm laceration right forearm   Psychiatric: He has a normal mood and affect. His behavior is normal. Judgment and thought content normal.       ED Course        Procedures               Critical Care time:  none               No results found for this or any previous visit (from the past 24 hour(s)).    Medications   lidocaine 1% with EPINEPHrine 1:100,000 injection 10 mL (not administered)       Assessments & Plan (with Medical Decision Making)   Patient seen and examined.  Patient is nontoxic-appearing no acute distress.  " Patient is alert and oriented x4.  Heart, lung, bowel sounds normal.  Abdomen soft nontender palpation, nondistended.  Patient has full range of motion of all extremities along with good circulatory, motor, sensory function in his injured arm.  Patient denies hitting his head or loss of consciousness.  I offered the patient a head CT at this time but he declined.  Patient's tetanus is up-to-date.  Patient's wound was anesthetized with 1% lidocaine with epi.  Wound was thoroughly cleaned with saline.  A size 4-0, nylon running suture was used for closure.  Patient tolerated the procedure well.  Patient had good circulatory, motor, sensory function after the procedure.  Bandage was applied.  Patient will be started on a short course of Keflex due to his diabetes and size of wound.  Wound instructions given.  Strict return precautions given, patient understands and agrees with the plan patient was discharged.    Tiago Linares PA-C    I have reviewed the nursing notes.    I have reviewed the findings, diagnosis, plan and need for follow up with the patient.          Medication List      Started    cephALEXin 500 MG capsule  Commonly known as:  KEFLEX  500 mg, Oral, 4 TIMES DAILY            Final diagnoses:   Arm laceration, right, initial encounter       1/12/2019   [facility]     Tiago Linares PA  01/12/19 1251

## 2019-01-12 NOTE — ED AVS SNAPSHOT
[facility]  [address]  [place] [address]  Phone:  [phone]  Fax:  [phone]                                    Adiel Rosa Jr   MRN: [id number]    Department:  [facility]   Date of Visit:  1/12/2019           After Visit Summary Signature Page    I have received my discharge instructions, and my questions have been answered. I have discussed any challenges I see with this plan with the nurse or doctor.    ..........................................................................................................................................  Patient/Patient Representative Signature      ..........................................................................................................................................  Patient Representative Print Name and Relationship to Patient    ..................................................               ................................................  Date                                   Time    ..........................................................................................................................................  Reviewed by Signature/Title    ...................................................              ..............................................  Date                                               Time          22EPIC Rev 08/18

## 2019-01-15 NOTE — TELEPHONE ENCOUNTER
Protocol failed due to   LDL on file in past 12 months    LDL Cholesterol Calculated   Date Value Ref Range Status   04/23/2015 56 0 - 129 mg/dL Final     Comment:     LDL Cholesterol is the primary guide to therapy: LDL-cholesterol goal in high   risk patients is <100 mg/dL and in very high risk patients is <70 mg/dL.       Lab ordered. Please advise. Thank you!

## 2019-01-15 NOTE — TELEPHONE ENCOUNTER
Simvastatin  Last Written Prescription Date: 10/24/18  Last Fill Quantity: 90 # of Refills: 0  Last Office Visit: 8/31/18

## 2019-01-17 NOTE — NURSING NOTE
Chief Complaint   Patient presents with     Diabetes       Initial /52 (BP Location: Left arm, Patient Position: Chair, Cuff Size: Adult Large)   Pulse 70   Temp 97.7  F (36.5  C) (Tympanic)   Wt 91.6 kg (202 lb)   SpO2 97%   BMI 27.40 kg/m   Estimated body mass index is 27.4 kg/m  as calculated from the following:    Height as of 1/12/19: 1.829 m (6').    Weight as of this encounter: 91.6 kg (202 lb).  Medication Reconciliation: complete    Radha Kuhn LPN

## 2019-02-05 NOTE — PROGRESS NOTES
ANTICOAGULATION FOLLOW-UP CLINIC VISIT    Patient Name:  Adiel Rosa Jr  Date:  2019  Contact Type:  Telephone/ faxed to [facility]    SUBJECTIVE:     Patient Findings     Positives:   No Problem Findings    Comments:   [facility] by [facility] nurse and result faxed to warfarin clinic. Per nursing communication sheet, patient has no bleeding/bruising, and no new changes in diet/meds/activity. New warfarin dosing/INR recheck date faxed back to [facility]. Staff to notify warfarin clinic if patient has any bleeding/bruising, changes in diet/meds/activity or questions.            OBJECTIVE    INR   Date Value Ref Range Status   2019 2.4  Final       ASSESSMENT / PLAN  INR assessment THER    Recheck INR In: 4 WEEKS    INR Location Clinic      Anticoagulation Summary  As of 2019    INR goal:   2.0-3.0   TTR:   62.5 % (2.5 y)   INR used for dosin.4 (2019)   Warfarin maintenance plan:   4 mg (2 mg x 2) every Mon, Fri; 6 mg (2 mg x 3) all other days   Full warfarin instructions:   4 mg every Mon, Fri; 6 mg all other days   Weekly warfarin total:   38 mg   No change documented:   Yara Cross RN   Plan last modified:   Jessa Gibson, RN (2018)   Next INR check:   3/5/2019   Priority:   INR   Target end date:   Indefinite    Indications    Chronic atrial fibrillation (H) [I48.2]  Long-term (current) use of anticoagulants [Z79.01] [Z79.01]             Anticoagulation Episode Summary     INR check location:       Preferred lab:       Send INR reminders to:   HC ANTICOAG POOL    Comments:   [facility], Fax   done with homecare      Anticoagulation Care Providers     Provider Role Specialty Phone number    GAURAV Grijalva MD [facility] [phone]            See the Encounter Report to view Anticoagulation Flowsheet and Dosing Calendar (Go to Encounters tab in chart review, and find the Anticoagulation  Therapy Visit)      Yara Cross RN

## 2019-03-05 NOTE — PROGRESS NOTES
ANTICOAGULATION FOLLOW-UP CLINIC VISIT    Patient Name:  Adiel Rosa Jr  Date:  3/5/2019  Contact Type:  new warfarin dosing/INR recheck date faxed to [facility]    SUBJECTIVE:     Patient Findings     Positives:   No Problem Findings    Comments:   INR done by assisted living staff and result faxed to warfarin clinic. Per nursing communication sheet, patient has no bleeding/bruising and no new changes in diet/meds/activity. New warfarin dosing/INR recheck date faxed to [facility]. Staff to notify warfarin clinic if patient has any bleeding/bruising or changes in diet/meds/activity or questions.            OBJECTIVE    INR   Date Value Ref Range Status   2019 2.4  Final       ASSESSMENT / PLAN  INR assessment THER    Recheck INR In: 4 WEEKS    INR Location [facility]      Anticoagulation Summary  As of 3/5/2019    INR goal:   2.0-3.0   TTR:   63.6 % (2.5 y)   INR used for dosin.4 (3/5/2019)   Warfarin maintenance plan:   4 mg (2 mg x 2) every Mon, Fri; 6 mg (2 mg x 3) all other days   Full warfarin instructions:   4 mg every Mon, Fri; 6 mg all other days   Weekly warfarin total:   38 mg   No change documented:   Jessa Storey RN   Plan last modified:   Jessa Gibson RN (2018)   Next INR check:   2019   Priority:   INR   Target end date:   Indefinite    Indications    Chronic atrial fibrillation (H) [I48.2]  Long-term (current) use of anticoagulants [Z79.01] [Z79.01]             Anticoagulation Episode Summary     INR check location:       Preferred lab:       Send INR reminders to:   [facility] ANTICOAG POOL    Comments:   [facility], Fax   done with homecare      Anticoagulation Care Providers     Provider Role Specialty Phone number    GAURAV Grijalva MD [facility] [phone]            See the Encounter Report to view Anticoagulation Flowsheet and Dosing Calendar (Go to Encounters tab in chart review, and find the Anticoagulation  Therapy Visit)        Jessa Storey RN

## 2019-03-11 NOTE — PROGRESS NOTES
SUBJECTIVE:   Adiel Rosa Jr is a 83 year old male who presents to clinic today for the following health issues:      Genitourinary symptoms      Duration: ongoing since 3/10/19    Description:   incontinence and more sleeping    Intensity:  mild    Accompanying signs and symptoms (fever/discharge/nausea/vomiting/back or abdominal pain):  None    History (frequent UTI's/kidney stones/prostate problems): history of blood in urine   Sexually active: no     Precipitating or alleviating factors: drinking lots of water which stated that it seems to help    Therapies tried and outcome: cranberry gummy    Outcome: says it seem to help      Patient has had painless gross hematuria since March 10.  Does have diabetes atrial fibrillation which he is on Coumadin and aspirin.  Denies any back pain fever chills weight loss chest pain shortness of breath.  He has known chronic kidney disease stage III.  Also is diabetic      PAST MEDICAL HISTORY:  Past Medical History:   Diagnosis Date     Acute myocardial infarction of other specified sites, episode of care unspecified 1/1/1999    Non Q wave  treated with Retavase      Benign hypertensive heart disease with heart failure (H) 1/1/2011     Problem list name updated by automated process. Provider to review     Chronic atrial fibrillation (H) 4/1/2013     Chronic obstructive pulmonary disease, unspecified COPD type (H) 1/1/2011     Problem list name updated by automated process. Provider to review     Hypercalcemia 1/1/2011     Hypercholesterolemia 1/1/2011     Long-term (current) use of anticoagulants [Z79.01] 8/2/2016     Primary prostate cancer identified by needle biopsy (T1c) (H) 1/11/2017     Type 2 diabetes mellitus with hyperglycemia, with long-term current use of insulin (H) 11/29/1999     Problem list name updated by automated process. Provider to review       PAST SURGICAL HISTORY:  Past Surgical History:   Procedure Laterality Date     reverse total arthoplasty  of right shoulder   01/25/2018       MEDICATIONS:  Prior to Admission medications    Medication Sig Start Date End Date Taking? Authorizing Provider   acetaminophen (TYLENOL) 650 MG CR tablet Take 1 tablet (650 mg) by mouth every 8 hours as needed 8/31/18  Yes GAURAV Grijalva MD   aspirin 325 MG tablet Take 1 tablet (325 mg) by mouth daily 2/14/18  Yes Christie Quijano PA   benzocaine-menthol (CEPACOL) 15-3.6 MG lozenge Place 1 lozenge inside cheek every hour as needed for sore throat 1/16/17  Yes Alejandro Fung MD   blood glucose (NO BRAND SPECIFIED) lancets standard Use to test blood sugar three times daily or as directed.  Brand-Freestyle Lite 12/28/18  Yes Michelle Liang MD   blood glucose monitoring (FREESTYLE LITE) test strip Use to test blood sugars 5 times daily or as directed. 8/29/18  Yes GAURAV Grijalva MD   blood glucose monitoring (NO BRAND SPECIFIED) meter device kit Use to test blood sugar 5 times daily due to patient being on sliding scale. 1/30/17  Yes GAURAV Grijalva MD   candesartan (ATACAND) 16 MG tablet TAKE 1 TABLET DAILY IN THE MORNING 10/24/18  Yes GAURAV Grijalva MD   ciprofloxacin (CIPRO) 250 MG tablet Take 1 tablet (250 mg) by mouth daily 3/14/19  Yes GAURAV Grijalva MD   Cranberry 500 MG CAPS Take 1 capsule (500 mg) by mouth daily 12/20/17  Yes GAURAV Grijalva MD   digoxin (LANOXIN) 125 MCG tablet TAKE 1 TABLET EVERY MORNING 10/24/18  Yes GAURAV Grijalva MD   Doxylamine-DM 6.25-15 MG/15ML LIQD Taking as pkg directions at night 1/20/17  Yes GAURAV Grijalva MD   fluticasone-salmeterol (ADVAIR DISKUS) 250-50 MCG/DOSE diskus inhaler USE 1 INHALATION TWO TIMES A DAY IN THE MORNING AND IN THE EVENING APPROXIMATELY 12 HOURS APART 5/15/18  Yes GAURAV Grijalva MD   FREESTYLE LITE test strip USE TO TEST BLOOD SUGARS 5 TIMES DAILY OR AS DIRECTED 2/5/19  Yes GAURAV Grijalva MD   furosemide (LASIX) 40 MG tablet TAKE 1 TABLET DAILY IN THE MORNING 10/24/18  Yes GAURAV Grijalva MD  "  HYDROcodone-acetaminophen (NORCO) 5-325 MG per tablet TAKE 1 TABLET BY MOUTH EVERY 6 HOURS AS NEEDED FOR MODERATE TO SEVRE PAIN 9/13/18  Yes GAURAV Grijalva MD   insulin aspart (NOVOLOG VIAL) 100 UNITS/ML injection 4-10 units 3 times daily with meals. Glucose less than or equal to149 don't give insulin.  150-199=4U, 200-249=5U, 250-299=6U, 300-349=8U, 350 or greater=10U. If over 500 call the Doctor. 5/15/18  Yes GAURAV Grijalva MD   insulin detemir (LEVEMIR VIAL) 100 UNIT/ML vial Inject 45 Units Subcutaneous every morning AND 25 Units At Bedtime. 12/26/18 12/26/19 Yes Ada Freeman APRN CNP   insulin pen needle (B-D U/F) 31G X 8 MM miscellaneous USE 5 TO 6 PEN NEEDLES DAILY OR AS DIRECTED 2/18/19  Yes GAURAV Grijalva MD   insulin syringe-needle U-100 (ULTICARE INSULIN SYRINGE) 31G X 5/16\" 1 ML miscellaneous USE 1 SYRINGE WITH EACH INJECTION FIVE TIMES DAILY 2/18/19  Yes GAURAV Grijalva MD   Magnesium Oxide 250 MG TABS TAKE 1 TABLET BY MOUTH ONCE DAILY (AM) 4/4/18  Yes Swapnil Mackay MD   metoprolol succinate ER (TOPROL-XL) 50 MG 24 hr tablet TAKE 1 TABLET DAILY 1/15/19  Yes GAURAV Grijalva MD   Multiple Vitamins-Minerals (CERTAVITE SENIOR/ANTIOXIDANT) TABS TAKE 1 TABLET BY MOUTH ONCE DAILY (AM) 5/2/18  Yes GAURAV Grijalva MD   NOVOLOG VIAL 100 UNIT/ML soln INJECT 4-10 UNITS SUBQ THREE TIMES DAILY WITH MEALS. GLUCOSE < / =  =0U, 150-200=4U, 201-255=5U, 251-300=6U, 301-349=8U, 350-500=10U,  3/23/18  Yes Swapnil Mackay MD   ranitidine (ZANTAC) 150 MG tablet TAKE 1 TABLET TWICE A DAY 10/24/18  Yes GAURAV Grijalva MD   simvastatin (ZOCOR) 80 MG tablet TAKE 1 TABLET DAILY IN THE EVENING 1/15/19  Yes GAURAV Grijalva MD   VITAMIN D3 1000 units tablet TAKE 1 TABLET BY MOUTH EVERY MORNING 5/2/18  Yes GAURAV Grijalva MD   warfarin (COUMADIN) 2 MG tablet Take 4 mg Mon/Fri; 6mg all other days or as directed by [facility] 5/15/18  Yes GAURAV Grijalva MD   cephALEXin (KEFLEX) 500 MG capsule Take 1 " capsule (500 mg) by mouth 4 times daily for 7 days 1/12/19 1/19/19  Tiago Linares PA   ciprofloxacin (CIPRO) 250 MG tablet Take 1 tablet (250 mg) by mouth 2 times daily for 7 days 1/17/19 1/24/19  GAURAV Grijalva MD       ALLERGIES:   No Known Allergies    ROS:  Constitutional, neuro, ENT, endocrine, pulmonary, cardiac, gastrointestinal, genitourinary, musculoskeletal, integument and psychiatric systems are negative, except as otherwise noted.      EXAM:  /62 (BP Location: Left arm, Patient Position: Sitting, Cuff Size: Adult Regular)   Pulse 65   Temp 98.1  F (36.7  C) (Tympanic)   Wt 84.4 kg (186 lb)   SpO2 96%   BMI 25.23 kg/m   Body mass index is 25.23 kg/m .   GENERAL APPEARANCE: healthy, alert and no distress  EYES: Eyes grossly normal to inspection, PERRL and conjunctivae and sclerae normal  RESP: lungs clear to auscultation - no rales, rhonchi or wheezes  CV: Irregularly irregular S1-S2 without S3-S4  ABDOMEN: soft, nontender, without hepatosplenomegaly or masses and bowel sounds normal, no CVA tenderness  NEURO: Normal strength and tone, mentation intact and speech normal  PSYCH: mentation appears normal and affect normal/bright  Lab/ X-ray  Results for orders placed or performed in visit on 03/14/19 (from the past 24 hour(s))   CBC with platelets differential   Result Value Ref Range    WBC 11.5 (H) 4.0 - 11.0 10e9/L    RBC Count 4.49 4.4 - 5.9 10e12/L    Hemoglobin 13.5 13.3 - 17.7 g/dL    Hematocrit 38.9 (L) 40.0 - 53.0 %    MCV 87 78 - 100 fl    MCH 30.1 26.5 - 33.0 pg    MCHC 34.7 31.5 - 36.5 g/dL    RDW 12.9 10.0 - 15.0 %    Platelet Count 291 150 - 450 10e9/L    Diff Method Automated Method     % Neutrophils 79.4 %    % Lymphocytes 10.1 %    % Monocytes 9.3 %    % Eosinophils 0.5 %    % Basophils 0.3 %    % Immature Granulocytes 0.4 %    Nucleated RBCs 0 0 /100    Absolute Neutrophil 9.1 (H) 1.6 - 8.3 10e9/L    Absolute Lymphocytes 1.2 0.8 - 5.3 10e9/L    Absolute Monocytes 1.1 0.0  - 1.3 10e9/L    Absolute Eosinophils 0.1 0.0 - 0.7 10e9/L    Absolute Basophils 0.0 0.0 - 0.2 10e9/L    Abs Immature Granulocytes 0.1 0 - 0.4 10e9/L    Absolute Nucleated RBC 0.0    Basic metabolic panel   Result Value Ref Range    Sodium 131 (L) 133 - 144 mmol/L    Potassium 4.9 3.4 - 5.3 mmol/L    Chloride 97 94 - 109 mmol/L    Carbon Dioxide 28 20 - 32 mmol/L    Anion Gap 6 3 - 14 mmol/L    Glucose 491 (H) 70 - 99 mg/dL    Urea Nitrogen 33 (H) 7 - 30 mg/dL    Creatinine 2.53 (H) 0.66 - 1.25 mg/dL    GFR Estimate 23 (L) >60 mL/min/[1.73_m2]    GFR Estimate If Black 26 (L) >60 mL/min/[1.73_m2]    Calcium 8.5 8.5 - 10.1 mg/dL   UA reflex to Microscopic and Culture   Result Value Ref Range    Color Urine Red     Appearance Urine Turbid     Glucose Urine >1000 (A) NEG^Negative mg/dL    Bilirubin Urine Negative NEG^Negative    Ketones Urine Negative NEG^Negative mg/dL    Specific Gravity Urine 1.015 1.003 - 1.035    Blood Urine Large (A) NEG^Negative    pH Urine 6.0 4.7 - 8.0 pH    Protein Albumin Urine 30 (A) NEG^Negative mg/dL    Urobilinogen mg/dL Normal 0.0 - 2.0 mg/dL    Nitrite Urine Negative NEG^Negative    Leukocyte Esterase Urine Negative NEG^Negative    Source Midstream Urine     RBC Urine >182 (H) 0 - 2 /HPF    WBC Urine 30 (H) 0 - 5 /HPF    Bacteria Urine Few (A) NEG^Negative /HPF    Mucous Urine Present (A) NEG^Negative /LPF       ASSESSMENT/PLAN:    ICD-10-CM    1. Gross hematuria R31.0 CBC with platelets differential     Basic metabolic panel     UA reflex to Microscopic and Culture     Urine Culture Aerobic Bacterial     UROLOGY ADULT REFERRAL     ciprofloxacin (CIPRO) 250 MG tablet   2. Type 2 diabetes mellitus with hyperglycemia, with long-term current use of insulin (H) E11.65     Z79.4    3. Chronic renal disease, stage III N18.3    4. Chronic atrial fibrillation (H) I48.2    Has multiple chronic problems including type 2 diabetes, chronic kidney disease, and chronic atrial fibrillation for which  she is on aspirin and Coumadin now has gross hematuria is painless.  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  We will have him see nephrology for worsening renal failure.  We will treat with Cipro once daily for 7 days for possible infectious etiology of the hematuria we will treat him with Cipro 250 mg daily for 7 days and refer him to urology for further evaluation.  He gets his INR is checked through a nurse at his facility and on their slip was written to check because of the interaction between Coumadin and Cipro.  He is not having bleeding elsewhere such as gums or nosebleeds or bruising.  Again he will get his INR monitored through his care facility.      WILSON Grijalva MD  March 14, 2019

## 2019-03-14 PROBLEM — N18.4 CHRONIC KIDNEY DISEASE, STAGE IV (SEVERE) (H): Status: ACTIVE | Noted: 2019-01-01

## 2019-03-14 NOTE — PROGRESS NOTES
ANTICOAGULATION FOLLOW-UP CLINIC VISIT    Patient Name:  Adiel Rosa Jr  Date:  3/14/2019  Contact Type:  new warfarin dosing/INR recheck date faxed to [facility]    SUBJECTIVE:     Patient Findings     Positives:   Change in medications    Comments:   Notified that patient will be starting Cipro x 7 days. New warfarin dosing/INR recheck date [facility]ed to [facility]. Staff to call warfarin clinic if patient has any bleeding/bruising or changes in diet/activity.            OBJECTIVE    INR   Date Value Ref Range Status   03/05/2019 2.4  Final       ASSESSMENT / PLAN  No question data found.  Anticoagulation Summary  As of 3/14/2019    INR goal:   2.0-3.0   TTR:   63.6 % (2.5 y)   INR used for dosing:   No new INR was available at the time of this encounter.   Warfarin maintenance plan:   4 mg (2 mg x 2) every Mon, Fri; 6 mg (2 mg x 3) all other days   Full warfarin instructions:   3/14: 4 mg; Otherwise 4 mg every Mon, Fri; 6 mg all other days   Weekly warfarin total:   38 mg   Plan last modified:   Jessa Gibson RN (7/2/2018)   Next INR check:   3/15/2019   Priority:   INR   Target end date:   Indefinite    Indications    Chronic atrial fibrillation (H) [I48.2]  Long-term (current) use of anticoagulants [Z79.01] [Z79.01]             Anticoagulation Episode Summary     INR check location:       Preferred lab:       Send INR reminders to:    ANTICOAG POOL    Comments:   [facility], Fax   done with homecare      Anticoagulation Care Providers     Provider Role Specialty Phone number    GAURAV Grijalva MD [facility] [phone]            See the Encounter Report to view Anticoagulation Flowsheet and Dosing Calendar (Go to Encounters tab in chart review, and find the Anticoagulation Therapy Visit)        Jessa Storey RN

## 2019-03-14 NOTE — NURSING NOTE
Chief Complaint   Patient presents with     UTI       Initial /62 (BP Location: Left arm, Patient Position: Sitting, Cuff Size: Adult Regular)   Pulse 65   Temp 98.1  F (36.7  C) (Tympanic)   Wt 84.4 kg (186 lb)   SpO2 96%   BMI 25.23 kg/m   Estimated body mass index is 25.23 kg/m  as calculated from the following:    Height as of 1/12/19: 1.829 m (6').    Weight as of this encounter: 84.4 kg (186 lb).  Medication Reconciliation: complete    Lara Hurtado LPN

## 2019-03-15 NOTE — PROGRESS NOTES
ANTICOAGULATION FOLLOW-UP CLINIC VISIT    Patient Name:  Adiel Rosa Jr  Date:  3/15/2019  Contact Type:  Telephone/ Communication sheet faxed back to [facility]    SUBJECTIVE:     Patient Findings     Positives:   Change in medications    Comments:   INR done by [facility]. Communication sheet faxed back to [facility] re: INR result, warfarin dosing and INR recheck date. [facility]TC to call with any questions, bleeding/bruising or diet/activity changes.  LTC reports per communication sheet that patient started Cipro 250 mg on 3/14/19 for 7 days.             OBJECTIVE    INR   Date Value Ref Range Status   03/05/2019 2.4  Final       ASSESSMENT / PLAN  INR assessment THER    Recheck INR In: 5 DAYS    INR Location [facility]      Anticoagulation Summary  As of 3/15/2019    INR goal:   2.0-3.0   TTR:   63.6 % (2.5 y)   INR used for dosing:      Warfarin maintenance plan:   4 mg (2 mg x 2) every Mon, Fri; 6 mg (2 mg x 3) all other days   Full warfarin instructions:   3/16: 4 mg; Otherwise 4 mg every Mon, Fri; 6 mg all other days   Weekly warfarin total:   38 mg   Plan last modified:   Jessa Gibson RN (7/2/2018)   Next INR check:   3/19/2019   Priority:   INR   Target end date:   Indefinite    Indications    Chronic atrial fibrillation (H) [I48.2]  Long-term (current) use of anticoagulants [Z79.01] [Z79.01]             Anticoagulation Episode Summary     INR check location:       Preferred lab:       Send INR reminders to:    ANTICOAG POOL    Comments:   [facility] assisted [facility], Fax   done with homecare      Anticoagulation Care Providers     Provider Role Specialty Phone number    GAURAV Grijalva MD [facility] [phone]            See the Encounter Report to view Anticoagulation Flowsheet and Dosing Calendar (Go to Encounters tab in chart review, and find the Anticoagulation Therapy Visit)      Yara Cross, STONEY

## 2019-03-18 NOTE — TELEPHONE ENCOUNTER
Spoke with NH nurse. Patient does still have blood in the urine, will order US. Order pending   BUSHRA MUÑIZ

## 2019-03-19 NOTE — PROGRESS NOTES
ANTICOAGULATION FOLLOW-UP CLINIC VISIT    Patient Name:  Adiel Rosa Jr  Date:  3/19/2019  Contact Type:  warfarin dosing/INR recheck date faxed to [facility]    SUBJECTIVE:     Patient Findings     Positives:   Change in medications (cipro done tomorrow (3/20/19))    Comments:   INr done by assisted living staff and result faxed to warfarin clinic. Per nursing communication sheet, patient has no bleeding/bruising and no changes in diet/activity. Cipro will be completed on 3/20/19 (tomorrow). New warfarin dosing/INR recheck date faxed back to [facility]. Staff to notify warfarin clinic if patient has any bleeding/bruising, changes in diet/meds/activity or questions.            OBJECTIVE    INR   Date Value Ref Range Status   2019 2.3  Final       ASSESSMENT / PLAN  INR assessment THER    Recheck INR In: 3 WEEKS    INR Location [facility]      Anticoagulation Summary  As of 3/19/2019    INR goal:   2.0-3.0   TTR:   64.2 % (2.6 y)   INR used for dosin.3 (3/19/2019)   Warfarin maintenance plan:   4 mg (2 mg x 2) every Mon, Fri; 6 mg (2 mg x 3) all other days   Full warfarin instructions:   4 mg every Mon, Fri; 6 mg all other days   Weekly warfarin total:   38 mg   Plan last modified:   Jessa Gibson RN (2018)   Next INR check:   2019   Priority:   INR   Target end date:   Indefinite    Indications    Chronic atrial fibrillation (H) [I48.2]  Long-term (current) use of anticoagulants [Z79.01] [Z79.01]             Anticoagulation Episode Summary     INR check location:       Preferred lab:       Send INR reminders to:    ANTICOAG POOL    Comments:   [facility], Fax   done with homecare      Anticoagulation Care Providers     Provider Role Specialty Phone number    GAURAV Grijalva MD [facility] [phone]            See the Encounter Report to view Anticoagulation Flowsheet and Dosing Calendar (Go to Encounters tab in chart review,  and find the Anticoagulation Therapy Visit)        Jessa Storey RN

## 2019-04-02 NOTE — ED NOTES
[employer] asking for a 100 day supply    Wound to forearm cleaned/irrigated, pt tolerated well.  Staff remains at bedside.

## 2019-04-09 NOTE — PROCEDURES
CYSTOSCOPY PROCEDURE NOTE:    Adiel Rosa Jr is a 83 year old male  who presents with gross hematuria for cystoscopy.    Pt ID verified with patient: Yes     Procedure verified with patient: Yes     Procedure confirmed with physician and support staff: Yes     Consent form confirmed with physician and support staff.    Sign In  History and Physical Exam reviewed .  Informed Consent Discussed: Yes   Sign in Communication: Yes   Time Out:  Team Confirms the Correct Patient, Correct Procedure; Yes , Correct Site and Site Marking, Correct Position (if applicable).    Affirmation of Time Out: Yes   Sign Out:  Sign Out Discussion: Yes   Physician: Eugene Gastelum MD    The benefits, risks, alternatives of the cystoscopy procedure and personnel were discussed with the patient. The verbal consent was obtained and the patient agrees to proceed.      Description of procedure:   After fully informed, voluntary consent was obtained, the patient was brought into the procedure room, identified and placed in a supine position on the cystoscopy table.  The groin/scrotum were prepped with betadine and draped in a sterile fashion.  Urojet lidocaine gel was introduced.  A 15F flexible cystoscope was inserted into the urethra, and the bladder and urethra wereexamined in a systematic manner.  The patient tolerated the procedure well and there were no complications.      Cystoscopic findings:  The urethra was normal without strictures.  The prostate was 3cm long and demonstrated mild bilobar hypertrophy.  There was no median lobe.  The external sphincter coapted normally and the bladder neck was normal. The bladder was  entered and careful pan endoscopy was carried out. The posterior, superior and lateral walls and dome of the bladder were all well visualized and the scope was retroflexed upon itself..  There was mild trabeculation.  There were no neoplasms, stones, or diverticula identifed.  The ureteric orifices were  normal in  position and number and effluxing clear urine.    Assessment/Plan:   Adiel Rosa Jr is a 83 year old male with a history of gross hematuria now with normal cystoscopy.     - See clinic note      Eugene Gastelum MD

## 2019-04-09 NOTE — PROGRESS NOTES
ANTICOAGULATION FOLLOW-UP CLINIC VISIT    Patient Name:  Adiel Rosa Jr  Date:  2019  Contact Type:  New warfarin dosing/INR recheck date [facility] to [facility]    SUBJECTIVE:     Patient Findings     Comments:   INR done by assisted living staff and result faxed to warfarin clinic. Per nursing communication sheet, patient has no bleeding/bruising and no new changes in diet/meds/activity. New warfarin dosing/INR recheck date faxed to [facility]. Staff to notify warfarin clinic if patient has any bleeding/bruising, changes in diet/meds/activity or questions.            OBJECTIVE    INR   Date Value Ref Range Status   2019 2.2  Final       ASSESSMENT / PLAN  INR assessment THER    Recheck INR In: 4 WEEKS    INR Location [facility]      Anticoagulation Summary  As of 2019    INR goal:   2.0-3.0   TTR:   65.0 % (2.6 y)   INR used for dosin.2 (2019)   Warfarin maintenance plan:   4 mg (2 mg x 2) every Mon, Fri; 6 mg (2 mg x 3) all other days   Full warfarin instructions:   4 mg every Mon, Fri; 6 mg all other days   Weekly warfarin total:   38 mg   No change documented:   Jessa Storey RN   Plan last modified:   Jessa Gibson, RN (2018)   Next INR check:   2019   Priority:   INR   Target end date:   Indefinite    Indications    Chronic atrial fibrillation (H) [I48.2]  Long-term (current) use of anticoagulants [Z79.01] [Z79.01]             Anticoagulation Episode Summary     INR check location:       Preferred lab:       Send INR reminders to:    ANTICOAG POOL    Comments:   [facility], Fax   done with homecare      Anticoagulation Care Providers     Provider Role Specialty Phone number    GAURAV Grijalva MD [facility] [phone]            See the Encounter Report to view Anticoagulation Flowsheet and Dosing Calendar (Go to Encounters tab in chart review, and find the Anticoagulation Therapy Visit)        Jessa Storey,  RN

## 2019-04-09 NOTE — PROGRESS NOTES
Urology Consult History and Physical  [place]   Name: Adiel Rosa Jr    MRN: [id number]   YOB: 1935       We were asked to see Adiel Rosa Jr at the request of Dr. Hao Grijalva for evaluation and treatment of gross hematuria.        Chief Complaint:   Gross hematuria     History is obtained from the patient            History of Present Illness:   Adiel Rosa Jr is a 83 year old male who is being seen for evaluation of gross hematuria.    He has been having gross hematuria since 3/10/2019. The patient notes that this stopped after about 1 week. INR from 3/19/19 2.3.  UCx from 3/14/2019 with >100k Mixed kalpesh.   Renal/bladder U/S was unremarkable.  He does remember having 1 day of hematuria 1 year ago.   Remote smoking history - quit 10-15 years ago - ~40 pack/year history.  He denies LUTS, dysuria, or incomplete emptying.     PMH significant for CKD (eGFR 23), COPD, GERD, DM on insulin, A-fib on warfarin.    (4 or >)  Location: Urine  Quality: gross hematuria   Severity: gross hematuria   Duration: 1 week in March           Past Medical History:     Past Medical History:   Diagnosis Date     Acute myocardial infarction of other specified sites, episode of care unspecified 1/1/1999    Non Q wave  treated with Retavase      Benign hypertensive heart disease with heart failure (H) 1/1/2011     Problem list name updated by automated process. Provider to review     Chronic atrial fibrillation (H) 4/1/2013     Chronic obstructive pulmonary disease, unspecified COPD type (H) 1/1/2011     Problem list name updated by automated process. Provider to review     Hypercalcemia 1/1/2011     Hypercholesterolemia 1/1/2011     Long-term (current) use of anticoagulants [Z79.01] 8/2/2016     Type 2 diabetes mellitus with hyperglycemia, with long-term current use of insulin (H) 11/29/1999     Problem list name updated by automated process. Provider to review            Past Surgical History:     Past  Surgical History:   Procedure Laterality Date     reverse total arthoplasty of right shoulder   01/25/2018            Social History:     Social History     Tobacco Use     Smoking status: Former Smoker     Packs/day: 1.00     Years: 40.00     Pack years: 40.00     Types: Cigarettes     Smokeless tobacco: Never Used   Substance Use Topics     Alcohol use: No       History   Smoking Status     Former Smoker     Packs/day: 1.00     Years: 40.00     Types: Cigarettes   Smokeless Tobacco     Never Used            Family History:     Family History   Problem Relation Age of Onset     No Known Problems Mother      No Known Problems Father      No Known Problems Other               Allergies:   No Known Allergies         Medications:     Current Outpatient Medications   Medication Sig     acetaminophen (TYLENOL) 650 MG CR tablet Take 1 tablet (650 mg) by mouth every 8 hours as needed     aspirin 325 MG tablet Take 1 tablet (325 mg) by mouth daily     benzocaine-menthol (CEPACOL) 15-3.6 MG lozenge Place 1 lozenge inside cheek every hour as needed for sore throat     blood glucose (NO BRAND SPECIFIED) lancets standard Use to test blood sugar three times daily or as directed.  Brand-Freestyle Lite     blood glucose monitoring (FREESTYLE LITE) test strip Use to test blood sugars 5 times daily or as directed.     blood glucose monitoring (NO BRAND SPECIFIED) meter device kit Use to test blood sugar 5 times daily due to patient being on sliding scale.     candesartan (ATACAND) 16 MG tablet TAKE 1 TABLET DAILY IN THE MORNING     ciprofloxacin (CIPRO) 250 MG tablet Take 1 tablet (250 mg) by mouth daily     Cranberry 500 MG CAPS Take 1 capsule (500 mg) by mouth daily     digoxin (LANOXIN) 125 MCG tablet TAKE 1 TABLET EVERY MORNING     Doxylamine-DM 6.25-15 MG/15ML LIQD Taking as pkg directions at night     fluticasone-salmeterol (ADVAIR DISKUS) 250-50 MCG/DOSE diskus inhaler USE 1 INHALATION TWO TIMES A DAY IN THE MORNING AND IN  "THE EVENING APPROXIMATELY 12 HOURS APART     FREESTYLE LITE test strip USE TO TEST BLOOD SUGARS 5 TIMES DAILY OR AS DIRECTED     furosemide (LASIX) 40 MG tablet TAKE 1 TABLET DAILY IN THE MORNING (Patient taking differently: TAKE 0.5 TABLET DAILY IN THE MORNING)     HYDROcodone-acetaminophen (NORCO) 5-325 MG per tablet TAKE 1 TABLET BY MOUTH EVERY 6 HOURS AS NEEDED FOR MODERATE TO SEVRE PAIN     insulin aspart (NOVOLOG VIAL) 100 UNITS/ML injection 4-10 units 3 times daily with meals. Glucose less than or equal to149 don't give insulin.  150-199=4U, 200-249=5U, 250-299=6U, 300-349=8U, 350 or greater=10U. If over 500 call the Doctor.     insulin detemir (LEVEMIR VIAL) 100 UNIT/ML vial Inject 45 Units Subcutaneous every morning AND 25 Units At Bedtime.     insulin pen needle (B-D U/F) 31G X 8 MM miscellaneous USE 5 TO 6 PEN NEEDLES DAILY OR AS DIRECTED     insulin syringe-needle U-100 (ULTICARE INSULIN SYRINGE) 31G X 5/16\" 1 ML miscellaneous USE 1 SYRINGE WITH EACH INJECTION FIVE TIMES DAILY     Magnesium Oxide 250 MG TABS TAKE 1 TABLET BY MOUTH ONCE DAILY (AM)     metoprolol succinate ER (TOPROL-XL) 50 MG 24 hr tablet TAKE 1 TABLET DAILY     Multiple Vitamins-Minerals (CERTAVITE SENIOR/ANTIOXIDANT) TABS TAKE 1 TABLET BY MOUTH ONCE DAILY (AM)     NOVOLOG VIAL 100 UNIT/ML soln INJECT 4-10 UNITS SUBQ THREE TIMES DAILY WITH MEALS. GLUCOSE < / =  =0U, 150-200=4U, 201-255=5U, 251-300=6U, 301-349=8U, 350-500=10U,      ranitidine (ZANTAC) 150 MG tablet TAKE 1 TABLET TWICE A DAY     simvastatin (ZOCOR) 80 MG tablet TAKE 1 TABLET DAILY IN THE EVENING     VITAMIN D3 1000 units tablet TAKE 1 TABLET BY MOUTH EVERY MORNING     warfarin (COUMADIN) 2 MG tablet Take 4 mg Mon/Fri; 6mg all other days or as directed by [facility]     No current facility-administered medications for this visit.              Review of Systems:     Skin: negative  Eyes: glasses  Ears/Nose/Throat: negative  Respiratory: No shortness of breath, " dyspnea on exertion, cough, or hemoptysis  Cardiovascular: negative  Gastrointestinal: negative  Genitourinary: as above  Musculoskeletal: negative  Neurologic: negative  Psychiatric: negative  Hematologic/Lymphatic/Immunologic: anti-coagulation  Endocrine: diabetes          Physical Exam:     Patient Vitals for the past 24 hrs:   BP Temp Temp src Pulse Resp SpO2 Weight   04/09/19 0949 122/58 98.1  F (36.7  C) Tympanic 68 20 95 % 83.5 kg (184 lb)     Body mass index is 24.95 kg/m .     General: age-appropriate appearing male in NAD  HEENT: Head AT/NC, EOMI, CN Grossly intact  Lungs: no respiratory distress, or pursed lip breathing  Heart: No obvious jugular venous distension present  Back: no bony midline tenderness, no CVAT bilaterally.  Abdomen: soft, non-distended, non-tender. No organomegaly  : normal male external genitalia.   Lymph: no palpable inguinal lymphadenopathy.  LE: no edema.   Musculoskeltal: extremities normal, no peripheral edema  Skin: no suspicious lesions or rashes  Neuro: Alert, oriented, speech and mentation normal;  moving all 4 extremities equally.  Psych: affect and mood normal          Data:   All laboratory data reviewed:    UA RESULTS:  Recent Labs   Lab Test 03/14/19  1101   COLOR Red   APPEARANCE Turbid   URINEGLC >1000*   URINEBILI Negative   URINEKETONE Negative   SG 1.015   UBLD Large*   URINEPH 6.0   PROTEIN 30*   NITRITE Negative   LEUKEST Negative   RBCU >182*   WBCU 30*      Lab Results   Component Value Date    CR 2.53 03/14/2019        All pertinent imaging reviewed:    All imaging studies reviewed by me.  I personally reviewed these imaging films.  A formal report from radiology will follow.    MEASUREMENTS:     The right kidney measures 9.8 x 5.8 x 5.8 cm. Cortical thickness is  1.3 cm. There is no hydronephrosis.     The left kidney is 11.6 x 5.1 x 5.6 cm. Cortical thickness is 1.4 cm.  There is no hydronephrosis. There is a 2.8 x 2.7 x 2.4 cm cyst at the  upper pole.  There is a 1.2 cm cyst at the lower pole.     The bladder is unremarkable. No intraluminal mass is evident. Ureteral  jets are visualized.                                                                       Impression: No evidence of renal mass or hydronephrosis.    PVR = 0cc         Impression and Plan:   Impression:   83 year old man with recent week episode of gross hematuria      Plan:   Gross hematuria   - We discussed that this warrants a hematuria work up which include urine cytology, upper tract imaging and cystoscopy  - Renal U/S completed and normal. Given his CKD he is not a candidate for CT Urogram  - Urine cytology ordered today  - Cystoscopy completed today with no evidence of bladder malignancy or abnormality. Mild bilobar prostatic hypertrophy, however no LUTS and PVR = 0  - We will inform him of his cytology results, but if this is negative then no further work up needed at this time     Thank you for the kind consultation.    Time spent: 30 minutes of which >50% was spent counseling.    Eugene Gastelum MD   Urology  [facility]  [facility] Phone: [phone]  [facility] Phone: [phone]

## 2019-04-09 NOTE — Clinical Note
Rk Grijalva,I saw Adiel today for evaluation of his gross hematuria. His renal U/S did not show anything concerning and given his CKD he is not a candidate for a CT Urogram. I did a cystoscopy with no abnormalities noted. He has mild BPH, but no LUTS and empties well. I will send a urine for cytology, but I anticipate this to be normal. No further work up needed at this time.Please let me know if you have any questions or concerns.Thanks, Eugene Gastelum M.D.Cell: [phone]

## 2019-04-09 NOTE — NURSING NOTE
Chief Complaint   Patient presents with     Consult     Gross hematuria per LAUERN Grijalva.       Initial /58   Pulse 68   Temp 98.1  F (36.7  C) (Tympanic)   Resp 20   Wt 83.5 kg (184 lb)   SpO2 95%   BMI 24.95 kg/m   Estimated body mass index is 24.95 kg/m  as calculated from the following:    Height as of 1/12/19: 1.829 m (6').    Weight as of this encounter: 83.5 kg (184 lb).  Medication Reconciliation: unable or not appropriate to perform, Yuli Taylor is going through home care list    Ana Jamison, [place]
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
## 2019-05-07 NOTE — PROGRESS NOTES
ANTICOAGULATION FOLLOW-UP CLINIC VISIT    Patient Name:  Adiel Rosa Jr  Date:  2019  Contact Type:  Telephone/ faxed communication sheet to [facility]    SUBJECTIVE:     Patient Findings     Comments:   INR done by [facility] and reported to Nurse by communication sheet.  Communication sheet faxed back to [facility] re: INR result, warfarin dosing and INR recheck date.  Facility to call with questions and report bleeding/bruising or diet/medication/activity change.             OBJECTIVE    INR   Date Value Ref Range Status   2019 2.0  Final       ASSESSMENT / PLAN  INR assessment THER    Recheck INR In: 4 WEEKS    INR Location [facility]      Anticoagulation Summary  As of 2019    INR goal:   2.0-3.0   TTR:   66.0 % (2.7 y)   INR used for dosin.0 (2019)   Warfarin maintenance plan:   4 mg (2 mg x 2) every Mon, Fri; 6 mg (2 mg x 3) all other days   Full warfarin instructions:   4 mg every Mon, Fri; 6 mg all other days   Weekly warfarin total:   38 mg   No change documented:   Yara Cross RN   Plan last modified:   Jessa Gibson RN (2018)   Next INR check:   2019   Priority:   INR   Target end date:   Indefinite    Indications    Chronic atrial fibrillation (H) [I48.2]  Long-term (current) use of anticoagulants [Z79.01] [Z79.01]             Anticoagulation Episode Summary     INR check location:       Preferred lab:       Send INR reminders to:    ANTICOAG POOL    Comments:   [facility], Fax   done with homecare      Anticoagulation Care Providers     Provider Role Specialty Phone number    GAURAV Grijalva MD [facility] [phone]            See the Encounter Report to view Anticoagulation Flowsheet and Dosing Calendar (Go to Encounters tab in chart review, and find the Anticoagulation Therapy Visit)      Yara Cross RN

## 2019-05-20 NOTE — TELEPHONE ENCOUNTER
NOVOLOG VIAL 100 UNIT/ML soln      Last Written Prescription Date:  3/23/18  Last Fill Quantity: 30mL,   # refills: 3  Last Office Visit: 3/14/19  Future Office visit:    Next 5 appointments (look out 90 days)    Jul 12, 2019  1:00 PM CDT  (Arrive by 12:45 PM)  SHORT with GAURAV Grijalva MD  [facility] ([facility] ) [address]  [place] [address]  [phone]           Routing refill request to provider for review/approval because:   LDL on file in past 12 months

## 2019-06-04 NOTE — PROGRESS NOTES
ANTICOAGULATION FOLLOW-UP CLINIC VISIT    Patient Name:  Adiel Rosa Jr  Date:  2019  Contact Type:  new warfarin dosing/INR recheck date faxed to [facility]    SUBJECTIVE:  Patient Findings     Comments:   [facility] done by [facility] staff and result faxed to warfarin clinic. Per nursing communication sheet, patient has no bleeding/bruising and no new changes in diet/meds/activity. New warfarin dosing/INR recheck date faxed back to assisted living facility. Staff to notify warfarin clinic nurse if patient has any bleeding/bruising, changes in diet/meds/activity or questions.         Clinical Outcomes     Negatives:   Major bleeding event, Thromboembolic event, Anticoagulation-related hospital admission, Anticoagulation-related ED visit, Anticoagulation-related fatality    Comments:   INR done by [facility] staff and result faxed to warfarin clinic. Per nursing communication sheet, patient has no bleeding/bruising and no new changes in diet/meds/activity. New warfarin dosing/INR recheck date faxed back to assisted living facility. Staff to notify warfarin clinic nurse if patient has any bleeding/bruising, changes in diet/meds/activity or questions.            OBJECTIVE    INR   Date Value Ref Range Status   2019 2.1 (A) 0.8 - 1.1 Final       ASSESSMENT / PLAN  INR assessment THER    Recheck INR In: 4 WEEKS    INR Location [facility]      Anticoagulation Summary  As of 2019    INR goal:   2.0-3.0   TTR:   66.9 % (2.8 y)   INR used for dosin.1 (2019)   Warfarin maintenance plan:   4 mg (2 mg x 2) every Mon, Fri; 6 mg (2 mg x 3) all other days   Full warfarin instructions:   4 mg every Mon, Fri; 6 mg all other days   Weekly warfarin total:   38 mg   No change documented:   Jessa Storey RN   Plan last modified:   Jessa Gibson RN (2018)   Next INR check:   2019   Priority:   INR   Target end date:   Indefinite    Indications    Chronic atrial fibrillation (H) [I48.2]  Long-term  (current) use of anticoagulants [Z79.01] [Z79.01]             Anticoagulation Episode Summary     INR check location:       Preferred lab:       Send INR reminders to:   [facility] ANTICOAG POOL    Comments:   [facility], Fax   done with homecare      Anticoagulation Care Providers     Provider Role Specialty Phone number    GAURAV Grijalva MD [facility] [phone]            See the Encounter Report to view Anticoagulation Flowsheet and Dosing Calendar (Go to Encounters tab in chart review, and find the Anticoagulation Therapy Visit)        Jessa Storey RN

## 2019-06-11 NOTE — PATIENT INSTRUCTIONS
Fever, 100.7 in clinic  Chest XR negative for consolidation  Urinalysis, shows blood no infection  Will treat for COPD exacerbation  Start doxycycline 100 mg oral tablet, take every 12 hours for 5 days. This can cause sun sensitivity, use caution in the sun. Take with food.   Duoneb every 4-6 hours as needed for cough and shortness of breath. Schedule this for a few days, then as needed  Continue with daily Advair  Follow up with PCP for a recheck in 1 week to be sure urine is clear of blood  Seek immediate care if no improvement in 48 hours or for worsening of symtpoms    Patient Education     COPD Flare    You have had a flare-up of your COPD.  COPD, or chronic obstructive pulmonary disease, is a common lung disease. It causes your airways to become irritated and narrower. This makes it harder for you to breathe. Emphysema and chronic bronchitis are both types of COPD. This is a chronic condition, which means you always have it. Sometimes it gets worse. When this happens, it is called a flare-up.  Symptoms of COPD  People with COPD may have symptoms most of the time. In a flare-up, your symptoms get worse. These symptoms may mean you are having a flare-up:    Shortness of breath, shallow or rapid breathing, or wheezing that gets worse    Lung infection    Cough that gets worse    More mucus, thicker mucus or mucus of a different color    Tiredness, decreased energy, or trouble doing your usual activities    Fever    Chest tightness    Your symptoms don t get better even when you use your usual medicines, inhalers, and nebulizer    Trouble talking    You feel confused  Causes of flare-ups  Unfortunately, a flare-up can happen even though you did everything right, and you followed your doctor s instructions. Some causes of flare-ups are:    Smoking or secondhand smoke    Colds, the flu, or respiratory infections    Air pollution    Sudden change in the weather    Dust, irritating chemicals, or strong fumes    Not  taking your medicines as prescribed  Home care  Here are some things you can do at home to treat a flare-up:    Try not to panic. This makes it harder to breathe, and keeps you from doing the right things.    Don t smoke or be around others who are smoking.    Try to drink more fluids than usual during a flare-up, unless your doctor has told you not to because of heart and kidney problems. More fluids can help loosen the mucus.    Use your inhalers and nebulizer, if you have one, as you have been told to.    If you were given antibiotics, take them until they are used up or your doctor tells you to stop. It s important to finish the antibiotics, even though you feel better. This will make sure the infection has cleared.    If you were given prednisone or another steroid, finish it even if you feel better.  Preventing a flare-up  Even though flare-ups happen, the best way to treat one is to prevent it before it starts. Here are some pointers:    Don t smoke or be around others who are smoking.    Take your medicines as you have been told.    Talk with your doctor about getting a flu shot every year. Also find out if you need a pneumonia shot.    If there is a weather advisory warning to stay indoors, try to stay inside when possible.    Try to eat healthy and get plenty of sleep.    Try to avoid things that usually set you off, like dust, chemical fumes, hairsprays, or strong perfumes.  Follow-up care  Follow up with your healthcare provider, or as advised.  If a culture was done, you will be told if your treatment needs to be changed. You can call as directed for the results.  If X-rays were done, you will be notified of any new findings that may affect your care.  Call 911  Call 911 if any of these occur:    You have trouble breathing    You feel confused or it s difficult to wake you up    You faint or lose consciousness    You have a rapid heart rate    You have new pain in your chest, arm, shoulder, neck or  upper back  When to seek medical advice  Call your healthcare provider right away if any of these occur:    Wheezing or shortness of breath gets worse    You need to use your inhalers more often than usual without relief    Fever of 100.4 F (38 C) or higher, or as directed by your healthcare provider    Coughing up lots of dark-colored or bloody mucus (sputum)    Chest pain with each breath    You do not start to get better within 24 hours    Swelling of your ankles gets worse    Dizziness or weakness  Date Last Reviewed: 9/1/2016 2000-2018 [employer]. [address] [place] [address]. All rights reserved. This information is not intended as a substitute for professional medical care. Always follow your healthcare professional's instructions.           Patient Education     Blood in the Urine    Blood in the urine (hematuria) has many possible causes. If it occurs after an injury (such as a car accident or fall), it is most often a sign of bruising to the kidney or bladder. Common causes of blood in the urine include urinary tract infections, kidney stones, inflammation, tumors, or certain other diseases of the kidney or bladder. Menstruation can cause blood to appear in the urine sample, although it is not coming from the urinary tract.  If only a trace amount of blood is present, it will show up on the urine test, even though the urine may be yellow and not pink or red. This may occur with any of the above conditions, as well as heavy exercise or high fever. In this case, your doctor may want to repeat the urine test on another day. This will show if the blood is still present. If it is, then other tests can be done to find out the cause.  Home care  Follow these home care guidelines:    If your urine does not appear bloody (pink, brown or red) then you do not need to restrict your activity in any way.    If you can see blood in your urine, rest and avoid heavy exertion until your next  exam. Do not use aspirin, blood thinners, or anti-platelet or anti-inflammatory medicines. These include ibuprofen and naproxen. These thin the blood and may increase bleeding.  Follow-up care  Follow up with your healthcare provider, or as advised. If you were injured and had blood in your urine, you should have a repeat urine test in 1 to 2 days. Contact your doctor for this test.  A radiologist will review any X-rays that were taken. You will be told of any new findings that may affect your care.  When to seek medical advice  Call your healthcare provider right away if any of these occur:    Bright red blood or blood clots in the urine (if you did not have this before)    Weakness, dizziness or fainting    New groin, abdominal, or back pain    Fever of 100.4 F (38 C) or higher, or as directed by your healthcare provider    Repeated vomiting    Bleeding from the nose or gums or easy bruising  Date Last Reviewed: 9/1/2016 2000-2018 [employer]. [address] [place] [address], [place] [address]. All rights reserved. This information is not intended as a substitute for professional medical care. Always follow your healthcare professional's instructions.

## 2019-06-11 NOTE — NURSING NOTE
Patient in clinic for urinary problem x 2 days (previous hematuria resolved without tx one month ago.)  Riddhi Gifford LPN....................  6/11/2019   5:07 PM    Riddhi Gifford LPN....................  6/11/2019   5:10 PM    Chief Complaint   Patient presents with     Urinary Problem       Medication Reconciliation: complete    Riddhi Gifford LPN

## 2019-06-11 NOTE — TELEPHONE ENCOUNTER
9:27 AM    Reason for Call: OVERBOOK    Patient is having the following symptoms: blood in urine for 1 day days.    The patient is requesting an appointment for blood in urine with Dr LAUREN Grijalva.    Was an appointment offered for this call? Yes, declined to far out and will not see anyone else  If yes : Appointment type              Date    Preferred method for responding to this message: Telephone Call  What is your phone number ? [phone]    If we cannot reach you directly, may we leave a detailed response at the number you provided? Yes    Can this message wait until your PCP/provider returns, if unavailable today? Not applicable, provider is in    Kelly Shoen

## 2019-06-11 NOTE — PROGRESS NOTES
SUBJECTIVE: Adiel Rosa Jr is a 83 year old male who complains of blood in urine - onset 2 years ago, he was seen by urology 4/9/19. Staff states the symptoms resolved but came back yesterday again. He has had clots and some blood in urine, Staff also notes confusion and balance problems.They would like to rule out an infection.  Patient states no blood in urine today.   Confusion started around Amaya time after his wife passed away.     Treatments - none  History of UTI - yest  BM - normal bowel, last bowel today. Normal.     Cough - for about 2 weeks, dry cough, mild SOB  He had asthma as a kid. He takes Advair daily    Past Medical History:   Diagnosis Date     Acute myocardial infarction of other specified sites, episode of care unspecified 1/1/1999    Non Q wave  treated with Retavase      Benign hypertensive heart disease with heart failure (H) 1/1/2011     Problem list name updated by automated process. Provider to review     Chronic atrial fibrillation (H) 4/1/2013     Chronic obstructive pulmonary disease, unspecified COPD type (H) 1/1/2011     Problem list name updated by automated process. Provider to review     Hypercalcemia 1/1/2011     Hypercholesterolemia 1/1/2011     Long-term (current) use of anticoagulants [Z79.01] 8/2/2016     Type 2 diabetes mellitus with hyperglycemia, with long-term current use of insulin (H) 11/29/1999     Problem list name updated by automated process. Provider to review     Current Outpatient Medications   Medication     acetaminophen (TYLENOL) 650 MG CR tablet     aspirin 325 MG tablet     benzocaine-menthol (CEPACOL) 15-3.6 MG lozenge     blood glucose (NO BRAND SPECIFIED) lancets standard     blood glucose monitoring (FREESTYLE LITE) test strip     blood glucose monitoring (NO BRAND SPECIFIED) meter device kit     candesartan (ATACAND) 16 MG tablet     ciprofloxacin (CIPRO) 250 MG tablet     Cranberry 500 MG CAPS     CRANBERRY EXTRACT PO     digoxin (LANOXIN)  "125 MCG tablet     Doxylamine-DM 6.25-15 MG/15ML LIQD     fluticasone-salmeterol (ADVAIR DISKUS) 250-50 MCG/DOSE diskus inhaler     FREESTYLE LITE test strip     furosemide (LASIX) 40 MG tablet     HYDROcodone-acetaminophen (NORCO) 5-325 MG per tablet     insulin aspart (NOVOLOG VIAL) 100 UNITS/ML injection     insulin detemir (LEVEMIR VIAL) 100 UNIT/ML vial     insulin NPH (HUMULIN N VIAL) 100 UNIT/ML vial     insulin pen needle (B-D U/F) 31G X 8 MM miscellaneous     insulin regular (HUMULIN R VIAL) 100 UNIT/ML vial     insulin syringe-needle U-100 (ULTICARE INSULIN SYRINGE) 31G X 5/16\" 1 ML miscellaneous     insulin syringe-needle U-100 (ULTICARE INSULIN SYRINGE) 31G X 5/16\" 1 ML miscellaneous     magnesium 250 MG tablet     Magnesium Oxide 250 MG TABS     metoprolol succinate ER (TOPROL-XL) 50 MG 24 hr tablet     Multiple Vitamins-Minerals (CERTAVITE SENIOR/ANTIOXIDANT) TABS     NOVOLOG VIAL 100 UNIT/ML soln     ranitidine (ZANTAC) 150 MG tablet     simvastatin (ZOCOR) 80 MG tablet     VITAMIN D3 1000 units tablet     warfarin (COUMADIN) 2 MG tablet     No current facility-administered medications for this visit.       No Known Allergies      ROS  General: feels well, no fever  Abdomen: negative  : POSITIVE - per HPI    OBJECTIVE:   Vitals:    06/11/19 1711   BP: 120/58   BP Location: Right arm   Patient Position: Sitting   Cuff Size: Adult Regular   Pulse: 82   Resp: 20   Temp: 100.9  F (38.3  C)   TempSrc: Tympanic   SpO2: 96%   Weight: 83.5 kg (184 lb)     Appears well, in no apparent distress.  Vital signs are normal.  Cardiac: normal RR, no murmur  Respiratory: normal respiration, clear to ausculation  Abdomen: soft, TTP suprapubic,No rigidity, no rebound. No guarding. No CVAT      Results for orders placed or performed in visit on 06/11/19   Urinalysis w Reflex Microscopic If Positive   Result Value Ref Range    Color Urine Yellow     Appearance Urine Clear     Glucose Urine Negative NEG^Negative mg/dL    " Bilirubin Urine Negative NEG^Negative    Ketones Urine Negative NEG^Negative mg/dL    Specific Gravity Urine 1.025 1.000 - 1.030    Blood Urine Large (A) NEG^Negative    pH Urine 6.0 5.0 - 9.0 pH    Protein Albumin Urine 100 (A) NEG^Negative mg/dL    Urobilinogen Urine 0.2 0.2 - 1.0 EU/dL    Nitrite Urine Negative NEG^Negative    Leukocyte Esterase Urine Negative NEG^Negative    Source Midstream Urine    Urine Microscopic   Result Value Ref Range    WBC Urine 0 - 5 OTO5^0 - 5 /HPF    RBC Urine >100 (A) OTO2^O - 2 /HPF    Hyaline Casts O - 2 OTO2^O - 2 /LPF   Urine Culture Aerobic Bacterial   Result Value Ref Range    Specimen Description Midstream Urine     Culture Micro       Urine culture negative (no growth of uropathogens).     Recent Results (from the past 744 hour(s))   XR Chest 2 Views    Narrative    PROCEDURE:  XR CHEST 2 VW    HISTORY:  Cough.     COMPARISON:  2017    FINDINGS:   The heart is enlarged The pulmonary vasculature is normal.  There is  some linear opacities of the right lung base representing atelectasis  or scarring No pleural effusion or pneumothorax. There is a shoulder  prosthesis in place on the right      Impression    IMPRESSION:  Cardiomegaly unchanged from previous examination in 2017       KIET ROMANO MD       ASSESSMENT:   (J44.1) COPD exacerbation (H)  (primary encounter diagnosis)  Plan: order for DME, ipratropium - albuterol 0.5         mg/2.5 mg/3 mL (DUONEB) 0.5-2.5 (3) MG/3ML neb         solution    (J22) Lower respiratory infection  (R05) Cough  Plan: XR Chest 2 Views    (R31.9) Blood in urine  Plan: Urinalysis w Reflex Microscopic If Positive,         Urine Microscopic    (R50.9) Fever of undetermined origin  Plan: Urine Culture Aerobic Bacterial    Fever, 100.7 in clinic, cough for 2 weeks with shortness of breath, history of COPD  Chest XR negative for consolidation  Urinalysis, shows blood with no infection  Will treat for COPD exacerbation  Start doxycycline 100 mg  oral tablet, take every 12 hours for 5 days. This can cause sun sensitivity, use caution in the sun. Take with food.   Duoneb every 4-6 hours as needed for cough and shortness of breath. Schedule this for a few days, then as needed  Continue with daily Advair  Follow up with PCP for a recheck in 1 week to be sure urine is clear of blood  Seek immediate care if no improvement in 48 hours or for worsening of symptoms  Patient received verbal and written instruction including review of warning signs    Latricia Loza PA-C on 6/17/2019 at 12:01 PM

## 2019-06-12 NOTE — PROGRESS NOTES
ANTICOAGULATION FOLLOW-UP CLINIC VISIT    Patient Name:  Adiel Rosa Jr  Date:  6/12/2019  Contact Type:  New warfarin dosing/INR recheck [facility]    SUBJECTIVE:  Patient Findings     Positives:   Change in medications (doxycycline x 5 days)    Comments:   Notified via fax from assisted living that patient will be starting doxycycline, one tab BID x 5 days. New warfarin dosing/INR recheck date faxed back to assisted living facility. Staff to notify warfarin clinic if any new changes in diet/meds/activity, bleeding/bruising or questions.         Clinical Outcomes     Comments:   Notified via fax from assisted living that patient will be starting doxycycline, one tab BID x 5 days. New warfarin dosing/INR recheck date faxed back to assisted living facility. Staff to notify warfarin clinic if any new changes in diet/meds/activity, bleeding/bruising or questions.            OBJECTIVE    INR   Date Value Ref Range Status   06/04/2019 2.1 (A) 0.8 - 1.1 Final       ASSESSMENT / PLAN  No question data found.  Anticoagulation Summary  As of 6/12/2019    INR goal:   2.0-3.0   TTR:   66.9 % (2.8 y)   INR used for dosing:   No new INR was available at the time of this encounter.   Warfarin maintenance plan:   4 mg (2 mg x 2) every Mon, Fri; 6 mg (2 mg x 3) all other days   Full warfarin instructions:   6/15: 4 mg; Otherwise 4 mg every Mon, Fri; 6 mg all other days   Weekly warfarin total:   38 mg   Plan last modified:   Jessa Gibson RN (7/2/2018)   Next INR check:   7/2/2019   Priority:   INR   Target end date:   Indefinite    Indications    Chronic atrial fibrillation (H) [I48.2]  Long-term (current) use of anticoagulants [Z79.01] [Z79.01]             Anticoagulation Episode Summary     INR check location:       Preferred lab:       Send INR reminders to:   HC ANTICOAG POOL    Comments:   [facility] assisted [facility], Fax   done with homecare      Anticoagulation Care Providers      Provider Role Specialty Phone number    GAURAV Grijalva MD [facility] [phone]            See the Encounter Report to view Anticoagulation Flowsheet and Dosing Calendar (Go to Encounters tab in chart review, and find the Anticoagulation Therapy Visit)        Jessa Storey RN

## 2019-06-13 NOTE — PROGRESS NOTES
Subjective     Adiel Rosa Jr is a 83 year old male who presents to clinic today for the following health issues:    HPI   ED/UC Followup:    Facility:  [facility]  Date of visit: 6/11/2019   Reason for visit: COPD exacerbation (H)   Lower respiratory infection   Blood in urine   Cough   Fever of undetermined origin     Current Status: symptoms Improved. No blood in urine/no blood clots.  Patient has had a couple episodes of gross hematuria.  After the first episode did see both nephrology he has stage IV kidney disease and also urology.  Ultrasound of the kidneys were negative.  Cystoscopy showed no acute problems.  He is on Coumadin for his atrial fibrillation.  Denies any bruising or nosebleeds.  He feels fine now urine is been clean.  On June 11 had to go to [facility] because of gross hematuria.  He also had a respiratory infection here for follow-up.  He has had no further bleeding no fever chills and feels great now.         [facility]    Adiel Rosa Jr, 84 year old, male presents with   Chief Complaint   Patient presents with     ER F/U       PAST MEDICAL HISTORY:  Past Medical History:   Diagnosis Date     Acute myocardial infarction of other specified sites, episode of care unspecified 1/1/1999    Non Q wave  treated with Retavase      Benign hypertensive heart disease with heart failure (H) 1/1/2011     Problem list name updated by automated process. Provider to review     Chronic atrial fibrillation (H) 4/1/2013     Chronic obstructive pulmonary disease, unspecified COPD type (H) 1/1/2011     Problem list name updated by automated process. Provider to review     Hypercalcemia 1/1/2011     Hypercholesterolemia 1/1/2011     Long-term (current) use of anticoagulants [Z79.01] 8/2/2016     Type 2 diabetes mellitus with hyperglycemia, with long-term current use of insulin (H) 11/29/1999     Problem list name updated by automated process. Provider to review       PAST  SURGICAL HISTORY:  Past Surgical History:   Procedure Laterality Date     reverse total arthoplasty of right shoulder   01/25/2018       MEDICATIONS:  Prior to Admission medications    Medication Sig Start Date End Date Taking? Authorizing Provider   acetaminophen (TYLENOL) 650 MG CR tablet Take 1 tablet (650 mg) by mouth every 8 hours as needed 8/31/18  Yes GAURAV Grijalva MD   aspirin 325 MG tablet Take 1 tablet (325 mg) by mouth daily 2/14/18  Yes Christie Quijano PA   benzocaine-menthol (CEPACOL) 15-3.6 MG lozenge Place 1 lozenge inside cheek every hour as needed for sore throat 1/16/17  Yes Alejandro Fung MD   blood glucose (NO BRAND SPECIFIED) lancets standard Use to test blood sugar three times daily or as directed.  Brand-Freestyle Lite 12/28/18  Yes Michelle Liang MD   blood glucose monitoring (FREESTYLE LITE) test strip Use to test blood sugars 5 times daily or as directed. 8/29/18  Yes GAURAV Grijalva MD   blood glucose monitoring (NO BRAND SPECIFIED) meter device kit Use to test blood sugar 5 times daily due to patient being on sliding scale. 1/30/17  Yes GAURAV Grijalva MD   candesartan (ATACAND) 16 MG tablet TAKE 1 TABLET DAILY IN THE MORNING 10/24/18  Yes GAURAV Grijalva MD   ciprofloxacin (CIPRO) 250 MG tablet Take 1 tablet (250 mg) by mouth daily 3/14/19  Yes GAURAV Grijalva MD   Cranberry 500 MG CAPS Take 1 capsule (500 mg) by mouth daily 12/20/17  Yes GAURAV Grijalva MD   CRANBERRY EXTRACT PO Take 500 mg by mouth 1/16/17  Yes Reported, Patient   digoxin (LANOXIN) 125 MCG tablet TAKE 1 TABLET EVERY MORNING 5/3/19  Yes GAURAV Grijalva MD   Doxylamine-DM 6.25-15 MG/15ML LIQD Taking as pkg directions at night 1/20/17  Yes GAURAV Grijalva MD   fluticasone-salmeterol (ADVAIR DISKUS) 250-50 MCG/DOSE diskus inhaler USE 1 INHALATION TWO TIMES A DAY IN THE MORNING AND IN THE EVENING APPROXIMATELY 12 HOURS APART 5/15/18  Yes GAURAV Grijalva MD   FREESTYLE LITE test strip USE 1 TEST STRIP TO TEST  "BLOOD SUGARS FIVE TIMES DAILY OR AS DIRECTED 5/29/19  Yes GAURAV Grijalva MD   furosemide (LASIX) 40 MG tablet TAKE 1 TABLET DAILY IN THE MORNING 5/3/19  Yes GAURAV Grijalva MD   HYDROcodone-acetaminophen (NORCO) 5-325 MG per tablet TAKE 1 TABLET BY MOUTH EVERY 6 HOURS AS NEEDED FOR MODERATE TO SEVRE PAIN 9/13/18  Yes GAURAV Grijalva MD   insulin aspart (NOVOLOG VIAL) 100 UNITS/ML injection 4-10 units 3 times daily with meals. Glucose less than or equal to149 don't give insulin.  150-199=4U, 200-249=5U, 250-299=6U, 300-349=8U, 350 or greater=10U. If over 500 call the Doctor. 5/15/18  Yes GAURAV Grijalva MD   insulin detemir (LEVEMIR VIAL) 100 UNIT/ML vial Inject 45 Units Subcutaneous every morning AND 25 Units At Bedtime. 12/26/18 12/26/19 Yes Ada Freeman APRN CNP   insulin NPH (HUMULIN N VIAL) 100 UNIT/ML vial 27 units every morning and 30 units with dinner.  Resuspend immediately prior to use; gently roll vial or pen in the palms of the hands. 3/13/13  Yes Reported, Patient   insulin pen needle (B-D U/F) 31G X 8 MM miscellaneous USE 5 TO 6 PEN NEEDLES DAILY OR AS DIRECTED 2/18/19  Yes GAURAV Grijalva MD   insulin regular (HUMULIN R VIAL) 100 UNIT/ML vial  4/26/10  Yes Reported, Patient   insulin syringe-needle U-100 (ULTICARE INSULIN SYRINGE) 31G X 5/16\" 1 ML miscellaneous USE 1 SYRINGE WITH EACH INJECTION 5 TIMES DAILY. 5/3/19  Yes GAURAV Grijalva MD   insulin syringe-needle U-100 (ULTICARE INSULIN SYRINGE) 31G X 5/16\" 1 ML miscellaneous USE 1 SYRINGE WITH EACH INJECTION FIVE TIMES DAILY 2/18/19  Yes GAURAV Grijalva MD   ipratropium - albuterol 0.5 mg/2.5 mg/3 mL (DUONEB) 0.5-2.5 (3) MG/3ML neb solution Take 1 vial (3 mLs) by nebulization every 6 hours as needed for shortness of breath / dyspnea or wheezing 6/11/19  Yes Latricia Loza PA-C   magnesium 250 MG tablet  3/13/13  Yes Reported, Patient   Magnesium Oxide 250 MG TABS TAKE 1 TABLET BY MOUTH ONCE DAILY (AM) 4/4/18  Yes Swapnil Mackay MD "   metoprolol succinate ER (TOPROL-XL) 50 MG 24 hr tablet TAKE 1 TABLET DAILY 1/15/19  Yes GAURAV Grijalva MD   Multiple Vitamins-Minerals (CERTAVITE SENIOR/ANTIOXIDANT) TABS TAKE 1 TABLET BY MOUTH ONCE DAILY (AM) 5/2/18  Yes GAURAV Grijalva MD   NOVOLOG VIAL 100 UNIT/ML soln USE AS INSTRUCTED BY YOUR PRESCRIBER 5/20/19  Yes GAURAV Grijalva MD   order for DME Equipment being ordered:home nebulizer set up with mask and tubing 6/11/19  Yes Latricia Loza PA-C   ranitidine (ZANTAC) 150 MG tablet TAKE 1 TABLET TWICE A DAY 10/24/18  Yes GAURAV Grijalva MD   simvastatin (ZOCOR) 80 MG tablet TAKE 1 TABLET DAILY IN THE EVENING 5/3/19  Yes GAURAV Grijalva MD   VITAMIN D3 1000 units tablet TAKE 1 TABLET BY MOUTH EVERY MORNING 5/2/18  Yes GAURAV Grijalva MD   warfarin (COUMADIN) 2 MG tablet Take 4 mg Mon/Fri; 6mg all other days or as directed by [facility] 5/15/18  Yes GAURAV Grijalva MD       ALLERGIES:   No Known Allergies    ROS:  Constitutional, neuro, ENT, endocrine, pulmonary, cardiac, gastrointestinal, genitourinary, musculoskeletal, integument and psychiatric systems are negative, except as otherwise noted.      EXAM:  /62 (BP Location: Left arm, Patient Position: Chair, Cuff Size: Adult Regular)   Pulse 64   Temp 97.4  F (36.3  C) (Tympanic)   Wt 86.6 kg (191 lb)   SpO2 96%   BMI 25.90 kg/m   Body mass index is 25.9 kg/m .   GENERAL APPEARANCE: healthy, alert and no distress  EYES: Eyes grossly normal to inspection, PERRL and conjunctivae and sclerae normal  HENT: nose and mouth without ulcers or lesions  NECK: no adenopathy, no asymmetry, masses, or scars and thyroid normal to palpation  RESP: lungs clear to auscultation - no rales, rhonchi or wheezes  CV: normal S1 S2, no S3 or S4, no murmur, click or rub and irregularly irregular rhythm  ABDOMEN: soft, nontender, without hepatosplenomegaly or masses and bowel sounds normal  NEURO: Normal strength and tone, mentation intact and speech normal  Lab/  X-ray  No results found for this or any previous visit (from the past 24 hour(s)).    ASSESSMENT/PLAN:    ICD-10-CM    1. Chronic obstructive pulmonary disease, unspecified COPD type (H) J44.9    2. Chronic atrial fibrillation (H) I48.2    3. Type 2 diabetes mellitus with hyperglycemia, with long-term current use of insulin (H) E11.65     Z79.4    4. Hematuria, unspecified type R31.9    5. Stage 4 chronic kidney disease (H) N18.4    Offered to do labs and patient and his caregiver are not not interested in doing tests.  He states he feels good and does not want to do blood work or any urine studies today.  They will return if he has recurrence of hematuria.  He does have a follow-up in October for his diabetes.  Caregiver states his sugars have been more in the upper 100s now.  No other new issues.  He is on Coumadin and has his INR checked again they did not want to do an INR today.  His breathing seems to be at baseline.      WILSON Grijalva MD  June 25, 2019

## 2019-06-25 NOTE — NURSING NOTE
Chief Complaint   Patient presents with     ER F/U       Initial /62 (BP Location: Left arm, Patient Position: Chair, Cuff Size: Adult Regular)   Pulse 64   Temp 97.4  F (36.3  C) (Tympanic)   Wt 86.6 kg (191 lb)   SpO2 96%   BMI 25.90 kg/m   Estimated body mass index is 25.9 kg/m  as calculated from the following:    Height as of 1/12/19: 1.829 m (6').    Weight as of this encounter: 86.6 kg (191 lb).  Medication Reconciliation: complete    BUSHRA MUÑIZ LPN

## 2019-07-30 NOTE — PROGRESS NOTES
ANTICOAGULATION FOLLOW-UP CLINIC VISIT    Patient Name:  Adiel Rosa Jr  Date:  2019  Contact Type:  New warfarin dosing/INR recheck [facility]    SUBJECTIVE:  Patient Findings     Comments:   INR done by assisted living nurse and result faxed to warfarin clinic. Per nursing communication sheet, patient has no unusual bleeding/bruising and no new changes in diet/meds/activity. New warfarin dosing/INR recheck date faxed to assisted living facility. Staff to notify warfarin clinic if patient has any new changes in diet/meds/activity, bleeding/bruising or questions.         Clinical Outcomes     Negatives:   Major bleeding event, Thromboembolic event, Anticoagulation-related hospital admission, Anticoagulation-related ED visit, Anticoagulation-related fatality    Comments:   INR done by assisted living nurse and result faxed to warfarin clinic. Per nursing communication sheet, patient has no unusual bleeding/bruising and no new changes in diet/meds/activity. New warfarin dosing/INR recheck date faxed to assisted living facility. Staff to notify warfarin clinic if patient has any new changes in diet/meds/activity, bleeding/bruising or questions.            OBJECTIVE    INR   Date Value Ref Range Status   2019 2.0  Final       ASSESSMENT / PLAN  INR assessment THER    Recheck INR In: 4 WEEKS    INR Location [facility]      Anticoagulation Summary  As of 2019    INR goal:   2.0-3.0   TTR:   68.6 % (2.9 y)   INR used for dosin.0 (2019)   Warfarin maintenance plan:   4 mg (2 mg x 2) every Mon, Fri; 6 mg (2 mg x 3) all other days   Full warfarin instructions:   4 mg every Mon, Fri; 6 mg all other days   Weekly warfarin total:   38 mg   No change documented:   Jessa Storey RN   Plan last modified:   Jessa Gibson RN (2018)   Next INR check:   2019   Priority:   INR   Target end date:   Indefinite    Indications    Chronic atrial fibrillation (H) [I48.2]  Long-term  (current) use of anticoagulants [Z79.01] [Z79.01]             Anticoagulation Episode Summary     INR check location:       Preferred lab:       Send INR reminders to:   [facility] ANTICOAG POOL    Comments:   [facility], Fax   done with homecare      Anticoagulation Care Providers     Provider Role Specialty Phone number    GAURAV Grijalva MD [facility] [phone]            See the Encounter Report to view Anticoagulation Flowsheet and Dosing Calendar (Go to Encounters tab in chart review, and find the Anticoagulation Therapy Visit)        Jessa Storey RN

## 2019-08-06 NOTE — TELEPHONE ENCOUNTER
Novolog  Last visit date with prescribing provider: 6-  Last refill date: 5-  Quantity: 40mls, Refills: 3    Ranitidine   Last visit date with prescribing provider: 6-  Last refill date: 10-  Quantity: 180, Refills: 2    Digoxin   Last visit date with prescribing provider: 6-  Last refill date: 5-3-2019  Quantity: 90, Refills: 0    Ulticare insulin syringes  Last visit date with prescribing provider: 6-  Last refill date: 5-3-2019  Quantity: 300 refills: 1        Freestyle Lite Strips   Last visit date with prescribing provider: 6-  Last refill date: 8-  Quantity: 300-1    Furosemide  Last visit date with prescribing provider: 6-  Last refill date: 5-3-2019  Quantity: 90, Refills: 1    Simvastin   Last visit date with prescribing provider: 6-  Last refill date: 5-3-2019  Quantity: 90, Refills: 0        Next 5 appointments (look out 90 days)    Oct 15, 2019  1:30 PM CDT  (Arrive by 1:15 PM)  SHORT with GAURAV Grijalva MD  [facility] ([facility] ) [address]  [place] [address]  [phone]            Next 5 appointments (look out 90 days)    Oct 15, 2019  1:30 PM CDT  (Arrive by 1:15 PM)  SHORT with GAURAV Grijalva MD  [facility] ([facility] ) [address]  [place] [address]  [phone]            Next 5 appointments (look out 90 days)    Oct 15, 2019  1:30 PM CDT  (Arrive by 1:15 PM)  SHORT with GAURAV Grijalva MD  [facility] ([facility] ) [address]  [place] [address]  [phone]            Next 5 appointments (look out 90 days)    Oct 15, 2019  1:30 PM CDT  (Arrive by 1:15 PM)  SHORT with GAURAV Grijalva MD  [facility] ([facility] ) [address]  [place] [address]  [phone]          Next 5 appointments (look out 90 days)    Oct 15, 2019  1:30 PM  CDT  (Arrive by 1:15 PM)  SHORT with GAURAV Grijalva MD  [facility] ([facility] ) [address]  [place] [address]  [phone]          Next 5 appointments (look out 90 days)    Oct 15, 2019  1:30 PM CDT  (Arrive by 1:15 PM)  SHORT with GAURAV Grijalva MD  [facility] ([facility] ) [address]  [place] [address]  [phone]          Next 5 appointments (look out 90 days)    Oct 15, 2019  1:30 PM CDT  (Arrive by 1:15 PM)  SHORT with GAURAV Grijalva MD  [facility] ([facility] ) [address]  [place] [address]  [phone]

## 2019-08-08 NOTE — TELEPHONE ENCOUNTER
Levemir 10ml vial  Last visit date with prescribing provider: 6-  Last refill date: 12-  Quantity: 70mls, Refills: 3    Freestyle lite strips  Last visit date with prescribing provider: 6-  Last refill date: 5-  Quantity: 300, Refills: 1    Advair Diskus  Last visit date with prescribing provider: 6-  Last refill date: 5-  Quantity:3, Refills: 1    Ulticare Insulin Syringes  Last visit date with prescribing provider: 6-  Last refill date: 2-  Quantity: 300, Refills: 1    Yasmin Marcum      Next 5 appointments (look out 90 days)    Oct 15, 2019  1:30 PM CDT  (Arrive by 1:15 PM)  SHORT with GAURAV Grijalva MD  [facility] ([facility] ) [address]  [place] [address]  [phone]          Next 5 appointments (look out 90 days)    Oct 15, 2019  1:30 PM CDT  (Arrive by 1:15 PM)  SHORT with GAURAV Grijalva MD  [facility] ([facility] ) [address]  [place] [address]  [phone]          Next 5 appointments (look out 90 days)    Oct 15, 2019  1:30 PM CDT  (Arrive by 1:15 PM)  SHORT with GAURAV Grijalva MD  [facility] ([facility] ) [address]  [place] [address]  [phone]           Next 5 appointments (look out 90 days)    Oct 15, 2019  1:30 PM CDT  (Arrive by 1:15 PM)  SHORT with GAURAV Grijalva MD  [facility] ([facility] ) [address]  [place] [address]  [phone]            Next 5 appointments (look out 90 days)    Oct 15, 2019  1:30 PM CDT  (Arrive by 1:15 PM)  SHORT with GAURAV Grijalva MD  [facility] ([facility] ) [address]  [place] [address]  [phone]

## 2019-08-14 NOTE — TELEPHONE ENCOUNTER
Swati from pt's assisted living called. Reports that pt is experiencing UTI symptoms and would like to see PCP today. PCP is out today. Pt scheduled with covering provider. UA ordered.

## 2019-08-16 NOTE — PATIENT INSTRUCTIONS
Will culture urine and call if culture grows out an urinary infection    Follow up with primary provider as soon as possible for further evaluation and go to ER if confusion worsening    Results for orders placed or performed in visit on 08/16/19   Urinalysis w Reflex Microscopic If Positive   Result Value Ref Range    Color Urine Yellow     Appearance Urine Clear     Glucose Urine Negative NEG^Negative mg/dL    Bilirubin Urine Negative NEG^Negative    Ketones Urine Negative NEG^Negative mg/dL    Specific Gravity Urine 1.020 1.000 - 1.030    Blood Urine Large (A) NEG^Negative    pH Urine 6.0 5.0 - 9.0 pH    Protein Albumin Urine 30 (A) NEG^Negative mg/dL    Urobilinogen Urine 0.2 0.2 - 1.0 EU/dL    Nitrite Urine Negative NEG^Negative    Leukocyte Esterase Urine Small (A) NEG^Negative    Source Midstream Urine    Urine Microscopic   Result Value Ref Range    WBC Urine 0 - 5 OTO5^0 - 5 /HPF    RBC Urine O - 2 OTO2^O - 2 /HPF    Bacteria Urine Moderate (A) NEG^Negative /HPF    Amorphous Crystals Moderate (A) NEG^Negative /HPF   CBC and Differential   Result Value Ref Range    WBC 7.4 4.0 - 11.0 10e9/L    RBC Count 4.65 4.4 - 5.9 10e12/L    Hemoglobin 12.3 (L) 13.3 - 17.7 g/dL    Hematocrit 38.1 (L) 40.0 - 53.0 %    MCV 82 78 - 100 fl    MCH 26.5 26.5 - 33.0 pg    MCHC 32.3 31.5 - 36.5 g/dL    RDW 15.5 (H) 10.0 - 15.0 %    Platelet Count 355 150 - 450 10e9/L    Diff Method Automated Method     % Neutrophils 73.7 %    % Lymphocytes 8.8 %    % Monocytes 10.7 %    % Eosinophils 6.0 %    % Basophils 0.4 %    % Immature Granulocytes 0.4 %    Absolute Neutrophil 5.4 1.6 - 8.3 10e9/L    Absolute Lymphocytes 0.7 (L) 0.8 - 5.3 10e9/L    Absolute Monocytes 0.8 0.0 - 1.3 10e9/L    Absolute Eosinophils 0.4 0.0 - 0.7 10e9/L    Absolute Basophils 0.0 0.0 - 0.2 10e9/L    Abs Immature Granulocytes 0.0 0 - 0.4 10e9/L   Basic Metabolic Panel   Result Value Ref Range    Sodium 135 134 - 144 mmol/L    Potassium 4.0 3.5 - 5.1 mmol/L     Chloride 98 98 - 107 mmol/L    Carbon Dioxide 30 21 - 31 mmol/L    Anion Gap 7 3 - 14 mmol/L    Glucose 192 (H) 70 - 105 mg/dL    Urea Nitrogen 24 7 - 25 mg/dL    Creatinine 1.22 0.70 - 1.30 mg/dL    GFR Estimate 57 (L) >60 mL/min/[1.73_m2]    GFR Estimate If Black 69 >60 mL/min/[1.73_m2]    Calcium 9.2 8.6 - 10.3 mg/dL

## 2019-08-16 NOTE — PROGRESS NOTES
HPI:    Adiel Rosa Jr is a 84 year old male  who presents to clinic today for confusion and fatigue.    [facility] is here [facility] from [facility].  Patient is brought in by staff member with concerns of increased confusion and fatigue.  Staff member states they just want to rule out an UTI.  Patient denies any concerns of urinary difficulty with stream, dysuria, or blood in urine.  Some urinary frequency and urgency with increased urinary incontinence per staff.  Hx of constipation.  Last bowel movement yesterday, states hard and small.  Denies diarrhea.  Denies any fevers.  Intermittent non productive cough from tickle in throat.  Denies feeling winded or short of breath.  Denies chest pain.  No recent falls.  Appetite at baseline, finishes all 3 meals.  Drinks fluids.  No vomiting.  Denies abdominal pain.  Stands to transfers but doesn't really ambulate per staff, uses a wheelchair.        Past Medical History:   Diagnosis Date     Acute myocardial infarction of other specified sites, episode of care unspecified 1/1/1999    Non Q wave  treated with Retavase      Benign hypertensive heart disease with heart failure (H) 1/1/2011     Problem list name updated by automated process. Provider to review     Chronic atrial fibrillation (H) 4/1/2013     Chronic obstructive pulmonary disease, unspecified COPD type (H) 1/1/2011     Problem list name updated by automated process. Provider to review     Hypercalcemia 1/1/2011     Hypercholesterolemia 1/1/2011     Long-term (current) use of anticoagulants [Z79.01] 8/2/2016     Type 2 diabetes mellitus with hyperglycemia, with long-term current use of insulin (H) 11/29/1999     Problem list name updated by automated process. Provider to review     Past Surgical History:   Procedure Laterality Date     reverse total arthoplasty of right shoulder   01/25/2018     Social History     Tobacco Use     Smoking status: Former Smoker     Packs/day: 1.00      Years: 40.00     Pack years: 40.00     Types: Cigarettes     Smokeless tobacco: Never Used   Substance Use Topics     Alcohol use: No     Current Outpatient Medications   Medication Sig Dispense Refill     acetaminophen (TYLENOL) 650 MG CR tablet Take 1 tablet (650 mg) by mouth every 8 hours as needed 100 tablet 5     aspirin 325 MG tablet Take 1 tablet (325 mg) by mouth daily 120 tablet 3     benzocaine-menthol (CEPACOL) 15-3.6 MG lozenge Place 1 lozenge inside cheek every hour as needed for sore throat 30 lozenge 3     blood glucose (FREESTYLE LITE) test strip Use to test blood sugars 5 times daily or as directed. 300 strip 1     blood glucose (NO BRAND SPECIFIED) lancets standard Use to test blood sugar three times daily or as directed.  Brand-Freestyle Lite 100 each 11     blood glucose monitoring (NO BRAND SPECIFIED) meter device kit Use to test blood sugar 5 times daily due to patient being on sliding scale. 1 kit 0     candesartan (ATACAND) 16 MG tablet TAKE 1 TABLET DAILY IN THE MORNING 90 tablet 1     ciprofloxacin (CIPRO) 250 MG tablet Take 1 tablet (250 mg) by mouth daily 7 tablet 0     Cranberry 500 MG CAPS Take 1 capsule (500 mg) by mouth daily 90 capsule 1     CRANBERRY EXTRACT PO Take 500 mg by mouth       digoxin (LANOXIN) 125 MCG tablet TAKE 1 TABLET EVERY MORNING 90 tablet 0     Doxylamine-DM 6.25-15 MG/15ML LIQD Taking as pkg directions at night 236 mL 1     fluticasone-salmeterol (ADVAIR DISKUS) 250-50 MCG/DOSE inhaler USE 1 INHALATION TWO TIMES A DAY IN THE MORNING AND IN THE EVENING APPROXIMATELY 12 HOURS APART 3 Inhaler 1     FREESTYLE LITE test strip USE 1 TEST STRIP TO TEST BLOOD SUGARS FIVE TIMES DAILY OR AS DIRECTED 300 strip 1     furosemide (LASIX) 40 MG tablet TAKE 1 TABLET DAILY IN THE MORNING 90 tablet 1     HYDROcodone-acetaminophen (NORCO) 5-325 MG per tablet TAKE 1 TABLET BY MOUTH EVERY 6 HOURS AS NEEDED FOR MODERATE TO SEVRE PAIN 30 tablet 0     insulin aspart (NOVOLOG PEN) 100  "UNIT/ML pen Inject 4-10 Units Subcutaneous 3 times daily (with meals) 40 mL 0     insulin detemir (LEVEMIR VIAL) 100 UNIT/ML vial Inject 45 Units Subcutaneous every morning AND 25 Units At Bedtime. 70 mL 0     insulin NPH (HUMULIN N VIAL) 100 UNIT/ML vial 27 units every morning and 30 units with dinner.  Resuspend immediately prior to use; gently roll vial or pen in the palms of the hands.       insulin pen needle (B-D U/F) 31G X 8 MM miscellaneous USE 5 TO 6 PEN NEEDLES DAILY OR AS DIRECTED 600 each 2     insulin regular (HUMULIN R VIAL) 100 UNIT/ML vial        insulin syringe-needle U-100 (ULTICARE INSULIN SYRINGE) 31G X 5/16\" 1 ML miscellaneous USE 1 SYRINGE WITH EACH INJECTION 5 TIMES DAILY. 300 each 1     insulin syringe-needle U-100 (ULTICARE INSULIN SYRINGE) 31G X 5/16\" 1 ML miscellaneous USE 1 SYRINGE WITH EACH INJECTION FIVE TIMES DAILY 300 each 1     ipratropium - albuterol 0.5 mg/2.5 mg/3 mL (DUONEB) 0.5-2.5 (3) MG/3ML neb solution Take 1 vial (3 mLs) by nebulization every 6 hours as needed for shortness of breath / dyspnea or wheezing 30 vial 0     magnesium 250 MG tablet        Magnesium Oxide 250 MG TABS TAKE 1 TABLET BY MOUTH ONCE DAILY (AM) 30 tablet 1     metoprolol succinate ER (TOPROL-XL) 50 MG 24 hr tablet TAKE 1 TABLET DAILY 90 tablet 2     Multiple Vitamins-Minerals (CERTAVITE SENIOR/ANTIOXIDANT) TABS Take 1 tablet by mouth daily       Multiple Vitamins-Minerals (CERTAVITE SENIOR/ANTIOXIDANT) TABS TAKE 1 TABLET BY MOUTH ONCE DAILY (AM) 31 tablet 3     NOVOLOG VIAL 100 UNIT/ML soln USE AS INSTRUCTED BY YOUR PRESCRIBER 40 mL 0     order for DME Equipment being ordered:home nebulizer set up with mask and tubing 1 Device 0     ranitidine (ZANTAC) 150 MG tablet Take 1 tablet (150 mg) by mouth 2 times daily 180 tablet 2     simvastatin (ZOCOR) 80 MG tablet TAKE 1 TABLET DAILY IN THE EVENING 90 tablet 0     VITAMIN D3 1000 units tablet TAKE 1 TABLET BY MOUTH EVERY MORNING 90 tablet 0     warfarin " (COUMADIN) 2 MG tablet Take 4 mg Mon/Fri; 6mg all other days or as directed by [facility] 260 tablet 0     No Known Allergies      Past medical history, past surgical history, current medications and allergies reviewed and accurate to the best of my knowledge.        ROS:  Refer to HPI    /60   Pulse 61   Temp 97.5  F (36.4  C) (Tympanic)   Resp 22   Wt 84.3 kg (185 lb 12.8 oz)   SpO2 96%   BMI 25.20 kg/m      EXAM:  General Appearance: Non-ill appearing elderly male, appropriate appearance for age. No acute distress.  Sitting comfortably in a wheelchair.  Head: normocephalic, atraumatic  Eyes: conjunctivae normal without erythema or irritation, no drainage or crusting, no eyelid swelling  Orophayrnx: moist mucous membranes, voice clear  Respiratory: normal chest wall and respirations.  Normal effort.  Clear to auscultation bilaterally, no wheezing, crackles or rhonchi.  No increased work of breathing.  No cough appreciated, oxygen saturation 96%  Cardiac: RRR with no murmurs (hx of afib), no lower extremity edema.  Abdomen: soft, nontender, no masses or organomegally, no rebound tenderness or guarding, normal bowel sounds present  Musculoskeletal:  Equal movement of bilateral upper extremities.  Equal movement of bilateral lower extremities.   Psychological: normal affect, alert and pleasant      Labs:  Results for orders placed or performed in visit on 08/16/19   Urinalysis w Reflex Microscopic If Positive   Result Value Ref Range    Color Urine Yellow     Appearance Urine Clear     Glucose Urine Negative NEG^Negative mg/dL    Bilirubin Urine Negative NEG^Negative    Ketones Urine Negative NEG^Negative mg/dL    Specific Gravity Urine 1.020 1.000 - 1.030    Blood Urine Large (A) NEG^Negative    pH Urine 6.0 5.0 - 9.0 pH    Protein Albumin Urine 30 (A) NEG^Negative mg/dL    Urobilinogen Urine 0.2 0.2 - 1.0 EU/dL    Nitrite Urine Negative NEG^Negative    Leukocyte Esterase Urine Small (A)  NEG^Negative    Source Midstream Urine    Urine Microscopic   Result Value Ref Range    WBC Urine 0 - 5 OTO5^0 - 5 /HPF    RBC Urine O - 2 OTO2^O - 2 /HPF    Bacteria Urine Moderate (A) NEG^Negative /HPF    Amorphous Crystals Moderate (A) NEG^Negative /HPF   CBC and Differential   Result Value Ref Range    WBC 7.4 4.0 - 11.0 10e9/L    RBC Count 4.65 4.4 - 5.9 10e12/L    Hemoglobin 12.3 (L) 13.3 - 17.7 g/dL    Hematocrit 38.1 (L) 40.0 - 53.0 %    MCV 82 78 - 100 fl    MCH 26.5 26.5 - 33.0 pg    MCHC 32.3 31.5 - 36.5 g/dL    RDW 15.5 (H) 10.0 - 15.0 %    Platelet Count 355 150 - 450 10e9/L    Diff Method Automated Method     % Neutrophils 73.7 %    % Lymphocytes 8.8 %    % Monocytes 10.7 %    % Eosinophils 6.0 %    % Basophils 0.4 %    % Immature Granulocytes 0.4 %    Absolute Neutrophil 5.4 1.6 - 8.3 10e9/L    Absolute Lymphocytes 0.7 (L) 0.8 - 5.3 10e9/L    Absolute Monocytes 0.8 0.0 - 1.3 10e9/L    Absolute Eosinophils 0.4 0.0 - 0.7 10e9/L    Absolute Basophils 0.0 0.0 - 0.2 10e9/L    Abs Immature Granulocytes 0.0 0 - 0.4 10e9/L   Basic Metabolic Panel   Result Value Ref Range    Sodium 135 134 - 144 mmol/L    Potassium 4.0 3.5 - 5.1 mmol/L    Chloride 98 98 - 107 mmol/L    Carbon Dioxide 30 21 - 31 mmol/L    Anion Gap 7 3 - 14 mmol/L    Glucose 192 (H) 70 - 105 mg/dL    Urea Nitrogen 24 7 - 25 mg/dL    Creatinine 1.22 0.70 - 1.30 mg/dL    GFR Estimate 57 (L) >60 mL/min/[1.73_m2]    GFR Estimate If Black 69 >60 mL/min/[1.73_m2]    Calcium 9.2 8.6 - 10.3 mg/dL           ASSESSMENT/PLAN:  1. Confusion    Patient was brought in today by staff member from assisted living with chief complaint of mildly increased confusion and request to rule out an UTI.  Explained that increased confusion is multifactorial and warrants a full work-up in the ER and not just a simple urinalysis.  Staff member from the assisted living refuses to have patient transferred to the ER as they do not feel this is necessary and does not want to  sit in the ER for hours.  I further explained that I can run a urinalysis and simple blood work but that is the limitation of our work-up in [facility].  Staff member from [facility] if it is not an UTI then its probably just his dementia worsening and they will then follow-up with his primary provider if it is not an UTI.    - CBC and Differential; Future  - Basic Metabolic Panel; Future  - CBC and Differential  - Basic Metabolic Panel    Urinalysis - large blood, negative nitrite small leukocyte esterase  Urine microscopic -  trace WBC, trace RBC, moderate bacteria    CBC - stable without concerns  BMP- normal electrolytes and stable renal function    Patient is to follow-up with PCP within the next week for further evaluation and management.  Patient is to go to the ER with any worsening symptoms or new onset fever.    2. Urinary problem    - Urinalysis w Reflex Microscopic If Positive  - Urine Microscopic  - Urine Culture Aerobic Bacterial    Urinalysis - large blood, negative nitrite small leukocyte esterase  Urine microscopic -  trace WBC, trace RBC, moderate bacteria    Urinalysis and microscopic is not conclusive of a UTI especially being patient is asymptomatic with symptoms for today's visit of confusion and not urinary in nature, so therefore will await urine culture prior to starting any treatment.    Patient is to follow-up with PCP within the next week for further evaluation and management.  Patient is to go to the ER with any worsening symptoms or new onset fever.      Disclaimer:  This note consists of words and symbols derived from keyboarding, dictation, or using voice recognition software. As a result, there may be errors in the script that have gone undetected. Please consider this when interpreting information found in this note.

## 2019-08-16 NOTE — NURSING NOTE
Chief Complaint   Patient presents with     Urinary Problem     Patient is here from [facility]. Patient has been having increasing confusion and complains of difficulty urinating that started a week ago. Patient has been afebrile.     Initial /60   Pulse 61   Temp 97.5  F (36.4  C) (Tympanic)   Resp 22   Wt 84.3 kg (185 lb 12.8 oz)   SpO2 96%   BMI 25.20 kg/m   Estimated body mass index is 25.2 kg/m  as calculated from the following:    Height as of 1/12/19: 1.829 m (6').    Weight as of this encounter: 84.3 kg (185 lb 12.8 oz).  Medication Reconciliation: complete    Lin Blakely LPN

## 2019-08-16 NOTE — TELEPHONE ENCOUNTER
This Patient has requested an appointment for follow up UC for UA and caretaker states he's more confused than usual and he will not be seen by anybody but Dr Bui    Patient is having the following symptoms confusion    Patient has been having these symptoms for the following Duration:    Please contact the patient at the following phone number         [phone] Swati caretaker

## 2019-08-20 NOTE — PROGRESS NOTES
Subjective     Adiel Rosa Jr is a 84 year old male who presents to clinic today for the following health issues:       HPI   Confusion       Duration: 2 weeks     Description (location/character/radiation): putting shoes on wrong feet, cant find bedroom, hallucinating(seeing bugs on himself) , Sofia from home states he has been talking nonsense .     Intensity:  moderate, severe    Accompanying signs and symptoms: has been blood in urine.     History (similar episodes/previous evaluation): 8/16/19 was seen in [place] at the clinic.     Precipitating or alleviating factors: None    Therapies tried and outcome: None    Overall patient has been failing and forgetting more things getting little more confused.  No history of trauma.  Was in [place] urgent care and had a UA that noted blood but no sign of infection.  He has a history of gross hematuria and has seen urology and nephrology and did have a work-up.  His BMP and CBC were stable.  He has had no complaints of headache sometimes shortness of breath none currently.  No chest pain they are requesting that he could get a prescription for a new wheelchair his old one is pretty worn out.  Earlier had some hallucinations and that seems to have resolved.     Also has hypertension needs a refill of his metoprolol.      PAST MEDICAL HISTORY:  Past Medical History:   Diagnosis Date     Acute myocardial infarction of other specified sites, episode of care unspecified 1/1/1999    Non Q wave  treated with Retavase      Benign hypertensive heart disease with heart failure (H) 1/1/2011     Problem list name updated by automated process. Provider to review     Chronic atrial fibrillation (H) 4/1/2013     Chronic obstructive pulmonary disease, unspecified COPD type (H) 1/1/2011     Problem list name updated by automated process. Provider to review     Hypercalcemia 1/1/2011     Hypercholesterolemia 1/1/2011     Long-term (current) use of anticoagulants [Z79.01]  8/2/2016     Type 2 diabetes mellitus with hyperglycemia, with long-term current use of insulin (H) 11/29/1999     Problem list name updated by automated process. Provider to review       PAST SURGICAL HISTORY:  Past Surgical History:   Procedure Laterality Date     reverse total arthoplasty of right shoulder   01/25/2018       MEDICATIONS:  Prior to Admission medications    Medication Sig Start Date End Date Taking? Authorizing Provider   donepezil (ARICEPT) 5 MG tablet Take 1 tablet (5 mg) by mouth At Bedtime 8/20/19  Yes GAURAV Grijalva MD   metoprolol succinate ER (TOPROL-XL) 50 MG 24 hr tablet Take 1 tablet (50 mg) by mouth daily 8/20/19  Yes GAURAV Grijalva MD   order for DME Equipment being ordered: Wheelchair 8/20/19  Yes GAURAV Grijalva MD   acetaminophen (TYLENOL) 650 MG CR tablet Take 1 tablet (650 mg) by mouth every 8 hours as needed 8/31/18   GAURAV Grijalva MD   aspirin 325 MG tablet Take 1 tablet (325 mg) by mouth daily 2/14/18   Christie Quijano, PA   benzocaine-menthol (CEPACOL) 15-3.6 MG lozenge Place 1 lozenge inside cheek every hour as needed for sore throat 1/16/17   Alejandro Fung MD   blood glucose (FREESTYLE LITE) test strip Use to test blood sugars 5 times daily or as directed. 8/7/19   GAURAV Grijalva MD   blood glucose (NO BRAND SPECIFIED) lancets standard Use to test blood sugar three times daily or as directed.  Brand-Freestyle Lite 12/28/18   Michelle Liang MD   blood glucose monitoring (NO BRAND SPECIFIED) meter device kit Use to test blood sugar 5 times daily due to patient being on sliding scale. 1/30/17   GAURAV Grijalva MD   candesartan (ATACAND) 16 MG tablet TAKE 1 TABLET DAILY IN THE MORNING 10/24/18   GAURAV Grijalva MD   ciprofloxacin (CIPRO) 250 MG tablet Take 1 tablet (250 mg) by mouth daily 3/14/19   GAURAV Grijalva MD   Cranberry 500 MG CAPS Take 1 capsule (500 mg) by mouth daily 12/20/17   GAURAV Grijalva MD   CRANBERRY EXTRACT PO Take 500 mg by mouth 1/16/17    "Reported, Patient   digoxin (LANOXIN) 125 MCG tablet TAKE 1 TABLET EVERY MORNING 8/7/19   GAURAV Grijalva MD   Doxylamine-DM 6.25-15 MG/15ML LIQD Taking as pkg directions at night 1/20/17   GAURAV Grijalva MD   fluticasone-salmeterol (ADVAIR DISKUS) 250-50 MCG/DOSE inhaler USE 1 INHALATION TWO TIMES A DAY IN THE MORNING AND IN THE EVENING APPROXIMATELY 12 HOURS APART 8/8/19   GAURAV Grijalva MD   FREESTYLE LITE test strip USE 1 TEST STRIP TO TEST BLOOD SUGARS FIVE TIMES DAILY OR AS DIRECTED 5/29/19   GAURAV Grijalva MD   furosemide (LASIX) 40 MG tablet TAKE 1 TABLET DAILY IN THE MORNING 8/7/19   GAURAV Grijalva MD   HYDROcodone-acetaminophen (NORCO) 5-325 MG per tablet TAKE 1 TABLET BY MOUTH EVERY 6 HOURS AS NEEDED FOR MODERATE TO SEVRE PAIN 9/13/18   GAURAV Grijalva MD   insulin aspart (NOVOLOG PEN) 100 UNIT/ML pen Inject 4-10 Units Subcutaneous 3 times daily (with meals) 8/7/19   GAURAV Grijalva MD   insulin detemir (LEVEMIR VIAL) 100 UNIT/ML vial Inject 45 Units Subcutaneous every morning AND 25 Units At Bedtime. 8/8/19 8/7/20  GAURAV Grijalva MD   insulin NPH (HUMULIN N VIAL) 100 UNIT/ML vial 27 units every morning and 30 units with dinner.  Resuspend immediately prior to use; gently roll vial or pen in the palms of the hands. 3/13/13   Reported, Patient   insulin pen needle (B-D U/F) 31G X 8 MM miscellaneous USE 5 TO 6 PEN NEEDLES DAILY OR AS DIRECTED 2/18/19   GAURAV Grijalva MD   insulin regular (HUMULIN R VIAL) 100 UNIT/ML vial  4/26/10   Reported, Patient   insulin syringe-needle U-100 (ULTICARE INSULIN SYRINGE) 31G X 5/16\" 1 ML miscellaneous USE 1 SYRINGE WITH EACH INJECTION 5 TIMES DAILY. 8/7/19   GAURAV Grijalva MD   insulin syringe-needle U-100 (ULTICARE INSULIN SYRINGE) 31G X 5/16\" 1 ML miscellaneous USE 1 SYRINGE WITH EACH INJECTION FIVE TIMES DAILY 2/18/19   GAURAV Grijalva MD   ipratropium - albuterol 0.5 mg/2.5 mg/3 mL (DUONEB) 0.5-2.5 (3) MG/3ML neb solution Take 1 vial (3 mLs) by nebulization " every 6 hours as needed for shortness of breath / dyspnea or wheezing 6/11/19   Latricia Loza PA-C   magnesium 250 MG tablet  3/13/13   Reported, Patient   Magnesium Oxide 250 MG TABS TAKE 1 TABLET BY MOUTH ONCE DAILY (AM) 4/4/18   Swapnil Mackay MD   Multiple Vitamins-Minerals (CERTAVITE SENIOR/ANTIOXIDANT) TABS Take 1 tablet by mouth daily 8/25/17   Reported, Patient   Multiple Vitamins-Minerals (CERTAVITE SENIOR/ANTIOXIDANT) TABS TAKE 1 TABLET BY MOUTH ONCE DAILY (AM) 5/2/18   GAURAV Grijalva MD   NOVOLOG VIAL 100 UNIT/ML soln USE AS INSTRUCTED BY YOUR PRESCRIBER 8/13/19   GAURAV Grijalva MD   order for DME Equipment being ordered:home nebulizer set up with mask and tubing 6/11/19   Latricia Loza PA-C   ranitidine (ZANTAC) 150 MG tablet Take 1 tablet (150 mg) by mouth 2 times daily 8/7/19   GAURAV Grijalva MD   simvastatin (ZOCOR) 80 MG tablet TAKE 1 TABLET DAILY IN THE EVENING 8/7/19   GAURAV Grijalva MD   VITAMIN D3 1000 units tablet TAKE 1 TABLET BY MOUTH EVERY MORNING 5/2/18   GAURAV Grijalva MD   warfarin (COUMADIN) 2 MG tablet Take 4 mg Mon/Fri; 6mg all other days or as directed by [facility] 8/7/19   GAURAV Grijalva MD       ALLERGIES:   No Known Allergies    ROS:  Constitutional, neuro, ENT, endocrine, pulmonary, cardiac, gastrointestinal, genitourinary, musculoskeletal, integument and psychiatric systems are negative, except as otherwise noted.      EXAM:  /68 (BP Location: Left arm, Patient Position: Sitting, Cuff Size: Adult Regular)   Pulse 54   Temp 96.9  F (36.1  C) (Tympanic)   SpO2 96%  There is no height or weight on file to calculate BMI.   GENERAL APPEARANCE: healthy, alert and no distress  EYES: Eyes grossly normal to inspection, PERRL and conjunctivae and sclerae normal  NECK: no adenopathy, no asymmetry, masses, or scars and thyroid normal to palpation  RESP: lungs clear to auscultation - no rales, rhonchi or wheezes  CV: normal S1 S2, no S3 or S4, no murmur, click or  rub and irregularly irregular rhythm  ABDOMEN: soft, nontender, without hepatosplenomegaly or masses and bowel sounds normal  SKIN: no suspicious lesions or rashes  NEURO: Normal strength and tone and speech normal  Mental status he can remember 2 of three 5-minute items and recall.  Can remember his wedding day and his name and wife's name but he cannot name the month of the year of the day of the week the year or the president.  He can spell world forwards but not backwards.  He can name simple items.  Lab/ X-ray  No results found for this or any previous visit (from the past 24 hour(s)).    ASSESSMENT/PLAN:    ICD-10-CM    1. Stage 4 chronic kidney disease (H) N18.4    2. Essential hypertension I10 metoprolol succinate ER (TOPROL-XL) 50 MG 24 hr tablet   3. Chronic atrial fibrillation (H) I48.2    4. Benign essential hypertension I10    5. Dementia without behavioral disturbance, unspecified dementia type F03.90 order for DME     donepezil (ARICEPT) 5 MG tablet   Had a recent work-up in [place] urgent care for infectious etiology and the BMP was significant for glucose 192 and GFR 57 otherwise negative, CBC significant for hemoglobin 12.3 and but had a normal white count 7.4 and a normal platelet count of 355,000.  Urine culture grew out mixed kalpesh  Prescription sent in for wheelchair because of his memory issues having more troubles with ambulation.  We will start with Aricept 5 mg a day we will see him in 3 months follow-up.  Other problems seem to be stable sugars are stable.  Papers brought in.  They range from the low 70s to the high of mid 200s    WILSON Grijalva MD  August 20, 2019

## 2019-08-20 NOTE — NURSING NOTE
Chief Complaint   Patient presents with     Altered Mental Status       Initial /68 (BP Location: Left arm, Patient Position: Sitting, Cuff Size: Adult Regular)   Pulse 54   Temp 96.9  F (36.1  C) (Tympanic)   SpO2 96%  Estimated body mass index is 25.2 kg/m  as calculated from the following:    Height as of 1/12/19: 1.829 m (6').    Weight as of 8/16/19: 84.3 kg (185 lb 12.8 oz).  Medication Reconciliation: complete

## 2019-08-27 NOTE — PROGRESS NOTES
ANTICOAGULATION FOLLOW-UP CLINIC VISIT    Patient Name:  Adiel Rosa Jr  Date:  2019  Contact Type:  new warfarin dosing/INR recheck date faxed to [facility]    SUBJECTIVE:  Patient Findings     Comments:   INR done by [facility] staff and result faxed to warfarin clinic. Per nursing communication sheet, patient has no bleeding/bruising and no new changes in diet/meds/activity. New warfarin dosing/INR recheck date faxed back to [facility]. Staff to notify warfarin clinic if patient has any new changes in diet/meds/activity or bleeding/bruising.         Clinical Outcomes     Negatives:   Major bleeding event, Thromboembolic event, Anticoagulation-related hospital admission, Anticoagulation-related ED visit, Anticoagulation-related fatality    Comments:   INR done by [facility] staff and result faxed to warfarin clinic. Per nursing communication sheet, patient has no bleeding/bruising and no new changes in diet/meds/activity. New warfarin dosing/INR recheck date faxed back to [facility]. Staff to notify warfarin clinic if patient has any new changes in diet/meds/activity or bleeding/bruising.            OBJECTIVE    INR   Date Value Ref Range Status   2019 2.4 (A) 0.8 - 1.14 Final       ASSESSMENT / PLAN  INR assessment THER    Recheck INR In: 6 WEEKS    INR Location [facility]      Anticoagulation Summary  As of 2019    INR goal:   2.0-3.0   TTR:   69.4 % (3 y)   INR used for dosin.4 (2019)   Warfarin maintenance plan:   4 mg (2 mg x 2) every Mon, Fri; 6 mg (2 mg x 3) all other days   Full warfarin instructions:   4 mg every Mon, Fri; 6 mg all other days   Weekly warfarin total:   38 mg   No change documented:   Jessa Storey RN   Plan last modified:   Jessa Gibson RN (2018)   Next INR check:   10/8/2019   Priority:   INR   Target end date:   Indefinite    Indications    Chronic atrial fibrillation (H) [I48.2]  Long-term (current) use of  anticoagulants [Z79.01] [Z79.01]             Anticoagulation Episode Summary     INR check location:       Preferred lab:       Send INR reminders to:   [facility] ANTICOAG POOL    Comments:   [facility] assisted living, Fax   done with homecare      Anticoagulation Care Providers     Provider Role Specialty Phone number    GAURAV Grijalva MD [facility] [phone]            See the Encounter Report to view Anticoagulation Flowsheet and Dosing Calendar (Go to Encounters tab in chart review, and find the Anticoagulation Therapy Visit)        Jessa Storey RN

## 2019-09-06 PROBLEM — J18.9 PNEUMONIA OF LEFT LOWER LOBE DUE TO INFECTIOUS ORGANISM: Status: ACTIVE | Noted: 2019-01-01

## 2019-09-06 NOTE — H&P
[facility]    History and Physical  Hospitalist       Date of Admission:  9/6/2019  Date of Service (when I saw the patient): 09/06/19    Assessment & Plan   Adiel Rosa Jr is a 84 year old male who presents with SOB and fever and was found to have a LLL pneumonia.     1. LLL pneumonia- Continue IV Abxs, O2 prn, PT.   2. COPD- Minimal exacerbation at this time. Doesn't merit steroids. Nebs, O2. Abxs as above.   3. CKD 4- Cr is at baseline.   4. CAF- Rate controlled. Continue outpt regimen including Coumadin. Pharmacy helping manage.   5. BPH- Continue outpt regimen.   6. GERD- Continue PPI.   7. DM 2- Cut down dose of LA insulin and started sliding scale.   8. HTN- BP well controlled.   9. CAD- Outpt regimen to continue. EKG with no changes.   10. On Aricept- Will continue. 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     Active Problems:    Pneumonia of left lower lobe due to infectious organism (H)    DVT Prophylaxis: Warfarin  Code Status: DNR / DNI    Disposition: Expected discharge in 1-3 days depending on clinical response to Rx.     Tonya Rosa    Primary Care Physician   GAURAV Grijalva    Chief Complaint   SOB    History is obtained from the patient and electronic health record    History of Present Illness   Adiel Rosa Jr is a 84 year old male who presents with SOB, DESHPANDE and a cough with mild white phlegm for the last couple of days. He resides at an assisted living facility called [facility]. In the ED, he was found to have a temp of 102F and a CXR revealed a LLL pneumonia. He also has a history of COPD, not on O2, but at his baseline can't walk more than about 10 feet with a walker. He says he usually uses a wheelchair. He stated to me that he feels much better than last night. He's currently afebrile and has voiced no concerns, however he isn't eating much.     Past Medical History    I have reviewed this patient's medical history and updated it with pertinent information if  needed.   Past Medical History:   Diagnosis Date     Acute myocardial infarction of other specified sites, episode of care unspecified 1/1/1999    Non Q wave  treated with Retavase      Benign hypertensive heart disease with heart failure (H) 1/1/2011     Problem list name updated by automated process. Provider to review     Chronic atrial fibrillation (H) 4/1/2013     Chronic obstructive pulmonary disease, unspecified COPD type (H) 1/1/2011     Problem list name updated by automated process. Provider to review     Hypercalcemia 1/1/2011     Hypercholesterolemia 1/1/2011     Long-term (current) use of anticoagulants [Z79.01] 8/2/2016     Type 2 diabetes mellitus with hyperglycemia, with long-term current use of insulin (H) 11/29/1999     Problem list name updated by automated process. Provider to review       Past Surgical History   I have reviewed this patient's surgical history and updated it with pertinent information if needed.  Past Surgical History:   Procedure Laterality Date     reverse total arthoplasty of right shoulder   01/25/2018       Prior to Admission Medications   Prior to Admission Medications   Prescriptions Last Dose Informant Patient Reported? Taking?   Cranberry 500 MG CAPS 9/5/2019 at AM Nursing Home No Yes   Sig: Take 1 capsule (500 mg) by mouth daily   Doxylamine-DM 6.25-15 MG/15ML LIQD Unknown at Unknown time  Yes No   Sig: Taking as package directions at night as needed   FREESTYLE LITE test strip NA at  Nursing Home No No   Sig: USE 1 TEST STRIP TO TEST BLOOD SUGARS FIVE TIMES DAILY OR AS DIRECTED   HYDROcodone-acetaminophen (NORCO) 5-325 MG tablet Unknown at Unknown time  Yes No   Sig: Take 1-2 tablets by mouth every 4 hours as needed for severe pain   Magnesium Oxide 250 MG TABS 9/5/2019 at AM Nursing Home No Yes   Sig: TAKE 1 TABLET BY MOUTH ONCE DAILY (AM)   Multiple Vitamins-Minerals (CERTAVITE SENIOR/ANTIOXIDANT) TABS 9/5/2019 at AM Nursing Home No Yes   Sig: TAKE 1 TABLET BY  MOUTH ONCE DAILY (AM)   VITAMIN D3 1000 units tablet 9/5/2019 at AM Nursing Home No Yes   Sig: TAKE 1 TABLET BY MOUTH EVERY MORNING   acetaminophen (TYLENOL) 650 MG CR tablet Unknown at Unknown time Nursing Home No No   Sig: Take 1 tablet (650 mg) by mouth every 8 hours as needed   aspirin 325 MG tablet 9/5/2019 at AM [facility] No Yes   Sig: Take 1 tablet (325 mg) by mouth daily   benzocaine-menthol (CEPACOL) 15-3.6 MG lozenge Unknown at Unknown time Nursing Home No No   Sig: Place 1 lozenge inside cheek every hour as needed for sore throat   blood glucose (FREESTYLE LITE) test strip NA at [facility] No No   Sig: Use to test blood sugars 5 times daily or as directed.   blood glucose (NO BRAND SPECIFIED) lancets standard NA at [facility] No No   Sig: Use to test blood sugar three times daily or as directed.  Brand-Freestyle Lite   blood glucose monitoring (NO BRAND SPECIFIED) meter device kit NA at [facility] No No   Sig: Use to test blood sugar 5 times daily due to patient being on sliding scale.   digoxin (LANOXIN) 125 MCG tablet 9/5/2019 at AM Nursing Home No Yes   Sig: TAKE 1 TABLET EVERY MORNING   donepezil (ARICEPT) 5 MG tablet 9/5/2019 at AM [facility] Home Yes Yes   Sig: Take 5 mg by mouth daily   fluticasone-salmeterol (ADVAIR DISKUS) 250-50 MCG/DOSE inhaler 9/5/2019 at AM [facility] No Yes   Sig: USE 1 INHALATION TWO TIMES A DAY IN THE MORNING AND IN THE EVENING APPROXIMATELY 12 HOURS APART   furosemide (LASIX) 40 MG tablet 9/5/2019 at AM Nursing Home Yes Yes   Sig: Take 20 mg by mouth daily   insulin aspart (NOVOLOG PEN) 100 UNIT/ML pen 9/5/2019 at PM Nursing Home No Yes   Sig: Inject 4-10 Units Subcutaneous 3 times daily (with meals)   insulin detemir (LEVEMIR VIAL) 100 UNIT/ML vial 9/5/2019 at AM [facility] No Yes   Sig: Inject 45 Units Subcutaneous every morning AND 25 Units At Bedtime.   insulin pen needle (B-D U/F) 31G X 8 MM miscellaneous NA at [facility] No No   Sig: USE 5 TO  "6 PEN NEEDLES DAILY OR AS DIRECTED   insulin syringe-needle U-100 (ULTICARE INSULIN SYRINGE) 31G X 5/16\" 1 ML miscellaneous NA at [facility] No No   Sig: USE 1 SYRINGE WITH EACH INJECTION FIVE TIMES DAILY   insulin syringe-needle U-100 (ULTICARE INSULIN SYRINGE) 31G X 5/16\" 1 ML miscellaneous NA at [facility] No No   Sig: USE 1 SYRINGE WITH EACH INJECTION 5 TIMES DAILY.   ipratropium - albuterol 0.5 mg/2.5 mg/3 mL (DUONEB) 0.5-2.5 (3) MG/3ML neb solution Unknown at Unknown time Nursing Home No No   Sig: Take 1 vial (3 mLs) by nebulization every 6 hours as needed for shortness of breath / dyspnea or wheezing   metoprolol succinate ER (TOPROL-XL) 50 MG 24 hr tablet 9/5/2019 at AM Nursing Home No Yes   Sig: Take 1 tablet (50 mg) by mouth daily   order for DME NA at [facility] No No   Sig: Equipment being ordered:home nebulizer set up with mask and tubing   order for DME NA at [facility] No No   Sig: Equipment being ordered: Wheelchair   ranitidine (ZANTAC) 150 MG tablet 9/5/2019 at AM Nursing Home No Yes   Sig: Take 1 tablet (150 mg) by mouth 2 times daily   simvastatin (ZOCOR) 80 MG tablet 9/4/2019 at PM Nursing Home No Yes   Sig: TAKE 1 TABLET DAILY IN THE EVENING   warfarin (COUMADIN) 2 MG tablet 9/4/2019 at PM Nursing Home No Yes   Sig: Take 4 mg Mon/Fri; 6mg all other days or as directed by [facility]      Facility-Administered Medications: None     Allergies   No Known Allergies    Social History   I have reviewed this patient's social history and updated it with pertinent information if needed. Adiel Rosa   reports that he has quit smoking. His smoking use included cigarettes. He has a 40.00 pack-year smoking history. He has never used smokeless tobacco. He reports that he does not drink alcohol or use drugs.    Family History   I have reviewed this patient's family history and updated it with pertinent information if needed.   Family History   Problem Relation Age of Onset "     No Known Problems Mother      No Known Problems Father      No Known Problems Other        Review of Systems   The 10 point Review of Systems is negative other than noted in the HPI or here.     Physical Exam   Temp: 96.9  F (36.1  C) Temp src: Tympanic BP: (!) 141/68 Pulse: 95 Heart Rate: 83 Resp: 24 SpO2: 93 % O2 Device: Nasal cannula Oxygen Delivery: 2 LPM  Vital Signs with Ranges  Temp:  [96.9  F (36.1  C)-102.5  F (39.2  C)] 96.9  F (36.1  C)  Pulse:  [95] 95  Heart Rate:  [83-84] 83  Resp:  [24-32] 24  BP: (130-141)/(63-76) 141/68  SpO2:  [88 %-96 %] 93 %  190 lbs 4.11 oz    Constitutional: In NAD  Eyes: Unremarkable.   HEENT: Unremarkable.  Respiratory: Mildly tachypneic but no dyspnea. Not using accessory muscles of respiration. Dimished BS but no wheezing.   Cardiovascular: S1 + S2. Irregularly irregular rhythm.   GI: Soft, NT, ND, BS +  Skin: No acute rashes or lesions.   Musculoskeletal: No pedal edema.  Neurologic: Grossly non-focal.  Psychiatric: Appropriate affect. Alert and oriented x 3.     Data   Data reviewed today:  I personally reviewed all data below:  Recent Labs   Lab 09/06/19  0615 09/06/19  0420   WBC  --  11.1*   HGB  --  11.8*   MCV  --  80   PLT  --  301   INR  --  2.21*   NA  --  135   POTASSIUM  --  4.3   CHLORIDE  --  97*   CO2  --  30   BUN  --  28*   CR  --  1.48*   ANIONGAP  --  8   AZRA  --  9.1   GLC  --  166*   ALBUMIN  --  3.4*   PROTTOTAL  --  7.1   BILITOTAL  --  0.7   ALKPHOS  --  105*   ALT  --  11   AST  --  17   TROPI 0.091* 0.076*       Lactic Acid   Date Value Ref Range Status   09/06/2019 0.8 0.5 - 2.2 mmol/L Final   01/13/2017 1.0 0.4 - 2.0 mmol/L Final   01/11/2017 1.6 0.4 - 2.0 mmol/L Final       Recent Results (from the past 24 hour(s))   XR Chest Port 1 View    Narrative    EXAM:   XR Chest, 1 View     EXAM DATE/TIME:   9/6/2019 4:31 AM     CLINICAL HISTORY:   84 years old, male; Shortness of breath; Additional info: Tachypnea and hypoxia   at [facility] this morning.      TECHNIQUE:   Imaging protocol: XR of the chest   Views: 1 view.     COMPARISON:   CR XR CHEST 2 VW 6/11/2019 6:06 PM     FINDINGS:   Lungs: Interstitial pulmonary edema. Left base opacity.  Pleural space: Small left pleural effusion.   Heart/Mediastinum: Cardiomegaly unchanged.   Bones/joints: Right shoulder post T6 again noted       Impression    IMPRESSION:   Cardiomegaly with congestive heart failure.    Left pleural effusion.    Left base atelectasis or mild pneumonia.     THIS DOCUMENT HAS BEEN ELECTRONICALLY SIGNED BY CECILE BEAUCHAMP MD

## 2019-09-06 NOTE — PROGRESS NOTES
[facility] ADMISSION NOTE    Patient admitted to room 306 at approximately 0630 via cart from emergency room. Patient was accompanied by transport tech.     Verbal SBAR report received from STONEY Herrera prior to patient arrival.     Patient trasferred to bed via slip Patient alert and oriented X 1. The patient is not having any pain.  . Admission vital signs: Blood pressure 130/63, pulse 95, temperature 97.9  F (36.6  C), temperature source Tympanic, resp. rate (!) 32, weight 86.3 kg (190 lb 4.1 oz), SpO2 96 %. Patient was oriented to plan of care, call light, bed controls, tv, telephone, bathroom and visiting hours.     Risk Assessment    The following safety risks were identified during admission: fall. Yellow risk band applied: YES.     Skin Initial Assessment    This writer admitted this patient and completed a full skin assessment and Alejandro score in the Adult PCS flowsheet. Appropriate interventions initiated as needed.      Lu Aparicio RN

## 2019-09-06 NOTE — PLAN OF CARE
Patient has had poor appetite today- encouraging oral intake and pushing fluids, likes ice cream and orange juice.  Patient is weaker with transfers this afternoon- standing lift utilized to safety transfer from recliner to bed.  Denied pain at rest, stated that he was uncomfortable during transfer- resolved when in bed.  Patient has been pulling the tape off of IV site- redirected and secured site.  Urine was colin/red and cloudy- has been incontinent as well as using the urinal.  Had soft formed BM in commode earlier.  Lungs clear/diminished throughout.  Dr. Rosa updated on patient weaker and urine characteristics.  /56   Pulse 95   Temp 98  F (36.7  C) (Tympanic)   Resp 20   Wt 86.3 kg (190 lb 4.1 oz)   SpO2 93%   BMI 25.80 kg/m   Nereyda Jameson RN on 9/6/2019 at 2:41 PM

## 2019-09-06 NOTE — ED TRIAGE NOTES
Pt arrives to ED from [facility]. Pt was found to have fever and low oxygenation tonight. Staff noticed pt is more confused and weaker than normal. Pt was placed on a non rebreather. Pt had respirations of 50 on scene. Pt has had a few episodes of hypoglycemia at night. Pt had glucose tabs at 0100 and 0200 for BG in the 60's. BG was 163 for EMS. IV established.    Edith Ya RN on 9/6/2019 at 4:05 AM

## 2019-09-06 NOTE — PLAN OF CARE
Patient has been pleasant, requesting staff to sit in and talk to him.  Needs frequent redirection to ensure safety.  Has been trying to remove IV and this has been wrapped with kerlix to protect site.  Patient has had multiple self transfer attempts out of bed and out of recliner- continues to be very weak and unsteady on feet- unable to safely transfer without use of standing lift.  Alarms in place, room close to desk, snacks and distractions in offered, non-slip footwear on, and call light in reach.  Has been 1:1 with staff intermittently through the afternoon.  Charge nurse notified of need for sitter- ANGIE watching patient in order to promptly intervene with safety interventions.  Nereyda Jameson RN on 9/6/2019 at 4:49 PM

## 2019-09-06 NOTE — PLAN OF CARE
Patient is alert, oriented to self, has confusion and communicates effectively.  Has been transferring with heavy assist 2 staff and gait belt.  Was up in chair for breakfast- 25% intake, encouraging fluids.  Lungs clear/diminished, heart irregular, bowel sounds active, pedal pulses weak/palpable, no edema.  Denies pain, denies SOB and no observed s/s of either at rest or with exertion.  Telemetry was discontinued.  IV fluids discontinued this morning and now resumed at 50cc/h.  Skin intact- has denuded/redness in groin related to incontinence.  Does use urinal at times.  Spoke to Sara, [facility]- she stated that she will notify patient's family of hospital admission and update the RN on call.  BP (!) 141/68   Pulse 95   Temp 96.9  F (36.1  C) (Tympanic)   Resp 24   Wt 86.3 kg (190 lb 4.1 oz)   SpO2 93%   BMI 25.80 kg/m   Nereyda Jameson RN on 9/6/2019 at 11:18 AM

## 2019-09-06 NOTE — ED PROVIDER NOTES
History     Chief Complaint   Patient presents with     Respiratory Distress     HPI  Adiel Rosa Jr is a 84 year old male who arrives via EMS from [facility] due to new respiratory distress with hypoxia tachypnea and fever tonight.  He has a hx of difficult to control DM and alternating hyperglycemia and then hypoglycemia especially at night time.  Staff got him up earlier tonight with for glucagon to prevent hypoglycemia when they noted he was having respiratory distress and fever.  Apparently he is usually up and walking around all night, but not tonight.  Fever over 102 on arrival.    Allergies:  No Known Allergies    Problem List:    Patient Active Problem List    Diagnosis Date Noted     Pneumonia of left lower lobe due to infectious organism (H) 09/06/2019     Priority: Medium     Stage 4 chronic kidney disease (H) 03/14/2019     Priority: Medium     Chronic osteomyelitis of right foot (H) 06/15/2017     Priority: Medium     Health Care Home 01/25/2017     Priority: Medium     Osteomyelitis of foot, right, acute (H) 01/12/2017     Priority: Medium     Hypoglycemia due to insulin 01/11/2017     Priority: Medium     Long-term (current) use of anticoagulants [Z79.01] 08/02/2016     Priority: Medium     Chronic atrial fibrillation (H) 04/01/2013     Priority: Medium     Advanced care planning/counseling discussion 03/16/2012     Priority: Medium     Chronic renal disease, stage III 09/22/2011     Priority: Medium     Hypercholesterolemia 01/01/2011     Priority: Medium     Benign hypertensive heart disease with heart failure (H) 01/01/2011     Priority: Medium     Problem list name updated by automated process. Provider to review       Hypercalcemia 01/01/2011     Priority: Medium     Chronic obstructive pulmonary disease, unspecified COPD type (H) 01/01/2011     Priority: Medium     Problem list name updated by automated process. Provider to review       Erectile Dysfunction 01/01/2011      Priority: Medium     Hypertrophy of prostate without urinary obstruction 01/01/2011     Priority: Medium     Problem list name updated by automated process. Provider to review       Esophageal reflux 01/01/2011     Priority: Medium     Hypertension, benign 01/01/2011     Priority: Medium     Cough 09/11/2007     Priority: Medium     Type 2 diabetes mellitus with hyperglycemia, with long-term current use of insulin (H) 11/29/1999     Priority: Medium     Problem list name updated by automated process. Provider to review       Acute myocardial infarction of other specified sites, episode of care unspecified 01/01/1999     Priority: Medium     Non Q wave  treated with Retavase          Past Medical History:    Past Medical History:   Diagnosis Date     Acute myocardial infarction of other specified sites, episode of care unspecified 1/1/1999     Benign hypertensive heart disease with heart failure (H) 1/1/2011     Chronic atrial fibrillation (H) 4/1/2013     Chronic obstructive pulmonary disease, unspecified COPD type (H) 1/1/2011     Hypercalcemia 1/1/2011     Hypercholesterolemia 1/1/2011     Long-term (current) use of anticoagulants [Z79.01] 8/2/2016     Type 2 diabetes mellitus with hyperglycemia, with long-term current use of insulin (H) 11/29/1999       Past Surgical History:    Past Surgical History:   Procedure Laterality Date     reverse total arthoplasty of right shoulder   01/25/2018       Family History:    Family History   Problem Relation Age of Onset     No Known Problems Mother      No Known Problems Father      No Known Problems Other        Social History:  Marital Status:   [5]  Social History     Tobacco Use     Smoking status: Former Smoker     Packs/day: 1.00     Years: 40.00     Pack years: 40.00     Types: Cigarettes     Smokeless tobacco: Never Used   Substance Use Topics     Alcohol use: No     Drug use: Never     Comment: Drug use: No        Medications:      aspirin 325 MG tablet  "  Cranberry 500 MG CAPS   CRANBERRY EXTRACT PO   digoxin (LANOXIN) 125 MCG tablet   donepezil (ARICEPT) 5 MG tablet   fluticasone-salmeterol (ADVAIR DISKUS) 250-50 MCG/DOSE inhaler   furosemide (LASIX) 40 MG tablet   insulin aspart (NOVOLOG PEN) 100 UNIT/ML pen   insulin detemir (LEVEMIR VIAL) 100 UNIT/ML vial   ipratropium - albuterol 0.5 mg/2.5 mg/3 mL (DUONEB) 0.5-2.5 (3) MG/3ML neb solution   magnesium 250 MG tablet   Magnesium Oxide 250 MG TABS   metoprolol succinate ER (TOPROL-XL) 50 MG 24 hr tablet   Multiple Vitamins-Minerals (CERTAVITE SENIOR/ANTIOXIDANT) TABS   Multiple Vitamins-Minerals (CERTAVITE SENIOR/ANTIOXIDANT) TABS   NOVOLOG VIAL 100 UNIT/ML soln   ranitidine (ZANTAC) 150 MG tablet   simvastatin (ZOCOR) 80 MG tablet   VITAMIN D3 1000 units tablet   warfarin (COUMADIN) 2 MG tablet   acetaminophen (TYLENOL) 650 MG CR tablet   benzocaine-menthol (CEPACOL) 15-3.6 MG lozenge   blood glucose (FREESTYLE LITE) test strip   blood glucose (NO BRAND SPECIFIED) lancets standard   blood glucose monitoring (NO BRAND SPECIFIED) meter device kit   candesartan (ATACAND) 16 MG tablet   ciprofloxacin (CIPRO) 250 MG tablet   Doxylamine-DM 6.25-15 MG/15ML LIQD   FREESTYLE LITE test strip   HYDROcodone-acetaminophen (NORCO) 5-325 MG per tablet   insulin NPH (HUMULIN N VIAL) 100 UNIT/ML vial   insulin pen needle (B-D U/F) 31G X 8 MM miscellaneous   insulin regular (HUMULIN R VIAL) 100 UNIT/ML vial   insulin syringe-needle U-100 (ULTICARE INSULIN SYRINGE) 31G X 5/16\" 1 ML miscellaneous   insulin syringe-needle U-100 (ULTICARE INSULIN SYRINGE) 31G X 5/16\" 1 ML miscellaneous   order for DME   order for DME         Review of Systems   Constitutional: Positive for fatigue and fever.   HENT: Negative.    Respiratory: Positive for shortness of breath.    Cardiovascular: Negative for chest pain and leg swelling.        Hx of afib on coumadin.   Gastrointestinal: Negative.  Negative for abdominal pain.   Genitourinary: Negative.  "   Psychiatric/Behavioral: Positive for decreased concentration.       Physical Exam   BP: 132/76  Pulse: 95  Temp: 102.5  F (39.2  C)  Resp: (!) 32  Weight: 84.8 kg (187 lb)  SpO2: (!) 88 %      Physical Exam   Constitutional: He appears well-developed. He appears distressed.   Elderly male with kussmaul breathing who will perk up when spoken to but then drift off and sats will vary from 100% to 88% on RA.   HENT:   Head: Normocephalic and atraumatic.   Right Ear: External ear normal.   Left Ear: External ear normal.   Nose: Nose normal.   Mouth/Throat: Oropharynx is clear and moist.   Eyes: Pupils are equal, round, and reactive to light. Conjunctivae and EOM are normal.   Neck: Normal range of motion. Neck supple.   Cardiovascular: Normal rate.   No murmur heard.  Irregularly irregular.   Pulmonary/Chest: No stridor. He is in respiratory distress. He has no wheezes. He has no rales.   Abdominal: Soft. Bowel sounds are normal.   Musculoskeletal: He exhibits no edema.   Neurological: He is alert.   Skin: He is not diaphoretic.   Nursing note and vitals reviewed.      ED Course        Procedures          EKG: Atrial fibrillation at 95 bpm, rate controlled with right bundle branch block left anterior anterior fascicular block and septal strain pattern.  Right bundle branch block appears new in comparison to 1/11/2017.    Critical Care time:  none               Results for orders placed or performed during the hospital encounter of 09/06/19 (from the past 24 hour(s))   CBC with platelets differential   Result Value Ref Range    WBC 11.1 (H) 4.0 - 11.0 10e9/L    RBC Count 4.49 4.4 - 5.9 10e12/L    Hemoglobin 11.8 (L) 13.3 - 17.7 g/dL    Hematocrit 36.1 (L) 40.0 - 53.0 %    MCV 80 78 - 100 fl    MCH 26.3 (L) 26.5 - 33.0 pg    MCHC 32.7 31.5 - 36.5 g/dL    RDW 16.1 (H) 10.0 - 15.0 %    Platelet Count 301 150 - 450 10e9/L    Diff Method Automated Method     % Neutrophils 85.2 %    % Lymphocytes 5.0 %    % Monocytes 8.5 %     % Eosinophils 0.1 %    % Basophils 0.3 %    % Immature Granulocytes 0.9 %    Absolute Neutrophil 9.5 (H) 1.6 - 8.3 10e9/L    Absolute Lymphocytes 0.6 (L) 0.8 - 5.3 10e9/L    Absolute Monocytes 0.9 0.0 - 1.3 10e9/L    Absolute Eosinophils 0.0 0.0 - 0.7 10e9/L    Absolute Basophils 0.0 0.0 - 0.2 10e9/L    Abs Immature Granulocytes 0.1 0 - 0.4 10e9/L   INR   Result Value Ref Range    INR 2.21 (H) 0 - 1.3   Comprehensive metabolic panel   Result Value Ref Range    Sodium 135 134 - 144 mmol/L    Potassium 4.3 3.5 - 5.1 mmol/L    Chloride 97 (L) 98 - 107 mmol/L    Carbon Dioxide 30 21 - 31 mmol/L    Anion Gap 8 3 - 14 mmol/L    Glucose 166 (H) 70 - 105 mg/dL    Urea Nitrogen 28 (H) 7 - 25 mg/dL    Creatinine 1.48 (H) 0.70 - 1.30 mg/dL    GFR Estimate 46 (L) >60 mL/min/[1.73_m2]    GFR Estimate If Black 55 (L) >60 mL/min/[1.73_m2]    Calcium 9.1 8.6 - 10.3 mg/dL    Bilirubin Total 0.7 0.3 - 1.0 mg/dL    Albumin 3.4 (L) 3.5 - 5.7 g/dL    Protein Total 7.1 6.4 - 8.9 g/dL    Alkaline Phosphatase 105 (H) 34 - 104 U/L    ALT 11 7 - 52 U/L    AST 17 13 - 39 U/L   Lactic acid   Result Value Ref Range    Lactic Acid 0.8 0.5 - 2.2 mmol/L   Troponin I   Result Value Ref Range    Troponin I ES 0.076 (H) 0.000 - 0.034 ug/L   Blood gas venous and oxyhgb   Result Value Ref Range    Ph Venous 7.45 (H) 7.32 - 7.43 pH    PCO2 Venous 40 40 - 50 mm Hg    PO2 Venous 38 25 - 47 mm Hg    Bicarbonate Venous 27 21 - 28 mmol/L    FIO2 28     Oxyhemoglobin Venous 70 %   Nt probnp inpatient (BNP)   Result Value Ref Range    N-Terminal Pro BNP Inpatient 929 (H) 0 - 100 pg/mL   Group A Streptococcus PCR Throat Swab   Result Value Ref Range    Specimen Description Throat     Strep Group A PCR Not Detected NDET^Not Detected   XR Chest Port 1 View    Narrative    EXAM:   XR Chest, 1 View     EXAM DATE/TIME:   9/6/2019 4:31 AM     CLINICAL HISTORY:   84 years old, male; Shortness of breath; Additional info: Tachypnea and hypoxia   at nh this morning.      TECHNIQUE:   Imaging protocol: XR of the chest   Views: 1 view.     COMPARISON:   CR XR CHEST 2 VW 6/11/2019 6:06 PM     FINDINGS:   Lungs: Interstitial pulmonary edema. Left base opacity.  Pleural space: Small left pleural effusion.   Heart/Mediastinum: Cardiomegaly unchanged.   Bones/joints: Right shoulder post T6 again noted       Impression    IMPRESSION:   Cardiomegaly with congestive heart failure.    Left pleural effusion.    Left base atelectasis or mild pneumonia.     THIS DOCUMENT HAS BEEN ELECTRONICALLY SIGNED BY CECILE BEAUCHAMP MD   UA reflex to Microscopic and Culture   Result Value Ref Range    Color Urine Yellow     Appearance Urine Clear     Glucose Urine 100 (A) NEG^Negative mg/dL    Bilirubin Urine Small (A) NEG^Negative    Ketones Urine Negative NEG^Negative mg/dL    Specific Gravity Urine 1.025 1.000 - 1.030    Blood Urine Large (A) NEG^Negative    pH Urine 5.0 5.0 - 9.0 pH    Protein Albumin Urine 100 (A) NEG^Negative mg/dL    Urobilinogen Urine 0.2 0.2 - 1.0 EU/dL    Nitrite Urine Negative NEG^Negative    Leukocyte Esterase Urine Negative NEG^Negative    Source Midstream Urine    Urine Microscopic   Result Value Ref Range    WBC Urine 0 - 5 OTO5^0 - 5 /HPF    RBC Urine 10-25 (A) OTO2^O - 2 /HPF    Squamous Epithelial /LPF Urine Few FEW^Few /LPF    Bacteria Urine Many (A) NEG^Negative /HPF    Amorphous Crystals Few (A) NEG^Negative /HPF       Medications   sodium chloride 0.9% infusion ( Intravenous New Bag 9/6/19 0422)   sodium chloride 0.9% infusion (has no administration in time range)   acetaminophen (TYLENOL) tablet 650 mg (has no administration in time range)   ondansetron (ZOFRAN-ODT) ODT tab 4 mg (has no administration in time range)     Or   ondansetron (ZOFRAN) injection 4 mg (has no administration in time range)   prochlorperazine (COMPAZINE) injection 5 mg (has no administration in time range)     Or   prochlorperazine (COMPAZINE) tablet 5 mg (has no administration in time  range)     Or   prochlorperazine (COMPAZINE) Suppository 12.5 mg (has no administration in time range)   metoclopramide (REGLAN) tablet 5 mg (has no administration in time range)     Or   metoclopramide (REGLAN) injection 5 mg (has no administration in time range)   piperacillin-tazobactam (ZOSYN) intermittent infusion 4.5 g (has no administration in time range)   furosemide (LASIX) injection 20 mg (has no administration in time range)   insulin detemir (LEVEMIR PEN) injection 30 Units (has no administration in time range)   metoprolol succinate ER (TOPROL-XL) 24 hr tablet 50 mg (has no administration in time range)   acetaminophen (TYLENOL) tablet 650 mg (650 mg Oral Given 9/6/19 0423)   piperacillin-tazobactam (ZOSYN) intermittent infusion 4.5 g (4.5 g Intravenous New Bag 9/6/19 0529)   furosemide (LASIX) injection 40 mg (40 mg Intravenous Given 9/6/19 0529)     5:31 AM chest x-ray shows left lower lobe pneumonia and cardiomegaly with elevated BNP consistent with CHF.  Starting empiric Lasix and Zosyn.  Lactic acid level is reassuringly normal.  I discussed patient with Dr. Dinh, hospitalist accepting patient on admission.  I will complete admission transition orders.    Assessments & Plan (with Medical Decision Making)   84-year-old male [facility] resident who had change in status with increasing respiratory distress, hypoxia, tachypnea, and fever with decreased concentration this early morning.  He has left lower lobe pneumonitis and CHF exacerbation treated with Lasix and Zosyn which we will continue.  I will continue his morning metoprolol and decreased dose of his usual Levemir to 30 units as I am not sure he is eating normally at this time.  Further orders per hospitalist.    I have reviewed the nursing notes.    I have reviewed the findings, diagnosis, plan and need for follow up with the patient.       New Prescriptions    No medications on file       Final diagnoses:   Pneumonia of left  lower lobe due to infectious organism (H)   Congestive heart failure, unspecified HF chronicity, unspecified heart failure type (H)   Stage 4 chronic kidney disease (H)   Type 2 diabetes mellitus with hyperglycemia, with long-term current use of insulin (H)   Hypoglycemia due to insulin   Long-term (current) use of anticoagulants [Z79.01]   Chronic atrial fibrillation (H)       9/6/2019   [facility]     Pedro Roland MD  09/06/19 0606

## 2019-09-06 NOTE — PHARMACY-ANTICOAGULATION SERVICE
Pharmacy Consult- Initial Coumadin Assessment    Adiel Rosa Jr is a 84 year old male admitted on 9/6/2019 with <principal problem not specified>    Primary Indication(s) for Anticoagulation: afib    Goal INR:2-3    FYI, patient is followed by the Anticoagulation/Protime Clinic at: [facility] - INR drawn by [facility]    Patient Active Problem List   Diagnosis     Chronic atrial fibrillation (H)     Advanced care planning/counseling discussion     Hypercholesterolemia     Chronic renal disease, stage III     Benign hypertensive heart disease with heart failure (H)     Hypercalcemia     Chronic obstructive pulmonary disease, unspecified COPD type (H)     Erectile Dysfunction     Hypertrophy of prostate without urinary obstruction     Esophageal reflux     Acute myocardial infarction of other specified sites, episode of care unspecified     Type 2 diabetes mellitus with hyperglycemia, with long-term current use of insulin (H)     Cough     Hypertension, benign     Long-term (current) use of anticoagulants [Z79.01]     Hypoglycemia due to insulin     Osteomyelitis of foot, right, acute (H)     Health Care Home     Chronic osteomyelitis of right foot (H)     Stage 4 chronic kidney disease (H)     Pneumonia of left lower lobe due to infectious organism (H)       Patient previously anticoagulated on Coumadin at a dose of 38 mg/week; dosed as: 4mg on Monday and Friday and 6mg rest of week    Factors that may increase patient's sensitivity to warfarin (Coumadin) include: acute illness and starting antibiotics today        Recent Labs   Lab Test 09/06/19  0420 08/27/19 08/16/19  1308 07/30/19 07/02/19 03/14/19  1059  10/07/17  1756   HGB 11.8*  --  12.3*  --   --   --  13.5  --  13.2*   INR 2.21* 2.4*  --  2.0 2.1   < >  --    < >  --      --  355  --   --   --  291  --  287    < > = values in this interval not displayed.        Plan: Give normal 4mg today    Thank You for the Consult. Will  continue to follow.    Zeenat Torrez, [facility] ....................  9/6/2019   12:43 PM

## 2019-09-06 NOTE — PROGRESS NOTES
Patient has had very poor oral intake- only eating ice cream and chips, is drinking adequate amounts of fluid with encouragement.  Updated Dr. Mackey on diet and intake- received verbal order to liberate diet to regular.  Blood glucose levels stable and managed with insulin per sliding scale.  Nereyda Jameson RN on 9/6/2019 at 5:02 PM

## 2019-09-06 NOTE — PROGRESS NOTES
:    Called Sara at [facility] and provided discharge update. Anticipated discharge in a couple days. Per Sara, they can accept patient back on Sunday if he is ready to discharge. Sara reports that patient has Medical Assistance and has used [facility] before for transportation. Patient can use [facility] at discharge. If [facility] is unable to transport, Sara reports that [facility] can be called and staff can possibly transport.      will continue to follow.     JESUS Robles on 9/6/2019 at 12:03 PM

## 2019-09-06 NOTE — PHARMACY-ADMISSION MEDICATION HISTORY
Pharmacy -- Admission Medication Reconciliation    Prior to admission (PTA) medications were reviewed and the patient's PTA medication list was updated.    Sources Consulted: [facility] MAR, anticoagulation note 8/27/19    The reliability of this Medication Reconciliation is: Reliability: Reliable    The following significant changes were made:    Removed candesartan    Removed cipro    Removed duplicate cranberry    Updated donepezil to AM    Updated lasix to 20mg every day    Removed insulin NPH    Removed Humulin N    Removed Humulin R    Removed duplicate Magnesium    Removed duplicate MVI    Removed duplicate Novolog    Updated Doxylamine DM to PRN    Updated Norco instructions    **per anticoagulation note:  Patient takes 4mg Mon/Fri and 6 mg the rest of the week.  Last dose was 6mg on 9/4 at 8pm.    In addition, the patient's allergies were reviewed with the patient and updated as follows:   Allergies: Patient has no known allergies.    The pharmacist has reviewed with the patient that all personal medications should be removed from the building or locked in the belongings safe.  Patient shall only take medications ordered by the physician and administered by the nursing staff.       Medication barriers identified: none   Medication adherence concerns: none   Understanding of emergency medications: NA, at nursing home    Beti Denise RPH, 9/6/2019,  11:25 AM

## 2019-09-07 NOTE — PLAN OF CARE
Discharge Planner OT   Patient plan for discharge: pt did not state   Current status: Pt transferred from bed to recliner at bedside with min assist of 2 using FWW and completed shaving with set up initially and max assist to finish. Pt pleasant, able to follow simple directions and give somewhat accurate history of prior level of function at [facility]   Barriers to return to prior living situation: none   Recommendations for discharge: back to [facility] with home care PT   Rationale for recommendations: see above        Entered by: Tiffanie Sosa 09/07/2019 12:51 PM

## 2019-09-07 NOTE — PLAN OF CARE
Patient has been alert and pleasant this afternoon- making jokes.  Transferring with staff assist of 2 and gait belt- stand pivot- does require prompting on hand and foot placement.  Did not eat lunch- had ice cream cup- has been taking fluids- held noon novolog due to low oral intake.  Utilizing fidget blanket to occupy his hands. Had large bowel movement on commode- urine continues to be red tinged.  Nereyda Jameson RN on 9/7/2019 at 1:30 PM

## 2019-09-07 NOTE — PROGRESS NOTES
Pt urine color red/colin. Dr. Rosa notified. Verbal orders received to start NS 50ml/hr. New order for one time dose 40 meq of potassium. Will continue to monitor.

## 2019-09-07 NOTE — PHARMACY-ANTICOAGULATION SERVICE
Pharmacy Consult- Coumadin Follow-Up    Adiel Rosa Jr is a 84 year old male admitted on 9/6/2019 with <principal problem not specified>    Primary Indication(s) for Anticoagulation: A fib    Goal INR: 2.5 (2-3)    FYI, patient is followed by the Anticoagulation/Protime Clinic at: [facility]- INR drawn by [facility]    Patient Active Problem List   Diagnosis     Chronic atrial fibrillation (H)     Advanced care planning/counseling discussion     Hypercholesterolemia     Chronic renal disease, stage III     Benign hypertensive heart disease with heart failure (H)     Hypercalcemia     Chronic obstructive pulmonary disease, unspecified COPD type (H)     Erectile Dysfunction     Hypertrophy of prostate without urinary obstruction     Esophageal reflux     Acute myocardial infarction of other specified sites, episode of care unspecified     Type 2 diabetes mellitus with hyperglycemia, with long-term current use of insulin (H)     Cough     Hypertension, benign     Long-term (current) use of anticoagulants [Z79.01]     Hypoglycemia due to insulin     Osteomyelitis of foot, right, acute (H)     Health Care Home     Chronic osteomyelitis of right foot (H)     Stage 4 chronic kidney disease (H)     Pneumonia of left lower lobe due to infectious organism (H)       New factors that may increase patient's sensitivity to warfarin (Coumadin) include: antibiotics, poor oral intake as noted by nursing staff, acute illness    New factors that may decrease patient's sensitivity to warfarin (Coumadin) include: none noted    Dietary Intake: poor oral intake    Recent Labs   Lab Test 09/07/19  0545 09/06/19  0420 08/27/19 08/16/19  1308 07/30/19 03/14/19  1059   HGB 12.3* 11.8*  --  12.3*  --   --  13.5   INR 2.64* 2.21* 2.4*  --  2.0   < >  --     301  --  355  --   --  291    < > = values in this interval not displayed.        Recent Dosing:    Date INR Dose Given   09/06/19 2.21 4 mg       Plan: INR  therapeutic today.  However, given jump in INR overnight, new abx, and poor oral intake, will slightly decrease home dose and give Warfarin 5mg today x 1.  INR with am labs.    Thank You for the Consult. Will continue to follow.    Beti Denise RPH ....................  9/7/2019   8:22 AM

## 2019-09-07 NOTE — PROGRESS NOTES
[facility]    Hospitalist Progress Note      Assessment & Plan     Adiel Rosa Jr, admitted on 9/6,  is an 84 year old male who presents with SOB and fever and was found to have a LLL pneumonia.      1. LLL pneumonia- Very weak but no cough, SOB or phlegm. O2 prn, PT. Has been afebrile and WBC is normal now. Will recheck. Was started on IV Zosyn and IV Azithromycin. Changed regimen on 9/7 to   2. COPD- Minimal exacerbation at this time. Doesn't merit steroids. Nebs, O2. Abxs as above.   3. CKD 4- Cr is at baseline. Does fluctuate over time.   4. CAF- Rate controlled. Continue outpt regimen including Coumadin. Pharmacy helping manage.   5. BPH- Continue outpt regimen.   6. GERD- Continue PPI.   7. DM 2- Cut down dose of LA insulin and started sliding scale. BS at one point did go down to 46 but pt was asymptomatic and after some juice normalized very quickly. Otherwise, largely fine.   8. HTN- BP well controlled.   9. CAD- Outpt regimen to continue. EKG with no changes.   10. On Aricept- Will continue. 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   11. Deconditioning- At [facility], he mostly uses a wheelchair and can walk with a a walker at best 10-20 feet. Currently an assist of 2, so definitely weaker. Appetite has decreased as well. D/w Nsg and we're going to encourage him to eat. Changed to regular diet so that he does eat something. Encouarge oral intake Change is likely due to infection. We checked with his A/living facility who do have a Dinesh lift. Repeat labs today and if improving and pt able to eat, then plan on working on discharge planning. On fall precautions and being seen by PT/OT.     DVT Prophylaxis: Warfarin  Code Status: DNR/I. D/w pt on admission.     Disposition: Expected discharge in 1-2 days once doing well on oral meds and able to self-hydrate.     Tonya Samuelsokhar    Interval History   No specific complaints. Wanted to be left alone and sleep. D/w RN as well. The main issue  has been weakness and poor appetite. Otherwise, no concerns.     -Data reviewed today: I reviewed all new labs and imaging results over the last 24 hours.    Physical Exam   Temp: 96.1  F (35.6  C) Temp src: Tympanic BP: 104/41   Heart Rate: 86 Resp: 16 SpO2: 92 % O2 Device: None (Room air) Oxygen Delivery: 2 LPM  Vitals:    09/06/19 0644 09/07/19 0259 09/07/19 0600   Weight: 86.3 kg (190 lb 4.1 oz) 87.3 kg (192 lb 7.4 oz) 85.5 kg (188 lb 7.9 oz)     Vital Signs with Ranges  Temp:  [95.6  F (35.3  C)-98.4  F (36.9  C)] 96.1  F (35.6  C)  Heart Rate:  [54-96] 86  Resp:  [16-20] 16  BP: (104-141)/(41-68) 104/41  SpO2:  [92 %-96 %] 92 %  I/O last 3 completed shifts:  In: 1125 [P.O.:100; I.V.:1025]  Out: 925 [Urine:925]    Peripheral IV 09/06/19 Left Lower forearm (Active)   Site Assessment WDL 9/7/2019  9:00 AM   Line Status Infusing 9/7/2019  9:00 AM   Phlebitis Scale 0-->no symptoms 9/7/2019  9:00 AM   Infiltration Scale 0 9/7/2019  9:00 AM   Dressing Intervention New dressing  9/6/2019  8:27 PM   Number of days: 1       Wound 01/12/17 Right Foot Other (comment) deep wound to middle top of right foot and bottom of middle right foot. (Active)   Number of days: 968     Constitutional: In NAD but appears sleepy and lethargic.   Eyes: Unremarkable.   HEENT: Unremarkable.  Respiratory: Mildly tachypneic but no dyspnea. Not using accessory muscles of respiration. Dimished BS but no wheezing.   Cardiovascular: S1 + S2. Irregularly irregular rhythm.   GI: Soft, NT, ND, BS +  Skin: No acute rashes or lesions.   Musculoskeletal: No pedal edema.  Neurologic: Grossly non-focal.  Psychiatric: Stoic affect.    Medications     sodium chloride 50 mL/hr at 09/07/19 0314     Warfarin Therapy Reminder         aspirin  325 mg Oral Daily     atorvastatin  40 mg Oral At Bedtime     azithromycin  500 mg Oral Daily     digoxin  125 mcg Oral Daily     donepezil  5 mg Oral Daily     famotidine  20 mg Oral Daily     fluticasone-salmeterol  1  puff Inhalation Q12H     insulin aspart  1-7 Units Subcutaneous TID AC     insulin aspart  1-5 Units Subcutaneous At Bedtime     insulin detemir  30 Units Subcutaneous QAM AC     metoprolol succinate ER  50 mg Oral QAM     piperacillin-tazobactam  3.375 g Intravenous Q6H     warfarin  5 mg Oral ONCE at 18:00       Data   Recent Labs   Lab 09/07/19  0545 09/06/19  1206 09/06/19  0615 09/06/19  0420   WBC 9.6  --   --  11.1*   HGB 12.3*  --   --  11.8*   MCV 81  --   --  80     --   --  301   INR 2.64*  --   --  2.21*     --   --  135   POTASSIUM 3.1*  --   --  4.3   CHLORIDE 95*  --   --  97*   CO2 34*  --   --  30   BUN 29*  --   --  28*   CR 1.50*  --   --  1.48*   ANIONGAP 10  --   --  8   AZRA 8.9  --   --  9.1   GLC 51*  --   --  166*   ALBUMIN  --   --   --  3.4*   PROTTOTAL  --   --   --  7.1   BILITOTAL  --   --   --  0.7   ALKPHOS  --   --   --  105*   ALT  --   --   --  11   AST  --   --   --  17   TROPI  --  0.097* 0.091* 0.076*       No results found for this or any previous visit (from the past 24 hour(s)).

## 2019-09-07 NOTE — PLAN OF CARE
Patient is alert, oriented to self and situation, has forgetfulness.  Has not been using call light today- reminded to use.  Spoke to B[facility], [facility] with patient update- they do have therapy services available in house at [facility] and mechanical lifts available.  Patient ate cold cereal for breakfast, has been drinking juice and water.  Denies pain.  Lungs clear/diminished, breathing non-labored, maintaining oxygen saturation greater than 90% on room air, no accessory muscle use or retractions noted.  Sticky note to MD regarding potassium with no new orders at this time.  Urine still red tinged- denies difficulty voiding and continues to have incontinent episodes as well as use the urinal.   /41   Pulse 95   Temp 96.1  F (35.6  C) (Tympanic)   Resp 16   Wt 85.5 kg (188 lb 7.9 oz)   SpO2 92%   BMI 25.56 kg/m   Nereyda Jameson RN on 9/7/2019 at 10:56 AM

## 2019-09-07 NOTE — PROGRESS NOTES
Pt VSS. Afebrile. Pt denies any pain. LS clear, diminished in bases. O2 96% on RA. Pt removed IV. Dr. Taylor updated. Writer encouraged oral fluids. Pt is resting. Will continue to monitor.

## 2019-09-07 NOTE — PROGRESS NOTES
09/07/19 1200   Quick Adds   Type of Visit Initial Occupational Therapy Evaluation   Living Environment   Lives With facility resident   Living Arrangements assisted living   Home Accessibility no concerns   Self-Care   Usual Activity Tolerance moderate   Current Activity Tolerance moderate   Equipment Currently Used at Home wheelchair, manual   Functional Level   Ambulation 3-->assistive equipment and person   Transferring 3-->assistive equipment and person   Toileting 2-->assistive person   Bathing 2-->assistive person   Dressing 2-->assistive person   Cognition 2 - difficulty with organizing thoughts   Cognitive Status Examination   Orientation person;time   Level of Consciousness alert   Follows Commands (Cognition) WNL   Memory impaired   Attention Distractible during evaluation   Organization/Problem Solving Problem solving impaired   Executive Function Planning ability impaired;Impulsive;Initiation impaired   Pain Assessment   Patient Currently in Pain No   Mobility   Bed Mobility Bed mobility skill: Supine to sit   Bed Mobility Skill: Supine to Sit   Level of [place]: Supine/Sit minimum assist (75% patients effort)   Physical Assist/Nonphysical Assist: Supine/Sit 1 person assist   Transfer Skill: Bed to Chair/Chair to Bed   Level of [place]: Bed to Chair minimum assist (75% patients effort)   Physical Assist/Nonphysical Assist: Bed to Chair 2 persons   Assistive Device - Transfer Skill Bed to Chair Chair to Bed Rehab Eval rolling walker   Transfer Skill: Sit to Stand   Level of [place]: Sit/Stand stand-by assist   Physical Assist/Nonphysical Assist: Sit/Stand 2 persons   Grooming   Level of [place]: Grooming maximum assist (25% patients effort)   Physical Assist/Nonphysical Assist: Grooming 1 person assist   Clinical Impression   Criteria for Skilled Therapeutic Interventions Met yes, treatment indicated   OT Diagnosis decreased endurance for function    Influenced by the following  impairments cognition, decreased endurance    Assessment of Occupational Performance 1-3 Performance Deficits   Identified Performance Deficits mobility and self care    Clinical Decision Making (Complexity) Low complexity   Therapy Frequency Daily   Predicted Duration of Therapy Intervention (days/wks) 1-2 times/day    Anticipated Equipment Needs at Discharge   (has all necessary )   Anticipated Discharge Disposition Home with Home Therapy   Risks and Benefits of Treatment have been explained. Yes   Patient, Family & other staff in agreement with plan of care Yes   Total Evaluation Time   Total Evaluation Time (Minutes) 15

## 2019-09-07 NOTE — PLAN OF CARE
Patient pulled his IV out. New one placed and IV fluids restarted. Explained to patient why we needed to restart the Iv. IV cover with burn netting to prevent patient from pulling on it. Patient cooperative with cares. Using the urinal to void. Denies any discomfort. Vital signs stable.

## 2019-09-07 NOTE — PLAN OF CARE
Patient turned and repositioned. He voided in the urinal and was also incontinent times two. IV patent. Resp even, non-labored. Patient denies any pain.

## 2019-09-07 NOTE — PLAN OF CARE
Patient has rested quietly between cares. Resp even, non-labored. IV patent, covered by burn netting. Cooperative with cares.

## 2019-09-07 NOTE — PLAN OF CARE
Patient had blood glucose of 46 this morning.  Administered glucose gel x1 and orange juice x2.  Patient alert, answering questions per his baseline, no s/s of hypoglycemia.  Insulin not administered.  /41   Pulse 95   Temp 96.1  F (35.6  C) (Tympanic)   Resp 16   Wt 85.5 kg (188 lb 7.9 oz)   SpO2 92%   BMI 25.56 kg/m   blood sugar now 85.  Nereyda Jameson RN on 9/7/2019 at 8:23 AM

## 2019-09-08 NOTE — PROGRESS NOTES
Writer alerted by CNA of pt blood glucose 57, pt alert and asymptomatic. Writer gave PRN 15 g glucagon, orange juice and cookies, blood glucose at 15 min 78, now 136. MD Taylor aware, new orders with read back to discontinue Novolog and change Levemir to 20 units daily, will continue to monitor. Tony Molina RN on 9/8/2019 at 2:41 AM

## 2019-09-08 NOTE — PLAN OF CARE
Discharge Planner OT   Patient plan for discharge: Pt did not state due to Dementia   Current status: Pt awake, alert and able to follow simple directions, agreeable to attempt ambulation in room, moved supine to sit with min assist of 1-2 and ambulated from bed to recliner using FWW with Min assist/CGA of 2 for safety and balance, and cues for direction. Etc.   Barriers to return to prior living situation: none, has assist in place   Recommendations for discharge: back to [facility] with assist with all cares and mobility as previous   Rationale for recommendations: see above        Entered by: Tiffanie Sosa 09/08/2019 9:58 AM

## 2019-09-08 NOTE — PROGRESS NOTES
Notified MD Rosa of pt UA results, Telephone order to insert indwelling mckeon catheter, irrigate bladder, and stop coumadin. Pt is refusing catheter at this time will pass onto oncoming nurse and reevaluate after MD sees pt. Tony Molina RN on 9/8/2019 at 6:55 AM

## 2019-09-08 NOTE — PLAN OF CARE
Pt confused,A & O to self, afebrile, vital signs stable. Lung sounds diminished throughout, fine crackle noted in RLL, O2 91% on RA. Urine pale red, clots noted MD Rosa notified, new order for bladder scan and UA sample. Bladder scan 112 mls, awaiting urine for sample.Patient denies any pain, will continue to monitor. Tony Molina RN on 9/8/2019 at 2:54 AM

## 2019-09-08 NOTE — PHARMACY-CONSULT NOTE
Pharmacy- Renal Dose Adjustment    Patient Active Problem List   Diagnosis     Chronic atrial fibrillation (H)     Advanced care planning/counseling discussion     Hypercholesterolemia     Chronic renal disease, stage III     Benign hypertensive heart disease with heart failure (H)     Hypercalcemia     Chronic obstructive pulmonary disease, unspecified COPD type (H)     Erectile Dysfunction     Hypertrophy of prostate without urinary obstruction     Esophageal reflux     Acute myocardial infarction of other specified sites, episode of care unspecified     Type 2 diabetes mellitus with hyperglycemia, with long-term current use of insulin (H)     Cough     Hypertension, benign     Long-term (current) use of anticoagulants [Z79.01]     Hypoglycemia due to insulin     Osteomyelitis of foot, right, acute (H)     Health Care Home     Chronic osteomyelitis of right foot (H)     Stage 4 chronic kidney disease (H)     Pneumonia of left lower lobe due to infectious organism (H)        Relevant Labs:  Recent Labs   Lab Test 09/08/19  0521 09/07/19  0545   WBC 7.4 9.6   HGB 10.9* 12.3*    312        CrCl: 54 ml/min      Intake/Output Summary (Last 24 hours) at 9/8/2019 0941  Last data filed at 9/8/2019 0801  Gross per 24 hour   Intake 1783 ml   Output 1100 ml   Net 683 ml          Per Renal Dose Adjustment Protocol, will adjust:  Increase famotidine to 20mg Q12H.      Will continue to follow and make adjustments accordingly. Thank You.    Beti Denise [facility] ....................  9/8/2019   9:41 AM

## 2019-09-08 NOTE — PHARMACY-ANTICOAGULATION SERVICE
Pharmacy Consult- Coumadin Follow-Up    Adiel Rosa Jr is a 84 year old male admitted on 9/6/2019 with <principal problem not specified>    Primary Indication(s) for Anticoagulation: A fib    Goal INR: 2.5 (2-3)    FYI, patient is followed by the Anticoagulation/Protime Clinic at: [facility]    Patient Active Problem List   Diagnosis     Chronic atrial fibrillation (H)     Advanced care planning/counseling discussion     Hypercholesterolemia     Chronic renal disease, stage III     Benign hypertensive heart disease with heart failure (H)     Hypercalcemia     Chronic obstructive pulmonary disease, unspecified COPD type (H)     Erectile Dysfunction     Hypertrophy of prostate without urinary obstruction     Esophageal reflux     Acute myocardial infarction of other specified sites, episode of care unspecified     Type 2 diabetes mellitus with hyperglycemia, with long-term current use of insulin (H)     Cough     Hypertension, benign     Long-term (current) use of anticoagulants [Z79.01]     Hypoglycemia due to insulin     Osteomyelitis of foot, right, acute (H)     Health Care Home     Chronic osteomyelitis of right foot (H)     Stage 4 chronic kidney disease (H)     Pneumonia of left lower lobe due to infectious organism (H)       New factors that may increase patient's sensitivity to warfarin (Coumadin) include: antibiotics, poor oral intake, acute illness    New factors that may decrease patient's sensitivity to warfarin (Coumadin) include: none noted    Dietary Intake: poor    Recent Labs   Lab Test 09/08/19  0521 09/07/19  0545 09/06/19  0420 08/27/19 08/16/19  1308   HGB 10.9* 12.3* 11.8*  --  12.3*   INR 3.67* 2.64* 2.21* 2.4*  --     312 301  --  355        Recent Dosing:    Date INR Dose Given   09/06/19 2.21 4mg   09/07/19 2.64 5mg       Plan: INR supra therapeutic today, and blood was seen in urine.  Will HOLD warfarin x 1 today.  INR with am labs.    Thank You for the Consult. Will  continue to follow.    Beti Denise [facility] ....................  9/8/2019   9:37 AM

## 2019-09-08 NOTE — PROGRESS NOTES
09/08/19 1000   Quick Adds   Type of Visit Initial PT Evaluation   Living Environment   Lives With facility resident   Living Arrangements assisted living   Functional Level Prior   Ambulation 3-->assistive equipment and person   Transferring 3-->assistive equipment and person   Cognition 2 - difficulty with organizing thoughts   Transfer Skill:  Sit to Stand   Physical Assist/Nonphysical Assist: Sit/Stand set-up required;supervision;verbal cues;nonverbal cues (demo/gestures);2 persons   Weightbearing Restrictions: Sit/Stand full weight-bearing   Assistive Device for Transfer: Sit/Stand rolling walker   Transfer Safety Analysis Sit to Stand   Impairments Contributing to Impaired Transfers: Sit to Stand decreased strength   Transfer Skill: Stand to Sit   Level of [place]: Stand/Sit minimum assist (75% patients effort)   Physical Assist/Nonphysical Assist: Stand/Sit 2 persons   Weight-Bearing Restrictions: Stand/Sit full weight-bearing   Assistive Device: Stand to Sit rolling walker   Transfer Safety Analysis Stand To Sit   Transfer Safety Concerns Noted: Stand to Sit losing balance backward;decreased weight-shifting ability   Impairments Contributing to Impaired Transfers: Stand to Sit decreased strength;impaired balance;impaired coordination   Gait Skills   Level of [place]: Gait minimum assist (75% patients effort)   Physical Assist/Nonphysical Assist: Gait 2 persons   Weight-Bearing Restrictions: Gait full weight-bearing   Assistive Device for Transfer: Gait rolling walker   Gait Distance 25 feet   Gait Analysis   Impairments Contributing to Gait Deviations impaired balance;impaired coordination;decreased flexibility;decreased strength   General Therapy Interventions   Planned Therapy Interventions balance training;gait training;strengthening;transfer training;bed mobility training   Clinical Impression   Criteria for Skilled Therapeutic Intervention yes, treatment indicated   PT Diagnosis Generalized  decontioning and decrease strength, impaired balance   Functional limitations due to impairments decrease strenth and balance   Clinical Presentation Stable/Uncomplicated   Clinical Decision Making (Complexity) Low complexity   Therapy Frequency Daily   Predicted Duration of Therapy Intervention (days/wks) LOS   Anticipated Equipment Needs at Discharge front wheeled walker   Anticipated Discharge Disposition Long Term Care Facility   Risk & Benefits of therapy have been explained Yes   Patient, Family & other staff in agreement with plan of care Yes   Total Evaluation Time   Total Evaluation Time (Minutes) 15

## 2019-09-08 NOTE — PLAN OF CARE
Pt is lethargic/sleeping. Open eyes to writers voice, able to follow commands. Pt is disorientated x3, alert to self. VSS, afebrile. Fine crackles noted in RLL. O2 91-92 % on RA. New verbal order for IS, nurse to instruct and encourage use. Pt tolerated breakfast well. Writer encouraged PO intake. Pt resting comfortably. Will continue to monitor.

## 2019-09-08 NOTE — PROGRESS NOTES
[facility] called to see if resident was going to be discharged back to them today.  Per MD noted appears to be 1-2 days until discharge so relayed that information.  Robbie Fitzgerald RN on 9/8/2019 at 2:48 PM

## 2019-09-08 NOTE — PROGRESS NOTES
[facility]    Hospitalist Progress Note      Assessment & Plan     Adiel Rosa Jr, admitted on 9/6,  is an 84 year old male who presents with SOB and fever and was found to have a LLL pneumonia.      1. LLL pneumonia- Very weak but no cough, SOB or phlegm. O2 prn, PT. Has been afebrile and WBC is normal now. Will recheck. Was started on IV Zosyn and IV Azithromycin. Changed regimen on 9/7 to Augmentin and Azithromycin PO. Rxing as HCAP.   2. COPD- Minimal exacerbation has resolved. Doesn't merit steroids. Nebs, O2. Abxs as above.   3. CKD 4- Cr is at baseline. Does fluctuate over time. Cr had gone up to 1.5 but down to 1.24 today. Follow.   4. CAF- Rate controlled. Continue outpt regimen including Coumadin. Pharmacy helping manage.   5. BPH- Continue outpt regimen.   6. GERD- Continue PPI.   7. DM 2- Cut down dose of LA insulin and started sliding scale. BS at one point did go down to 46 but pt was asymptomatic and after some juice normalized very quickly. Otherwise, largely fine. As a result LA insulin dose decreased further. His BS have started to climb now as he's started to eat and drink more.   8. HTN- BP well controlled.   9. CAD- Outpt regimen to continue. EKG with no changes.   10. On Aricept- Will continue. 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   11. Deconditioning- At [facility], he mostly uses a wheelchair and can walk with a a walker at best 10-20 feet. Currently an assist of 2, so definitely weaker. Appetite has decreased as well. D/w Nsg and we're going to encourage him to eat. Changed to regular diet so that he does eat something. Encouarge oral intake Change is likely due to infection. We checked with his A/living facility who do have a Dinesh lift. Repeat labs today and if improving and pt able to eat, then plan on working on discharge planning. On fall precautions and being seen by PT/OT. His appetite is improving gradually.   12. ?hematuria- I was notified early AM about  the night RN noticing colin urine with possible clots (he has a supraRxic INR). On his next urination, his RN thought is was more colin/brown. His PVR is 20cc and he has no dysuria. Due to the elevated INR, will hold his Coumadin and simply monitor his urine. His UA does show >100RBCs. There is some question as to whether or not this is a chronic condition so we'll be checking with his facility as well. Monitor for now. Hb is 10.9. Will recheck in AM. No need for a Grady at this time.   He keeps pulling out his IV and so we've stopped IVFs. Follow Cr in AM. Clinically, appears euvolemic. I don't have a good explanation for his hematuria, but there are no signs of a UTI and I have no good reason for any intervention at this time.     DVT Prophylaxis: Warfarin  Code Status: DNR/I. D/w pt on admission.     Disposition: Expected discharge in 1-2 days once doing well on oral meds and able to self-hydrate and issue of urination as noted above is resolved.     Tonya Rosa    Interval History   No specific complaints. Says he feels fine.     -Data reviewed today: I reviewed all new labs and imaging results over the last 24 hours.    Physical Exam   Temp: 98.9  F (37.2  C) Temp src: Tympanic BP: 132/64   Heart Rate: 69 Resp: 16 SpO2: 92 % O2 Device: None (Room air)    Vitals:    09/07/19 0259 09/07/19 0600 09/08/19 0143   Weight: 87.3 kg (192 lb 7.4 oz) 85.5 kg (188 lb 7.9 oz) 84.2 kg (185 lb 10 oz)     Vital Signs with Ranges  Temp:  [97.5  F (36.4  C)-98.9  F (37.2  C)] 98.9  F (37.2  C)  Heart Rate:  [68-79] 69  Resp:  [12-20] 16  BP: (101-133)/(46-67) 132/64  SpO2:  [91 %-96 %] 92 %  I/O last 3 completed shifts:  In: 1563 [P.O.:560; I.V.:1003]  Out: 1100 [Urine:1100]    Wound 01/12/17 Right Foot Other (comment) deep wound to middle top of right foot and bottom of middle right foot. (Active)   Number of days: 969     Constitutional: In NAD but appears sleepy and lethargic.   Eyes: Unremarkable.   HEENT:  Unremarkable.  Respiratory: Mildly tachypneic but no dyspnea. Not using accessory muscles of respiration. Dimished BS but no wheezing. Mild bibasilar coarse rhonci at bases heard.   Cardiovascular: S1 + S2. Irregularly irregular rhythm.   GI: Soft, NT, ND, BS +  Skin: No acute rashes or lesions.   Musculoskeletal: No pedal edema.  Neurologic: Grossly non-focal.  Psychiatric: Stoic affect.    Medications     sodium chloride Stopped (09/08/19 1151)     Warfarin Therapy Reminder         amoxicillin-clavulanate  1 tablet Oral Q12H     aspirin  325 mg Oral Daily     atorvastatin  40 mg Oral At Bedtime     azithromycin  500 mg Oral Daily     digoxin  125 mcg Oral Daily     donepezil  5 mg Oral Daily     famotidine  20 mg Oral BID     fluticasone-salmeterol  1 puff Inhalation Q12H     insulin aspart  1-7 Units Subcutaneous TID AC     insulin aspart  1-5 Units Subcutaneous At Bedtime     insulin detemir  20 Units Subcutaneous QAM AC     metoprolol succinate ER  50 mg Oral QAM     warfarin-No DOSE today  1 each Does not apply no dose today (warfarin)       Data   Recent Labs   Lab 09/08/19  0521 09/07/19  0545 09/06/19  1206 09/06/19  0615 09/06/19  0420   WBC 7.4 9.6  --   --  11.1*   HGB 10.9* 12.3*  --   --  11.8*   MCV 80 81  --   --  80    312  --   --  301   INR 3.67* 2.64*  --   --  2.21*    139  --   --  135   POTASSIUM 3.7 3.1*  --   --  4.3   CHLORIDE 98 95*  --   --  97*   CO2 29 34*  --   --  30   BUN 23 29*  --   --  28*   CR 1.26 1.50*  --   --  1.48*   ANIONGAP 9 10  --   --  8   AZRA 8.5* 8.9  --   --  9.1   * 51*  --   --  166*   ALBUMIN  --   --   --   --  3.4*   PROTTOTAL  --   --   --   --  7.1   BILITOTAL  --   --   --   --  0.7   ALKPHOS  --   --   --   --  105*   ALT  --   --   --   --  11   AST  --   --   --   --  17   TROPI  --   --  0.097* 0.091* 0.076*       No results found for this or any previous visit (from the past 24 hour(s)).

## 2019-09-08 NOTE — PLAN OF CARE
Discharge Planner PT   Patient plan for discharge: Assisted Living : [facility]  Current status: Pt needs minimal assist of 2 for bed transfers ( supine to sit), and sit <> stand, and ambulation. Pt tolerated 25 feet in room with minimal assist of 2.  Barriers to return to prior living situation: Fatigue, decreased generalized strength, endurance, balance, and stamina.   Recommendations for discharge: Home Therapy PT  Rationale for recommendations: Weakness, deconditioning, decrease stamina and balance       Entered by: Lin Land 09/08/2019 10:35 AM

## 2019-09-09 NOTE — PROGRESS NOTES
[facility]    Hospitalist Progress Note      Assessment & Plan   Adiel Rosa Jr is a 84 year old male who was admitted on 9/6/2019.     Principal Problem:    Pneumonia of left lower lobe due to infectious organism (H)    Assessment: present on admission, improving.     Plan: continue augmentin and azithromycin     Active Problems:    Chronic atrial fibrillation (H)    Assessment: rate controlled. On warfarin    Plan: continue      Chronic obstructive pulmonary disease, unspecified COPD type (H)    Assessment: mild exacerbation    Plan: nebs      Type 2 diabetes mellitus with hyperglycemia, with long-term current use of insulin (H)    Assessment: blood glucose uncontrolled, but recent changes, so need to monitor today    Plan: continue levemir and novolog at current dose      Stage 4 chronic kidney disease (H)    Assessment: creatinine stable        DVT Prophylaxis: Warfarin  Code Status: Prior    James Dunbar    Interval History   Feels OK. Complains of cough     -Data reviewed today: I reviewed all new labs and imaging results over the last 24 hours. I personally reviewed no images or EKG's today.    Physical Exam   Temp: 98.4  F (36.9  C) Temp src: Tympanic BP: (!) 140/58   Heart Rate: 73 Resp: 18 SpO2: 98 % O2 Device: None (Room air)    Vitals:    09/07/19 0600 09/08/19 0143 09/09/19 0434   Weight: 85.5 kg (188 lb 7.9 oz) 84.2 kg (185 lb 10 oz) 84.2 kg (185 lb 10 oz)     Vital Signs with Ranges  Temp:  [96.6  F (35.9  C)-98.4  F (36.9  C)] 98.4  F (36.9  C)  Heart Rate:  [63-76] 73  Resp:  [18-20] 18  BP: (115-143)/(45-80) 140/58  SpO2:  [92 %-98 %] 98 %  I/O last 3 completed shifts:  In: 1537 [P.O.:1240; I.V.:297]  Out: 1275 [Urine:1275]    GENERAL: Comfortable, no apparent distress.  CARDIOVASCULAR: regular rate and rhythm, no murmur. No lower extremity edema   RESPIRATORY: Clear to auscultation bilaterally, no wheezes or crackles.  GI: non-tender, non-distended, normal bowel  sounds.   SKIN: warm periphery, no rashes      Medications     Warfarin Therapy Reminder         amoxicillin-clavulanate  1 tablet Oral Q12H     aspirin  325 mg Oral Daily     atorvastatin  40 mg Oral At Bedtime     azithromycin  500 mg Oral Daily     digoxin  125 mcg Oral Daily     donepezil  5 mg Oral Daily     famotidine  20 mg Oral BID     fluticasone-salmeterol  1 puff Inhalation Q12H     insulin aspart  1-7 Units Subcutaneous TID AC     insulin aspart  1-5 Units Subcutaneous At Bedtime     insulin detemir  20 Units Subcutaneous QAM AC     metoprolol succinate ER  50 mg Oral QAM     warfarin  2 mg Oral ONCE at 18:00       Data   Recent Labs   Lab 09/09/19  0538 09/08/19  0521 09/07/19  0545 09/06/19  1206 09/06/19  0615 09/06/19  0420   WBC 5.7 7.4 9.6  --   --  11.1*   HGB 11.2* 10.9* 12.3*  --   --  11.8*   MCV 82 80 81  --   --  80    327 312  --   --  301   INR 3.09* 3.67* 2.64*  --   --  2.21*    136 139  --   --  135   POTASSIUM 4.1 3.7 3.1*  --   --  4.3   CHLORIDE 100 98 95*  --   --  97*   CO2 32* 29 34*  --   --  30   BUN 17 23 29*  --   --  28*   CR 1.26 1.26 1.50*  --   --  1.48*   ANIONGAP 6 9 10  --   --  8   AZRA 9.0 8.5* 8.9  --   --  9.1   * 182* 51*  --   --  166*   ALBUMIN  --   --   --   --   --  3.4*   PROTTOTAL  --   --   --   --   --  7.1   BILITOTAL  --   --   --   --   --  0.7   ALKPHOS  --   --   --   --   --  105*   ALT  --   --   --   --   --  11   AST  --   --   --   --   --  17   TROPI  --   --   --  0.097* 0.091* 0.076*

## 2019-09-09 NOTE — PLAN OF CARE
"Patient confused,A & O to self, less agitated than previous, states \" I just want to go back to [facility] where the memories of my wife and dog are at.\". Pt afebrile, vital signs stable.Lung sounds diminished, fine crackles heard in RLL & LLL, incentive spirometer and TCD encouraged. Patient incontinent of urine all shift, colin in color,PVBS 0 mls. Pt denies any pain, will continue to monitor. Tony Molina RN on 9/9/2019 at 4:52 AM   "

## 2019-09-09 NOTE — PLAN OF CARE
Discharge Planner SLP   Patient plan for discharge: Pt to return to assisted living.   Current status: Regular textures with nectar thick liquids. Pt to discontinue meals if fatigued or demonstrates decreased alertness. Pt to remain upright during and for 30-minutes following all meals to reduce risk of aspiration. Pt recommended for single sips and bites only. Pt to follow-up with outpatient speech therapy if pt continues to demonstrate overt S/sx of aspiration on food and drink.   Barriers to return to prior living situation: Pt demonstrating fatigue throughout the session. Pt should be monitored for fatigue during PO intake.   Recommendations for discharge: Pt recommended to be on regular diet with nectar thick liquids. Further recommendations include: remain upright during all meals and 30-minutes following all PO intake, single sips only, avoid PO intake if pt fatigued and/or not alert. Pt to follow-up with outpatient speech therapy if pt continues to demonstrate overt S/sx of aspiration on food and drink.   Rationale for recommendations: Results of clinical bedside swallow evaluation on 9/9/19.       Entered by: Michelle Bonner 09/09/2019 5:08 PM

## 2019-09-09 NOTE — PROGRESS NOTES
Writer contacted [facility] to address the blood in urine per MD. Staff member Sara, reported that blood has been noticed once in a while in urine, and have brought pt to clinic before to address this issue. The staff member is unsure of the outcome as the RN is out on vacation this week. Tony Molina RN on 9/8/2019 at 7:36 PM

## 2019-09-09 NOTE — PHARMACY-ANTICOAGULATION SERVICE
Pharmacy Consult- Coumadin Follow-Up    Adiel Rosa Jr is a 84 year old male admitted on 9/6/2019 with <principal problem not specified>    Primary Indication(s) for Anticoagulation: A fib    Goal INR: 2.5 (2-3)    FYI, patient is followed by the Anticoagulation/Protime Clinic at: [facility]    Patient Active Problem List   Diagnosis     Chronic atrial fibrillation (H)     Advanced care planning/counseling discussion     Hypercholesterolemia     Chronic renal disease, stage III     Benign hypertensive heart disease with heart failure (H)     Hypercalcemia     Chronic obstructive pulmonary disease, unspecified COPD type (H)     Erectile Dysfunction     Hypertrophy of prostate without urinary obstruction     Esophageal reflux     Acute myocardial infarction of other specified sites, episode of care unspecified     Type 2 diabetes mellitus with hyperglycemia, with long-term current use of insulin (H)     Cough     Hypertension, benign     Long-term (current) use of anticoagulants [Z79.01]     Hypoglycemia due to insulin     Osteomyelitis of foot, right, acute (H)     Health Care Home     Chronic osteomyelitis of right foot (H)     Stage 4 chronic kidney disease (H)     Pneumonia of left lower lobe due to infectious organism (H)       New factors that may increase patient's sensitivity to warfarin (Coumadin) include: antibiotics, acute illness    New factors that may decrease patient's sensitivity to warfarin (Coumadin) include: increasing oral intake        Recent Labs   Lab Test 09/09/19  0538 09/08/19  0521 09/07/19  0545 09/06/19  0420   HGB 11.2* 10.9* 12.3* 11.8*   INR 3.09* 3.67* 2.64* 2.21*    327 312 301        Recent Dosing:    Date INR Dose Given   09/06/19 2.21 4mg   09/07/19 2.64 5mg   09/08/19 3.67 HELD       Plan: INR is trending down, slightly supra therapeutic. Will be conservative and give half of his home dose today.  Administer Warfarin 2mg x 1.  INR with am labs.    Thank You for  the Consult. Will continue to follow.    Beti Denise [facility] ....................  9/9/2019   8:54 AM

## 2019-09-09 NOTE — PROGRESS NOTES
Pt calm and cooperative with writer at beginning of shift then became increasingly agitated and uncooperative with direction wanting to leave, pt started walking out of room. Writer was able to talk patient into going to bed and gave PRN Seroquel, see MAR. Patient now cooperative, sleeping intermittently, bed alarm on, call light in reach, will continue to monitor. Tony Molina RN on 9/8/2019 at 11:22 PM

## 2019-09-09 NOTE — PROGRESS NOTES
:    Called Sofia at [facility] to discuss patient's baseline, per request of MD. Sofia reports that patient is independent at their facility and does not need assistance for transfers, ambulating, etc.     Provided update to MD. Anticipated discharge tomorrow.     Met with patient and offered home care services. Patient reports that he receives PT everyday at his facility. Patient declined home care.     Called Suzan at [facility]. Provided update that patient is anticipated to discharge tomorrow. Per Suzan, [facility] does have an exercise program that patient attends, that is similar to PT. [facility] would be open to patient having home care services, if needed and if patient agrees.      will continue to follow.     JESUS Robles on 9/9/2019 at 11:06 AM

## 2019-09-09 NOTE — PLAN OF CARE
Discharge Planner PT   Patient approached by PT/OT today, however, patient did not wish to participate. PT/OT to remain available to assist with patient mobility and ADL performance as needed.        Entered by: Jacinto Connor 09/09/2019 2:54 PM

## 2019-09-09 NOTE — PROGRESS NOTES
09/09/19 1300   General Information   Onset Date 09/06/19   Start of Care Date 09/09/19   Referring Physician Dr. Dunbar   Patient/Family Goals Statement Pt goals to tolerate safe diet level without overt signs or symptoms of aspiration.   Swallowing Evaluation Bedside swallow evaluation   Behaviorial Observations Lethargic;Other (Comment)  (Pt demonstrating mild confusion throughout evaluation. )   Mode of current nutrition Oral diet   Type of oral diet Regular;Nectar - thick liquid   Respiratory Status Room air   Clinical Swallow Evaluation   Oral Musculature generally intact   Structural Abnormalities none present   Mucosal Quality good   Mandibular Strength and Mobility other (see comments)  (decreased mastication strength)   Oral Labial Strength and Mobility WFL   Lingual Strength and Mobility WFL   Velar Elevation intact   Buccal Strength and Mobility intact   Laryngeal Function Swallow;Dry swallow palpated;Voicing initiated;Cough;Throat clear   Oral Musculature Comments Oral musculature intact. Pt observed to produce lingual sweep to clear residue from regular textures.    Additional Documentation Yes   Swallow Eval   Feeding Assistance minimal assistance required   Clinical Swallow Eval: Thin Liquid Texture Trial   Mode of Presentation, Thin Liquids cup;self-fed   Volume of Liquid or Food Presented 1 oz   Oral Phase of Swallow WFL   Pharyngeal Phase of Swallow coughing/choking   Successful Strategies Trialed During Procedure other (see comments)  (repeat swallow)   Diagnostic Statement Pt demonstrated overt S/sx of aspiration as characterized by productive cough on trial of single sip of thin water. Pt demonstrated adequate hyolaryngeal excursion; however, demonstrated decreased coordination.     Clinical Swallow Eval: Nectar Thick Liquid Texture Trial   Mode of Presentation, Nectar cup;self-fed   Volume of Nectar Presented 2 oz   Oral Phase, [place] Poor AP movement;WFL;other (see comments)  (Decreased  bolus transport time. )   Pharyngeal Phase, Nectar throat clearing;other (see comments)  (mild throat clear x1 of 3 trials)   Diagnostic Statement Pt trialed with single sips via cup. Pt demonstrated mild throat clear x1 of 3 trials. Pt also presented with decreased bolus transport time and decreased hyolaryngeal coordination; however, hyolaryngeal excursion WFL.    Clinical Swallow Eval: Puree Solid Texture Trial   Mode of Presentation, Puree spoon;self-fed   Volume of Puree Presented 0.5 oz   Oral Phase, Puree WFL   Pharyngeal Phase, Puree intact   Diagnostic Statement No overt S/sx of aspiration at this time. Pt demonstrates WFL oral phase, as well as pharyngeal swallow timing is WFL.    Clinical Swallow Eval: Semisolid Texture Trial   Mode of Presentation, Semisolid fed by clinician;self-fed;other (see comments)  (via fork)   Volume of Semisolid Food Presented 1 oz   Oral Phase, Semisolid other (see comments)  (increased mastication time)   Pharyngeal Phase, Semisolid throat clearing;other (see comments)  (mild throat clear x1; self reports tickle sensation. )   Successful Strategies Trialed During Procedure, Semisolid   (swallow-cough-swallow trialed. )   Diagnostic Statement Pt was presented trails of semi-solid via fork. Pt presents with prolonged mastication time and mild throat clear x1.    Clinical Swallow Eval: Solid Food Texture Trial   Mode of Presentation, Solid self-fed   Volume of Solid Food Presented 1 oz   Oral Phase, Solid Residue in oral cavity   Oral Residue, Solid mid posterior tongue;right anterior lateral sulci;left anterior lateral sulci   Pharyngeal Phase, Solid intact   Diagnostic Statement Pt was presented trial of regular textures. Pt did not demonstrate overt S/sx of aspiration at this time; however, he demosntrated increased mastication time across trials.    General Therapy Interventions   Intervention Comments Pt to follow-up with OP speech therapy with changes to swallow or  continued difficulty with NDD3 and regular textured foods.    Swallow Eval: Clinical Impressions   Skilled Criteria for Therapy Intervention Other (see comments)  (Pt discharging. Recommended to follwo-up with OP ST.)   Functional Assessment Scale (FAS) 5   Dysphagia Outcome Severity Scale (LUISITO) Level 5 - LUISITO   Treatment Diagnosis Mild Oropharyngeal Dysphagia   Diet texture recommendations Regular diet;Nectar thick liquids   Recommended Feeding/Eating Techniques maintain upright posture during/after eating for 30 mins;other (see comments)  (Discontinue PO intake if fatigue or decreased alertness. )   Anticipated Discharge Disposition other (see comments)  (Assisted living)   Risks and Benefits of Treatment have been explained. Yes   Patient, family and/or staff in agreement with Plan of Care Yes   Clinical Impression Comments Adiel is an 84-year old male who was admitted for pneumonia in his left lower lung. Pt was trialed with thin and nectar thick liquids, as well as puree, semi-solid, and regular textures. Pt presents with mild oropharyngeal dysphagia, as characterized by productive cough on thin liquids, mild throat clear on nectar thick liquids on 1/3 trials, mild throat clear on semi-solid foods on   trials, increased mastication time, and oral residue on regular textures. Dysphagia may be impacted by decreased coordination and fatigue. Pt demonstrated fatigue throughout evaluation and may have increased risk of aspiration with fatigued. Pt recommended to continue regular textures; however, recommended for nectar thick liquids. Further recommendations include: remain upright during all meals and 30-minutes following all PO intake, single sips only, avoid PO intake if pt fatigued and/or not alert.    Total Evaluation Time   Total Evaluation Time (Minutes) 20

## 2019-09-09 NOTE — PLAN OF CARE
Problem: Respiratory Compromise (Pneumonia)  Goal: Effective Oxygenation and Ventilation  Intervention: Optimize Oxygenation and Ventilation  Flowsheets (Taken 9/9/2019 0215)  Airway/Ventilation Management: calming measures promoted  Head of Bed (HOB): HOB at 20 degrees  Note:   Oxygen level 92-95% on RA, dry cough noted. Writer encouraged pt to use incentive spirometer, pt used x 3, 1250 mls.

## 2019-09-09 NOTE — PROGRESS NOTES
09/09/19 1300   General Information   Onset Date 09/06/19   Start of Care Date 09/09/19   Referring Physician Dr. Dnubar   Patient Profile Review/OT: Additional Occupational Profile Info See Profile for full history and prior level of function   Patient/Family Goals Statement Pt goals to tolerate safe diet level without overt signs or symptoms of aspiration.   Swallowing Evaluation Bedside swallow evaluation   Behaviorial Observations Lethargic;Other (Comment)  (Pt demonstrating mild confusion throughout evaluation. )   Mode of current nutrition Oral diet   Type of oral diet Regular;Nectar - thick liquid   Respiratory Status Room air   Comments Pt participated in evaluation due to informing physician of coughing on food and liquids. Pt informed therapist of coughing on regular textures; however, pt did not demonstrate overt S/sx on regular textures during evaluation. Pt demonstrated productive cough on thin liquids. Pt demonstrated mild throat clear on nectar thick liquids x1/3 and semi-solids x1/2; however, productive cough was not observed. Pt recommended to follow-up with outpatient speech therapy. Pt to remain upright during PO intake and 30-minutes following intake. Pt recommended for single sips only.    Clinical Swallow Evaluation   Oral Musculature generally intact   Structural Abnormalities none present   Mucosal Quality good   Mandibular Strength and Mobility other (see comments)  (decreased mastication strength)   Oral Labial Strength and Mobility WFL   Lingual Strength and Mobility WFL   Velar Elevation intact   Buccal Strength and Mobility intact   Laryngeal Function Swallow;Dry swallow palpated;Voicing initiated;Cough;Throat clear   Oral Musculature Comments Oral musculature intact. Pt observed to produce lingual sweep to clear residue from regular textures.    Additional Documentation Yes   Swallow Eval   Feeding Assistance minimal assistance required   Clinical Swallow Eval: Thin Liquid Texture  Trial   Mode of Presentation, Thin Liquids cup;self-fed   Volume of Liquid or Food Presented 1 oz   Oral Phase of Swallow WFL   Pharyngeal Phase of Swallow coughing/choking   Successful Strategies Trialed During Procedure other (see comments)  (repeat swallow)   Diagnostic Statement Pt demonstrated overt S/sx of aspiration as characterized by productive cough on trial of single sip of thin water. Pt demonstrated adequate hyolaryngeal excursion; however, demonstrated decreased coordination.     Clinical Swallow Eval: Nectar Thick Liquid Texture Trial   Mode of Presentation, Nectar cup;self-fed   Volume of Nectar Presented 2 oz   Oral Phase, [place] Poor AP movement;WFL;other (see comments)  (Decreased bolus transport time. )   Pharyngeal Phase, Nectar throat clearing;other (see comments)  (mild throat clear x1 of 3 trials)   Diagnostic Statement Pt trialed with single sips via cup. Pt demonstrated mild throat clear x1 of 3 trials. Pt also presented with decreased bolus transport time and decreased hyolaryngeal coordination; however, hyolaryngeal excursion WFL.    Clinical Swallow Eval: Puree Solid Texture Trial   Mode of Presentation, Puree spoon;self-fed   Volume of Puree Presented 0.5 oz   Oral Phase, Puree WFL   Pharyngeal Phase, Puree intact   Diagnostic Statement No overt S/sx of aspiration at this time. Pt demonstrates WFL oral phase, as well as pharyngeal swallow timing is WFL.    Clinical Swallow Eval: Semisolid Texture Trial   Mode of Presentation, Semisolid fed by clinician;self-fed;other (see comments)  (via fork)   Volume of Semisolid Food Presented 1 oz   Oral Phase, Semisolid other (see comments)  (increased mastication time)   Pharyngeal Phase, Semisolid throat clearing;other (see comments)  (mild throat clear x1; self reports tickle sensation. )   Successful Strategies Trialed During Procedure, Semisolid   (swallow-cough-swallow trialed. )   Diagnostic Statement Pt was presented trails of semi-solid  via fork. Pt presents with prolonged mastication time and mild throat clear x1.    Clinical Swallow Eval: Solid Food Texture Trial   Mode of Presentation, Solid self-fed   Volume of Solid Food Presented 1 oz   Oral Phase, Solid Residue in oral cavity   Oral Residue, Solid mid posterior tongue;right anterior lateral sulci;left anterior lateral sulci   Pharyngeal Phase, Solid intact   Diagnostic Statement Pt was presented trial of regular textures. Pt did not demonstrate overt S/sx of aspiration at this time; however, he demosntrated increased mastication time across trials.    General Therapy Interventions   Intervention Comments Pt to follow-up with OP speech therapy with changes to swallow or continued difficulty with NDD3 and regular textured foods.    Swallow Eval: Clinical Impressions   Skilled Criteria for Therapy Intervention Other (see comments)  (Pt discharging. Recommended to follwo-up with OP ST.)   Functional Assessment Scale (FAS) 5   Dysphagia Outcome Severity Scale (LUISITO) Level 5 - LUISITO   Treatment Diagnosis Mild Oropharyngeal Dysphagia   Diet texture recommendations Regular diet;Nectar thick liquids   Recommended Feeding/Eating Techniques maintain upright posture during/after eating for 30 mins;other (see comments)  (Discontinue PO intake if fatigue or decreased alertness. )   Anticipated Discharge Disposition other (see comments)  (Assisted living)   Risks and Benefits of Treatment have been explained. Yes   Patient, family and/or staff in agreement with Plan of Care Yes   Clinical Impression Comments Adiel is an 84-year old male who was admitted for pneumonia in his left lower lung. Pt was trialed with thin and nectar thick liquids, as well as puree, semi-solid, and regular textures. Pt presents with mild oropharyngeal dysphagia, as characterized by productive cough on thin liquids, mild throat clear on nectar thick liquids on 1/3 trials, mild throat clear on semi-solid foods on   trials,  increased mastication time, and oral residue on regular textures. Dysphagia may be impacted by decreased coordination and fatigue. Pt demonstrated fatigue throughout evaluation and may have increased risk of aspiration with fatigued. Pt recommended to continue regular textures; however, recommended for nectar thick liquids. Further recommendations include: remain upright during all meals and 30-minutes following all PO intake, single sips only, avoid PO intake if pt fatigued and/or not alert.    Total Evaluation Time   Total Evaluation Time (Minutes) 20

## 2019-09-09 NOTE — PROGRESS NOTES
Patient calm throughout the day. Reports that he would like to go home multiple times. Easily reoriented. Alert and oriented to self, but disoriented to situation and time. Heart rate irregular. Lung sounds clear. Bowel sounds active. Able to eat without difficulty, but switched to nectar thicken liquids today. Anticipated discharge to prior living situation tomorrow. Cooperative and calm for the most part. Vitals stable, /64   Pulse 95   Temp 98.2  F (36.8  C) (Tympanic)   Resp 15   Wt 84.2 kg (185 lb 10 oz)   SpO2 97%   BMI 25.18 kg/m      Russell Gonzales RN 09/09/19 6:55 PM

## 2019-09-10 NOTE — DISCHARGE SUMMARY
"[facility]    Discharge Summary  Hospitalist    Date of Admission:  9/6/2019  Date of Discharge:  9/10/2019  Discharging Provider: James Dunbar  Date of Service (when I saw the patient): 09/10/19    Discharge Diagnoses   Principal Problem:    Pneumonia of left lower lobe due to infectious organism (H) (9/6/2019)  Active Problems:    Chronic atrial fibrillation (H) (4/1/2013)    Chronic obstructive pulmonary disease, unspecified COPD type (H) (1/1/2011)    Type 2 diabetes mellitus with hyperglycemia, with long-term current use of insulin (H) (11/29/1999)    Stage 4 chronic kidney disease (H) (3/14/2019)      History of Present Illness   Adiel Rosa Jr is an 84 year old male who presented with this of breath and fever.  Per the H&P by Dr. Rosa:  \"Adiel Rosa Jr is a 84 year old male who presents with SOB, DESHPANDE and a cough with mild white phlegm for the last couple of days. He resides at an assisted living facility called [facility]. In the ED, he was found to have a temp of 102F and a CXR revealed a LLL pneumonia. He also has a history of COPD, not on O2, but at his baseline can't walk more than about 10 feet with a walker. He says he usually uses a wheelchair. He stated to me that he feels much better than last night. He's currently afebrile and has voiced no concerns, however he isn't eating much.\"    Hospital Course   Adiel Rosa Jr was admitted on 9/6/2019.  The following problems were addressed during his hospitalization:    Left lower lobe pneumonia  He was initially treated with Zosyn and azithromycin and transition to Augmentin and azithromycin.  His symptoms improved over the course of his hospital stay.  He required no oxygen.  Febrile and had no elevated white blood cell count.  Was ambulatory at his baseline.  He was discharged on 5 days of Augmentin.    Chronic atrial fibrillation  The patient is on warfarin and will remain on warfarin    James LUO" MD Manjinder      Code Status   Full Code       Primary Care Physician   GAURAV Grijalva    Physical Exam   Temp: 97.1  F (36.2  C) Temp src: Tympanic BP: 129/79   Heart Rate: 66 Resp: 18 SpO2: 95 % O2 Device: None (Room air)    Vitals:    09/08/19 0143 09/09/19 0434 09/10/19 0203   Weight: 84.2 kg (185 lb 10 oz) 84.2 kg (185 lb 10 oz) 81.2 kg (179 lb)     Vital Signs with Ranges  Temp:  [97.1  F (36.2  C)-98.2  F (36.8  C)] 97.1  F (36.2  C)  Heart Rate:  [54-74] 66  Resp:  [15-20] 18  BP: (112-149)/(55-80) 129/79  SpO2:  [93 %-98 %] 95 %  I/O last 3 completed shifts:  In: 100 [P.O.:100]  Out: 1000 [Urine:1000]    GENERAL: Comfortable, no apparent distress.  CARDIOVASCULAR: regular rate and rhythm, no murmur. No lower extremity edema   RESPIRATORY: Clear to auscultation bilaterally, no wheezes or crackles.  GI: non-tender, non-distended, normal bowel sounds.   SKIN: warm periphery, no rashes      Discharge Disposition   Discharged to home  Condition at discharge: Stable    Consultations This Hospital Stay   SOCIAL WORK IP CONSULT  PHARMACY TO DOSE WARFARIN  PHYSICAL THERAPY ADULT IP CONSULT  OCCUPATIONAL THERAPY ADULT IP CONSULT  SWALLOW EVAL SPEECH PATH AT BEDSIDE IP CONSULT    Time Spent on this Encounter   I, James Dunbar MD, personally saw the patient today and spent greater than 30 minutes discharging this patient.    Discharge Orders      Reason for your hospital stay    Community acquired pneumonia     Follow-up and recommended labs and tests    Pt has an INR appt 9/12 at 10:15 at [facility].      Hospital Follow up on 9/19 @ 215p with Dr. Grijalva     Activity    Your activity upon discharge: activity as tolerated     Full Code     Diet    Follow this diet upon discharge: Orders Placed This Encounter      Regular Diet Adult Nectar Thickened Liquids (pre-thickened or use instant food thickener)       Discharge Medications   Current Discharge Medication List      START taking these medications    Details    amoxicillin-clavulanate (AUGMENTIN) 875-125 MG tablet Take 1 tablet by mouth every 12 hours for 5 days  Qty: 10 tablet, Refills: 0    Associated Diagnoses: Pneumonia of left lower lobe due to infectious organism (H)         CONTINUE these medications which have CHANGED    Details   warfarin ANTICOAGULANT (COUMADIN) 2 MG tablet Take 4 mg daily  Qty: 260 tablet, Refills: 0    Associated Diagnoses: Chronic atrial fibrillation (H)         CONTINUE these medications which have NOT CHANGED    Details   aspirin 325 MG tablet Take 1 tablet (325 mg) by mouth daily  Qty: 120 tablet, Refills: 3    Associated Diagnoses: Coronary artery disease without angina pectoris, unspecified vessel or lesion type, unspecified whether native or transplanted heart      Cranberry 500 MG CAPS Take 1 capsule (500 mg) by mouth daily  Qty: 90 capsule, Refills: 1    Associated Diagnoses: Hypertrophy of prostate without urinary obstruction      digoxin (LANOXIN) 125 MCG tablet TAKE 1 TABLET EVERY MORNING  Qty: 90 tablet, Refills: 0    Associated Diagnoses: Essential hypertension, benign      donepezil (ARICEPT) 5 MG tablet Take 5 mg by mouth daily      fluticasone-salmeterol (ADVAIR DISKUS) 250-50 MCG/DOSE inhaler USE 1 INHALATION TWO TIMES A DAY IN THE MORNING AND IN THE EVENING APPROXIMATELY 12 HOURS APART  Qty: 3 Inhaler, Refills: 1    Associated Diagnoses: Simple chronic bronchitis (H)      furosemide (LASIX) 40 MG tablet Take 20 mg by mouth daily      insulin aspart (NOVOLOG PEN) 100 UNIT/ML pen Inject 4-10 Units Subcutaneous 3 times daily (with meals)  Qty: 40 mL, Refills: 0    Associated Diagnoses: Type 2 diabetes mellitus without complication, with long-term current use of insulin (H)      insulin detemir (LEVEMIR VIAL) 100 UNIT/ML vial Inject 45 Units Subcutaneous every morning AND 25 Units At Bedtime.  Qty: 70 mL, Refills: 0    Associated Diagnoses: Type 2 diabetes mellitus with hyperglycemia, with long-term current use of insulin (H)       Magnesium Oxide 250 MG TABS TAKE 1 TABLET BY MOUTH ONCE DAILY (AM)  Qty: 30 tablet, Refills: 1    Comments: This prescription was filled on 4/3/2018. Any refills authorized will be placed on file.  Associated Diagnoses: Permanent atrial fibrillation (H)      metoprolol succinate ER (TOPROL-XL) 50 MG 24 hr tablet Take 1 tablet (50 mg) by mouth daily  Qty: 15 tablet, Refills: 0    Associated Diagnoses: Essential hypertension      Multiple Vitamins-Minerals (CERTAVITE SENIOR/ANTIOXIDANT) TABS TAKE 1 TABLET BY MOUTH ONCE DAILY (AM)  Qty: 31 tablet, Refills: 3    Associated Diagnoses: Health care maintenance      ranitidine (ZANTAC) 150 MG tablet Take 1 tablet (150 mg) by mouth 2 times daily  Qty: 180 tablet, Refills: 2    Associated Diagnoses: Gastroesophageal reflux disease without esophagitis      simvastatin (ZOCOR) 80 MG tablet TAKE 1 TABLET DAILY IN THE EVENING  Qty: 90 tablet, Refills: 0    Associated Diagnoses: Pure hypercholesterolemia      VITAMIN D3 1000 units tablet TAKE 1 TABLET BY MOUTH EVERY MORNING  Qty: 90 tablet, Refills: 0    Associated Diagnoses: Vitamin deficiency      acetaminophen (TYLENOL) 650 MG CR tablet Take 1 tablet (650 mg) by mouth every 8 hours as needed  Qty: 100 tablet, Refills: 5    Associated Diagnoses: Osteoarthritis, unspecified osteoarthritis type, unspecified site      benzocaine-menthol (CEPACOL) 15-3.6 MG lozenge Place 1 lozenge inside cheek every hour as needed for sore throat  Qty: 30 lozenge, Refills: 3    Associated Diagnoses: Simple chronic bronchitis (H)      !! blood glucose (FREESTYLE LITE) test strip Use to test blood sugars 5 times daily or as directed.  Qty: 300 strip, Refills: 1    Associated Diagnoses: Type 2 diabetes mellitus with complication (H)      blood glucose (NO BRAND SPECIFIED) lancets standard Use to test blood sugar three times daily or as directed.  Brand-Freestyle Lite  Qty: 100 each, Refills: 11    Associated Diagnoses: Type 2 diabetes mellitus  "without complication, with long-term current use of insulin (H)      blood glucose monitoring (NO BRAND SPECIFIED) meter device kit Use to test blood sugar 5 times daily due to patient being on sliding scale.  Qty: 1 kit, Refills: 0    Associated Diagnoses: Type 2 diabetes mellitus without complication, with long-term current use of insulin (H)      Doxylamine-DM 6.25-15 MG/15ML LIQD Taking as package directions at night as needed      !! FREESTYLE LITE test strip USE 1 TEST STRIP TO TEST BLOOD SUGARS FIVE TIMES DAILY OR AS DIRECTED  Qty: 300 strip, Refills: 1    Associated Diagnoses: Type 2 diabetes mellitus with complication (H)      HYDROcodone-acetaminophen (NORCO) 5-325 MG tablet Take 1-2 tablets by mouth every 4 hours as needed for severe pain      insulin pen needle (B-D U/F) 31G X 8 MM miscellaneous USE 5 TO 6 PEN NEEDLES DAILY OR AS DIRECTED  Qty: 600 each, Refills: 2    Associated Diagnoses: Diabetes mellitus, type 2 (H)      !! insulin syringe-needle U-100 (ULTICARE INSULIN SYRINGE) 31G X 5/16\" 1 ML miscellaneous USE 1 SYRINGE WITH EACH INJECTION 5 TIMES DAILY.  Qty: 300 each, Refills: 1    Associated Diagnoses: Type 2 diabetes mellitus without complication, with long-term current use of insulin (H)      !! insulin syringe-needle U-100 (ULTICARE INSULIN SYRINGE) 31G X 5/16\" 1 ML miscellaneous USE 1 SYRINGE WITH EACH INJECTION FIVE TIMES DAILY  Qty: 300 each, Refills: 1    Associated Diagnoses: Type 2 diabetes mellitus without complication, with long-term current use of insulin (H)      ipratropium - albuterol 0.5 mg/2.5 mg/3 mL (DUONEB) 0.5-2.5 (3) MG/3ML neb solution Take 1 vial (3 mLs) by nebulization every 6 hours as needed for shortness of breath / dyspnea or wheezing  Qty: 30 vial, Refills: 0    Associated Diagnoses: COPD exacerbation (H)      !! order for DME Equipment being ordered: Wheelchair  Qty: 1 Device, Refills: 0    Associated Diagnoses: Dementia without behavioral disturbance, unspecified " dementia type      !! order for DME Equipment being ordered:home nebulizer set up with mask and tubing  Qty: 1 Device, Refills: 0    Associated Diagnoses: COPD exacerbation (H)       !! - Potential duplicate medications found. Please discuss with provider.        Allergies   No Known Allergies  Data   Most Recent 3 CBC's:  Recent Labs   Lab Test 09/09/19  0538 09/08/19  0521 09/07/19  0545   WBC 5.7 7.4 9.6   HGB 11.2* 10.9* 12.3*   MCV 82 80 81    327 312      Most Recent 3 BMP's:  Recent Labs   Lab Test 09/09/19  0538 09/08/19  0521 09/07/19  0545    136 139   POTASSIUM 4.1 3.7 3.1*   CHLORIDE 100 98 95*   CO2 32* 29 34*   BUN 17 23 29*   CR 1.26 1.26 1.50*   ANIONGAP 6 9 10   AZRA 9.0 8.5* 8.9   * 182* 51*     Most Recent 2 LFT's:  Recent Labs   Lab Test 09/06/19  0420 02/27/17  1529   AST 17 240*   ALT 11 470*   ALKPHOS 105* 114   BILITOTAL 0.7 0.5     Most Recent INR's and Anticoagulation Dosing History:  Anticoagulation Dose History     Recent Dosing and Labs Latest Ref Rng & Units 7/30/2019 8/27/2019 9/6/2019 9/7/2019 9/8/2019 9/9/2019 9/10/2019    Warfarin 2 mg - - - - - - 2 mg -    Warfarin 4 mg - - - 4 mg - - - -    Warfarin 5 mg - - - - 5 mg - - -    INR 0 - 1.3 2.0 2.4(A) 2.21(H) 2.64(H) 3.67(H) 3.09(H) 2.39(H)    INR 0.86 - 1.14 - - - - - - -        Most Recent 3 Troponin's:  Recent Labs   Lab Test 09/06/19  1206 09/06/19  0615 09/06/19  0420   TROPI 0.097* 0.091* 0.076*     Most Recent Cholesterol Panel:  Recent Labs   Lab Test 04/23/15  0922   CHOL 142   LDL 56   HDL 36*   TRIG 248*     Most Recent 6 Bacteria Isolates From Any Culture (See EPIC Reports for Culture Details):  Recent Labs   Lab Test 09/06/19  0420 08/16/19  1239 06/11/19  1830 03/14/19  1101 01/17/19  1421 01/12/17  0755   CULT No growth after 4 days  No growth after 4 days 10,000 to 50,000 colonies/mL  mixed urogenital kalpesh  No further identification or sensitivity done   Urine culture negative (no growth of  uropathogens). >100,000 colonies/mL  Mixed bacterial kalpesh  No further identification or sensitivity done  * No growth Moderate growth Proteus mirabilis  Heavy growth Staphylococcus pseudintermedius  Heavy growth Pasteurella canis Susceptibility testing not routinely done  Light growth Alcaligenes species  Isolated in the broth only:   Enterococcus faecalis  *     Most Recent TSH, T4 and A1c Labs:  Recent Labs   Lab Test 01/17/19  1421 08/31/18  1445   TSH  --  2.03   A1C 10.9* 11.0*     Results for orders placed or performed during the hospital encounter of 09/06/19   XR Chest Port 1 View    Narrative    EXAM:   XR Chest, 1 View     EXAM DATE/TIME:   9/6/2019 4:31 AM     CLINICAL HISTORY:   84 years old, male; Shortness of breath; Additional info: Tachypnea and hypoxia   at [facility] this morning.     TECHNIQUE:   Imaging protocol: XR of the chest   Views: 1 view.     COMPARISON:   CR XR CHEST 2 VW 6/11/2019 6:06 PM     FINDINGS:   Lungs: Interstitial pulmonary edema. Left base opacity.  Pleural space: Small left pleural effusion.   Heart/Mediastinum: Cardiomegaly unchanged.   Bones/joints: Right shoulder post T6 again noted       Impression    IMPRESSION:   Cardiomegaly with congestive heart failure.    Left pleural effusion.    Left base atelectasis or mild pneumonia.     THIS DOCUMENT HAS BEEN ELECTRONICALLY SIGNED BY CECILE BEAUCHAMP MD

## 2019-09-10 NOTE — PLAN OF CARE
Occupational Therapy Discharge Summary    Reason for therapy discharge:    Discharged to home./[facility]     Progress towards therapy goal(s). See goals on Care Plan in [facility] electronic health record for goal details.  Goals partially met.  Barriers to achieving goals:   discharge from facility.    Therapy recommendation(s):    No further therapy is recommended., pt has assist from staff as needed and participates in daily exercise per staff at [facility]

## 2019-09-10 NOTE — PLAN OF CARE
Physical Therapy Discharge Summary    Reason for therapy discharge:    Discharged to home.    Progress towards therapy goal(s). See goals on Care Plan in [facility] electronic health record for goal details.  Goals partially met.  Barriers to achieving goals:   discharge from facility.    Therapy recommendation(s):    No further therapy is recommended.

## 2019-09-10 NOTE — PROGRESS NOTES
Nurse to nurse report given to Alethea at [facility]. No further questions or concerns at this time. Anticipated discharge at 1000.   Russell Gonzales RN 09/10/19 8:53 AM

## 2019-09-10 NOTE — PHARMACY-ANTICOAGULATION SERVICE
Pharmacy Consult- Coumadin Follow-Up    Adiel Rosa Jr is a 84 year old male admitted on 9/6/2019 with Pneumonia of left lower lobe due to infectious organism (H)    Primary Indication(s) for Anticoagulation: A fib    Goal INR: 2.5 (2-3)    FYI, patient is followed by the Anticoagulation/[facility]time Clinic at: [facility]    Patient Active Problem List   Diagnosis     Chronic atrial fibrillation (H)     Advanced care planning/counseling discussion     Hypercholesterolemia     Benign hypertensive heart disease with heart failure (H)     Hypercalcemia     Chronic obstructive pulmonary disease, unspecified COPD type (H)     Erectile Dysfunction     Hypertrophy of prostate without urinary obstruction     Esophageal reflux     Acute myocardial infarction of other specified sites, episode of care unspecified     Type 2 diabetes mellitus with hyperglycemia, with long-term current use of insulin (H)     Cough     Hypertension, benign     Long-term (current) use of anticoagulants [Z79.01]     Hypoglycemia due to insulin     Osteomyelitis of foot, right, acute (H)     Health Care Home     Chronic osteomyelitis of right foot (H)     Stage 4 chronic kidney disease (H)     Pneumonia of left lower lobe due to infectious organism (H)       New factors that may increase patient's sensitivity to warfarin (Coumadin) include: antibiotics, acute illness    New factors that may decrease patient's sensitivity to warfarin (Coumadin) include: none noted        Recent Labs   Lab Test 09/10/19  0510 09/09/19  0538 09/08/19  0521 09/07/19  0545 09/06/19  0420   HGB  --  11.2* 10.9* 12.3* 11.8*   INR 2.39* 3.09* 3.67* 2.64* 2.21*   PLT  --  352 327 312 301        Recent Dosing:    Date INR Dose Given   09/06/19 2.21 4mg   09/07/19 2.64 5mg   09/08/19 3.67 HELD   09/09/19 3.09 2mg       Plan: INR therapeutic today, however due to recent increases of INR with close to home dose, will be conservative and dose Warfarin 4mg x 1.  INR with am  labs.    Thank You for the Consult. Will continue to follow.    Beti Denise [facility] ....................  9/10/2019   8:07 AM

## 2019-09-10 NOTE — PROGRESS NOTES
Discharge Note      Data:  Adiel Rosa Jr discharged to assisted living ([facility]) at 10:30 am via wheel chair. Accompanied by  and staff.    Action:  Written discharge/follow-up instructions were provided to patient and caregiver. Prescriptions sent to patients preferred pharmacy. All belongings sent with patient.    Response:  Adiel verbalized understanding of discharge instructions, reason for discharge, and necessary follow-up appointments. No further questions or concerns at this time.    Russell Gonzales RN 09/10/19 10:31 AM

## 2019-09-10 NOTE — PLAN OF CARE
Pt confused, A & O to self, afebrile vital signs stable. Pt lung sounds clear/ diminished throughout, crackles in LLL, no sob noted, O2 93% on RA. Pt incontinent of bowel and bladder this shift, urine is clear yellow. Pt tolerating nectar thick liquids, will continue to monitor. Tony Molina RN on 9/10/2019 at 3:20 AM

## 2019-09-10 NOTE — PROGRESS NOTES
:    Received report that patient is ready to discharge today. Called Alethea at [facility] and provided discharge update. Alethea reports that they can accept patient back anytime this morning. Called Saul and scheduled ride for 1000. Called [facility] again and spoke with Suzan. Provided her with discharge time. Provided update to nursing and patient. No further needs at this time.     JESUS Robles on 9/10/2019 at 8:22 AM

## 2019-09-12 NOTE — TELEPHONE ENCOUNTER
[facility] called and stated that the order for the wheelchair was sent to [facility] and needs to be sent to [facility] in [place] with the prescription for w/c  Chart not for order of wheelchair for office visit  One time physical therapy order to fit him for the right wheelchair. Please advise.

## 2019-09-12 NOTE — PROGRESS NOTES
ANTICOAGULATION FOLLOW-UP CLINIC VISIT    Patient Name:  Adiel Rosa Jr  Date:  2019  Contact Type:  new warfarin dosing/INR recheck date faxed to [facility]    SUBJECTIVE:  Patient Findings     Positives:   Change in medications (augmentin done 9/15/19)    Comments:   INR done by assisted living staff and result faxed to warfarin clinic. Per nursing communication sheet, patient has 3 more days left of augmentin. No reported bleeding/bruising and no new changes in diet/activtiy. New warfarin dosing/INR recheck date faxed to assisted living facility. Staff to notify warfarin clinic if patient has any bleeding/bruising, new changes in diet/meds/activity or questions.        Clinical Outcomes     Negatives:   Major bleeding event, Thromboembolic event, Anticoagulation-related hospital admission, Anticoagulation-related ED visit, Anticoagulation-related fatality    Comments:   INR done by assisted living staff and result faxed to warfarin clinic. Per nursing communication sheet, patient has 3 more days left of augmentin. No reported bleeding/bruising and no new changes in diet/activtiy. New warfarin dosing/INR recheck date faxed to assisted living facility. Staff to notify warfarin clinic if patient has any bleeding/bruising, new changes in diet/meds/activity or questions.           OBJECTIVE    INR   Date Value Ref Range Status   2019 2.6 (A) 0.8 - 1.14 Final       ASSESSMENT / PLAN  INR assessment THER    Recheck INR In: 1 WEEK    INR Location [facility]      Anticoagulation Summary  As of 2019    INR goal:   2.0-3.0   TTR:   69.7 % (3.1 y)   INR used for dosin.6 (2019)   Warfarin maintenance plan:   4 mg (2 mg x 2) every Mon, Fri; 6 mg (2 mg x 3) all other days   Full warfarin instructions:   : 4 mg; : 4 mg; 9/15: 4 mg; Otherwise 4 mg every Mon, Fri; 6 mg all other days   Weekly warfarin total:   38 mg   Plan last modified:   Jessa Gibson RN (2018)   Next INR check:    9/19/2019   Priority:   INR   Target end date:   Indefinite    Indications    Chronic atrial fibrillation (H) [I48.2]  Long-term (current) use of anticoagulants [Z79.01] [Z79.01]             Anticoagulation Episode Summary     INR check location:       Preferred lab:       Send INR reminders to:   HC ANTICOAG POOL    Comments:   [facility] assisted living, Fax   done with homecare      Anticoagulation Care Providers     Provider Role Specialty Phone number    GAURAV Grijalva MD [facility] [phone]            See the Encounter Report to view Anticoagulation Flowsheet and Dosing Calendar (Go to Encounters tab in chart review, and find the Anticoagulation Therapy Visit)        Jessa Storey RN

## 2019-09-13 NOTE — PROGRESS NOTES
Subjective     Adiel Rosa Jr is a 84 year old male who presents to clinic today for the following health issues:    [facility]       Hospital ER Follow-up Visit:    Hospital/Nursing Home/IP Rehab Facility: [facility]  Date of Admission: 9/6/19  Date of Discharge: 9/10/19  Reason(s) for Admission: Pneumonia             Problems taking medications regularly:  None       Medication changes since discharge: See list       Problems adhering to non-medication therapy:  None  Summary of hospitalization:  [facility] discharge summary reviewed  Diagnostic Tests/Treatments reviewed.  Follow up needed: see me  Other Healthcare Providers Involved in Patient s Care:         None  Update since discharge: improved.     Post Discharge Medication Reconciliation: discharge medications reconciled, continue medications without change.  Plan of care communicated with patient     Coding guidelines for this visit:  Type of Medical   Decision Making Face-to-Face Visit       within 7 Days of discharge Face-to-Face Visit        within 14 days of discharge   Moderate Complexity [id number]   High Complexity [id number]              PAST MEDICAL HISTORY:  Past Medical History:   Diagnosis Date     Acute myocardial infarction of other specified sites, episode of care unspecified 1/1/1999    Non Q wave  treated with Retavase      Benign hypertensive heart disease with heart failure (H) 1/1/2011     Problem list name updated by automated process. Provider to review     Chronic atrial fibrillation (H) 4/1/2013     Chronic obstructive pulmonary disease, unspecified COPD type (H) 1/1/2011     Problem list name updated by automated process. Provider to review     Hypercalcemia 1/1/2011     Hypercholesterolemia 1/1/2011     Long-term (current) use of anticoagulants [Z79.01] 8/2/2016     Type 2 diabetes mellitus with hyperglycemia, with long-term current use of insulin (H) 11/29/1999     Problem list name updated by automated  process. Provider to review       PAST SURGICAL HISTORY:  Past Surgical History:   Procedure Laterality Date     reverse total arthoplasty of right shoulder   01/25/2018       MEDICATIONS:  Prior to Admission medications    Medication Sig Start Date End Date Taking? Authorizing Provider   acetaminophen (TYLENOL) 650 MG CR tablet Take 1 tablet (650 mg) by mouth every 8 hours as needed 8/31/18  Yes GAURAV Grijalva MD   aspirin 325 MG tablet Take 1 tablet (325 mg) by mouth daily 2/14/18  Yes Christie Quijano PA   benzocaine-menthol (CEPACOL) 15-3.6 MG lozenge Place 1 lozenge inside cheek every hour as needed for sore throat 1/16/17  Yes Alejandro Fung MD   blood glucose (FREESTYLE LITE) test strip Use to test blood sugars 5 times daily or as directed. 8/7/19  Yes GAURAV Grijalva MD   blood glucose (NO BRAND SPECIFIED) lancets standard Use to test blood sugar three times daily or as directed.  Brand-Freestyle Lite 12/28/18  Yes Michelle Liang MD   blood glucose monitoring (NO BRAND SPECIFIED) meter device kit Use to test blood sugar 5 times daily due to patient being on sliding scale. 1/30/17  Yes GAURAV Grijalva MD   Cranberry 500 MG CAPS Take 1 capsule (500 mg) by mouth daily 12/20/17  Yes GAURAV Grijalva MD   digoxin (LANOXIN) 125 MCG tablet TAKE 1 TABLET EVERY MORNING 8/7/19  Yes GAURAV Grijalva MD   donepezil (ARICEPT) 5 MG tablet Take 5 mg by mouth daily   Yes Unknown, Entered By History   Doxylamine-DM 6.25-15 MG/15ML LIQD Taking as package directions at night as needed   Yes Unknown, Entered By History   fluticasone-salmeterol (ADVAIR DISKUS) 250-50 MCG/DOSE inhaler USE 1 INHALATION TWO TIMES A DAY IN THE MORNING AND IN THE EVENING APPROXIMATELY 12 HOURS APART 8/8/19  Yes GAURAV Grijalva MD   FREESTYLE LITE test strip USE 1 TEST STRIP TO TEST BLOOD SUGARS FIVE TIMES DAILY OR AS DIRECTED 5/29/19  Yes GAURAV Grijalva MD   furosemide (LASIX) 40 MG tablet Take 20 mg by mouth daily   Yes Unknown, Entered  "By History   HYDROcodone-acetaminophen (NORCO) 5-325 MG tablet Take 1-2 tablets by mouth every 4 hours as needed for severe pain   Yes Unknown, Entered By History   insulin aspart (NOVOLOG PEN) 100 UNIT/ML pen Inject 4-10 Units Subcutaneous 3 times daily (with meals) 8/7/19  Yes GAURAV Grijalva MD   insulin detemir (LEVEMIR VIAL) 100 UNIT/ML vial Inject 45 Units Subcutaneous every morning AND 25 Units At Bedtime. 8/8/19 8/7/20 Yes GAURAV Grijalva MD   insulin pen needle (B-D U/F) 31G X 8 MM miscellaneous USE 5 TO 6 PEN NEEDLES DAILY OR AS DIRECTED 2/18/19  Yes GAURAV Grijalva MD   insulin syringe-needle U-100 (ULTICARE INSULIN SYRINGE) 31G X 5/16\" 1 ML miscellaneous USE 1 SYRINGE WITH EACH INJECTION 5 TIMES DAILY. 8/7/19  Yes GAURAV Grijalva MD   insulin syringe-needle U-100 (ULTICARE INSULIN SYRINGE) 31G X 5/16\" 1 ML miscellaneous USE 1 SYRINGE WITH EACH INJECTION FIVE TIMES DAILY 2/18/19  Yes GAURAV Grijalva MD   ipratropium - albuterol 0.5 mg/2.5 mg/3 mL (DUONEB) 0.5-2.5 (3) MG/3ML neb solution Take 1 vial (3 mLs) by nebulization every 6 hours as needed for shortness of breath / dyspnea or wheezing 6/11/19  Yes Latricia Loza PA-C   Magnesium Oxide 250 MG TABS TAKE 1 TABLET BY MOUTH ONCE DAILY (AM) 4/4/18  Yes Swapnil Mackay MD   metoprolol succinate ER (TOPROL-XL) 50 MG 24 hr tablet Take 1 tablet (50 mg) by mouth daily 8/22/19  Yes GAURAV Grijalva MD   Multiple Vitamins-Minerals (CERTAVITE SENIOR/ANTIOXIDANT) TABS TAKE 1 TABLET BY MOUTH ONCE DAILY (AM) 5/2/18  Yes GAURAV Grijalva MD   order for DME Equipment being ordered: Wheelchair 8/20/19  Yes GAURAV Grijalva MD   order for DME Equipment being ordered:home nebulizer set up with mask and tubing 6/11/19  Yes Latricia Loza PA-C   ranitidine (ZANTAC) 150 MG tablet Take 1 tablet (150 mg) by mouth 2 times daily 8/7/19  Yes GAURAV Grijalva MD   simvastatin (ZOCOR) 80 MG tablet TAKE 1 TABLET DAILY IN THE EVENING 8/7/19  Yes GAURAV Grijalva MD   VITAMIN D3 1000 " units tablet TAKE 1 TABLET BY MOUTH EVERY MORNING 5/2/18  Yes GAURAV Grijalva MD   warfarin ANTICOAGULANT (COUMADIN) 2 MG tablet Take 4 mg daily 9/10/19  Yes Jmaes Dunbar MD       ALLERGIES:   No Known Allergies    ROS:  Constitutional, HEENT, cardiovascular, pulmonary, gi and gu systems are negative, except as otherwise noted.      EXAM:  BP 96/52 (BP Location: Left arm, Patient Position: Sitting, Cuff Size: Adult Regular)   Pulse 75   Temp 99.9  F (37.7  C) (Tympanic)   SpO2 98%  There is no height or weight on file to calculate BMI.   GENERAL APPEARANCE: alert and no distress  EYES: Eyes grossly normal to inspection, PERRL and conjunctivae and sclerae normal  NECK: no adenopathy, no asymmetry, masses, or scars and thyroid normal to palpation  RESP: lungs clear to auscultation - no rales, rhonchi or wheezes  CV:  irregularly irregular rhythm with S1-S2 without S3-S4, no murmur, no pedal edema  ABDOMEN: soft, nontender, without hepatosplenomegaly or masses and bowel sounds normal  NEURO: His mentation seems to be at his baseline he answers questions appropriately declines having any pain and he is breathing comfortably.  Is in a wheelchair no focal weakness in arms or legs or facial asymmetry  Lab/ X-ray  Results for orders placed or performed during the hospital encounter of 09/18/19   XR Chest 2 Views    Narrative    PROCEDURE:  XR CHEST 2 VW    HISTORY: shortness of breath, recent PNA, recurrent fever, .    COMPARISON:  9/6/19    FINDINGS:  The cardiomediastinal contours are enlarged, unchanged.  The trachea  is midline.  There is calcific aortic atherosclerosis.  Mild ill-defined opacity at the right lung base is chronic and  unchanged. No focal consolidation, effusion or pneumothorax.    Osteoporosis.      Impression    IMPRESSION:      No discrete consolidation is identified. Chronic probable scarring,  right lung base.      ORESTES FERGUSON MD   POC US ECHO LIMITED    Narrative    Reduced global  function, atrial fibrillation, dilated LV, no evidence of RV dilation. No B lines bilaterally. Bilateral sliding signs    Impression    Atrial fibrillation with reduced global function. No sonographic evidence of acute heart failure   Troponin I (now)   Result Value Ref Range    Troponin I ES 0.045 (H) 0.000 - 0.034 ug/L   NT pro BNP   Result Value Ref Range    N-Terminal Pro BNP Inpatient 825 (H) 0 - 100 pg/mL   CBC with platelets differential   Result Value Ref Range    WBC 8.8 4.0 - 11.0 10e9/L    RBC Count 4.27 (L) 4.4 - 5.9 10e12/L    Hemoglobin 11.2 (L) 13.3 - 17.7 g/dL    Hematocrit 34.1 (L) 40.0 - 53.0 %    MCV 80 78 - 100 fl    MCH 26.2 (L) 26.5 - 33.0 pg    MCHC 32.8 31.5 - 36.5 g/dL    RDW 16.0 (H) 10.0 - 15.0 %    Platelet Count 314 150 - 450 10e9/L    Diff Method Automated Method     % Neutrophils 82.8 %    % Lymphocytes 4.7 %    % Monocytes 11.1 %    % Eosinophils 0.7 %    % Basophils 0.2 %    % Immature Granulocytes 0.5 %    Absolute Neutrophil 7.3 1.6 - 8.3 10e9/L    Absolute Lymphocytes 0.4 (L) 0.8 - 5.3 10e9/L    Absolute Monocytes 1.0 0.0 - 1.3 10e9/L    Absolute Eosinophils 0.1 0.0 - 0.7 10e9/L    Absolute Basophils 0.0 0.0 - 0.2 10e9/L    Abs Immature Granulocytes 0.0 0 - 0.4 10e9/L   Basic metabolic panel   Result Value Ref Range    Sodium 134 134 - 144 mmol/L    Potassium 4.2 3.5 - 5.1 mmol/L    Chloride 97 (L) 98 - 107 mmol/L    Carbon Dioxide 29 21 - 31 mmol/L    Anion Gap 8 3 - 14 mmol/L    Glucose 251 (H) 70 - 105 mg/dL    Urea Nitrogen 25 7 - 25 mg/dL    Creatinine 1.40 (H) 0.70 - 1.30 mg/dL    GFR Estimate 49 (L) >60 mL/min/[1.73_m2]    GFR Estimate If Black 59 (L) >60 mL/min/[1.73_m2]    Calcium 9.1 8.6 - 10.3 mg/dL   Blood gas venous and oxyhgb   Result Value Ref Range    Ph Venous 7.49 (H) 7.32 - 7.43 pH    PCO2 Venous 32 (L) 40 - 50 mm Hg    PO2 Venous 191 (H) 25 - 47 mm Hg    Bicarbonate Venous 24 21 - 28 mmol/L    FIO2 NONE     Oxyhemoglobin Venous 99 %   *UA reflex to Microscopic    Result Value Ref Range    Color Urine Yellow     Appearance Urine Clear     Glucose Urine Negative NEG^Negative mg/dL    Bilirubin Urine Negative NEG^Negative    Ketones Urine Negative NEG^Negative mg/dL    Specific Gravity Urine 1.025 1.000 - 1.030    Blood Urine Moderate (A) NEG^Negative    pH Urine 5.5 5.0 - 9.0 pH    Protein Albumin Urine 100 (A) NEG^Negative mg/dL    Urobilinogen Urine 0.2 0.2 - 1.0 EU/dL    Nitrite Urine Negative NEG^Negative    Leukocyte Esterase Urine Negative NEG^Negative    Source Midstream Urine    Urine Microscopic   Result Value Ref Range    WBC Urine 0 - 5 OTO5^0 - 5 /HPF    RBC Urine 5-10 (A) OTO2^O - 2 /HPF    Squamous Epithelial /LPF Urine Few FEW^Few /LPF    Bacteria Urine Few (A) NEG^Negative /HPF         ASSESSMENT/PLAN:    ICD-10-CM    1. Confusion R41.0      Whatever confusion he had leading up to the emergency room visit has resolved.  The vitals at the facility note a systolic in the 110s to 120s over 50s to 70s pulses in the 70s his sats have been in the upper to mid 90s and has been afebrile.  He feels fine with no focal complaints today.  We reviewed  his urine and chest x-ray which did not show any acute pathology.  We will return him back to his facility if new problems develop they can send him in to be evaluated but he seems to have had a transient episode of confusion that may have been due to a febrile illness that has subsequently resolved.  Does have a baseline dementia.    WILSON Grijalva MD  September 19, 2019

## 2019-09-18 NOTE — ED AVS SNAPSHOT
[facility]  [address]  [place] [address]  Phone:  [phone]  Fax:  [phone]                                    Adiel Rosa Jr   MRN: [id number]    Department:  [facility]   Date of Visit:  9/18/2019           After Visit Summary Signature Page    I have received my discharge instructions, and my questions have been answered. I have discussed any challenges I see with this plan with the nurse or doctor.    ..........................................................................................................................................  Patient/Patient Representative Signature      ..........................................................................................................................................  Patient Representative Print Name and Relationship to Patient    ..................................................               ................................................  Date                                   Time    ..........................................................................................................................................  Reviewed by Signature/Title    ...................................................              ..............................................  Date                                               Time          22EPIC Rev 08/18

## 2019-09-18 NOTE — DISCHARGE INSTRUCTIONS
You were seen today with confusion.  On our examination there was no evidence of a new pneumonia, no evidence of new injury to the kidneys, no evidence of an infection in the urine.  It is possible that your low level fevers are related to a viral infection, which should get better over the coming days and as your fever improves, the confusion should improve as well.  If you are having trouble taking care of yourself at home, or have worsening confusion to the point that your caregivers are no longer able to take care of you, please return to the emergency department.  Please return to the emergency department with any other concerning symptoms

## 2019-09-18 NOTE — PROGRESS NOTES
ANTICOAGULATION FOLLOW-UP CLINIC VISIT    Patient Name:  Adiel Rosa Jr  Date:  2019  Contact Type:  New warfarin dosing/INR recheck date faxed to [facility]    SUBJECTIVE:  Patient Findings     Positives:   Change in medications (antibiotic done)    Comments:   INR done by assisted living nurse and result faxed to warfarin clinic. Per nursing communication sheet, patient has completed antibiotic. He has no bleeding/bruising and no new changes in diet/meds/activity. New warfarin dosing/INR recheck date faxed to [facility]. Staff to notify warfarin clinic if patient has any bleeding/bruising, new changes in diet/meds/activity or questions.         Clinical Outcomes     Negatives:   Major bleeding event, Thromboembolic event, Anticoagulation-related hospital admission, Anticoagulation-related ED visit, Anticoagulation-related fatality    Comments:   INR done by assisted living nurse and result faxed to warfarin clinic. Per nursing communication sheet, patient has completed antibiotic. He has no bleeding/bruising and no new changes in diet/meds/activity. New warfarin dosing/INR recheck date faxed to [facility]. Staff to notify warfarin clinic if patient has any bleeding/bruising, new changes in diet/meds/activity or questions.            OBJECTIVE    INR   Date Value Ref Range Status   2019 1.5 (A) 0.8 - 1.14 Final       ASSESSMENT / PLAN  INR assessment SUB dose cut for antibiotics   Recheck INR In: 2 WEEKS    INR Location [facility]      Anticoagulation Summary  As of 2019    INR goal:   2.0-3.0   TTR:   69.6 % (3.1 y)   INR used for dosin.5! (2019)   Warfarin maintenance plan:   4 mg (2 mg x 2) every Mon, Fri; 6 mg (2 mg x 3) all other days   Full warfarin instructions:   : 10 mg; : 8 mg; Otherwise 4 mg every Mon, Fri; 6 mg all other days   Weekly warfarin total:   38 mg   Plan last modified:   Jessa Gibson RN (2018)   Next INR check:   10/2/2019    Priority:   INR   Target end date:   Indefinite    Indications    Chronic atrial fibrillation (H) [I48.2]  Long-term (current) use of anticoagulants [Z79.01] [Z79.01]             Anticoagulation Episode Summary     INR check location:       Preferred lab:       Send INR reminders to:   HC ANTICOAG POOL    Comments:   [facility] assisted [facility], Fax   done with homecare      Anticoagulation Care Providers     Provider Role Specialty Phone number    GAURAV Grijalva MD [facility] [phone]            See the Encounter Report to view Anticoagulation Flowsheet and Dosing Calendar (Go to Encounters tab in chart review, and find the Anticoagulation Therapy Visit)        Jessa Storey RN

## 2019-09-18 NOTE — ED PROVIDER NOTES
Adiel Rosa Jr  : 1935 Age: 84 year old Sex: male MRN: [id number]    CC: AMS    HPI: Adiel Rosa Jr is a 84 year old male with PMH significant for dementia, afib, COPD, DM2 who was recently admitted with PNA and is brought in from his nursing home with complaints of increased confusion over the past couple of days with low grade fevers (100.2 temporal).  He has seemed more tired and quiet than usual.  There are no reports of cough, no reports of dysuria, frequency, urgency.  The patient denies any complaints personally, and all complaints are related through his caregiver.  There is no report of increased leg swelling    ED Course and MDM:  Altered mental status: Overall the history is consistent with delirium currently of unknown cause.  Pneumonia versus urinary tract infection versus dehydration versus ACS versus viral infection.  Plan for CBC, BMP, VBG, troponin, BNP, chest x-ray, urinalysis, bedside ultrasound.  Point-of-care ultrasound demonstrated significantly reduced global function, but no evidence of acute heart failure.  Chest x-ray with improvement in the infiltrates from previous.  Labs returned with mildly elevated BNP, mild elevation in troponin, consistent with previous.  VBG with mild respiratory alkalosis.  CBC and BMP without acute changes from previous.  Urinalysis with no evidence of infection.  Given the lack of evidence of infection, and the patient's fairly consistent mental status throughout his time here, and the presence of caregivers at home, there is no indication for admission at this time.  With the lack of urinary infection and lack of abnormal chest x-ray findings it is likely that the fevers are result of a viral illness, with resulting mild delirium.  Plan for follow-up with primary care in the next couple of days for recheck    Final Clinical Impression:  Altered mental status  Fever    Tato Bowen MD  Internal Medicine and Emergency  Medicine  11:45 AM 09/18/19      Physical Exam:  Pulse 56   Temp 98.9  F (37.2  C) (Tympanic)   Resp 19   Wt 81.2 kg (179 lb)   SpO2 95%   BMI 24.28 kg/m      Gen: Awake, interactive, somewhat forgetful  HEENT: MMM, EOMI, PERRL  CV: Irregular, strong peripheral pulses, no LE edema  Pulm: Nonlabored, mildly reduced lung sounds on R  Abd: Soft, nontender  Ext: Full ROM, no edema  Neuro: Oriented to self and place, moving all extremities, interacting and answering questions consistently  Psych: Denies SI/HI    ROS:  10 point review of systems performed and negative except as noted in HPI.    Past Medical History:  Past Medical History:   Diagnosis Date     Acute myocardial infarction of other specified sites, episode of care unspecified 1/1/1999    Non Q wave  treated with Retavase      Benign hypertensive heart disease with heart failure (H) 1/1/2011     Problem list name updated by automated process. Provider to review     Chronic atrial fibrillation (H) 4/1/2013     Chronic obstructive pulmonary disease, unspecified COPD type (H) 1/1/2011     Problem list name updated by automated process. Provider to review     Hypercalcemia 1/1/2011     Hypercholesterolemia 1/1/2011     Long-term (current) use of anticoagulants [Z79.01] 8/2/2016     Type 2 diabetes mellitus with hyperglycemia, with long-term current use of insulin (H) 11/29/1999     Problem list name updated by automated process. Provider to review         Family history:  Family History   Problem Relation Age of Onset     No Known Problems Mother      No Known Problems Father      No Known Problems Other          Social History:   Social History     Socioeconomic History     Marital status:      Spouse name: Not on file     Number of children: Not on file     Years of education: Not on file     Highest education level: Not on file   Occupational History     Not on file   Social Needs     Financial resource strain: Not on file     Food insecurity:      Worry: Not on file     Inability: Not on file     Transportation needs:     Medical: Not on file     Non-medical: Not on file   Tobacco Use     Smoking status: Former Smoker     Packs/day: 1.00     Years: 40.00     Pack years: 40.00     Types: Cigarettes     Smokeless tobacco: Never Used   Substance and Sexual Activity     Alcohol use: No     Drug use: Never     Comment: Drug use: No     Sexual activity: Not Currently   Lifestyle     Physical activity:     Days per week: Not on file     Minutes per session: Not on file     Stress: Not on file   Relationships     Social connections:     Talks on phone: Not on file     Gets together: Not on file     Attends Jew service: Not on file     Active member of club or organization: Not on file     Attends meetings of clubs or organizations: Not on file     Relationship status: Not on file     Intimate partner violence:     Fear of current or ex partner: Not on file     Emotionally abused: Not on file     Physically abused: Not on file     Forced sexual activity: Not on file   Other Topics Concern     Not on file   Social History Narrative     Not on file         Surgical History:  Past Surgical History:   Procedure Laterality Date     reverse total arthoplasty of right shoulder   01/25/2018                  Tato Bowen MD  09/18/19 144

## 2019-09-18 NOTE — ED TRIAGE NOTES
Patient presents to the ED with complaints of cough, low grade temp x2 days.  Staff also states patient has had decreased appetite and increased confusion.    Recently hospitalized 1 week ago with pneumonia.

## 2019-09-19 NOTE — NURSING NOTE
Chief Complaint   Patient presents with     Hospital F/U       Initial BP 96/52 (BP Location: Left arm, Patient Position: Sitting, Cuff Size: Adult Regular)   Pulse 75   Temp 99.9  F (37.7  C) (Tympanic)   SpO2 98%  Estimated body mass index is 24.28 kg/m  as calculated from the following:    Height as of 1/12/19: 1.829 m (6').    Weight as of 9/18/19: 81.2 kg (179 lb).  Medication Reconciliation: complete  Linh Gong LPN

## 2019-09-22 PROBLEM — J18.9 RECURRENT PNEUMONIA: Status: ACTIVE | Noted: 2019-01-01

## 2019-09-22 PROBLEM — R79.89 ELEVATED TROPONIN: Status: ACTIVE | Noted: 2019-01-01

## 2019-09-22 NOTE — PROGRESS NOTES
Received report from [place] [facility] in ED. Pt will be admitted to room 302 on [facility].    Janice Welch RN on 9/22/2019 at 5:38 PM

## 2019-09-22 NOTE — PROGRESS NOTES
RESPIRATORY THERAPY ASSESSMENT    Assessment:     Reason for Assessment: COPD (09/22/19 1829)    Pulmonary History:    Pulmonary Status: Smoking history, greater than or equal to 1 PPD (09/22/19 1829)    Smoking cessation information given: No     Surgical:  Surgical Status: No surgery or greater than/equal 4 weeks post-op (09/22/19 1829)    CXR:   Chest X-ray: Infiltrates, atelectasis, or pleural effusions in one lung (09/22/19 1829)    Respiratory Pattern:    Respiratory Pattern: Increased RR 21-25 (09/22/19 1829)    Breath Sounds:    Breath Sounds: Decreased bilaterally (09/22/19 1829)    Effectiveness of Cough:     Cough Effectiveness: Weak, non-productive (09/22/19 1829)    Mental Status:    Mental Status: Lethargic, follows commands (09/22/19 1829)    Level of Activity:     Acuity Level : Acuity 3 (11-15 points) (09/22/19 1829)    Current O2 Requirement:   O2 Required for SpO2>=92%: 1-3 liters (09/22/19 1829)    Patient uses home oxygen:   No    Does the patient have a diagnosis of COPD?   Yes      Radhika Perkins RT on 9/22/2019 at 6:38 PM

## 2019-09-22 NOTE — H&P
[facility]    History and Physical  Hospitalist       Date of Admission:  9/22/2019    Assessment & Plan   Adiel Rosa Jr is a 84 year old male who presents with clinical and radiographic evidence of recurrent pneumonia.    Principal Problem:    HCAP (healthcare-associated pneumonia)    Assessment: Patient was just hospitalized about 2 weeks ago and treated with Zosyn and Zithromax.  He did a course of Augmentin as an outpatient.    Plan: Start cefepime, follow clinically.  Active Problems:    Chronic atrial fibrillation (H)    Assessment: Rate is controlled    Plan: Continue current treatments    Benign hypertensive heart disease with heart failure (H)    Assessment: No symptoms at this time    Plan: Continue current meds    Chronic obstructive pulmonary disease, unspecified COPD type (H)    Assessment: Appears stable    Plan: Continue current meds    Type 2 diabetes mellitus with hyperglycemia, with long-term current use of insulin (H)    Assessment: Poor control.  Last A1c was 11% 1 month ago.    Plan: Continue current insulin and add corrective dose    Stage 4 chronic kidney disease (H)    Assessment: Creatinine appears relatively stable    Plan: Continue current treatments    Elevated troponin    Assessment: Troponin is chronically elevated likely related to atrial fibrillation, heart failure, and stress.  EKG shows no acute change    Plan: Monitor    DVT Prophylaxis: Warfarin  Code Status: Prior full CODE STATUS    ASH BONE    Primary Care Physician   GAURAV Grijalva    Chief Complaint   Fever, lethargy, and cough    History is obtained from the patient, with minimal reliability, records from his assisted living facility, ED physician, and chart review.    History of Present Illness   Adiel Rosa Jr is a 84 year old male who presents with the above complaints.  The patient was hospitalized about 2 weeks ago with pneumonia and treated with Zosyn and Zithromax.  He was  transitioned to oral meds including Augmentin and has finished that course.  Over the last several days is been noted that he has been running a low-grade fever and had a weak congested cough.  Fever increased today and lethargy increased and so he presented to the ED for evaluation.  Patient's history itself is unreliable and he states that he thinks he feels okay.    Past Medical History    I have reviewed this patient's medical history and updated it with pertinent information if needed.   Past Medical History:   Diagnosis Date     Acute myocardial infarction of other specified sites, episode of care unspecified 1/1/1999    Non Q wave  treated with Retavase      Benign hypertensive heart disease with heart failure (H) 1/1/2011     Problem list name updated by automated process. Provider to review     Chronic atrial fibrillation (H) 4/1/2013     Chronic obstructive pulmonary disease, unspecified COPD type (H) 1/1/2011     Problem list name updated by automated process. Provider to review     Hypercalcemia 1/1/2011     Hypercholesterolemia 1/1/2011     Long-term (current) use of anticoagulants [Z79.01] 8/2/2016     Type 2 diabetes mellitus with hyperglycemia, with long-term current use of insulin (H) 11/29/1999     Problem list name updated by automated process. Provider to review       Past Surgical History   I have reviewed this patient's surgical history and updated it with pertinent information if needed.  Past Surgical History:   Procedure Laterality Date     reverse total arthoplasty of right shoulder   01/25/2018       Prior to Admission Medications   Prior to Admission Medications   Prescriptions Last Dose Informant Patient Reported? Taking?   Cranberry 500 MG CAPS 9/21/2019 at Unknown time group home No Yes   Sig: Take 1 capsule (500 mg) by mouth daily   Doxylamine-DM 6.25-15 MG/15ML LIQD 9/21/2019 at Unknown time  Yes Yes   Sig: Taking as package directions at night as needed   FREESTYLE LITE test strip  Unknown at Unknown time Nursing Home No No   Sig: USE 1 TEST STRIP TO TEST BLOOD SUGARS FIVE TIMES DAILY OR AS DIRECTED   HYDROcodone-acetaminophen (NORCO) 5-325 MG tablet Unknown at Unknown time  Yes No   Sig: Take 1-2 tablets by mouth every 4 hours as needed for severe pain   Magnesium Oxide 250 MG TABS 9/21/2019 at Unknown time intermediate No Yes   Sig: TAKE 1 TABLET BY MOUTH ONCE DAILY (AM)   Multiple Vitamins-Minerals (CERTAVITE SENIOR/ANTIOXIDANT) TABS Unknown at Unknown time Nursing Home No No   Sig: TAKE 1 TABLET BY MOUTH ONCE DAILY (AM)   VITAMIN D3 1000 units tablet 9/21/2019 at Unknown time intermediate No Yes   Sig: TAKE 1 TABLET BY MOUTH EVERY MORNING   acetaminophen (TYLENOL) 650 MG CR tablet 9/21/2019 at Unknown time intermediate No Yes   Sig: Take 1 tablet (650 mg) by mouth every 8 hours as needed   aspirin 325 MG tablet 9/21/2019 at Unknown time intermediate No Yes   Sig: Take 1 tablet (325 mg) by mouth daily   benzocaine-menthol (CEPACOL) 15-3.6 MG lozenge 9/21/2019 at Unknown time intermediate No Yes   Sig: Place 1 lozenge inside cheek every hour as needed for sore throat   blood glucose (FREESTYLE LITE) test strip Unknown at Unknown time Nursing Home No No   Sig: Use to test blood sugars 5 times daily or as directed.   blood glucose (NO BRAND SPECIFIED) lancets standard Unknown at Unknown time Nursing Home No No   Sig: Use to test blood sugar three times daily or as directed.  Brand-Freestyle Lite   blood glucose monitoring (NO BRAND SPECIFIED) meter device kit Unknown at Unknown time Nursing Home No No   Sig: Use to test blood sugar 5 times daily due to patient being on sliding scale.   digoxin (LANOXIN) 125 MCG tablet 9/21/2019 at Unknown time intermediate No Yes   Sig: TAKE 1 TABLET EVERY MORNING   donepezil (ARICEPT) 5 MG tablet 9/21/2019 at Unknown time Nursing Home Yes Yes   Sig: Take 5 mg by mouth daily   fluticasone-salmeterol (ADVAIR DISKUS) 250-50 MCG/DOSE inhaler 9/21/2019 at Unknown  "time Nursing Home No Yes   Sig: USE 1 INHALATION TWO TIMES A DAY IN THE MORNING AND IN THE EVENING APPROXIMATELY 12 HOURS APART   furosemide (LASIX) 40 MG tablet 9/21/2019 at Unknown time Nursing Home Yes Yes   Sig: Take 20 mg by mouth daily   insulin aspart (NOVOLOG PEN) 100 UNIT/ML pen Unknown at Unknown time Nursing Home No No   Sig: Inject 4-10 Units Subcutaneous 3 times daily (with meals)   insulin detemir (LEVEMIR VIAL) 100 UNIT/ML vial 9/21/2019 at Unknown time [facility] No Yes   Sig: Inject 45 Units Subcutaneous every morning AND 25 Units At Bedtime.   insulin pen needle (B-D U/F) 31G X 8 MM miscellaneous Unknown at Unknown time Nursing Home No No   Sig: USE 5 TO 6 PEN NEEDLES DAILY OR AS DIRECTED   insulin syringe-needle U-100 (ULTICARE INSULIN SYRINGE) 31G X 5/16\" 1 ML miscellaneous Unknown at Unknown time Nursing Home No No   Sig: USE 1 SYRINGE WITH EACH INJECTION FIVE TIMES DAILY   insulin syringe-needle U-100 (ULTICARE INSULIN SYRINGE) 31G X 5/16\" 1 ML miscellaneous Unknown at Unknown time Nursing Home No No   Sig: USE 1 SYRINGE WITH EACH INJECTION 5 TIMES DAILY.   ipratropium - albuterol 0.5 mg/2.5 mg/3 mL (DUONEB) 0.5-2.5 (3) MG/3ML neb solution 9/22/2019 at 1000 Nursing Home No Yes   Sig: Take 1 vial (3 mLs) by nebulization every 6 hours as needed for shortness of breath / dyspnea or wheezing   metoprolol succinate ER (TOPROL-XL) 50 MG 24 hr tablet 9/21/2019 at Unknown time [facility] No Yes   Sig: Take 1 tablet (50 mg) by mouth daily   order for DME Unknown at Unknown time Nursing Home No No   Sig: Equipment being ordered:home nebulizer set up with mask and tubing   order for DME Unknown at Unknown time Nursing Home No No   Sig: Equipment being ordered: Wheelchair   ranitidine (ZANTAC) 150 MG tablet Unknown at Unknown time Nursing Home No No   Sig: Take 1 tablet (150 mg) by mouth 2 times daily   simvastatin (ZOCOR) 80 MG tablet 9/21/2019 at Unknown time [facility] No Yes   Sig: TAKE 1 TABLET " DAILY IN THE EVENING   warfarin ANTICOAGULANT (COUMADIN) 2 MG tablet 9/21/2019 at Unknown time  No Yes   Sig: Take 4 mg daily      Facility-Administered Medications: None     Allergies   No Known Allergies    Social History   I have reviewed this patient's social history and updated it with pertinent information if needed. Adiel Rosa Jr  reports that he has quit smoking. His smoking use included cigarettes. He has a 40.00 pack-year smoking history. He has never used smokeless tobacco. He reports that he does not drink alcohol or use drugs.    Family History   I have reviewed this patient's family history and updated it with pertinent information if needed.   Family History   Problem Relation Age of Onset     No Known Problems Mother      No Known Problems Father      No Known Problems Other        Review of Systems     REVIEW OF SYSTEMS:    Quite unreliable due to patient lethargy.  He denies any specific problems but says that his legs hurt.  He is diabetic, poorly controlled.    Physical Exam   Temp: 100.3  F (37.9  C) Temp src: Tympanic BP: 131/61 Pulse: 91 Heart Rate: 88 Resp: (!) 38 SpO2: 93 % O2 Device: Nasal cannula Oxygen Delivery: 2 LPM  Vital Signs with Ranges  Temp:  [100.3  F (37.9  C)-101.5  F (38.6  C)] 100.3  F (37.9  C)  Pulse:  [] 91  Heart Rate:  [88-97] 88  Resp:  [34-60] 38  BP: (108-143)/() 131/61  SpO2:  [87 %-98 %] 93 %  175 lbs 0 oz    Constitutional: He is sleepy but easily arousable.  He does answer questions but unreliable.  He is in no acute distress.  Eyes: No scleral icterus.  Pupils are equal round reactive.  Extraocular movements appear intact.  HEENT: Within normal limits for age.  Neck is supple with no adenopathy.  Respiratory: Diffuse rhonchus breath sounds are heard.  There are few scattered rales at the left lung field and at the right base.  Cardiovascular: Very distant tones, difficult to hear.  No murmur gallop appreciated.  Rhythm is irregularly irregular  with a rate of 100  GI: Soft and nontender with no organomegaly or masses, bowel sounds normal  Lymph/Hematologic: No bruising  Genitourinary: Wearing a diaper.  Penis and testicles appear relatively normal.  Incontinent.  Skin: Warm and dry, no diaphoresis.  Musculoskeletal: No significant edema  Neurologic: Confused otherwise no focal findings  Psychiatric: Lethargic, not oriented, somewhat flat affect    Data   Data reviewed today:  I personally reviewed the EKG tracing showing Atrial fibrillation with controlled ventricular response at 100.  He has a bifascicular block. and the chest x-ray image(s) showing Bilateral infiltrates, more on the left..  White blood count is normal.  Hemoglobin is stable at 12.0.  Electrolytes are normal.  Troponin is elevated at 0.08 which is chronic.  Procalcitonin is slightly elevated at 1.2.  Lactate is normal.  Glucose is 275.  Recent Labs   Lab 09/22/19  1012 09/18/19  1200 09/18/19   WBC 10.3 8.8  --    HGB 12.0* 11.2*  --    MCV 81 80  --     314  --    INR  --   --  1.5*    134  --    POTASSIUM 4.7 4.2  --    CHLORIDE 95* 97*  --    CO2 29 29  --    BUN 39* 25  --    CR 1.49* 1.40*  --    ANIONGAP 10 8  --    AZRA 9.1 9.1  --    * 251*  --    TROPI 0.080* 0.045*  --        Recent Results (from the past 24 hour(s))   XR Chest 2 Views    Narrative    PROCEDURE: XR CHEST 2 VW 9/22/2019 10:39 AM    HISTORY: shortness of breath    COMPARISONS: 9/18/2019.    TECHNIQUE: 2 views.    FINDINGS: Heart is enlarged. There is bilateral interstitial  prominence with more confluent patchy infiltrate in the right upper  lobe. This has increased when compared to the prior exam. Mild  infiltrate lateral to the right heart border is a stable finding.  There is blunting of the costophrenic angles consistent with small  effusions.    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         Impression    IMPRESSION: Patchy right-sided infiltrates, increased in the right  upper  lobe when compared to the prior exam.    CHRISTOPHE ROJAS MD

## 2019-09-22 NOTE — PROGRESS NOTES
Admission Note    Data:  Adiel Rosa Jr admitted to 302 from emergency room at 1443.      Action:  Dr. Ernandez has been notified of admission. Pt oriented to unit, call light in reach.     Response:  Patient was orientated to where he is. Pt seems pleasantly confused. Had to re-orientate often. Is resting comfortably with call light in reach.    Janice Welch RN on 9/22/2019 at 5:41 PM

## 2019-09-22 NOTE — ED PROVIDER NOTES
Adiel Rosa Jr  : 1935 Age: 84 year old Sex: male MRN: [id number]    CC: Fever, cough    HPI: Adiel Rosa Jr is a 84 year old male with PMH significant for recent admission for PNA who presents from his nursing home with complaints of fever for the last few days along with an ongoing cough for the last week or so.  I saw him approximately 4 to 5 days ago at which time he presented with confusion, and had a generally benign work-up and was discharged.  He followed up with his primary care the next day and was doing well at that time.  Since then at the nursing home he has been having regular fevers and has been having a progressively worsening cough.  He states that he feels fine overall denies any chest pain or shortness of breath    ED Course and MDM:  Fever, cough: Patient is febrile on presentation and tachypneic with coarse lung sounds particularly on the right, primary concern is for recurrent pneumonia.  Plan for CBC, BMP, VBG, lactate, chest x-ray, EKG.  Labs returned essentially unremarkable.  Chest x-ray with significantly more congestion than previous, particularly in the right upper lobe.  In conjunction with the patient's ongoing tachypnea and mild hypoxia, this is consistent with a recurrent pneumonia.  The patient was started on ceftriaxone and azithromycin and admitted to the hospital for ongoing management of his pneumonia.    Final Clinical Impression:  Pneumonia    Tato Bowen MD  Internal Medicine and Emergency Medicine  9:54 AM 19      Physical Exam:  /61   Pulse 91   Temp 100.3  F (37.9  C) (Tympanic)   Resp (!) 38   Ht 1.829 m (6')   Wt 79.4 kg (175 lb)   SpO2 93%   BMI 23.73 kg/m      Gen: Awake, interactive  HEENT: MMM, EOMI  CV: Irregular, WWP  Pulm: Tachypneic with R>L coarse breath sounds and coarse nonproductive cough  Abd: Soft, nontender  Ext: No edema  Neuro: Oriented to self, interactive  Psych: Intermittently irritated    ROS:  10  point review of systems performed and negative except as noted in HPI.    Past Medical History:  Past Medical History:   Diagnosis Date     Acute myocardial infarction of other specified sites, episode of care unspecified 1/1/1999    Non Q wave  treated with Retavase      Benign hypertensive heart disease with heart failure (H) 1/1/2011     Problem list name updated by automated process. Provider to review     Chronic atrial fibrillation (H) 4/1/2013     Chronic obstructive pulmonary disease, unspecified COPD type (H) 1/1/2011     Problem list name updated by automated process. Provider to review     Hypercalcemia 1/1/2011     Hypercholesterolemia 1/1/2011     Long-term (current) use of anticoagulants [Z79.01] 8/2/2016     Type 2 diabetes mellitus with hyperglycemia, with long-term current use of insulin (H) 11/29/1999     Problem list name updated by automated process. Provider to review         Family history:  Family History   Problem Relation Age of Onset     No Known Problems Mother      No Known Problems Father      No Known Problems Other          Social History:   Social History     Socioeconomic History     Marital status:      Spouse name: Not on file     Number of children: Not on file     Years of education: Not on file     Highest education level: Not on file   Occupational History     Not on file   Social Needs     Financial resource strain: Not on file     Food insecurity:     Worry: Not on file     Inability: Not on file     Transportation needs:     Medical: Not on file     Non-medical: Not on file   Tobacco Use     Smoking status: Former Smoker     Packs/day: 1.00     Years: 40.00     Pack years: 40.00     Types: Cigarettes     Smokeless tobacco: Never Used   Substance and Sexual Activity     Alcohol use: No     Drug use: Never     Comment: Drug use: No     Sexual activity: Not Currently   Lifestyle     Physical activity:     Days per week: Not on file     Minutes per session: Not on file      Stress: Not on file   Relationships     Social connections:     Talks on phone: Not on file     Gets together: Not on file     Attends Yarsani service: Not on file     Active member of club or organization: Not on file     Attends meetings of clubs or organizations: Not on file     Relationship status: Not on file     Intimate partner violence:     Fear of current or ex partner: Not on file     Emotionally abused: Not on file     Physically abused: Not on file     Forced sexual activity: Not on file   Other Topics Concern     Not on file   Social History Narrative     Not on file         Surgical History:  Past Surgical History:   Procedure Laterality Date     reverse total arthoplasty of right shoulder   01/25/2018                  Tato Bowen MD  09/22/19 0230

## 2019-09-22 NOTE — ED TRIAGE NOTES
EMS Arrival Note  ________________________________  Adiel Rosa Jr is a 84 year old Male that arrives via [facility] ambulance service fromassisted living [facility]  Pre hospital clinical presentation per EMS personnel includes has been running a fever for the last few days with a week congested cough.  Pre hospital personnel report vital signs of:  B/P 127/69; , RR 32;SpO2 98, on 4L via NC  Pre Hospital Cardiac rhythm reported as A fib  Pre hospital care included: Medication: DuoNeb  Patient arrives with:  GCS 13  Airway intact  Breathing Assessment course breath sounds.    Circulation Assessment Normal.  Patient arrives with a 20 IV at his right hand with 300 ml of normal saline infused upon arrival.    Placed in room 904, gowned, warm blanket provided, side rails up,  ID verified and band placed, and call light within reach.       Previous living situation Assisted Living

## 2019-09-22 NOTE — PLAN OF CARE
Pt is pleasantly confused and lethargic. VSS. BP (!) 144/65   Pulse 94   Temp 99.8  F (37.7  C) (Tympanic)   Resp (!) 51   Ht 1.829 m (6')   Wt 79.4 kg (175 lb)   SpO2 96%   BMI 23.73 kg/m    O2 is 96% on 2 LPM. BS active. LS diminished. Denies pain. Denies nausea. IV patent. Pt has been checked, changed and repositioned q2h. Had 2 soiled briefs since admission. Will continue to monitor.     Janice Welch RN on 9/22/2019 at 6:22 PM

## 2019-09-22 NOTE — PHARMACY-ANTICOAGULATION SERVICE
Pharmacy Consult- Initial Warfarin Assessment    Adiel Rosa Jr is a 84 year old male admitted on 9/22/2019 with HCAP (healthcare-associated pneumonia)    Primary Indication(s) for Anticoagulation: A fib    Goal INR: 2 to 3    FYI, patient is followed by the Anticoagulation/Protime Clinic at: [facility] Rudi Melissa    Patient Active Problem List   Diagnosis     Chronic atrial fibrillation (H)     Advanced care planning/counseling discussion     Hypercholesterolemia     Benign hypertensive heart disease with heart failure (H)     Hypercalcemia     Chronic obstructive pulmonary disease, unspecified COPD type (H)     Erectile Dysfunction     Hypertrophy of prostate without urinary obstruction     Esophageal reflux     Acute myocardial infarction of other specified sites, episode of care unspecified     Type 2 diabetes mellitus with hyperglycemia, with long-term current use of insulin (H)     Cough     Hypertension, benign     Long-term (current) use of anticoagulants [Z79.01]     Hypoglycemia due to insulin     Osteomyelitis of foot, right, acute (H)     Health Care Home     Chronic osteomyelitis of right foot (H)     Stage 4 chronic kidney disease (H)     HCAP (healthcare-associated pneumonia)     Recurrent pneumonia     Elevated troponin       Patient previously anticoagulated on Coumadin at a dose of 38 mg/week; dosed as: 4 mg on Monday and Friday; 6 mg the other 5 days of the week  Note: INR was low on 9/18. Increased dose of 8 9/18 10 mg and 9/19 8 mg    Factors that may increase patient's sensitivity to warfarin (Coumadin) include: age, infection, antibiotics    Factors that may decrease patient's sensitivity to warfarin (Coumadin) include: none noted    Recent Labs   Lab Test 09/22/19  1410 09/22/19  1012 09/18/19  1200 09/18/19 09/12/19 09/10/19  0510 09/09/19  0538 09/08/19  0521   HGB  --  12.0* 11.2*  --   --   --  11.2* 10.9*   INR 2.50*  --   --  1.5* 2.6* 2.39* 3.09* 3.67*   PLT   --  192 314  --   --   --  341 591        Plan: Give warfarin 3 mg po today x1. INR in AM.    Thank You for the Consult. Will continue to follow.    Ciera Cordoba [facility] ....................  9/22/2019   3:04 PM

## 2019-09-22 NOTE — PHARMACY
Pharmacy- Renal Dose Adjustment    Patient Active Problem List   Diagnosis     Chronic atrial fibrillation (H)     Advanced care planning/counseling discussion     Hypercholesterolemia     Benign hypertensive heart disease with heart failure (H)     Hypercalcemia     Chronic obstructive pulmonary disease, unspecified COPD type (H)     Erectile Dysfunction     Hypertrophy of prostate without urinary obstruction     Esophageal reflux     Acute myocardial infarction of other specified sites, episode of care unspecified     Type 2 diabetes mellitus with hyperglycemia, with long-term current use of insulin (H)     Cough     Hypertension, benign     Long-term (current) use of anticoagulants [Z79.01]     Hypoglycemia due to insulin     Osteomyelitis of foot, right, acute (H)     Health Care Home     Chronic osteomyelitis of right foot (H)     Stage 4 chronic kidney disease (H)     HCAP (healthcare-associated pneumonia)     Recurrent pneumonia     Elevated troponin        Relevant Labs:  Recent Labs   Lab Test 09/22/19  1012 09/18/19  1200   WBC 10.3 8.8   HGB 12.0* 11.2*    314        CrCl: 43 ml/min    No intake or output data in the 24 hours ending 09/22/19 1455       Estimated Cr cl is between 30 and 50 ml/min, per Renal Dose Adjustment Protocol, will adjust ranitidine to 150 mg po daily.    Will continue to follow and make adjustments accordingly. Thank You.    Ciera Cordoba [facility] ....................  9/22/2019   2:55 PM

## 2019-09-22 NOTE — PROGRESS NOTES
Patient has been a bit anxious/agitated and picking at his IV and tubing.  Ativan ordered.  Also reviewed his chest x-ray.  Must consider aspiration.  We will add metronidazole to his current regimen.  ASH BONE MD on 9/22/2019 at 3:49 PM

## 2019-09-23 NOTE — PROGRESS NOTES
Writer spoke with son re patient's condition. Writer then recommended son speak with MD rodriguez further wishes re care.

## 2019-09-23 NOTE — PROGRESS NOTES
Writer called to patient room.  At this time patient RR is 60 and very shallow. Dr. Ernandez called. ABG ordered.    Agapito Dorado RT on 9/23/2019 at 5:50 AM

## 2019-09-23 NOTE — PROGRESS NOTES
Comfort care orders recieved and BiPap discontinued at this time. Patient remains very lethargic but responsive to light touch. Not responding verbally.

## 2019-09-23 NOTE — PROGRESS NOTES
[facility] [place]    Patient admitted to room 301 at approximately 1711 via bed from ICU.. Patient was accompanied by nurse.  Verbal SBAR report received from STONEY Smith prior to patient arrival.     Patient trasferred to bed via slip. Unable to assess orientation. The patient is not having any pain. 0-10 Pain Scale: 0. Admission vital signs: Blood pressure 126/68, pulse 101, temperature 96.9  F (36.1  C), temperature source Tympanic, resp. rate (!) 48, height 1.829 m (6'), weight 83.8 kg (184 lb 11.9 oz), SpO2 90 %. Patient unable to be oriented to room due to health condition.    Risk Assessment    The following safety risks were identified during admission: fall. Yellow risk band applied: YES.     Skin Initial Assessment    Alejandro Risk Assessment  Sensory Perception: 3-->slightly limited  Moisture: 4-->rarely moist  Activity: 1-->bedfast  Mobility: 2-->very limited  Nutrition: 2-->probably inadequate  Friction and Shear: 2-->potential problem  Alejandro Score: 14  Bed Support Surface: Zone Air mattress (Range)  Alejandro Intervention(s) Implemented: draw sheets, incontinence care, patient /family education on pressure injury prevention, patient education on pressure relief reinforced    Veronica Scherer RN

## 2019-09-23 NOTE — PROGRESS NOTES
Called by RT for respiratory distress. Apparently has been tachypneic all night, but recently with a decline in mental status.   RT recommended BiPAP, transfer to ICU.   Labs obtained  Now in ICU, still has tachypnea on BiPAP, but less labored breathing.  Mild bibasilar rales  A-fib with rate around 100    INR   Result Value Ref Range    INR 4.71 (H) 0 - 1.3   Basic metabolic panel   Result Value Ref Range    Sodium 134 134 - 144 mmol/L    Potassium 5.0 3.5 - 5.1 mmol/L    Chloride 98 98 - 107 mmol/L    Carbon Dioxide 30 21 - 31 mmol/L    Anion Gap 6 3 - 14 mmol/L    Glucose 298 (H) 70 - 105 mg/dL    Urea Nitrogen 41 (H) 7 - 25 mg/dL    Creatinine 1.46 (H) 0.70 - 1.30 mg/dL    GFR Estimate 46 (L) >60 mL/min/[1.73_m2]    GFR Estimate If Black 56 (L) >60 mL/min/[1.73_m2]    Calcium 8.8 8.6 - 10.3 mg/dL   CBC with platelets   Result Value Ref Range    WBC 10.8 4.0 - 11.0 10e9/L    RBC Count 4.37 (L) 4.4 - 5.9 10e12/L    Hemoglobin 11.0 (L) 13.3 - 17.7 g/dL    Hematocrit 35.7 (L) 40.0 - 53.0 %    MCV 82 78 - 100 fl    MCH 25.2 (L) 26.5 - 33.0 pg    MCHC 30.8 (L) 31.5 - 36.5 g/dL    RDW 17.1 (H) 10.0 - 15.0 %    Platelet Count 341 150 - 450 10e9/L   Procalcitonin   Result Value Ref Range    Procalcitonin 1.40 ng/ml   Blood gas arterial and oxyhgb   Result Value Ref Range    pH Arterial 7.36 7.35 - 7.45 pH    pCO2 Arterial 55 (H) 35 - 45 mm Hg    pO2 Arterial 63 (L) 83 - 108 mm Hg    Bicarbonate Arterial 30 (H) 22 - 28 mmol/L    FIO2 UNKNOWN     Oxyhemoglobin Arterial 90 (L) >95 %   Lactic acid   Result Value Ref Range    Lactic Acid 1.2 0.5 - 2.2 mmol/L       CO2 retention, appears partially compensated. No worse infection markers. Hypoxia, suspect pulmonary edema.   Given furosemide 20 mg IV x1 and ordered Solu-Medrol 60 mg IV x 1.  CXR pending.   Care to be assumed shortly by weekday hospitalist.  Jesse Ernandez MD

## 2019-09-23 NOTE — PROGRESS NOTES
[facility]    Hospitalist Progress Note      Assessment & Plan   Adiel Rosa Jr is a 84 year old male who was admitted on 9/22/2019 with clinical and radiographic evidence of recurrent pneumonia, concern for possible aspiration.        HCAP (healthcare-associated pneumonia)  Acute hypoxic and hypercapnic respiratory failure.     Assessment: Patient was just hospitalized about 2 weeks ago and treated with Zosyn and Zithromax.  He did a course of Augmentin as an outpatient.     Plan: getting cefepime and flagyl.   -swallow eval. He has no POA, but I will call his son for update.  -repeat ABG around 10am.   -continue bipap  -blood culture not resulted yet. Monitor.   -currently requiring bipap      Chronic atrial fibrillation (H). Currently NPO due to bipap and respiratory failure. On chronic anticoagulation with elevated INR this AM.     Assessment: Rate is controlled    Plan: change meds to IV and monitor  -continue tele      Benign hypertensive heart disease with heart failure (H)    Assessment: No symptoms at this time    Plan: Continue current meds      Chronic obstructive pulmonary disease, unspecified COPD type (H). No wheezing on exam. Got a dose of solumedrol this AM with respiratory changes.     Assessment: Appears stable    Plan: Continue current meds. No further solumedrol at this time.       Type 2 diabetes mellitus with hyperglycemia, with long-term current use of insulin (H)    Assessment: Poor control.  Last A1c was 11% 1 month ago.    Plan: Continue current insulin and add corrective dose      Stage 4 chronic kidney disease (H)    Assessment: Creatinine appears relatively stable    Plan: Continue current treatments      Elevated troponin    Assessment: Troponin is chronically elevated likely related to atrial fibrillation, heart failure, and stress.  EKG shows no acute change    Plan: Monitor  -no echo on file. Will order    DVT Prophylaxis: on chronic anticoagulation with  warfarin  Code Status: Full Code    Mary Alaniz    Interval History   Had rapid response overnight with decompensation in breathing. Transferred to ICU. Was given lasix. CXR shows volume overload. He is minimally responsive today, wearing bipap in unit. Cannot follow commands or answer questions.     -Data reviewed today: I reviewed all new labs and imaging results over the last 24 hours. I personally reviewed CXR which shows significant pulmonary edema and interstitial infiltrates.     Physical Exam   Temp: 100.2  F (37.9  C) Temp src: Tympanic BP: 126/68 Pulse: 101 Heart Rate: 95 Resp: (!) 59 SpO2: 95 % O2 Device: Nasal cannula Oxygen Delivery: 2 LPM(increased to 3L)  Vitals:    09/22/19 0952 09/23/19 0439   Weight: 79.4 kg (175 lb) 84.8 kg (186 lb 15.2 oz)     Vital Signs with Ranges  Temp:  [98.8  F (37.1  C)-101.5  F (38.6  C)] 100.2  F (37.9  C)  Pulse:  [] 101  Heart Rate:  [] 95  Resp:  [34-60] 59  BP: (108-144)/() 126/68  SpO2:  [83 %-98 %] 95 %  I/O last 3 completed shifts:  In: 1530 [P.O.:10; I.V.:1520]  Out: -      Constitutional: more obtunded and difficult to arouse, will not open eyes to painful stimuli or command but withdraws to painful stimuli.  Wearing bipap.   Eyes: No scleral icterus.  had to open eyes for him as he will not on his own. Pupils are equal round reactive.  Extraocular movements appear intact.  Respiratory: on bipap. Coarse.   Cardiovascular: irregular.   GI: Soft and nontender with no organomegaly or masses, bowel sounds normal  Lymph/Hematologic: No bruising  Genitourinary: Wearing a diaper.  Incontinent.  Skin: Warm and dry, no diaphoresis.  Musculoskeletal: No significant edema  Neurologic: responds withdrawing only to painful stimuli  Psychiatric: Lethargic, not oriented    Medications     dextrose 5% and 0.45% NaCl + KCl 20 mEq/L Stopped (09/23/19 0607)     - MEDICATION INSTRUCTIONS -       sodium chloride 10 mL/hr at 09/23/19 0734     Warfarin Therapy  Reminder         aspirin  325 mg Oral Daily     atorvastatin  40 mg Oral QPM     ceFEPIme (MAXIPIME) IV  2 g Intravenous Q12H     digoxin  125 mcg Oral Daily     donepezil  5 mg Oral Daily     fluticasone-salmeterol  1 puff Inhalation Q12H     furosemide  20 mg Oral Daily     insulin aspart  4-10 Units Subcutaneous TID w/meals     insulin aspart  1-7 Units Subcutaneous TID AC     insulin aspart  1-5 Units Subcutaneous At Bedtime     insulin detemir  25 Units Subcutaneous At Bedtime     insulin detemir  40 Units Subcutaneous QAM AC     LORazepam  0.5 mg Intravenous Q6H     metoprolol succinate ER  50 mg Oral Daily     metroNIDAZOLE  500 mg Intravenous Q8H     ranitidine  150 mg Oral At Bedtime     sodium chloride (PF)  3 mL Intracatheter Q8H     warfarin-No DOSE today  1 each Does not apply no dose today (warfarin)       Data   Recent Labs   Lab 09/23/19  0520 09/22/19  1410 09/22/19  1012 09/18/19  1200 09/18/19   WBC 10.8  --  10.3 8.8  --    HGB 11.0*  --  12.0* 11.2*  --    MCV 82  --  81 80  --      --  314 314  --    INR 4.71* 2.50*  --   --  1.5*     --  134 134  --    POTASSIUM 5.0  --  4.7 4.2  --    CHLORIDE 98  --  95* 97*  --    CO2 30  --  29 29  --    BUN 41*  --  39* 25  --    CR 1.46*  --  1.49* 1.40*  --    ANIONGAP 6  --  10 8  --    AZRA 8.8  --  9.1 9.1  --    *  --  275* 251*  --    TROPI 0.114*  --  0.080* 0.045*  --        Recent Results (from the past 24 hour(s))   XR Chest 2 Views    Narrative    PROCEDURE: XR CHEST 2 VW 9/22/2019 10:39 AM    HISTORY: shortness of breath    COMPARISONS: 9/18/2019.    TECHNIQUE: 2 views.    FINDINGS: Heart is enlarged. There is bilateral interstitial  prominence with more confluent patchy infiltrate in the right upper  lobe. This has increased when compared to the prior exam. Mild  infiltrate lateral to the right heart border is a stable finding.  There is blunting of the costophrenic angles consistent with small  effusions.    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         Impression    IMPRESSION: Patchy right-sided infiltrates, increased in the right  upper lobe when compared to the prior exam.    CHRISTOPHE ROJAS MD

## 2019-09-23 NOTE — PHARMACY
Pharmacy - Transfer Medication Reconciliation     The patient's transfer medication orders have been compared to the medication administration record and to the Prior to Admissions Medications list - any noted discrepancies were resolved with the MD.     Thank you. Pharmacy will continue to monitor.     Ciera Cordoba [facility] ....................  9/23/2019   7:21 AM

## 2019-09-23 NOTE — PROGRESS NOTES
Met with family at bedside.  Discussed goals of care.  Patient would want to be comfort measures.  Family expressed some concerns of his recent living situation in the nursing home.  I listen to them and offered resources and reassurance.  They understand prognosis and Mesfin has commented that he is ready to be with his wife and his dog who have both passed in the last year.  Status changed to comfort measures.  He can transfer to the floor.  Will stop antibiotics and all other medical therapy at this time as appropriate.  Ativan and morphine as needed for pain control, mature issues, anxiety.

## 2019-09-23 NOTE — PROVIDER NOTIFICATION
Patient RR 50s, became more shallow, patient more lethargic, RT and MD notified, ABGs and other labs ordered, 20mg IV lasix administered, IV maintenance fluids stopped.    Patient transferred to ICU accompanied by writer and RT, report given to STONEY Suarez. Son, Villa, updated on patient's condition and transfer to ICU. Vinayak Gómez RN on 9/23/2019 at 6:35 AM

## 2019-09-23 NOTE — PROGRESS NOTES
Patient transferred from medical to ICU bed 916 at 630 am. Patient respiratory rate in 50's.patient is lethargic with minimum response. Unable to follow any commands. Responds to painful stimuli.arrived on 3 L per NC. Put on BiPAP by RT.Medical RN(Vinayak) will call family about changes in medical condition of the patient.  Montserrat Colin RN on 9/23/2019 at 6:48 AM

## 2019-09-23 NOTE — PROGRESS NOTES
:    Patient is from [facility] assisted living.  I called Swati and left message regarding discharge update.  They provide all cares for patient and will accept him back at discharge.  Anticipated discharge in several days back to his assisted living.   will continue to follow.    KRYSTINA Gómez on 9/23/2019 at 10:23   AM    :    At discharge planing meeting it was mentioned that patient was taken of Bi-PAP and was being placed on comfort cares and expected to pass within 1-2 days.  I called Swati at [facility] and gave her new update.   will continue to provide support.    KRYSTINA Gómez on 9/23/2019 at 1:06 PM

## 2019-09-23 NOTE — PLAN OF CARE
Pt admitted for comfort care.  Temperature 96.9, RR 48, and O2 90% on 3L via NC.  Skin pale to color with scattered bruising. Pressure injury present on bottom with barrier cream applied and repositioning every 2 hours. Breathing very shallow with abdominal muscles in use.   Mouth swabs placed in room with mouth moistened. Chapstick applied. Light relaxing music turned on with lights dimmed. Pt currently showing no signs of pain and resting comfortably. Pt incontinent upon arrival and changed. Check and changes will be done with repositioning.  Veronica Scherer RN on 9/23/2019 at 5:58 PM    Oral Morphine given to help ease increased respirations. Veronica Scherer RN on 9/23/2019 at 6:55 PM

## 2019-09-23 NOTE — PHARMACY-ANTICOAGULATION SERVICE
Pharmacy Consult- warfarin Follow-Up    Adiel Rosa Jr is a 84 year old male admitted on 9/22/2019 with HCAP (healthcare-associated pneumonia)    Primary Indication(s) for Anticoagulation: a fib    Goal INR: 2 to 3    FYI, patient is followed by the Anticoagulation/Protime Clinic at: [facility] Dr. Rudi Melissa    Patient Active Problem List   Diagnosis     Chronic atrial fibrillation (H)     Advanced care planning/counseling discussion     Hypercholesterolemia     Benign hypertensive heart disease with heart failure (H)     Hypercalcemia     Chronic obstructive pulmonary disease, unspecified COPD type (H)     Erectile Dysfunction     Hypertrophy of prostate without urinary obstruction     Esophageal reflux     Acute myocardial infarction of other specified sites, episode of care unspecified     Type 2 diabetes mellitus with hyperglycemia, with long-term current use of insulin (H)     Cough     Hypertension, benign     Long-term (current) use of anticoagulants [Z79.01]     Hypoglycemia due to insulin     Osteomyelitis of foot, right, acute (H)     Health Care Home     Chronic osteomyelitis of right foot (H)     Stage 4 chronic kidney disease (H)     HCAP (healthcare-associated pneumonia)     Recurrent pneumonia     Elevated troponin       New factors that may increase patient's sensitivity to warfarin (Coumadin) include: infection, cefepime, metronidazole, age    New factors that may decrease patient's sensitivity to warfarin (Coumadin) include: none noted    Dietary Intake: 100%    Recent Labs   Lab Test 09/23/19  0520 09/22/19  1410 09/22/19  1012 09/18/19  1200 09/18/19 09/12/19 09/09/19  0538   HGB 11.0*  --  12.0* 11.2*  --   --   --  11.2*   INR 4.71* 2.50*  --   --  1.5* 2.6*   < > 3.09*     --  314 314  --   --   --  352    < > = values in this interval not displayed.        Recent Dosing:      Anticoagulation Dose History     Recent Dosing and Labs Latest Ref Rng & Units  9/8/2019 9/9/2019 9/10/2019 9/12/2019 9/18/2019 9/22/2019 9/23/2019    Warfarin 2 mg - - 2 mg - - - - -    Warfarin 3 mg - - - - - - 3 mg -    INR 0 - 1.3 3.67(H) 3.09(H) 2.39(H) 2.6(A) 1.5(A) 2.50(H) 4.71(H)    INR 0.86 - 1.14 - - - - - - -            Plan: Hold warfarin today. INR in AM. Further dosing per INR.    Thank You for the Consult. Will continue to follow.    Ciera Cordoba [facility] ....................  9/23/2019   7:27 AM

## 2019-09-24 NOTE — PROGRESS NOTES
:    I called Tiffanie at [facility] assisted living and gave her discharge update. She stated if patient needs to return they would accept patient back with hospice if needed.  Plan for now is for patient to remain here on comfort care.  A referral was faxed to [facility] to review incase patient is discharged back to [facility] asssited living.   will continue to follow.    KRYSTINA Gómez on 9/24/2019 at 1:29 PM

## 2019-09-24 NOTE — PLAN OF CARE
Patient arouses to to voice or gentle touch. Patient answers questions infrequently with one word answers. Occasional moaning with repositioning. Pain level of a 2-3 on the FLACC scale. Morphine administered per MAR. Respirations 32-44. O2 administered via nasal cannula 3 L. Coccyx is red and blanchable. Patient repositioned every 2 hours. Barrier cream applied. Incontinent of urine. Voiding without difficulty.   Beatriz Jo RN on 9/24/2019 at 7:32 AM

## 2019-09-24 NOTE — PLAN OF CARE
Pt temp and RR checked during this shift. Temp was 101.7 and was given PRN suppository Tylenol. Temp currently 99.1. RR 40 - 50. On 2L O2 for comfort.   Morphine being given to help ease breathing and some restlessness during repositioning.   No change in skin assessment. Skin pale to color, scattered bruising and scabs present. Bottom pink to color being treated with barrier cream and repositioning every 2 hours. Check/changes being done due to incontinence. Oral cares being done frequently. Pt mainly non-verbal ex occasional yes and no answers. Relaxing music and dimmed lights in use for comfort. Veronica Scherer RN on 9/24/2019 at 6:00 PM

## 2019-09-24 NOTE — PROGRESS NOTES
[facility]    Hospitalist Progress Note      Assessment & Plan   Adiel Rosa Jr is a 84 year old male who was admitted on 9/22/2019 with clinical and radiographic evidence of recurrent pneumonia, concern for possible aspiration.  I had a long conversation with his family yesterday.  Plan now is for comfort measures.       HCAP (healthcare-associated pneumonia)  Acute hypoxic and hypercapnic respiratory failure.     Chronic atrial fibrillation (H).     Benign hypertensive heart disease with heart failure (H)    Chronic obstructive pulmonary disease, unspecified COPD type (H). No wheezing on exam. Got a dose of solumedrol this AM with respiratory changes.     Type 2 diabetes mellitus with hyperglycemia, with long-term current use of insulin (H)    Stage 4 chronic kidney disease (H)    Elevated troponin    All home medications were stopped as goal is comfort.  Antibiotics were stopped.  IV fluids only if patient needs them for medications.  He has been comfortable with morphine.  Family not present on my exam today.    DVT Prophylaxis: on chronic anticoagulation with warfarin-stopped this admission as he is comfort measures  Code Status: DNR/DNI    Mary Alaniz    Interval History   Mesfin is actually a little bit more alert this morning.  He is following commands a little bit and answering questions with one-word sentences.  He is confused though.  Weak cough.  Denies any pain.    -Data reviewed today: no new.     Physical Exam   Temp: 97.4  F (36.3  C) Temp src: Tympanic     Heart Rate: 88 Resp: (!) 40 SpO2: 90 % O2 Device: Nasal cannula Oxygen Delivery: 3 LPM  Vitals:    09/23/19 0439 09/23/19 1718 09/24/19 0500   Weight: 84.8 kg (186 lb 15.2 oz) 83.8 kg (184 lb 11.9 oz) 84 kg (185 lb 3 oz)     Vital Signs with Ranges  Temp:  [96.2  F (35.7  C)-97.4  F (36.3  C)] 97.4  F (36.3  C)  Heart Rate:  [88] 88  Resp:  [32-48] 40  FiO2 (%):  [3 %] 3 %  SpO2:  [90 %] 90 %  No intake/output data  recorded.     Constitutional: more obtunded and difficult to arouse, will not open eyes to painful stimuli or command but withdraws to painful stimuli.  Wearing bipap.   Eyes: No scleral icterus.  had to open eyes for him as he will not on his own. Pupils are equal round reactive.  Extraocular movements appear intact.  Respiratory: on bipap. Coarse.   Cardiovascular: irregular.   GI: Soft and nontender with no organomegaly or masses, bowel sounds normal  Lymph/Hematologic: No bruising  Genitourinary: Wearing a diaper.  Incontinent.  Skin: Warm and dry, no diaphoresis.  Musculoskeletal: No significant edema  Neurologic: responds withdrawing only to painful stimuli  Psychiatric: Lethargic, not oriented    Medications     - MEDICATION INSTRUCTIONS -       Warfarin Therapy Reminder         sodium chloride (PF)  10 mL Intracatheter Q8H       Data   Recent Labs   Lab 09/23/19  0520 09/22/19  1410 09/22/19  1012 09/18/19  1200 09/18/19   WBC 10.8  --  10.3 8.8  --    HGB 11.0*  --  12.0* 11.2*  --    MCV 82  --  81 80  --      --  314 314  --    INR 4.71* 2.50*  --   --  1.5*     --  134 134  --    POTASSIUM 5.0  --  4.7 4.2  --    CHLORIDE 98  --  95* 97*  --    CO2 30  --  29 29  --    BUN 41*  --  39* 25  --    CR 1.46*  --  1.49* 1.40*  --    ANIONGAP 6  --  10 8  --    AZRA 8.8  --  9.1 9.1  --    *  --  275* 251*  --    TROPI 0.114*  --  0.080* 0.045*  --        No results found for this or any previous visit (from the past 24 hour(s)).

## 2019-09-25 NOTE — TELEPHONE ENCOUNTER
[facility] [employer] [id number] is calling for the following supplies   Incontinence briefs   (size large) Qty 150 for 30 day supply   Gloves  Qty 400   30 day supply   Will need a diagnostic code     Please advise

## 2019-09-25 NOTE — PLAN OF CARE
Temp 101.5 (T) ... given tylenol supp. Temp now 100.4 RR has been 40-44. Medicated with morphine. Pt has not been moving, grimacing or groaning. Appears comfortable. Son inquired by phone.

## 2019-09-25 NOTE — PLAN OF CARE
Pain level of 0-2 on the FLACC scale. Moaning with repositioning. Arouses to voice or touch. Respirations 38-42. Morphine administered x 2. Coccyx is red and blanchable. Repositioned every 2 hours. Barrier cream applied. Incontinent of urine. Temp of 97.6-98.5.   Beatriz Jo RN on 9/25/2019 at 7:51 AM

## 2019-09-25 NOTE — PROGRESS NOTES
[facility]  Hospitalist Progress Note    Assessment & Plan   Adiel Rosa Jr is a 84 year old male who was admitted on 9/22/2019 with clinical and radiographic evidence of recurrent pneumonia, concern for possible aspiration, on comfort measures.       HCAP (healthcare-associated pneumonia)  Acute hypoxic and hypercapnic respiratory failure.     Chronic atrial fibrillation (H).     Benign hypertensive heart disease with heart failure (H)    Chronic obstructive pulmonary disease, unspecified COPD type (H). No wheezing on exam. Got a dose of solumedrol this AM with respiratory changes.     Type 2 diabetes mellitus with hyperglycemia, with long-term current use of insulin (H)    Stage 4 chronic kidney disease (H)    Elevated troponin    All home medications were stopped as goal is comfort.  Antibiotics were stopped.  IV fluids only if patient needs them for medications.  He has been comfortable with morphine.  Family not present on my exam today.    DVT Prophylaxis: on chronic anticoagulation with warfarin-stopped this admission as he is comfort measures  Code Status: DNR/DNI    Mary Alaniz    Interval History   Patient is more somnolent this morning.  He has been febrile overnight.  Increase secretion production.  He is not awake or able to follow commands or answer questions today.    -Data reviewed today: no new.     Physical Exam   Temp: 97.6  F (36.4  C) Temp src: Tympanic       Resp: (!) 40        Vitals:    09/23/19 0439 09/23/19 1718 09/24/19 0500   Weight: 84.8 kg (186 lb 15.2 oz) 83.8 kg (184 lb 11.9 oz) 84 kg (185 lb 3 oz)     Vital Signs with Ranges  Temp:  [97.6  F (36.4  C)-101.7  F (38.7  C)] 97.6  F (36.4  C)  Resp:  [38-50] 40  I/O last 3 completed shifts:  In: 120 [P.O.:120]  Out: -      Constitutional:  Does not appear in pain.  Tachypneic.  Unable to follow commands or open eyes.  Respiratory: Coarse, with increasing secretions  Cardiovascular: irregular.   Skin: Warm  and dry, no mottling.    Medications     - MEDICATION INSTRUCTIONS -         sodium chloride (PF)  10 mL Intracatheter Q8H       Data   Recent Labs   Lab 09/23/19  0520 09/22/19  1410 09/22/19  1012 09/18/19  1200   WBC 10.8  --  10.3 8.8   HGB 11.0*  --  12.0* 11.2*   MCV 82  --  81 80     --  314 314   INR 4.71* 2.50*  --   --      --  134 134   POTASSIUM 5.0  --  4.7 4.2   CHLORIDE 98  --  95* 97*   CO2 30  --  29 29   BUN 41*  --  39* 25   CR 1.46*  --  1.49* 1.40*   ANIONGAP 6  --  10 8   AZRA 8.8  --  9.1 9.1   *  --  275* 251*   TROPI 0.114*  --  0.080* 0.045*       No results found for this or any previous visit (from the past 24 hour(s)).

## 2019-09-25 NOTE — PROGRESS NOTES
:    I called Swati at [facility] and gave her update.  It appears patient will most likely pass here in hospital in the next 24 hours.  I called hospice and gave them update.   will continue to follow.    KRYSTINA Gómez on 9/25/2019 at 11:16 AM

## 2019-09-26 NOTE — PLAN OF CARE
Pt resting comfortable.  No indicators of pain observed.  Trista Morales RN.............................9/25/2019 8:46 PM

## 2019-09-26 NOTE — PROGRESS NOTES
[facility]  Hospitalist Progress Note    Assessment & Plan   Adiel Rosa Jr is a 84 year old male who was admitted on 9/22/2019 with clinical and radiographic evidence of recurrent pneumonia, concern for possible aspiration, on comfort measures.       HCAP (healthcare-associated pneumonia)  Acute hypoxic and hypercapnic respiratory failure.     Chronic atrial fibrillation (H).     Benign hypertensive heart disease with heart failure (H)    Chronic obstructive pulmonary disease, unspecified COPD type (H). No wheezing on exam. Got a dose of solumedrol this AM with respiratory changes.     Type 2 diabetes mellitus with hyperglycemia, with long-term current use of insulin (H)    Stage 4 chronic kidney disease (H)    Elevated troponin    All home medications were stopped as goal is comfort.  Antibiotics were stopped.  IV fluids only if patient needs them for medications.  He has been comfortable with morphine.  Family not present on my exam today.    DVT Prophylaxis: on chronic anticoagulation with warfarin-stopped this admission as he is comfort measures  Code Status: DNR/DNI    Mary Alaniz    Interval History   Does not around to voice.  He has been febrile overnight.  Increase secretion production.  He is not awake or able to follow commands or answer questions today.    -Data reviewed today: no new.     Physical Exam   Temp: 99.8  F (37.7  C) Temp src: Tympanic       Resp: 26   O2 Device: Nasal cannula Oxygen Delivery: 3 LPM  Vitals:    09/23/19 0439 09/23/19 1718 09/24/19 0500   Weight: 84.8 kg (186 lb 15.2 oz) 83.8 kg (184 lb 11.9 oz) 84 kg (185 lb 3 oz)     Vital Signs with Ranges  Temp:  [97.7  F (36.5  C)-101.5  F (38.6  C)] 99.8  F (37.7  C)  Resp:  [26-44] 26  No intake/output data recorded.     Constitutional:  Does not appear in pain.  Tachypneic.  Unable to follow commands or open eyes.  Respiratory: clear anteriorly but poor effort  Cardiovascular: irregular.   Skin: Warm and  dry, no mottling.    Medications     - MEDICATION INSTRUCTIONS -         sodium chloride (PF)  10 mL Intracatheter Q8H       Data   Recent Labs   Lab 09/23/19  0520 09/22/19  1410 09/22/19  1012   WBC 10.8  --  10.3   HGB 11.0*  --  12.0*   MCV 82  --  81     --  314   INR 4.71* 2.50*  --      --  134   POTASSIUM 5.0  --  4.7   CHLORIDE 98  --  95*   CO2 30  --  29   BUN 41*  --  39*   CR 1.46*  --  1.49*   ANIONGAP 6  --  10   AZRA 8.8  --  9.1   *  --  275*   TROPI 0.114*  --  0.080*       No results found for this or any previous visit (from the past 24 hour(s)).

## 2019-09-26 NOTE — PROGRESS NOTES
Patient resting comfortably so far this shift.  No s/sx pain at this time.  Robbie Fitzgerald RN on 9/26/2019 at 12:49 PM

## 2019-09-26 NOTE — PROGRESS NOTES
Pt resting comfortably.  No non-verbal indicators of pain observed.  Will continue to monitor.  Trista Morales RN.............................9/26/2019 12:21 AM

## 2019-09-26 NOTE — PLAN OF CARE
Pt noted to be restless at times this afternoon. Medicated with morphine and pt resting quietly. Does not open eyes or have any verbal response. Family member here to visit.

## 2019-09-26 NOTE — PROGRESS NOTES
:    I called STONEY Longoria at [facility] and gave her discharge update.  Patient will stay here on comfort cares and pass.  Hospice was updated.   will continue to follow.    KRYSTINA Gómez on 9/26/2019 at 10:00 AM

## 2019-09-26 NOTE — PLAN OF CARE
Pt resting comfortably throughout shift.  Observed slight agitation upon repositioning.  PRN tylenol administered for temperature of 100.7 + morphine for pain.  Respirations remain shallow and between 24-30.  Will continue to monitor.  Trista Morales RN.............................9/26/2019 5:58 AM

## 2019-09-27 NOTE — PLAN OF CARE
Respirations ceased at 0545.  House supervisor Kim notified as well as family member Hillary Ayers  home to be notified at a later time.  Trista Morales RN.............................2019 6:19 AM

## 2019-09-27 NOTE — DEATH PRONOUNCEMENT
[facility]    Death Summary - Hospitalist Service     Date of Admission:  9/22/2019  Date of Death: 9/27/2019  Discharging Provider: Mary Alaniz DO    Hospital Course   Adiel Rosa Jr is a 84 year old male who was admitted on 9/22/2019 with clinical and radiographic evidence of recurrent pneumonia, concern for possible aspiration, on comfort measures.       HCAP (healthcare-associated pneumonia)  Acute hypoxic and hypercapnic respiratory failure.     Chronic atrial fibrillation (H).     Benign hypertensive heart disease with heart failure (H)    Chronic obstructive pulmonary disease, unspecified COPD type (H). No wheezing on exam. Got a dose of solumedrol this AM with respiratory changes.     Type 2 diabetes mellitus with hyperglycemia, with long-term current use of insulin (H)    Stage 4 chronic kidney disease (H)    Elevated troponin    All home medications were stopped as goal is comfort.  Antibiotics were stopped.  IV fluids only if patient needs them for medications.  He has been comfortable with morphine.  Family not present on my exam today.    DVT Prophylaxis: on chronic anticoagulation with warfarin-stopped this admission as he is comfort measures  Code Status: DNR/DNI    Mary Alaniz      Cause of death: pneumonia       Mary Alaniz DO  [facility]  ______________________________________________________________________      Significant Results and Procedures   Results for orders placed or performed during the hospital encounter of 09/22/19   XR Chest 2 Views    Narrative    PROCEDURE: XR CHEST 2 VW 9/22/2019 10:39 AM    HISTORY: shortness of breath    COMPARISONS: 9/18/2019.    TECHNIQUE: 2 views.    FINDINGS: Heart is enlarged. There is bilateral interstitial  prominence with more confluent patchy infiltrate in the right upper  lobe. This has increased when compared to the prior exam. Mild  infiltrate lateral to the right heart border is a stable  finding.  There is blunting of the costophrenic angles consistent with small  effusions.    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         Impression    IMPRESSION: Patchy right-sided infiltrates, increased in the right  upper lobe when compared to the prior exam.    CHRISTOPHE ROJAS MD   XR Chest Port 1 View    Narrative    PROCEDURE:  XR CHEST PORT 1 VW    HISTORY: hypoxia. .    COMPARISON:  9/22/19    FINDINGS:    The cardiomediastinal contours are enlarged, magnified by portable  technique.  Some Kerley B-lines are suggested. Patchy right lung opacities  persist, better seen on the AP and lateral views.      Impression    IMPRESSION:  Stable portable chest.      ORESTES FERGUSON MD       Consultations This Hospital Stay   PHARMACY TO DOSE WARFARIN  PHYSICAL THERAPY ADULT IP CONSULT  OCCUPATIONAL THERAPY ADULT IP CONSULT  SOCIAL WORK IP CONSULT  SPEECH LANGUAGE PATH ADULT IP CONSULT  SWALLOW EVAL SPEECH PATH AT BEDSIDE IP CONSULT    Primary Care Physician   GAURAV Grijalva    Time Spent on this Encounter   I, Mary Alaniz DO, discharged this patient today but I did not personally see the patient today and will not be billing for the patient's discharge.

## 2019-09-27 NOTE — PROGRESS NOTES
:    Called Sofia at [facility] and Alva at hospice and provided updates that patient passed away. No further needs at this time.     JESUS Robles on 9/27/2019 at 8:08 AM

## 2019-09-27 NOTE — PROGRESS NOTES
Patient  at 0545 and [facility] notified at 630.  Patient ruled out as a donor of tissue and eyes with a referral number of [id number].  Will call  home after family arrives.

## 2019-09-28 LAB
BACTERIA SPEC CULT: NORMAL
BACTERIA SPEC CULT: NORMAL
SPECIMEN SOURCE: NORMAL
SPECIMEN SOURCE: NORMAL

## 2021-04-28 NOTE — MR AVS SNAPSHOT
Adiel VILLATORO Moe    2/19/2016   Anticoagulation Therapy Visit    Description:  82 year old male   Provider:  GAURAV Grijalva MD   Department:  [facility] Anti Coagulation           INR as of 2/19/2018     Today's INR 2.6      Anticoagulation Summary as of 2/19/2018     INR goal 2.0-3.0   Today's INR 2.6   Full instructions 4 mg on Mon, Fri; 6 mg all other days   Next INR check 3/12/2018    Indications   Chronic atrial fibrillation (H) [I48.2]  Long-term (current) use of anticoagulants [Z79.01] [Z79.01]         February 2018 Details    Sun Mon Tue Wed Thu Fri Sat         1               2               3                 4               5               6               7               8               9               10                 11               12               13               14               15               16               17                 18               19      4 mg   See details      20      6 mg         21      6 mg         22      6 mg         23      4 mg         24      6 mg           25      6 mg         26      4 mg         27      6 mg         28      6 mg             Date Details   02/19 This INR check               How to take your warfarin dose     To take:  4 mg Take 2 of the 2 mg tablets.    To take:  6 mg Take 3 of the 2 mg tablets.           March 2018 Details    Sun Mon Tue Wed Thu Fri Sat         1      6 mg         2      4 mg         3      6 mg           4      6 mg         5      4 mg         6      6 mg         7      6 mg         8      6 mg         9      4 mg         10      6 mg           11      6 mg         12            13               14               15               16               17                 18               19               20               21               22               23               24                 25               26               27               28               29               30               31                Date Details   No additional  details    Date of next INR:  3/12/2018         How to take your warfarin dose     To take:  4 mg Take 2 of the 2 mg tablets.    To take:  6 mg Take 3 of the 2 mg tablets.            Bilobed Flap Text: The defect edges were debeveled with a #15 scalpel blade.  Given the location of the defect and the proximity to free margins a bilobe flap was deemed most appropriate.  Using a sterile surgical marker, an appropriate bilobe flap drawn around the defect.    The area thus outlined was incised deep to adipose tissue with a #15 scalpel blade.  The skin margins were undermined to an appropriate distance in all directions utilizing iris scissors.

## 2021-10-16 NOTE — TELEPHONE ENCOUNTER
Left message that the written RX is ready at the [facility]  registration to be picked up.       
Prescription picked up by Tiffanie Baird staff at [facility] ID Verified    
norco      Last Written Prescription Date: 1/16/17  Last Fill Quantity: 30,  # refills: 0   Last Office Visit with G, P or [facility] prescribing provider: 1/20/17                                               
MED

## 2022-05-05 NOTE — TELEPHONE ENCOUNTER
Mary     Last Written Prescription Date: 3/16/2017  Last Fill Quantity: 30,  # refills: 0   Last Office Visit with [facility], [facility] or [facility] prescribing provider: 6/15/2017                                                Patient called requesting a sign order for an MRI along with the prior-auth be faxed to [facility] central scheduling at [phone].     Please call to confirm fax sent.

## 2022-05-17 NOTE — MR AVS SNAPSHOT
Adiel Rosa Jr   7/2/2018   Anticoagulation Therapy Visit    Description:  83 year old male   Provider:  Jessa Gibson, RN   Department:  [facility]           INR as of 7/2/2018     Today's INR 2.0      Anticoagulation Summary as of 7/2/2018     INR goal 2.0-3.0   Today's INR 2.0   Full warfarin instructions 4 mg on Mon, Fri; 6 mg all other days   Next INR check 7/23/2018    Indications   Chronic atrial fibrillation (H) [I48.2]  Long-term (current) use of anticoagulants [Z79.01] [Z79.01]         July 2018 Details    Sun Mon Tue Wed Thu Fri Sat     1               2      4 mg   See details      3      6 mg         4      6 mg         5      6 mg         6      4 mg         7      6 mg           8      6 mg         9      4 mg         10      6 mg         11      6 mg         12      6 mg         13      4 mg         14      6 mg           15      6 mg         16      4 mg         17      6 mg         18      6 mg         19      6 mg         20      4 mg         21      6 mg           22      6 mg         23            24               25               26               27               28                 29               30               31                    Date Details   07/02 This INR check       Date of next INR:  7/23/2018         How to take your warfarin dose     To take:  4 mg Take 2 of the 2 mg tablets.    To take:  6 mg Take 3 of the 2 mg tablets.            Encounter Date: 5/17/2022       History     Chief Complaint   Patient presents with    Neck Pain     Left sided neck pain after having two warts removed from forehead     71 y/o m, h/o DVTs (off eliquis now), obesity, c/o neck pain, posterior/inferior auricular region x 2-3 days, mildly painful, feels area of swelling there and was worried about a blood clot.  No HA, no fever/chills, no recent URIs or dental pain, no ear pain.    The history is provided by the patient.     Review of patient's allergies indicates:   Allergen Reactions    Bamboo Swelling     Bamboo & sugar cane    Cat/feline products     Latex, natural rubber     Sulfa (sulfonamide antibiotics)      Fever     Past Medical History:   Diagnosis Date    BPH (benign prostatic hyperplasia)     Obesity (BMI 30-39.9)      Past Surgical History:   Procedure Laterality Date    APPENDECTOMY      COLONOSCOPY N/A 9/11/2017    Procedure: COLONOSCOPY;  Surgeon: Emiliano Kennedy MD;  Location: [address]);  Service: Endoscopy;  Laterality: N/A;    TONSILLECTOMY, ADENOIDECTOMY       Family History   Problem Relation Age of Onset    Stroke Father     Acromegaly Mother     Diabetes Mother     Cancer Brother         unk    Heart disease Neg Hx     Melanoma Neg Hx     Psoriasis Neg Hx     Lupus Neg Hx      Social History     Tobacco Use    Smoking status: Never Smoker    Smokeless tobacco: Never Used   Substance Use Topics    Alcohol use: Yes     Comment: social    Drug use: No     Review of Systems   Constitutional: Negative for chills and fever.   HENT: Negative for congestion, dental problem, ear pain and sore throat.    Respiratory: Negative for chest tightness and shortness of breath.    Cardiovascular: Negative for chest pain and leg swelling.   Neurological: Negative for dizziness, syncope, speech difficulty, weakness, numbness and headaches.       Physical Exam     Initial Vitals [05/17/22 0754]   BP Pulse Resp Temp SpO2   (!)  141/90 88 18 98 °F (36.7 °C) 96 %      MAP       --         Physical Exam    Nursing note and vitals reviewed.  Constitutional: He appears well-developed and well-nourished. He is not diaphoretic. No distress.   HENT:   Head:       Left Ear: No mastoid tenderness.   Mouth/Throat: Uvula is midline, oropharynx is clear and moist and mucous membranes are normal. No oral lesions. Normal dentition. No dental abscesses or uvula swelling.   1-2 cm area of tenderness and ? Round mass to posterior auricular region, inferior to mastoid process.  No erythema/warmth or area of fluctuance   Neck: Neck supple.   Normal range of motion.  Pulmonary/Chest: No stridor. No respiratory distress.   Musculoskeletal:      Cervical back: Normal range of motion and neck supple. No edema, erythema or rigidity. Muscular tenderness present. No spinous process tenderness.     Skin: Skin is warm and dry. No rash noted. No erythema.   Psychiatric: He has a normal mood and affect. Thought content normal.         ED Course   Procedures  Labs Reviewed - No data to display       Imaging Results    None          Medications - No data to display  Medical Decision Making:   History:   Old Medical Records: I decided to obtain old medical records.  Initial Assessment:   HD stable  Clinically very well-appearing    Exam as noted  Differential Diagnosis:   Pt was most concerned about a blood clot though given the location of pain/swelling, this would not raise suspicion for a clot.  I am most suspicious for a swollen lymph node vs muscle strain, less likely neoplasm or abscess.  That being said, given advanced age, I have explained to pt that if area of swelling does not resolve, will need advanced imaging to characterize further.  I do not think that emergent imaging is indicated today, however, and would try OTC pain meds/NSAIDS and see if this resolves with time.  I have instructed pt to arrange close f/u with PCP for further management.                       Clinical Impression:   Final diagnoses:  [M54.2] Neck pain (Primary)          ED Disposition Condition    Discharge Stable        ED Prescriptions     None        Follow-up Information     Follow up With Specialties Details Why Contact Info    Taiwo Infante, DO Internal Medicine Call in 1 day  [address] [facility] [address]  [phone]             Melina Bazan MD  05/17/22 5127

## 2023-05-07 NOTE — MR AVS SNAPSHOT
Adiel Rosa Jr   3/13/2017   Anticoagulation Therapy Visit    Description:  81 year old male   Provider:  GAURAV Grijalva MD   Department:  [facility]           INR as of 3/13/2017     Today's INR 2.9      Anticoagulation Summary as of 3/13/2017     INR goal 2.0-3.0   Today's INR 2.9   Full instructions 8 mg on Mon, Fri; 6 mg all other days   Next INR check 3/23/2017    Indications   Chronic atrial fibrillation (H) [I48.2]  Long-term (current) use of anticoagulants [Z79.01] [Z79.01]         March 2017 Details    Sun Mon Tue Wed Thu Fri Sat        1               2               3               4                 5               6               7               8               9               10               11                 12               13      8 mg   See details      14      6 mg         15      6 mg         16      6 mg         17      8 mg         18      6 mg           19      6 mg         20      8 mg         21      6 mg         22      6 mg         23            24               25                 26               27               28               29               30               31                 Date Details   03/13 This INR check       Date of next INR:  3/23/2017         How to take your warfarin dose     To take:  6 mg Take 3 of the 2 mg tablets.    To take:  8 mg Take 4 of the 2 mg tablets.            [Follow-Up: _____] : a [unfilled] follow-up visit

## 2023-08-24 NOTE — PLAN OF CARE
Blood sugar 42, juice and 50% dextrose iv given.     carvedilol , entresto and spironolactone as usual in am

## 2024-06-17 PROBLEM — Z76.89 HEALTH CARE HOME: Status: RESOLVED | Noted: 2017-01-25 | Resolved: 2024-06-17

## 2025-01-12 NOTE — TELEPHONE ENCOUNTER
Left message that the written RX is ready at the [facility]  registration to be picked up.      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  Hypoglycemia and hypothermia also noted on previous presentations.  Family is concerned about seizure activity.  None observed while in the emergency department.  Tox screen positive for THC.  Patient awakens to verbal stimuli and moves all extremities without neurologic deficits.  Predominant symptom is sleepiness and disorientation.    Serial neuroexams  Delirium precautions  Blood pressure control to maintain systolic blood pressure 160-180  Maintain blood glucose 80-1 40  Neurology consultation  Seizure precautions  Spot EEG  Check TSH and ammonia levels  Check thiamine

## (undated) RX ORDER — AZITHROMYCIN 500 MG/5ML
INJECTION, POWDER, LYOPHILIZED, FOR SOLUTION INTRAVENOUS
Status: DISPENSED
Start: 2019-01-01

## (undated) RX ORDER — LIDOCAINE HYDROCHLORIDE AND EPINEPHRINE 10; 10 MG/ML; UG/ML
INJECTION, SOLUTION INFILTRATION; PERINEURAL
Status: DISPENSED
Start: 2019-01-01

## (undated) RX ORDER — FUROSEMIDE 10 MG/ML
INJECTION INTRAMUSCULAR; INTRAVENOUS
Status: DISPENSED
Start: 2019-01-01

## (undated) RX ORDER — SODIUM CHLORIDE 9 MG/ML
INJECTION, SOLUTION INTRAVENOUS
Status: DISPENSED
Start: 2019-01-01

## (undated) RX ORDER — CEFTRIAXONE SODIUM 2 G
VIAL (EA) INJECTION
Status: DISPENSED
Start: 2019-01-01

## (undated) RX ORDER — ACETAMINOPHEN 325 MG/1
TABLET ORAL
Status: DISPENSED
Start: 2019-01-01